# Patient Record
Sex: FEMALE | Race: WHITE | NOT HISPANIC OR LATINO | Employment: OTHER | URBAN - METROPOLITAN AREA
[De-identification: names, ages, dates, MRNs, and addresses within clinical notes are randomized per-mention and may not be internally consistent; named-entity substitution may affect disease eponyms.]

---

## 2019-04-18 ENCOUNTER — HOSPITAL ENCOUNTER (OUTPATIENT)
Dept: OBGYN | Facility: HOSPITAL | Age: 39
Discharge: HOME | End: 2019-04-18
Attending: OBSTETRICS & GYNECOLOGY | Admitting: OBSTETRICS & GYNECOLOGY

## 2019-04-18 ASSESSMENT — MIFFLIN-ST. JEOR: SCORE: 1172.57

## 2019-05-27 ENCOUNTER — HOSPITAL ENCOUNTER (OUTPATIENT)
Dept: OBGYN | Facility: HOSPITAL | Age: 39
Discharge: HOME | End: 2019-05-27
Attending: OBSTETRICS & GYNECOLOGY | Admitting: OBSTETRICS & GYNECOLOGY

## 2019-05-27 ENCOUNTER — HOSPITAL ENCOUNTER (OUTPATIENT)
Dept: OBGYN | Facility: HOSPITAL | Age: 39
Discharge: HOME | End: 2019-05-27

## 2019-06-02 ENCOUNTER — HOSPITAL ENCOUNTER (OUTPATIENT)
Dept: OBGYN | Facility: HOSPITAL | Age: 39
Discharge: HOME | End: 2019-06-02
Attending: OBSTETRICS & GYNECOLOGY | Admitting: OBSTETRICS & GYNECOLOGY

## 2019-06-02 LAB
ALBUMIN UR-MCNC: NEGATIVE MG/DL
APPEARANCE UR: ABNORMAL
BACTERIA #/AREA URNS HPF: ABNORMAL HPF
BILIRUB UR QL STRIP: NEGATIVE
COLOR UR AUTO: YELLOW
GLUCOSE UR STRIP-MCNC: NEGATIVE MG/DL
HGB UR QL STRIP: NEGATIVE
KETONES UR STRIP-MCNC: NEGATIVE MG/DL
LEUKOCYTE ESTERASE UR QL STRIP: ABNORMAL
MUCOUS THREADS #/AREA URNS LPF: ABNORMAL LPF
NITRATE UR QL: NEGATIVE
PH UR STRIP: 6 [PH] (ref 4.5–8)
RBC #/AREA URNS AUTO: ABNORMAL HPF
SP GR UR STRIP: 1.01 (ref 1–1.03)
SQUAMOUS #/AREA URNS AUTO: >100 LPF
UROBILINOGEN UR STRIP-ACNC: ABNORMAL
WBC #/AREA URNS AUTO: ABNORMAL HPF

## 2019-06-02 ASSESSMENT — MIFFLIN-ST. JEOR: SCORE: 1186.17

## 2019-06-03 LAB — BACTERIA SPEC CULT: NO GROWTH

## 2019-06-09 ENCOUNTER — HOSPITAL ENCOUNTER (OUTPATIENT)
Dept: OBGYN | Facility: HOSPITAL | Age: 39
Discharge: HOME | End: 2019-06-09
Attending: OBSTETRICS & GYNECOLOGY | Admitting: OBSTETRICS & GYNECOLOGY

## 2019-06-09 LAB
ALBUMIN UR-MCNC: ABNORMAL MG/DL
APPEARANCE UR: ABNORMAL
BACTERIA #/AREA URNS HPF: ABNORMAL HPF
BILIRUB UR QL STRIP: NEGATIVE
COLOR UR AUTO: YELLOW
GLUCOSE UR STRIP-MCNC: NEGATIVE MG/DL
HGB UR QL STRIP: ABNORMAL
KETONES UR STRIP-MCNC: ABNORMAL MG/DL
LEUKOCYTE ESTERASE UR QL STRIP: ABNORMAL
NITRATE UR QL: NEGATIVE
PH UR STRIP: 5.5 [PH] (ref 4.5–8)
RBC #/AREA URNS AUTO: ABNORMAL HPF
SP GR UR STRIP: 1.03 (ref 1–1.03)
SQUAMOUS #/AREA URNS AUTO: >100 LPF
UROBILINOGEN UR STRIP-ACNC: ABNORMAL
WBC #/AREA URNS AUTO: ABNORMAL HPF

## 2019-06-09 ASSESSMENT — MIFFLIN-ST. JEOR: SCORE: 1168.03

## 2019-06-10 LAB — BACTERIA SPEC CULT: NO GROWTH

## 2019-06-26 ENCOUNTER — HOSPITAL ENCOUNTER (OUTPATIENT)
Dept: OBGYN | Facility: HOSPITAL | Age: 39
Discharge: ANOTHER HOSPITAL | End: 2019-06-26
Attending: OBSTETRICS & GYNECOLOGY | Admitting: OBSTETRICS & GYNECOLOGY

## 2019-06-26 ASSESSMENT — MIFFLIN-ST. JEOR: SCORE: 1190.71

## 2019-07-05 ENCOUNTER — HOSPITAL ENCOUNTER (OUTPATIENT)
Dept: OBGYN | Facility: HOSPITAL | Age: 39
Discharge: HOME | End: 2019-07-06
Attending: OBSTETRICS & GYNECOLOGY | Admitting: OBSTETRICS & GYNECOLOGY

## 2019-07-05 LAB — RUPTURE OF FETAL MEMBRANES BY ROM PLUS: NEGATIVE

## 2019-07-05 ASSESSMENT — MIFFLIN-ST. JEOR: SCORE: 1227

## 2019-07-07 ENCOUNTER — ANESTHESIA - HEALTHEAST (OUTPATIENT)
Dept: OBGYN | Facility: HOSPITAL | Age: 39
End: 2019-07-07

## 2019-10-23 ENCOUNTER — RECORDS - HEALTHEAST (OUTPATIENT)
Dept: ADMINISTRATIVE | Facility: OTHER | Age: 39
End: 2019-10-23

## 2019-11-06 ENCOUNTER — HOSPITAL ENCOUNTER (OUTPATIENT)
Dept: MRI IMAGING | Facility: CLINIC | Age: 39
Discharge: HOME | End: 2019-11-06
Attending: INTERNAL MEDICINE

## 2019-11-06 DIAGNOSIS — R79.89 ABNORMAL LFTS: ICD-10-CM

## 2020-09-15 ENCOUNTER — TRANSFERRED RECORDS (OUTPATIENT)
Dept: HEALTH INFORMATION MANAGEMENT | Facility: CLINIC | Age: 40
End: 2020-09-15

## 2020-11-09 ENCOUNTER — HOSPITAL ENCOUNTER (OUTPATIENT)
Facility: CLINIC | Age: 40
End: 2020-11-09
Attending: INTERNAL MEDICINE | Admitting: INTERNAL MEDICINE
Payer: OTHER GOVERNMENT

## 2020-11-09 DIAGNOSIS — Z11.59 ENCOUNTER FOR SCREENING FOR OTHER VIRAL DISEASES: Primary | ICD-10-CM

## 2020-11-10 ENCOUNTER — AMBULATORY - HEALTHEAST (OUTPATIENT)
Dept: LAB | Facility: CLINIC | Age: 40
End: 2020-11-10

## 2020-11-10 DIAGNOSIS — Z11.59 ENCOUNTER FOR SCREENING FOR OTHER VIRAL DISEASES: ICD-10-CM

## 2020-11-12 ENCOUNTER — COMMUNICATION - HEALTHEAST (OUTPATIENT)
Dept: SCHEDULING | Facility: CLINIC | Age: 40
End: 2020-11-12

## 2020-11-12 RX ORDER — METRONIDAZOLE 500 MG/1
500 TABLET ORAL 2 TIMES DAILY
Status: ON HOLD | COMMUNITY
End: 2021-11-12

## 2020-11-12 RX ORDER — SULFAMETHOXAZOLE/TRIMETHOPRIM 800-160 MG
1 TABLET ORAL 2 TIMES DAILY
Status: ON HOLD | COMMUNITY
End: 2021-11-12

## 2020-11-12 RX ORDER — VANCOMYCIN HYDROCHLORIDE 125 MG/1
125 CAPSULE ORAL 2 TIMES DAILY
Status: ON HOLD | COMMUNITY
End: 2021-11-12

## 2020-11-12 RX ORDER — COLESEVELAM 180 1/1
1875 TABLET ORAL 2 TIMES DAILY WITH MEALS
Status: ON HOLD | COMMUNITY
End: 2021-11-12

## 2020-11-13 ENCOUNTER — HOSPITAL ENCOUNTER (OUTPATIENT)
Facility: CLINIC | Age: 40
Discharge: HOME | End: 2020-11-13
Attending: INTERNAL MEDICINE | Admitting: INTERNAL MEDICINE
Payer: OTHER GOVERNMENT

## 2020-11-13 ENCOUNTER — ANESTHESIA (OUTPATIENT)
Dept: SURGERY | Facility: CLINIC | Age: 40
End: 2020-11-13
Payer: OTHER GOVERNMENT

## 2020-11-13 ENCOUNTER — ANESTHESIA EVENT (OUTPATIENT)
Dept: SURGERY | Facility: CLINIC | Age: 40
End: 2020-11-13
Payer: OTHER GOVERNMENT

## 2020-11-13 VITALS
SYSTOLIC BLOOD PRESSURE: 99 MMHG | RESPIRATION RATE: 16 BRPM | BODY MASS INDEX: 20.44 KG/M2 | WEIGHT: 111.1 LBS | HEIGHT: 62 IN | OXYGEN SATURATION: 100 % | HEART RATE: 72 BPM | DIASTOLIC BLOOD PRESSURE: 56 MMHG | TEMPERATURE: 98 F

## 2020-11-13 LAB
ALBUMIN SERPL-MCNC: 3.5 G/DL (ref 3.4–5)
ALP SERPL-CCNC: 98 U/L (ref 40–150)
ALT SERPL W P-5'-P-CCNC: 59 U/L (ref 0–50)
AST SERPL W P-5'-P-CCNC: 23 U/L (ref 0–45)
BILIRUB DIRECT SERPL-MCNC: 0.1 MG/DL (ref 0–0.2)
BILIRUB SERPL-MCNC: 0.6 MG/DL (ref 0.2–1.3)
HCG UR QL: NEGATIVE
PROT SERPL-MCNC: 7 G/DL (ref 6.8–8.8)
UPPER EUS: NORMAL

## 2020-11-13 PROCEDURE — 88305 TISSUE EXAM BY PATHOLOGIST: CPT | Mod: 26 | Performed by: PATHOLOGY

## 2020-11-13 PROCEDURE — 250N000009 HC RX 250: Performed by: NURSE ANESTHETIST, CERTIFIED REGISTERED

## 2020-11-13 PROCEDURE — 999N000139 HC STATISTIC PRE-PROCEDURE ASSESSMENT II: Performed by: INTERNAL MEDICINE

## 2020-11-13 PROCEDURE — 36415 COLL VENOUS BLD VENIPUNCTURE: CPT | Performed by: INTERNAL MEDICINE

## 2020-11-13 PROCEDURE — 360N000015 HC SURGERY LEVEL 2 EA 15 ADDTL MIN: Performed by: INTERNAL MEDICINE

## 2020-11-13 PROCEDURE — 360N000014 HC SURGERY LEVEL 2 1ST 30 MIN: Performed by: INTERNAL MEDICINE

## 2020-11-13 PROCEDURE — 761N000001 HC RECOVERY PHASE 1 LEVEL 1 FIRST HR: Performed by: INTERNAL MEDICINE

## 2020-11-13 PROCEDURE — 370N000001 HC ANESTHESIA TECHNICAL FEE, 1ST 30 MIN: Performed by: INTERNAL MEDICINE

## 2020-11-13 PROCEDURE — 370N000002 HC ANESTHESIA TECHNICAL FEE, EACH ADDTL 15 MIN: Performed by: INTERNAL MEDICINE

## 2020-11-13 PROCEDURE — 258N000003 HC RX IP 258 OP 636: Performed by: ANESTHESIOLOGY

## 2020-11-13 PROCEDURE — 761N000007 HC RECOVERY PHASE 2 EACH 15 MINS: Performed by: INTERNAL MEDICINE

## 2020-11-13 PROCEDURE — 80076 HEPATIC FUNCTION PANEL: CPT | Performed by: INTERNAL MEDICINE

## 2020-11-13 PROCEDURE — 250N000011 HC RX IP 250 OP 636: Performed by: NURSE ANESTHETIST, CERTIFIED REGISTERED

## 2020-11-13 PROCEDURE — 81025 URINE PREGNANCY TEST: CPT | Performed by: ANESTHESIOLOGY

## 2020-11-13 PROCEDURE — 88305 TISSUE EXAM BY PATHOLOGIST: CPT | Mod: TC | Performed by: INTERNAL MEDICINE

## 2020-11-13 RX ORDER — SODIUM CHLORIDE, SODIUM LACTATE, POTASSIUM CHLORIDE, CALCIUM CHLORIDE 600; 310; 30; 20 MG/100ML; MG/100ML; MG/100ML; MG/100ML
INJECTION, SOLUTION INTRAVENOUS CONTINUOUS
Status: DISCONTINUED | OUTPATIENT
Start: 2020-11-13 | End: 2020-11-13 | Stop reason: HOSPADM

## 2020-11-13 RX ORDER — ONDANSETRON 2 MG/ML
4 INJECTION INTRAMUSCULAR; INTRAVENOUS EVERY 30 MIN PRN
Status: DISCONTINUED | OUTPATIENT
Start: 2020-11-13 | End: 2020-11-13 | Stop reason: HOSPADM

## 2020-11-13 RX ORDER — HYDRALAZINE HYDROCHLORIDE 20 MG/ML
2.5-5 INJECTION INTRAMUSCULAR; INTRAVENOUS EVERY 10 MIN PRN
Status: DISCONTINUED | OUTPATIENT
Start: 2020-11-13 | End: 2020-11-13 | Stop reason: HOSPADM

## 2020-11-13 RX ORDER — PROPOFOL 10 MG/ML
INJECTION, EMULSION INTRAVENOUS CONTINUOUS PRN
Status: DISCONTINUED | OUTPATIENT
Start: 2020-11-13 | End: 2020-11-13

## 2020-11-13 RX ORDER — INDOMETHACIN 50 MG/1
100 SUPPOSITORY RECTAL
Status: DISCONTINUED | OUTPATIENT
Start: 2020-11-13 | End: 2020-11-13 | Stop reason: HOSPADM

## 2020-11-13 RX ORDER — DEXAMETHASONE SODIUM PHOSPHATE 4 MG/ML
4 INJECTION, SOLUTION INTRA-ARTICULAR; INTRALESIONAL; INTRAMUSCULAR; INTRAVENOUS; SOFT TISSUE EVERY 10 MIN PRN
Status: DISCONTINUED | OUTPATIENT
Start: 2020-11-13 | End: 2020-11-13 | Stop reason: HOSPADM

## 2020-11-13 RX ORDER — HYDROMORPHONE HYDROCHLORIDE 1 MG/ML
.3-.5 INJECTION, SOLUTION INTRAMUSCULAR; INTRAVENOUS; SUBCUTANEOUS EVERY 10 MIN PRN
Status: DISCONTINUED | OUTPATIENT
Start: 2020-11-13 | End: 2020-11-13 | Stop reason: HOSPADM

## 2020-11-13 RX ORDER — LABETALOL HYDROCHLORIDE 5 MG/ML
10 INJECTION, SOLUTION INTRAVENOUS
Status: DISCONTINUED | OUTPATIENT
Start: 2020-11-13 | End: 2020-11-13 | Stop reason: HOSPADM

## 2020-11-13 RX ORDER — MEPERIDINE HYDROCHLORIDE 25 MG/ML
12.5 INJECTION INTRAMUSCULAR; INTRAVENOUS; SUBCUTANEOUS
Status: DISCONTINUED | OUTPATIENT
Start: 2020-11-13 | End: 2020-11-13 | Stop reason: HOSPADM

## 2020-11-13 RX ORDER — LIDOCAINE 40 MG/G
CREAM TOPICAL
Status: DISCONTINUED | OUTPATIENT
Start: 2020-11-13 | End: 2020-11-13 | Stop reason: HOSPADM

## 2020-11-13 RX ORDER — PROPOFOL 10 MG/ML
INJECTION, EMULSION INTRAVENOUS PRN
Status: DISCONTINUED | OUTPATIENT
Start: 2020-11-13 | End: 2020-11-13

## 2020-11-13 RX ORDER — ONDANSETRON 2 MG/ML
INJECTION INTRAMUSCULAR; INTRAVENOUS PRN
Status: DISCONTINUED | OUTPATIENT
Start: 2020-11-13 | End: 2020-11-13

## 2020-11-13 RX ORDER — NALOXONE HYDROCHLORIDE 0.4 MG/ML
.1-.4 INJECTION, SOLUTION INTRAMUSCULAR; INTRAVENOUS; SUBCUTANEOUS
Status: DISCONTINUED | OUTPATIENT
Start: 2020-11-13 | End: 2020-11-13 | Stop reason: HOSPADM

## 2020-11-13 RX ORDER — ONDANSETRON 4 MG/1
4 TABLET, ORALLY DISINTEGRATING ORAL EVERY 30 MIN PRN
Status: DISCONTINUED | OUTPATIENT
Start: 2020-11-13 | End: 2020-11-13 | Stop reason: HOSPADM

## 2020-11-13 RX ORDER — FENTANYL CITRATE 50 UG/ML
25-50 INJECTION, SOLUTION INTRAMUSCULAR; INTRAVENOUS
Status: DISCONTINUED | OUTPATIENT
Start: 2020-11-13 | End: 2020-11-13 | Stop reason: HOSPADM

## 2020-11-13 RX ORDER — LIDOCAINE HYDROCHLORIDE 20 MG/ML
INJECTION, SOLUTION INFILTRATION; PERINEURAL PRN
Status: DISCONTINUED | OUTPATIENT
Start: 2020-11-13 | End: 2020-11-13

## 2020-11-13 RX ORDER — FLUMAZENIL 0.1 MG/ML
0.2 INJECTION, SOLUTION INTRAVENOUS
Status: DISCONTINUED | OUTPATIENT
Start: 2020-11-13 | End: 2020-11-13 | Stop reason: HOSPADM

## 2020-11-13 RX ORDER — DEXAMETHASONE SODIUM PHOSPHATE 4 MG/ML
INJECTION, SOLUTION INTRA-ARTICULAR; INTRALESIONAL; INTRAMUSCULAR; INTRAVENOUS; SOFT TISSUE PRN
Status: DISCONTINUED | OUTPATIENT
Start: 2020-11-13 | End: 2020-11-13

## 2020-11-13 RX ADMIN — DEXAMETHASONE SODIUM PHOSPHATE 4 MG: 4 INJECTION, SOLUTION INTRA-ARTICULAR; INTRALESIONAL; INTRAMUSCULAR; INTRAVENOUS; SOFT TISSUE at 14:36

## 2020-11-13 RX ADMIN — MIDAZOLAM 2 MG: 1 INJECTION INTRAMUSCULAR; INTRAVENOUS at 14:14

## 2020-11-13 RX ADMIN — SUGAMMADEX 100 MG: 100 INJECTION, SOLUTION INTRAVENOUS at 14:50

## 2020-11-13 RX ADMIN — SODIUM CHLORIDE, POTASSIUM CHLORIDE, SODIUM LACTATE AND CALCIUM CHLORIDE: 600; 310; 30; 20 INJECTION, SOLUTION INTRAVENOUS at 14:14

## 2020-11-13 RX ADMIN — ROCURONIUM BROMIDE 30 MG: 10 INJECTION INTRAVENOUS at 14:19

## 2020-11-13 RX ADMIN — LIDOCAINE HYDROCHLORIDE 100 MG: 20 INJECTION, SOLUTION INFILTRATION; PERINEURAL at 14:19

## 2020-11-13 RX ADMIN — PROPOFOL 150 MCG/KG/MIN: 10 INJECTION, EMULSION INTRAVENOUS at 14:19

## 2020-11-13 RX ADMIN — PROPOFOL 150 MG: 10 INJECTION, EMULSION INTRAVENOUS at 14:19

## 2020-11-13 RX ADMIN — ONDANSETRON 4 MG: 2 INJECTION INTRAMUSCULAR; INTRAVENOUS at 14:36

## 2020-11-13 SDOH — HEALTH STABILITY: MENTAL HEALTH: HOW OFTEN DO YOU HAVE A DRINK CONTAINING ALCOHOL?: NEVER

## 2020-11-13 ASSESSMENT — LIFESTYLE VARIABLES: TOBACCO_USE: 0

## 2020-11-13 ASSESSMENT — MIFFLIN-ST. JEOR: SCORE: 1137.2

## 2020-11-13 NOTE — ANESTHESIA CARE TRANSFER NOTE
Patient: Alok Nino    Procedure(s):  ENDOSCOPIC ULTRASOUND, ESOPHAGOSCOPY / UPPER GASTROINTESTINAL TRACT with BIOPSY    Diagnosis: Abnormal results of liver function studies [R94.5]  Diagnosis Additional Information: No value filed.    Anesthesia Type:   General     Note:  Airway :Face Mask  Patient transferred to:PACU  Handoff Report: Identifed the Patient, Identified the Reponsible Provider, Reviewed the pertinent medical history, Discussed the surgical course, Reviewed Intra-OP anesthesia mangement and issues during anesthesia, Set expectations for post-procedure period and Allowed opportunity for questions and acknowledgement of understanding      Vitals: (Last set prior to Anesthesia Care Transfer)    CRNA VITALS  11/13/2020 1428 - 11/13/2020 1506      11/13/2020             Pulse:  78    Ht Rate:  77    SpO2:  99 %    Resp Rate (set):  10                Electronically Signed By: TACOS Montes CRNA  November 13, 2020  3:06 PM

## 2020-11-13 NOTE — DISCHARGE INSTRUCTIONS
Same Day Surgery Discharge Instructions for  Sedation and General Anesthesia       It's not unusual to feel dizzy, light-headed or faint for up to 24 hours after surgery or while taking pain medication.  If you have these symptoms: sit for a few minutes before standing and have someone assist you when you get up to walk or use the bathroom.      You should rest and relax for the next 24 hours. We recommend you make arrangements to have an adult stay with you for at least 24 hours after your discharge.  Avoid hazardous and strenuous activity.      DO NOT DRIVE any vehicle or operate mechanical equipment for 24 hours following the end of your surgery.  Even though you may feel normal, your reactions may be affected by the medication you have received.      Do not drink alcoholic beverages for 24 hours following surgery.       Slowly progress to your regular diet as you feel able. It's not unusual to feel nauseated and/or vomit after receiving anesthesia.  If you develop these symptoms, drink clear liquids (apple juice, ginger ale, broth, 7-up, etc. ) until you feel better.  If your nausea and vomiting persists for 24 hours, please notify your surgeon.        All narcotic pain medications, along with inactivity and anesthesia, can cause constipation. Drinking plenty of liquids and increasing fiber intake will help.      For any questions of a medical nature, call your surgeon.      Do not make important decisions for 24 hours.      If you had general anesthesia, you may have a sore throat for a couple of days related to the breathing tube used during surgery.  You may use Cepacol lozenges to help with this discomfort.  If it worsens or if you develop a fever, contact your surgeon.       If you feel your pain is not well managed with the pain medications prescribed by your surgeon, please contact your surgeon's office to let them know so they can address your concerns.       CoVid 19 Information    We want to give you  information regarding Covid. Please consult your primary care provider with any questions you might have.     Patient who have symptoms (cough, fever, or shortness of breath), need to isolate for 7 days from when symptoms started OR 72 hours after fever resolves (without fever reducing medications) AND improvement of respiratory symptoms (whichever is longer).      Isolate yourself at home (in own room/own bathroom if possible)    Do Not allow any visitors    Do Not go to work or school    Do Not go to [employer],  centers, shopping, or other public places.    Do Not shake hands.    Avoid close and intimate contact with others (hugging, kissing).    Follow CDC recommendations for household cleaning of frequently touched services.     After the initial 7 days, continue to isolate yourself from household members as much as possible. To continue decrease the risk of community spread and exposure, you and any members of your household should limit activities in public for 14 days after starting home isolation.     You can reference the following CDC link for helpful home isolation/care tips:  https://www.cdc.gov/coronavirus/2019-ncov/downloads/10Things.pdf    Protect Others:    Cover Your Mouth and Nose with a mask, disposable tissue or wash cloth to avoid spreading germs to others.    Wash your hands and face frequently with soap and water    Call Your Primary Doctor If: Breathing difficulty develops or you become worse.    For more information about COVID19 and options for caring for yourself at home, please visit the CDC website at https://www.cdc.gov/coronavirus/2019-ncov/about/steps-when-sick.html  For more options for care at [facility], please visit our website at [email or web address]    **If you have questions or concerns about your procedure,   call Dr. Garcia at [phone]**    Followup with primary GI Dr. Tolbert in 1-2 weeks.

## 2020-11-13 NOTE — OR NURSING
PreOp cares/orders now completed. Pt is now ready for scheduled Procedures.   No Family present w/Pt; Texting Service has been initiated.

## 2020-11-13 NOTE — ANESTHESIA PREPROCEDURE EVALUATION
Anesthesia Pre-Procedure Evaluation    Patient: Alok Nino   MRN: [id number] : 1980          Preoperative Diagnosis: Abnormal results of liver function studies [R94.5]    Procedure(s):  ENDOSCOPIC ULTRASOUND, ESOPHAGOSCOPY / UPPER GASTROINTESTINAL TRACT (GI)  ENDOSCOPIC RETROGRADE CHOLANGIOPANCREATOGRAPHY    Past Medical History:   Diagnosis Date     C. difficile enteritis      Chronic pain      Crohn disease (H)      Melanoma (H)      PONV (postoperative nausea and vomiting)      Past Surgical History:   Procedure Laterality Date     ABDOMEN SURGERY      3 for crohns dx     APPENDECTOMY       CHOLECYSTECTOMY       COLONOSCOPY       GYN SURGERY       RESECTION ILEOCECAL  2013    Procedure: RESECTION ILEOCECAL;  Laparotomy, Extensive Lysis of Adhesions, Stricture Plasty X2  Anesthesia general with block;  Surgeon: Pete Cartagena MD;  Location: [facility] OR       Anesthesia Evaluation     . Pt has had prior anesthetic.     History of anesthetic complications   - PONV        ROS/MED HX    ENT/Pulmonary:      (-) tobacco use   Neurologic:       Cardiovascular:        (-) hypertension   METS/Exercise Tolerance:     Hematologic:         Musculoskeletal:         GI/Hepatic:     (+) Inflammatory bowel disease, liver disease, Other GI/Hepatic s/p ileocecal resection     (-) GERD   Renal/Genitourinary:  - ROS Renal section negative       Endo:  - neg endo ROS       Psychiatric:         Infectious Disease:   (+) Other Infectious Disease c. difficile      Malignancy:         Other:    (+) H/O Chronic Pain,H/O chronic opiod use ,                         Physical Exam  Normal systems: cardiovascular, pulmonary and dental    Airway   Mallampati: II  TM distance: >3 FB  Neck ROM: full    Dental     Cardiovascular       Pulmonary             Lab Results   Component Value Date    WBC 8.6 2013    HGB 9.3 (L) 2013    HCT 29.4 (L) 2013     12/15/2013     2013    POTASSIUM 3.4 2013     "CHLORIDE 105 12/14/2013    CO2 27 12/14/2013    BUN 3 (L) 12/14/2013    CR 0.46 (L) 12/14/2013     (H) 12/14/2013    BLACK 8.0 (L) 12/14/2013    PHOS 3.1 12/07/2013    MAG 1.7 12/11/2013    ALBUMIN 3.6 (L) 11/19/2013    PROTTOTAL 6.9 11/19/2013    ALT 34 11/19/2013    AST 32 11/19/2013    ALKPHOS 56 11/19/2013    BILITOTAL 0.2 11/19/2013    AMYLASE 85 12/12/2013    HCG Negative 12/06/2013       Preop Vitals  BP Readings from Last 3 Encounters:   11/13/20 113/68   01/07/14 103/74   12/30/13 105/68    Pulse Readings from Last 3 Encounters:   01/07/14 90   12/30/13 95   12/23/13 85      Resp Readings from Last 3 Encounters:   11/13/20 16   12/16/13 18    SpO2 Readings from Last 3 Encounters:   11/13/20 99%   01/07/14 98%   12/30/13 100%      Temp Readings from Last 1 Encounters:   11/13/20 35.9  C (96.7  F) (Oral)    Ht Readings from Last 1 Encounters:   11/13/20 1.575 m (5' 2\")      Wt Readings from Last 1 Encounters:   11/13/20 50.4 kg (111 lb 1.6 oz)    Estimated body mass index is 20.32 kg/m  as calculated from the following:    Height as of this encounter: 1.575 m (5' 2\").    Weight as of this encounter: 50.4 kg (111 lb 1.6 oz).       Anesthesia Plan      History & Physical Review  History and physical reviewed and following examination; no interval change.    ASA Status:  2 .    NPO Status:  > 8 hours    Plan for General with Intravenous and Propofol induction. Maintenance will be TIVA.    PONV prophylaxis:  Ondansetron (or other 5HT-3) and Dexamethasone or Solumedrol         Postoperative Care  Postoperative pain management:  Multi-modal analgesia.      Consents  Anesthetic plan, risks, benefits and alternatives discussed with:  Patient..                 Sarah Hazel MD  "

## 2020-11-13 NOTE — ANESTHESIA POSTPROCEDURE EVALUATION
Patient: Alok Nino    Procedure(s):  ENDOSCOPIC ULTRASOUND, ESOPHAGOSCOPY / UPPER GASTROINTESTINAL TRACT with BIOPSY    Diagnosis:Abnormal results of liver function studies [R94.5]  Diagnosis Additional Information: No value filed.    Anesthesia Type:  General    Note:  Anesthesia Post Evaluation    Patient location during evaluation: PACU  Patient participation: Able to fully participate in evaluation  Level of consciousness: awake and alert  Pain management: adequate  Airway patency: patent  Cardiovascular status: acceptable  Respiratory status: acceptable and unassisted  Hydration status: acceptable  PONV: none             Last vitals:  Vitals:    11/13/20 1515 11/13/20 1530 11/13/20 1545   BP: 100/55 98/62 98/60   Pulse: 59 72    Resp: 16 14 20   Temp:      SpO2: 100% 100% 100%         Electronically Signed By: Sarah Hazel MD  November 13, 2020  3:47 PM

## 2020-11-13 NOTE — ANESTHESIA PROCEDURE NOTES
Airway   Date/Time: 11/13/2020 2:21 PM   Patient location during procedure: OR    Staff -   CRNA: Sb Medina APRN CRNA  Performed By: CRNA    Consent for Airway   Urgency: elective    Indications and Patient Condition  Indications for airway management: shukri-procedural  Induction type:intravenousMask difficulty assessment: 1 - vent by mask    Final Airway Details  Final airway type: endotracheal airway  Successful airway:ETT - single  Endotracheal Airway Details   ETT size (mm): 6.5  Cuffed: yes  Successful intubation technique: direct laryngoscopy  Grade View of Cords: 2  Adjucts: stylet  Secured with: pink tape    Post intubation assessment   Placement verified by: capnometry, equal breath sounds and chest rise   Number of attempts at approach: 1  Secured with:pink tape  Ease of procedure: easy  Dentition: Unchanged and Intact

## 2020-11-16 LAB — COPATH REPORT: NORMAL

## 2021-05-25 ENCOUNTER — RECORDS - HEALTHEAST (OUTPATIENT)
Dept: ADMINISTRATIVE | Facility: CLINIC | Age: 41
End: 2021-05-25

## 2021-05-26 ENCOUNTER — RECORDS - HEALTHEAST (OUTPATIENT)
Dept: ADMINISTRATIVE | Facility: CLINIC | Age: 41
End: 2021-05-26

## 2021-05-27 ENCOUNTER — RECORDS - HEALTHEAST (OUTPATIENT)
Dept: ADMINISTRATIVE | Facility: CLINIC | Age: 41
End: 2021-05-27

## 2021-05-27 NOTE — PROGRESS NOTES
Alok presents to L&D at 22w6d gestation with complaints of irregular uterine contractions the past few weeks that have worsened this past week. Denies LOF or vaginal bleeding. Douds on for evaluation and FHTs by Doppler. Vital signs obtained and Dr CHING Samuel informed of patient arrival. Here to see patient

## 2021-05-29 ENCOUNTER — RECORDS - HEALTHEAST (OUTPATIENT)
Dept: ADMINISTRATIVE | Facility: CLINIC | Age: 41
End: 2021-05-29

## 2021-05-29 NOTE — PROGRESS NOTES
Dr. Samuel here. EFM and BPP reviewed with him. He will discharge Alok home. She has a follow-up level 2 ultrasound on Thursday at [facility].

## 2021-05-29 NOTE — PROGRESS NOTES
5-27-19    S: Pt with at approx 27 weeks with U/S showing polyhydramnios. Pt with severe Crohn's, recently discharged following conservative treatment for SMO.     O: VSS   BPP 8/8. NST reactive for 27 weeks.     A: Reassuring fetal status.    P: MFM appointment Thursday. Disposition depending on results.    To Samuel MD

## 2021-05-29 NOTE — PROGRESS NOTES
Alok has returned from ultrasound and EFM applied. She has had multiple medical issues during this pregnancy including a bowel obstruction. She has had pre-term contractions and a shortened cervix. Dr. Samuel notified of US report and EFM strip. He is on his way here to see patient.

## 2021-05-29 NOTE — PROGRESS NOTES
[place] [facility] [place] told her to come in for a BPP and NST test this morning. Dr. Anderson called and confirmed this, orders placed.

## 2021-05-29 NOTE — PROGRESS NOTES
Pt presented to [facility] with c/o ctx q 5 minutes since around 0800 this morning. She denies that they are painful at this time. She also denies LOF or bleeding. Urine sample obtained for UA and pt placed on EFM. VSS, Category 1 FHR tracing with occ. Ctx, and no further concerns. Dr. Samuel at bedside to assess pt, review FHR strip, and SVE. Dr. Samuel noted pt's cervix to remain closed, and pt is ok to discharge to home at this time.

## 2021-05-29 NOTE — PROGRESS NOTES
Alok here with c/o cramping.  She denies vaginal bleeding or leaking of fluid.  EFM applied and Dr Samuel notified.  He is coming in to evaluate pt.  Urine sent for UA.

## 2021-05-30 NOTE — ANESTHESIA PROCEDURE NOTES
Epidural Block    Patient location during procedure: OB  Time Called: 7/7/2019 3:40 AM  Reason for Block:labor epidural  Staffing:  Performing  Anesthesiologist: Wally Waggoner MD  Preanesthetic Checklist  Completed: patient identified, risks, benefits, and alternatives discussed, timeout performed, consent obtained, airway assessed, oxygen available, suction available, emergency drugs available and hand hygiene performed  Procedure  Patient position: sitting  Prep: ChloraPrep and site prepped and draped  Patient monitoring: continuous pulse oximetry, heart rate and blood pressure  Approach: midline  Location: L3-L4  Injection technique: GAYATRI saline  Number of Attempts:1  Needle  Needle type: Vaibhav   Needle gauge: 18 G     Catheter in Space: 5  Assessment  Sensory level:  No complications

## 2021-05-30 NOTE — PROGRESS NOTES
100 mg vist oral given and pt discharged home with written instruction.  Pt walked out of the unit with .

## 2021-05-30 NOTE — ANESTHESIA POSTPROCEDURE EVALUATION
Patient: Alok Nino  * No procedures listed *  Anesthesia type: epidural    Patient location: Labor and Delivery  Last vitals: No vitals data found for the desired time range.    Post vital signs: stable  Level of consciousness: awake and responds to simple questions  Post-anesthesia pain: pain controlled  Post-anesthesia nausea and vomiting: no  Pulmonary: unassisted, return to baseline  Cardiovascular: stable and blood pressure at baseline  Hydration: adequate  Anesthetic events: no    QCDR Measures:  ASA# 11 - Debra-op Cardiac Arrest: ASA11B - Patient did NOT experience unanticipated cardiac arrest  ASA# 12 - Debra-op Mortality Rate: ASA12B - Patient did NOT die  ASA# 13 - PACU Re-Intubation Rate: NA - No ETT / LMA used for case  ASA# 10 - Composite Anes Safety: ASA10A - No serious adverse event    Additional Notes:

## 2021-05-30 NOTE — PROGRESS NOTES
2340, Pt negative rom plus result out and doctor Samuel updated.  2355,Dr. Samuel at bedside. Verbal order to give pt 100 mg vistril orally and discharge home obtained.

## 2021-05-30 NOTE — ANESTHESIA PREPROCEDURE EVALUATION
Anesthesia Evaluation      Patient summary reviewed   No history of anesthetic complications     Airway   Mallampati: I  Neck ROM: full   Pulmonary - negative ROS                          Cardiovascular - negative ROS   Neuro/Psych - negative ROS     Endo/Other    (+) pregnant     GI/Hepatic/Renal       Other findings: Hx of crohn's disease with bowel obstructions      Dental - normal exam                        Anesthesia Plan  Planned anesthetic: epidural    ASA 2     Anesthetic plan and risks discussed with: patient    Post-op plan: routine recovery

## 2021-05-31 ENCOUNTER — RECORDS - HEALTHEAST (OUTPATIENT)
Dept: ADMINISTRATIVE | Facility: CLINIC | Age: 41
End: 2021-05-31

## 2021-06-01 ENCOUNTER — RECORDS - HEALTHEAST (OUTPATIENT)
Dept: ADMINISTRATIVE | Facility: CLINIC | Age: 41
End: 2021-06-01

## 2021-06-02 ENCOUNTER — RECORDS - HEALTHEAST (OUTPATIENT)
Dept: ADMINISTRATIVE | Facility: CLINIC | Age: 41
End: 2021-06-02

## 2021-06-02 VITALS — HEIGHT: 62 IN | BODY MASS INDEX: 22.08 KG/M2 | WEIGHT: 120 LBS

## 2021-06-03 VITALS — HEIGHT: 62 IN | BODY MASS INDEX: 21.9 KG/M2 | WEIGHT: 119 LBS

## 2021-06-03 VITALS — BODY MASS INDEX: 24.29 KG/M2 | WEIGHT: 132 LBS | HEIGHT: 62 IN

## 2021-06-03 VITALS — HEIGHT: 62 IN | WEIGHT: 124 LBS | BODY MASS INDEX: 22.82 KG/M2

## 2021-06-03 VITALS — WEIGHT: 123 LBS | BODY MASS INDEX: 22.63 KG/M2 | HEIGHT: 62 IN

## 2021-06-05 ENCOUNTER — RECORDS - HEALTHEAST (OUTPATIENT)
Dept: SCHEDULING | Facility: CLINIC | Age: 41
End: 2021-06-05

## 2021-06-05 DIAGNOSIS — K50.00 REGIONAL ENTERITIS OF SMALL INTESTINE (H): ICD-10-CM

## 2021-07-27 ENCOUNTER — TRANSFERRED RECORDS (OUTPATIENT)
Dept: HEALTH INFORMATION MANAGEMENT | Facility: CLINIC | Age: 41
End: 2021-07-27

## 2021-09-21 ENCOUNTER — MEDICAL CORRESPONDENCE (OUTPATIENT)
Dept: HEALTH INFORMATION MANAGEMENT | Facility: CLINIC | Age: 41
End: 2021-09-21

## 2021-09-21 ENCOUNTER — TRANSFERRED RECORDS (OUTPATIENT)
Dept: HEALTH INFORMATION MANAGEMENT | Facility: CLINIC | Age: 41
End: 2021-09-21

## 2021-09-22 ENCOUNTER — TRANSCRIBE ORDERS (OUTPATIENT)
Dept: OTHER | Age: 41
End: 2021-09-22

## 2021-09-22 DIAGNOSIS — K50.819 CROHN'S DISEASE OF BOTH SMALL AND LARGE INTESTINE WITH COMPLICATION (H): Primary | ICD-10-CM

## 2021-09-27 NOTE — PROGRESS NOTES
Colon and Rectal Surgery Clinic Note      RE: Alok Nino  : 1980  AURELIO: 10/5/2021    CHIEF COMPLAINT: Stricturing Crohn's disease    HPI: Alok is a 41 year old woman with long-standing Crohn's disease dating back to age 16, at which time she underwent an appendectomy at [facility].  Dr. Whitaker was consulted intraoperatively and the diagnosis of Crohn's disease was made.  The patient has had multiple operations since that time.  In , Dr. Le Alaniz performed 10 strictureplasties.  In , Dr. Jeffers assumed the patient's care.  She performed a lysis of adhesions, ileocolic resection in 3 separate small bowel resections at that time.  The patient was brought back to the operating room in  by Dr. Jeffers.  Again, an extensive lysis of adhesions was performed.  The patient was measured to have 180 cm of small bowel remaining.  A repeat ileocolic resection was performed and, estimating from the path report, approximately 30 cm of small bowel was resected, leaving her presumably with about 150 cm of small bowel. In  she underwent another exploratory laparotomy, lysis of adhesions, and 2 more stricturoplasties by Dr. Cartagena.  She additionally underwent a laparoscopic surgery at [facility] to free fallopian tube adhesions for fertility reasons and an open cholecystectomy.     She currently follows with Dr. Bruno Tolbert at [facility] and is on Humira, Azathioprine and budesonide. She has been on Remicade in the past. She has been having some increased abdominal pain over the last month which she describes as a sharp stabbing pain in the centre of her abdomen, more on the right than the left. This is worsened with meals. She has been maintaining a low residue diet and has had resolution of her pain by instituting this change in addition to starting the budesonide. She has not had any nausea nor vomiting. Her weight has been stable. She normally has 5-6 loose stools per day and  "these have increased in frequency to 10-15x per day over the last month.    She has had a recent colonoscopy which showed an ulcerated stricture at the ileocolic anastomosis. The lumen was measured to be 5-6mm at the time. She has had an MRE which shows a 4cm inflammatory stricture at the ileocolic anastomosis. There is a plan to switch her to Stelara, and she will be receiving her first dose today.        Recent colonoscopy on 7/27/2021 with Dr. Ramey showed worsening ulcerating and stricturing anastomosis with anastomotic stenosis 5-6 mm with linear ulceration and scope could not be advanced past this.     MRE 9/30/21:  IMPRESSION:  1.  Distal 4 cm terminal ileum with circumferential mural thickening and mild generalized enhancement but no intramural edema, diffusion restriction or proximal obstruction consistent with chronic Crohn's disease with minimal or no active inflammation.   2.  Marked chronic patulous dilatation of multiple segments of small bowel without signs of active inflammation unchanged.  3.  Colon not optimally visualized but no obvious inflammatory change is present.  4.  Mild uniform bile duct dilatation to the level of the ampulla with no stone, stricture or mass unchanged.     PMH: In addition to the above, she has history of melanoma and basal cell cancer      Physical examination:  Examination was chaperoned by Cinda Poon, CRS Fellow.     Vitals: /80 (BP Location: Left arm, Patient Position: Sitting, Cuff Size: Adult Regular)   Pulse 74   Temp 98.1  F (36.7  C) (Oral)   Ht 5' 2\"   Wt 115 lb   SpO2 98%   BMI 21.03 kg/m    BMI= Body mass index is 21.03 kg/m .  General: Appears well, no acute distress  Abdomen: Soft, non-tender, not distended. Multiple surgical scars were noted. She has a RUQ subcostal incision from her previous cholecystectomy. There is a transverse incision below the umbilicus from her multiple previous exploratory laparotomies. She additionally had an umbilical " port site incision and a lower midline laparotomy incision - she reports that this from a previous gynecologic procedure.    Laboratory data:    Recent Labs   Lab Test 11/13/20  1203 08/02/19  0519 07/31/19  1925 07/31/19  1915 06/29/19  0616 06/28/19  2157 11/19/13  0841   WBC  --   --  7.1  --    < >  --   --    HGB  --   --  11.4*  --    < >  --   --    PLT  --   --  236  --    < >  --   --    CR  --  0.58*  --    < >   < >  --   --    ALBUMIN 3.5  --   --   --    < >  --    < >   BILITOTAL 0.6  --   --   --    < >  --    < >   ALKPHOS 98  --   --   --    < >  --    < >   ALT 59*  --   --   --    < >  --    < >   AST 23  --   --   --    < >  --    < >   INR  --   --   --   --   --  1.20*  --     < > = values in this interval not displayed.       Assessment/plan:  41 year old lady with a long-standing history of stricturing Crohn's disease and multiple previous laparotomies, small bowel resections and stricturoplasties. Currently has 150cm of small bowel remaining. Now presenting with partial obstructive symptoms from a tight inflammatory stricture at her ileocolic anastomosis. We discussed management options going forward, which include ongoing medical management vs likely resection of the stricture and reanastomosis.There is a plan to switch her to Stelara to see if this helps resolve the local inflammation and obstructive symptoms, although, in speaking with her gastroenterologist this has a lower likelihood of success. Additionally, she would be at risk for an acute obstructive episode requiring urgent laparotomy, resection and likely stoma if medical management were to fail. An elective OR would likely involve another exploratory laparotomy, lysis of adhesions and limited resection of the strictured small bowel and previous anastomosis with a redo ileocolic anastomosis, although stricturoplasty may be an option. Risks of surgery, including but not limited to, bleeding, infection, injury to intraabdominal  structures, anastomotic leak, VTE and cardiopulmonary complications were discussed. Resection would also be complicated by her multiple previous abdominal procedures and relatively short length of small bowel remaining. However, in light of her 5-6mm stricture on endoscopy, partial obstructive symptoms, low likelihood of success with ongoing medical management and the risk of complete obstruction if medical management were to fail, we have advised resection at this time. She would like some more time to think about her options and we'll follow up with her in a few weeks time.      Cinda Poon MD  CRS Fellow      For details of past medical history, surgical history, family history, medications, allergies, and review of systems, please see details below.    Medical history:  Past Medical History:   Diagnosis Date     C. difficile enteritis      Chronic pain      Crohn disease (H)      Melanoma (H)      PONV (postoperative nausea and vomiting)        Surgical history:  Past Surgical History:   Procedure Laterality Date     ABDOMEN SURGERY      3 for crohns dx     APPENDECTOMY       APPENDECTOMY       CHOLECYSTECTOMY       CHOLECYSTECTOMY       COLONOSCOPY       ENDOSCOPIC ULTRASOUND UPPER GASTROINTESTINAL TRACT (GI) N/A 11/13/2020    Procedure: ENDOSCOPIC ULTRASOUND, ESOPHAGOSCOPY / UPPER GASTROINTESTINAL TRACT with BIOPSY;  Surgeon: Cydney Garcia MD;  Location: [facility] OR     GYN SURGERY       OTHER SURGICAL HISTORY      strictoplasty x3     OTHER SURGICAL HISTORY      adhesion removal x1     OTHER SURGICAL HISTORY      small bowel resections     PICC  5/10/2019          RESECTION ILEOCECAL  12/6/2013    Procedure: RESECTION ILEOCECAL;  Laparotomy, Extensive Lysis of Adhesions, Stricture Plasty X2  Anesthesia general with block;  Surgeon: Pete Cartagena MD;  Location: [facility]       Family history:  Family History   Problem Relation Age of Onset     Anesthesia Reaction No family hx of      Colon Cancer No  family hx of      Crohn's Disease No family hx of      Ulcerative Colitis No family hx of      Colon Polyps No family hx of      No Known Problems Mother      No Known Problems Father      No Known Problems Brother      No Known Problems Son        Medications:  Current Outpatient Medications   Medication Sig Dispense Refill     AZATHIOPRINE PO Take 125 mg by mouth daily       calcitRIOL (ROCALTROL) 0.25 MCG capsule Take 0.25 mcg by mouth three times a week       colesevelam (WELCHOL) 625 MG tablet Take 1,875 mg by mouth 2 times daily (with meals)       ustekinumab (STELARA) 45 MG/0.5ML SOSY Inject 45 mg Subcutaneous once Patient is starting on 10/5/21 and is unsure of dose. Getting off Humira.       vitamin D (ERGOCALCIFEROL) 20238 UNIT capsule Take 50,000 Units by mouth five times a week        vitamin E (TOCOPHEROL) 400 units (180 mg) capsule Take 400 Units by mouth daily       adalimumab (HUMIRA) 40 MG/0.8ML injection Inject 40 mg Subcutaneous every 7 days (Patient not taking: Reported on 10/5/2021)       metroNIDAZOLE (FLAGYL) 500 MG tablet Take 500 mg by mouth 2 times daily (Patient not taking: Reported on 10/5/2021)       rifaximin (XIFAXAN) 550 MG TABS tablet Take 200 mg by mouth 3 times daily (Patient not taking: Reported on 10/5/2021)       sulfamethoxazole-trimethoprim (BACTRIM DS) 800-160 MG tablet Take 1 tablet by mouth 2 times daily (Patient not taking: Reported on 10/5/2021)       vancomycin (VANCOCIN) 125 MG capsule Take 125 mg by mouth 2 times daily (Patient not taking: Reported on 10/5/2021)         Allergies:  The patientis allergic to carafate, codeine, and morphine hcl.    Social history:  Social History     Tobacco Use     Smoking status: Never Smoker     Smokeless tobacco: Never Used   Substance Use Topics     Alcohol use: Yes     Comment: occasional/ 1 drink per 1-3 month     Marital status: .    Review of Systems:  Nursing Notes:   Celia Eastman, EMT  10/5/2021  9:10 AM   "Signed  Chief Complaint   Patient presents with     Consult     Discuss surgery for Crohns       Vitals:    10/05/21 0902   BP: 128/80   BP Location: Left arm   Patient Position: Sitting   Cuff Size: Adult Regular   Pulse: 74   Temp: 98.1  F (36.7  C)   TempSrc: Oral   SpO2: 98%   Weight: 115 lb   Height: 5' 2\"       Body mass index is 21.03 kg/m .          Celai Eastman, EMT                  I saw and examined the patient, led the discussion and edited the resident note.  I agree with the assessment and plan as outlined. In brief, the patient is well-known to me from 2013, when I performed a 2-1/2-hour lysis of adhesions and to stricturoplasties for her recurrent Crohn's disease. She had 150 cm of small bowel remaining at that time. She has had no procedures since and has done well until relatively recently, when she again began to experience abdominal cramping, borborygmi, and loud \"whooshing\" noises from her intestines. She has been on maximal medical therapy under the care of Dr. Bruno Tolbert which includes Humira, azathioprine, and budesonide. She had previously failed Remicade. MR enterogram shows a 4 cm inflammatory stricture at the ileocolic anastomosis and anatomic irregularity of the remaining small bowel, where she has had numerous prior operations including multiple stricturoplasties. She is to have her first Stelara infusion today. I discussed the case by telephone with Dr. Tolbert today, and he feels that there is only about a 20% chance that the Stelara will be effective. Alok has been maintaining herself on a low residue diet and avoiding vegetables of all kinds. We had a detailed discussion about the risks associated with surgery, including but not limited to bleeding, infection, DVT/PE, anastomotic failure with its potential severe consequences, and loss of additional small bowel that could lead now or later to short gut syndrome. Given that Alok is symptomatic and managing her stricture with " significant dietary restriction, I think the best course is probably to proceed with exploration and a short ileocolic resection versus repeat stricturoplasty. I am concerned about the possibility that obstruction will supervene if we continue conservative management which could lead to an urgent operation and, under the circumstances, an ileostomy. Dr. Tolbert is in agreement with this, though he and I agree that it would not be entirely unreasonable for Alok to continue on with a limited diet as she is doing to give the Stelara an opportunity to work. This trial would probably take at least 2 months to have any chance of succeeding, and as noted the risk of obstruction would be real. Alok wants to think all this over which I think is perfectly reasonable. She will touch base in the next few weeks and let me know how she wants to proceed.    I answered all of Alok's questions to her stated satisfaction. She expressed her understanding and agreement.        60 minutes spent on the date of the encounter doing chart review, history and exam, documentation, counseling, review of imaging with the radiologist, care coordination with Dr. Tolbert, and further activities as noted above.     Pete Cartagena MD   Professor and Chief  Division of Colon and Rectal Surgery  [facility]      Referring Provider:  No referring provider defined for this encounter.     Primary Care Provider:  Nakita Odonnell    This note was created using speech recognition software and may contain unintended word substitutions.

## 2021-09-30 ENCOUNTER — PRE VISIT (OUTPATIENT)
Dept: SURGERY | Facility: CLINIC | Age: 41
End: 2021-09-30

## 2021-09-30 ENCOUNTER — HOSPITAL ENCOUNTER (OUTPATIENT)
Dept: MRI IMAGING | Facility: CLINIC | Age: 41
Discharge: HOME | End: 2021-09-30
Attending: INTERNAL MEDICINE | Admitting: INTERNAL MEDICINE
Payer: OTHER GOVERNMENT

## 2021-09-30 DIAGNOSIS — K50.818 CROHN'S DISEASE OF BOTH SMALL AND LARGE INTESTINE WITH OTHER COMPLICATION (H): ICD-10-CM

## 2021-09-30 PROCEDURE — 74183 MRI ABD W/O CNTR FLWD CNTR: CPT

## 2021-09-30 PROCEDURE — 250N000011 HC RX IP 250 OP 636: Performed by: INTERNAL MEDICINE

## 2021-09-30 PROCEDURE — A9585 GADOBUTROL INJECTION: HCPCS | Performed by: INTERNAL MEDICINE

## 2021-09-30 PROCEDURE — 255N000002 HC RX 255 OP 636: Performed by: INTERNAL MEDICINE

## 2021-09-30 RX ORDER — GADOBUTROL 604.72 MG/ML
5 INJECTION INTRAVENOUS ONCE
Status: COMPLETED | OUTPATIENT
Start: 2021-09-30 | End: 2021-09-30

## 2021-09-30 RX ADMIN — GADOBUTROL 5 ML: 604.72 INJECTION INTRAVENOUS at 10:01

## 2021-09-30 RX ADMIN — GLUCAGON HYDROCHLORIDE 0.5 MG: KIT at 08:13

## 2021-09-30 RX ADMIN — GLUCAGON HYDROCHLORIDE 0.5 MG: KIT at 08:44

## 2021-10-01 NOTE — TELEPHONE ENCOUNTER
RECORDS RECEIVED FROM: External   Appt date: 10/05/2021   NOTES STATUS DETAILS   OFFICE NOTE from referring provider  Received 09/22/2021 -- Jose Tolbert MD -- [facility], P: [phone]  F: [phone]   OFFICE NOTE from other specialist   Internal 09/30/2021 -- Jose Tolbert MD -- [facility]     DISCHARGE SUMMARY from hospital  Internal 05/07/2019 -- Johnny Thurman MD -- [facility]     DISCHARGE REPORT from the ER N/A    OPERATIVE REPORT  Care Everywhere 2015   MEDICATION LIST Internal    LABS     PFC TESTING N/A    ANAL PAP N/A    BIOPSIES/PATHOLOGY RELATED TO DIAGNOSIS Internal 11/13/2020 -- [facility]    DIAGNOSTIC PROCEDURES     COLONOSCOPY Pending 07/27/2021 [facility]   UPPER ENDOSCOPY (EGD) Internal 11/13/2020     FLEX SIGMOIDOSCOPY  N/A    ERCP N/A    IMAGING (DISC & REPORT)      CT  N/A    MRI Internal 11/06/2019, 05/13/2019, 05/07/2019, more in Epic -- MR Abdomen  09/30/2021 -- MR ENtrography   XRAY N/A    ULTRASOUND (ENDOANAL/ENDORECTAL) Internal 06/26/2019 -- US Abdomen     Action 10/01/2021 JTV 5:02pm   Action Taken CSS sent an urgent request to [facility] for colonoscopy report from 07/27/2021. Update Nurse Pichardo once records have been received.     Action 10/05/2021 JTV 10:14am   Action Taken [facility] called [facility] for Colonoscopy report. [facility] spoke to Margaret. Margaret confirmed that Colonoscopy report will be faxed over. [facility] received colonoscopy report. Records was sent to Nurse Pichardo.

## 2021-10-05 ENCOUNTER — OFFICE VISIT (OUTPATIENT)
Dept: SURGERY | Facility: CLINIC | Age: 41
End: 2021-10-05
Payer: OTHER GOVERNMENT

## 2021-10-05 VITALS
DIASTOLIC BLOOD PRESSURE: 80 MMHG | BODY MASS INDEX: 21.16 KG/M2 | HEIGHT: 62 IN | WEIGHT: 115 LBS | OXYGEN SATURATION: 98 % | SYSTOLIC BLOOD PRESSURE: 128 MMHG | HEART RATE: 74 BPM | TEMPERATURE: 98.1 F

## 2021-10-05 DIAGNOSIS — K50.012 CROHN'S DISEASE OF SMALL INTESTINE WITH INTESTINAL OBSTRUCTION (H): Primary | ICD-10-CM

## 2021-10-05 PROCEDURE — 99205 OFFICE O/P NEW HI 60 MIN: CPT | Performed by: COLON & RECTAL SURGERY

## 2021-10-05 RX ORDER — CALCITRIOL 0.25 UG/1
0.25 CAPSULE, LIQUID FILLED ORAL
COMMUNITY
End: 2023-08-22

## 2021-10-05 RX ORDER — VITAMIN E 268 MG
400 CAPSULE ORAL EVERY EVENING
COMMUNITY
End: 2023-08-22

## 2021-10-05 ASSESSMENT — MIFFLIN-ST. JEOR: SCORE: 1149.89

## 2021-10-05 ASSESSMENT — PAIN SCALES - GENERAL: PAINLEVEL: NO PAIN (0)

## 2021-10-05 NOTE — LETTER
10/5/2021       RE: Alok Nino  [address]  [facility] [address]     Dear Colleague,    Thank you for referring your patient, Alok Nino, to the [facility] COLON AND RECTAL SURGERY CLINIC [place] at [facility]. Please see a copy of my visit note below.    Colon and Rectal Surgery Clinic Note      RE: Alok Nino  : 1980  AURELIO: 10/5/2021    CHIEF COMPLAINT: Stricturing Crohn's disease    HPI: Alok is a 41 year old woman with long-standing Crohn's disease dating back to age 16, at which time she underwent an appendectomy at [facility].  Dr. Whitaker was consulted intraoperatively and the diagnosis of Crohn's disease was made.  The patient has had multiple operations since that time.  In , Dr. Le Alaniz performed 10 strictureplasties.  In , Dr. Jeffers assumed the patient's care.  She performed a lysis of adhesions, ileocolic resection in 3 separate small bowel resections at that time.  The patient was brought back to the operating room in  by Dr. Jeffers.  Again, an extensive lysis of adhesions was performed.  The patient was measured to have 180 cm of small bowel remaining.  A repeat ileocolic resection was performed and, estimating from the path report, approximately 30 cm of small bowel was resected, leaving her presumably with about 150 cm of small bowel. In  she underwent another exploratory laparotomy, lysis of adhesions, and 2 more stricturoplasties by Dr. Cartagena.  She additionally underwent a laparoscopic surgery at [facility] to free fallopian tube adhesions for fertility reasons and an open cholecystectomy.     She currently follows with Dr. Bruno Tolbert at [facility] and is on Humira, Azathioprine and budesonide. She has been on Remicade in the past. She has been having some increased abdominal pain over the last month which she describes as a sharp stabbing pain in the centre of her  "abdomen, more on the right than the left. This is worsened with meals. She has been maintaining a low residue diet and has had resolution of her pain by instituting this change in addition to starting the budesonide. She has not had any nausea nor vomiting. Her weight has been stable. She normally has 5-6 loose stools per day and these have increased in frequency to 10-15x per day over the last month.    She has had a recent colonoscopy which showed an ulcerated stricture at the ileocolic anastomosis. The lumen was measured to be 5-6mm at the time. She has had an MRE which shows a 4cm inflammatory stricture at the ileocolic anastomosis. There is a plan to switch her to Stelara, and she will be receiving her first dose today.        Recent colonoscopy on 7/27/2021 with Dr. Ramey showed worsening ulcerating and stricturing anastomosis with anastomotic stenosis 5-6 mm with linear ulceration and scope could not be advanced past this.     MRE 9/30/21:  IMPRESSION:  1.  Distal 4 cm terminal ileum with circumferential mural thickening and mild generalized enhancement but no intramural edema, diffusion restriction or proximal obstruction consistent with chronic Crohn's disease with minimal or no active inflammation.   2.  Marked chronic patulous dilatation of multiple segments of small bowel without signs of active inflammation unchanged.  3.  Colon not optimally visualized but no obvious inflammatory change is present.  4.  Mild uniform bile duct dilatation to the level of the ampulla with no stone, stricture or mass unchanged.     PMH: In addition to the above, she has history of melanoma and basal cell cancer      Physical examination:  Examination was chaperoned by Cinda Poon, CRS Fellow.     Vitals: /80 (BP Location: Left arm, Patient Position: Sitting, Cuff Size: Adult Regular)   Pulse 74   Temp 98.1  F (36.7  C) (Oral)   Ht 5' 2\"   Wt 115 lb   SpO2 98%   BMI 21.03 kg/m    BMI= Body mass index is 21.03 " kg/m .  General: Appears well, no acute distress  Abdomen: Soft, non-tender, not distended. Multiple surgical scars were noted. She has a RUQ subcostal incision from her previous cholecystectomy. There is a transverse incision below the umbilicus from her multiple previous exploratory laparotomies. She additionally had an umbilical port site incision and a lower midline laparotomy incision - she reports that this from a previous gynecologic procedure.    Laboratory data:    Recent Labs   Lab Test 11/13/20  1203 08/02/19  0519 07/31/19  1925 07/31/19  1915 06/29/19  0616 06/28/19  2157 11/19/13  0841   WBC  --   --  7.1  --    < >  --   --    HGB  --   --  11.4*  --    < >  --   --    PLT  --   --  236  --    < >  --   --    CR  --  0.58*  --    < >   < >  --   --    ALBUMIN 3.5  --   --   --    < >  --    < >   BILITOTAL 0.6  --   --   --    < >  --    < >   ALKPHOS 98  --   --   --    < >  --    < >   ALT 59*  --   --   --    < >  --    < >   AST 23  --   --   --    < >  --    < >   INR  --   --   --   --   --  1.20*  --     < > = values in this interval not displayed.       Assessment/plan:  41 year old lady with a long-standing history of stricturing Crohn's disease and multiple previous laparotomies, small bowel resections and stricturoplasties. Currently has 150cm of small bowel remaining. Now presenting with partial obstructive symptoms from a tight inflammatory stricture at her ileocolic anastomosis. We discussed management options going forward, which include ongoing medical management vs likely resection of the stricture and reanastomosis.There is a plan to switch her to Stelara to see if this helps resolve the local inflammation and obstructive symptoms, although, in speaking with her gastroenterologist this has a lower likelihood of success. Additionally, she would be at risk for an acute obstructive episode requiring urgent laparotomy, resection and likely stoma if medical management were to fail. An  elective OR would likely involve another exploratory laparotomy, lysis of adhesions and limited resection of the strictured small bowel and previous anastomosis with a redo ileocolic anastomosis, although stricturoplasty may be an option. Risks of surgery, including but not limited to, bleeding, infection, injury to intraabdominal structures, anastomotic leak, VTE and cardiopulmonary complications were discussed. Resection would also be complicated by her multiple previous abdominal procedures and relatively short length of small bowel remaining. However, in light of her 5-6mm stricture on endoscopy, partial obstructive symptoms, low likelihood of success with ongoing medical management and the risk of complete obstruction if medical management were to fail, we have advised resection at this time. She would like some more time to think about her options and we'll follow up with her in a few weeks time.      Cinda Poon MD  CRS Fellow      For details of past medical history, surgical history, family history, medications, allergies, and review of systems, please see details below.    Medical history:  Past Medical History:   Diagnosis Date     C. difficile enteritis      Chronic pain      Crohn disease (H)      Melanoma (H)      PONV (postoperative nausea and vomiting)        Surgical history:  Past Surgical History:   Procedure Laterality Date     ABDOMEN SURGERY      3 for crohns dx     APPENDECTOMY       APPENDECTOMY       CHOLECYSTECTOMY       CHOLECYSTECTOMY       COLONOSCOPY       ENDOSCOPIC ULTRASOUND UPPER GASTROINTESTINAL TRACT (GI) N/A 11/13/2020    Procedure: ENDOSCOPIC ULTRASOUND, ESOPHAGOSCOPY / UPPER GASTROINTESTINAL TRACT with BIOPSY;  Surgeon: Cydney Garcia MD;  Location: [facility] OR     GYN SURGERY       OTHER SURGICAL HISTORY      strictoplasty x3     OTHER SURGICAL HISTORY      adhesion removal x1     OTHER SURGICAL HISTORY      small bowel resections     [facility]  5/10/2019          RESECTION  ILEOCECAL  12/6/2013    Procedure: RESECTION ILEOCECAL;  Laparotomy, Extensive Lysis of Adhesions, Stricture Plasty X2  Anesthesia general with block;  Surgeon: Pete Cartagena MD;  Location:  OR       Family history:  Family History   Problem Relation Age of Onset     Anesthesia Reaction No family hx of      Colon Cancer No family hx of      Crohn's Disease No family hx of      Ulcerative Colitis No family hx of      Colon Polyps No family hx of      No Known Problems Mother      No Known Problems Father      No Known Problems Brother      No Known Problems Son        Medications:  Current Outpatient Medications   Medication Sig Dispense Refill     AZATHIOPRINE PO Take 125 mg by mouth daily       calcitRIOL (ROCALTROL) 0.25 MCG capsule Take 0.25 mcg by mouth three times a week       colesevelam (WELCHOL) 625 MG tablet Take 1,875 mg by mouth 2 times daily (with meals)       ustekinumab (STELARA) 45 MG/0.5ML SOSY Inject 45 mg Subcutaneous once Patient is starting on 10/5/21 and is unsure of dose. Getting off Humira.       vitamin D (ERGOCALCIFEROL) 55764 UNIT capsule Take 50,000 Units by mouth five times a week        vitamin E (TOCOPHEROL) 400 units (180 mg) capsule Take 400 Units by mouth daily       adalimumab (HUMIRA) 40 MG/0.8ML injection Inject 40 mg Subcutaneous every 7 days (Patient not taking: Reported on 10/5/2021)       metroNIDAZOLE (FLAGYL) 500 MG tablet Take 500 mg by mouth 2 times daily (Patient not taking: Reported on 10/5/2021)       rifaximin (XIFAXAN) 550 MG TABS tablet Take 200 mg by mouth 3 times daily (Patient not taking: Reported on 10/5/2021)       sulfamethoxazole-trimethoprim (BACTRIM DS) 800-160 MG tablet Take 1 tablet by mouth 2 times daily (Patient not taking: Reported on 10/5/2021)       vancomycin (VANCOCIN) 125 MG capsule Take 125 mg by mouth 2 times daily (Patient not taking: Reported on 10/5/2021)         Allergies:  The patientis allergic to carafate, codeine, and morphine  "hcl.    Social history:  Social History     Tobacco Use     Smoking status: Never Smoker     Smokeless tobacco: Never Used   Substance Use Topics     Alcohol use: Yes     Comment: occasional/ 1 drink per 1-3 month     Marital status: .    Review of Systems:  Nursing Notes:   Celia Eastman, EMT  10/5/2021  9:10 AM  Signed  Chief Complaint   Patient presents with     Consult     Discuss surgery for Crohns       Vitals:    10/05/21 0902   BP: 128/80   BP Location: Left arm   Patient Position: Sitting   Cuff Size: Adult Regular   Pulse: 74   Temp: 98.1  F (36.7  C)   TempSrc: Oral   SpO2: 98%   Weight: 115 lb   Height: 5' 2\"       Body mass index is 21.03 kg/m .          Celia Eastman, EMT                  I saw and examined the patient, led the discussion and edited the resident note.  I agree with the assessment and plan as outlined. In brief, the patient is well-known to me from 2013, when I performed a 2-1/2-hour lysis of adhesions and to stricturoplasties for her recurrent Crohn's disease. She had 150 cm of small bowel remaining at that time. She has had no procedures since and has done well until relatively recently, when she again began to experience abdominal cramping, borborygmi, and loud \"whooshing\" noises from her intestines. She has been on maximal medical therapy under the care of Dr. Bruno Tolbert which includes Humira, azathioprine, and budesonide. She had previously failed Remicade. MR enterogram shows a 4 cm inflammatory stricture at the ileocolic anastomosis and anatomic irregularity of the remaining small bowel, where she has had numerous prior operations including multiple stricturoplasties. She is to have her first Stelara infusion today. I discussed the case by telephone with Dr. Tolbert today, and he feels that there is only about a 20% chance that the Stelara will be effective. Alok has been maintaining herself on a low residue diet and avoiding vegetables of all kinds. We had a " detailed discussion about the risks associated with surgery, including but not limited to bleeding, infection, DVT/PE, anastomotic failure with its potential severe consequences, and loss of additional small bowel that could lead now or later to short gut syndrome. Given that Alok is symptomatic and managing her stricture with significant dietary restriction, I think the best course is probably to proceed with exploration and a short ileocolic resection versus repeat stricturoplasty. I am concerned about the possibility that obstruction will supervene if we continue conservative management which could lead to an urgent operation and, under the circumstances, an ileostomy. Dr. Tolbert is in agreement with this, though he and I agree that it would not be entirely unreasonable for Alok to continue on with a limited diet as she is doing to give the Stelara an opportunity to work. This trial would probably take at least 2 months to have any chance of succeeding, and as noted the risk of obstruction would be real. Alok wants to think all this over which I think is perfectly reasonable. She will touch base in the next few weeks and let me know how she wants to proceed.    I answered all of Alok's questions to her stated satisfaction. She expressed her understanding and agreement.        60 minutes spent on the date of the encounter doing chart review, history and exam, documentation, counseling, review of imaging with the radiologist, care coordination with Dr. Tolbert, and further activities as noted above.     Pete Cartagena MD   Professor and Chief  Division of Colon and Rectal Surgery  [facility]      Referring Provider:  No referring provider defined for this encounter.     Primary Care Provider:  Nkaita Odonnell    This note was created using speech recognition software and may contain unintended word substitutions.      Again, thank you for allowing me to participate in the  care of your patient.      Sincerely,    Pete Cartagena MD

## 2021-10-05 NOTE — NURSING NOTE
"Chief Complaint   Patient presents with     Consult     Discuss surgery for Crohns       Vitals:    10/05/21 0902   BP: 128/80   BP Location: Left arm   Patient Position: Sitting   Cuff Size: Adult Regular   Pulse: 74   Temp: 98.1  F (36.7  C)   TempSrc: Oral   SpO2: 98%   Weight: 115 lb   Height: 5' 2\"       Body mass index is 21.03 kg/m .          Celia Eastman, EMT                 "

## 2021-10-12 ENCOUNTER — TELEPHONE (OUTPATIENT)
Dept: SURGERY | Facility: CLINIC | Age: 41
End: 2021-10-12

## 2021-10-12 NOTE — TELEPHONE ENCOUNTER
Spoke with patient, she has decided to move forward with the surgery discussed with Dr. Cartagena in clinic last week.    I sent a message to Dr. Cartagena and RNSina Marie asking for a Case Request so that surgery can be scheduled.    I told patient I would call her back once I have a Case Request.

## 2021-10-13 DIAGNOSIS — K50.012 CROHN'S DISEASE OF SMALL INTESTINE WITH INTESTINAL OBSTRUCTION (H): Primary | ICD-10-CM

## 2021-10-13 RX ORDER — POLYETHYLENE GLYCOL 3350 17 G/17G
238 POWDER, FOR SOLUTION ORAL SEE ADMIN INSTRUCTIONS
Qty: 14 PACKET | Refills: 0 | Status: ON HOLD | OUTPATIENT
Start: 2021-10-13 | End: 2021-11-12

## 2021-10-13 RX ORDER — NEOMYCIN SULFATE 500 MG/1
1000 TABLET ORAL EVERY 6 HOURS
Qty: 6 TABLET | Refills: 0 | Status: ON HOLD | OUTPATIENT
Start: 2021-10-13 | End: 2021-11-12

## 2021-10-13 RX ORDER — METRONIDAZOLE 500 MG/1
500 TABLET ORAL EVERY 6 HOURS
Qty: 3 TABLET | Refills: 0 | Status: ON HOLD | OUTPATIENT
Start: 2021-10-13 | End: 2021-11-12

## 2021-10-13 RX ORDER — ONDANSETRON 4 MG/1
4 TABLET, FILM COATED ORAL EVERY 6 HOURS
Qty: 3 TABLET | Refills: 0 | Status: ON HOLD | OUTPATIENT
Start: 2021-10-13 | End: 2021-11-12

## 2021-10-14 NOTE — TELEPHONE ENCOUNTER
Tier 2 Case Request received to schedule:    Patient Name: Alok Nino   MRN: [id number]   Case#: [id number]   Surgeon(s) and Role:      * Pete Cartagena MD - Primary   Date requested: * No dates entered *   Location: [facility] OR   Procedure(s):   Exploratory laparotomy, ileocolic resection versus repeat stricturoplasty (N/A)   ILEOCOLECTOMY (N/A)     Additional Instructions for the Case  Multi-surgeon case:  no  Time in minutes:  180  Anesthesia: general  Prep: full prep with antibiotics   Pre-Op: PAC  Labs: yes  WOC: no  Special equipment: no  Special Instructions: no    Schedule with Nahomy Gandhi NP 2-3 weeks after surgery.  Schedule with Surgeon 3-4 weeks after Nahomy Gandhi NP    Will offer surgery date 12/1 or after

## 2021-10-18 ENCOUNTER — TELEPHONE (OUTPATIENT)
Dept: SURGERY | Facility: CLINIC | Age: 41
End: 2021-10-18

## 2021-10-18 NOTE — TELEPHONE ENCOUNTER
Spoke with patient via phone, completed the following scheduling, then sent Surgery Packet to patient via MyChart and Mail including related scheduling information and prep instructions:     Required: Yes, you will need a  18 years or older to drive you home from your procedure as well as stay with you for 24 hours after your procedure, if you are discharged the same day as your procedure.    Surgery: Exploratory laparotomy, ileocolic resection versus repeat stricturoplasty     Date: Wednesday December 1, 2021 Surgeon: Dr. Pete Cartagena    Time: You will receive a call 1-3 days prior to your surgery with your finalized surgery and arrival time.     Location:     [address])      [place] [address]      [phone]      [email or web address]     Upcoming Related Appointments:     Pre-operative History & Physical appointment:   Clinic appointment with Pre-operative Assessment Center (PAC): 11/17/21 at 10:30 AM                                                 [facility]                                                 [address] [place] [id number]  Pre-operative Lab appointment:    Lab draw appointment:    11/17/2021 at 12:00 PM                                                 [address] Lab                                                 [address] [place] [address]  Pre-operative COVID-19 Test:   Pre-procedure asymptomatic COVID PCR   Monday 11/29/2021 at 10:00 AM                                                 [facility]        [address], [address]         [facility] [id number]  Post operative appointment:  Clinic appointment with Nahomy Gandhi NP: 12/15/2021 at 11:30 AM                                                                    "   [facility]                                                                      [address] [place] [address]    Post operative appointment:  Clinic appointment with Dr. Pete Cartagena: Tuesday 1/11/2021 at 3:00 PM                                                                      [facility]                                                                      [address] [place] [address]    Pre-surgical Preparation:      MiraLAX/Gatorade, Magnesium Citrate, Antibiotics, Zofran  - see \"Day Before Surgery\"  - obtain medications and ingredients in advance  - if you have diabetes or blood sugar concerns, please use Gatorade ZERO or Low Sugar, as per recommendation by your physician    "

## 2021-10-18 NOTE — TELEPHONE ENCOUNTER
Health Call Center    Phone Message    May a detailed message be left on voicemail: yes     Reason for Call: Other: Alok is calling in asking for a call back. She states that she would like to proceed with scheduling surgery. She states that she had called the week of 10/11 to schedule, but was told that there was no order placed for the procedure. Please call back as soon as possible to discuss.     Action Taken: Message routed to:  [facility] ([facility] Colon/Rectal    Travel Screening: Not Applicable

## 2021-10-19 NOTE — TELEPHONE ENCOUNTER
FUTURE VISIT INFORMATION      SURGERY INFORMATION:    Date: 21    Location: [facility] OR    Surgeon:  Pete Cartagena MD    Anesthesia Type:  General    Procedure: Exploratory laparotomy, ileocolic resection versus repeat stricturoplasty    Consult: OV 10/5/21    RECORDS REQUESTED FROM:       Primary Care Provider: Nakita Odonnell NP    Most recent EKG+ Tracin21

## 2021-10-20 ENCOUNTER — TELEPHONE (OUTPATIENT)
Dept: SURGERY | Facility: CLINIC | Age: 41
End: 2021-10-20

## 2021-10-20 DIAGNOSIS — K50.012 CROHN'S DISEASE OF SMALL INTESTINE WITH INTESTINAL OBSTRUCTION (H): Primary | ICD-10-CM

## 2021-10-20 NOTE — TELEPHONE ENCOUNTER
[facility] Call Center    Phone Message    May a detailed message be left on voicemail: yes     Reason for Call: Other: Per pt was told to report any symptoms or any changes before her surgery with  in December. Per pt has loss 4 pounds in the last week and is having nausea and also always feeling full even thought she has not ate anything.       Action Taken: Message routed to:  [facility] ([facility] Colon and rec    Travel Screening: Not Applicable

## 2021-10-22 NOTE — TELEPHONE ENCOUNTER
Health Call Center    Phone Message    May a detailed message be left on voicemail: yes     Reason for Call: Other: Patient is calling in asking for a call back. She states that she had called on 10/20 and left a message for Dr. Cartagena about her weight loss, and reports that she has lost a total of 5 pounds this week now and is starting to get worried. Please call back as soon as possible to discuss.     Action Taken: Message routed to:  [facility] ([facility] Colon/Rectal    Travel Screening: Not Applicable

## 2021-10-22 NOTE — TELEPHONE ENCOUNTER
I checked in on patient who states she is 108lbs. Her surgery is in December. She has nausea that is relieved with zofran. She is not vomiting. She does not have abd pain or bloating. Only pain with palpitation. She is taking Premier protein. Will check in with nutritionist to see if there are any calorie dense liquid foods she can try. Will update Dr. Cartagena.     Orders for albumin and prealbumin. Change protein drinks to Ensure Complete drinks or Ensure Enlive.   Labs look good. Continue protein supplement drinks.

## 2021-10-27 ENCOUNTER — LAB (OUTPATIENT)
Dept: LAB | Facility: CLINIC | Age: 41
End: 2021-10-27
Payer: OTHER GOVERNMENT

## 2021-10-27 DIAGNOSIS — K50.012 CROHN'S DISEASE OF SMALL INTESTINE WITH INTESTINAL OBSTRUCTION (H): ICD-10-CM

## 2021-10-27 DIAGNOSIS — Z11.59 ENCOUNTER FOR SCREENING FOR OTHER VIRAL DISEASES: ICD-10-CM

## 2021-10-27 LAB
ALBUMIN SERPL-MCNC: 3.4 G/DL (ref 3.4–5)
ALP SERPL-CCNC: 78 U/L (ref 40–150)
ALT SERPL W P-5'-P-CCNC: 27 U/L (ref 0–50)
ANION GAP SERPL CALCULATED.3IONS-SCNC: 4 MMOL/L (ref 3–14)
AST SERPL W P-5'-P-CCNC: 13 U/L (ref 0–45)
BILIRUB SERPL-MCNC: 0.3 MG/DL (ref 0.2–1.3)
BUN SERPL-MCNC: 10 MG/DL (ref 7–30)
CALCIUM SERPL-MCNC: 9 MG/DL (ref 8.5–10.1)
CHLORIDE BLD-SCNC: 113 MMOL/L (ref 94–109)
CO2 SERPL-SCNC: 27 MMOL/L (ref 20–32)
CREAT SERPL-MCNC: 0.53 MG/DL (ref 0.52–1.04)
GFR SERPL CREATININE-BSD FRML MDRD: >90 ML/MIN/1.73M2
GLUCOSE BLD-MCNC: 108 MG/DL (ref 70–99)
POTASSIUM BLD-SCNC: 3.9 MMOL/L (ref 3.4–5.3)
PREALB SERPL IA-MCNC: 20 MG/DL (ref 15–45)
PROT SERPL-MCNC: 6.8 G/DL (ref 6.8–8.8)
SODIUM SERPL-SCNC: 144 MMOL/L (ref 133–144)

## 2021-10-27 PROCEDURE — 80053 COMPREHEN METABOLIC PANEL: CPT | Performed by: PATHOLOGY

## 2021-10-27 PROCEDURE — 36415 COLL VENOUS BLD VENIPUNCTURE: CPT | Performed by: PATHOLOGY

## 2021-10-27 PROCEDURE — 84134 ASSAY OF PREALBUMIN: CPT | Performed by: PATHOLOGY

## 2021-11-08 ENCOUNTER — TELEPHONE (OUTPATIENT)
Dept: SURGERY | Facility: CLINIC | Age: 41
End: 2021-11-08
Payer: OTHER GOVERNMENT

## 2021-11-08 DIAGNOSIS — K50.012 CROHN'S DISEASE OF SMALL INTESTINE WITH INTESTINAL OBSTRUCTION (H): Primary | ICD-10-CM

## 2021-11-08 DIAGNOSIS — R11.0 NAUSEA: ICD-10-CM

## 2021-11-08 DIAGNOSIS — R10.84 ABDOMINAL PAIN, GENERALIZED: ICD-10-CM

## 2021-11-08 NOTE — TELEPHONE ENCOUNTER
Patient is scheduled on 12/1/2021 with ex lap, ileocolic resection vs repeat stricturoplasty for stricturing Crohns disease.     Per patient she recently had gained back the weight she lost but then lost it again this week. She is having worse abd pain, 7/10. She is having abd bloating. Nausea comes and goes. No vomiting. Not eating a whole lot. She is worried every time she eats that she is going to get obstructed. She is having regular diarrhea bowel movements daily, about 10x per day, which is normal for her. She is on daily budesonide. Discussed with Dr. Cartagena. Plan for outpatient CT A/P. I told pt if she starts feeling worse she should have a low threshold to go to the ER.

## 2021-11-08 NOTE — TELEPHONE ENCOUNTER
[facility] Call Center    Phone Message    May a detailed message be left on voicemail: yes     Reason for Call: Symptoms or Concerns     If patient has red-flag symptoms, warm transfer to triage line    Current symptom or concern: Increased Pain and Bloating    Symptoms have been present for:  5 day(s)    Has patient previously been seen for this? Yes    By : Dr. Cartagena     Date: 11/08    Are there any new or worsening symptoms? Yes: Increased Pain and Bloating      Action Taken: Message routed to:  [facility] ([facility] Colon and Rectal    Travel Screening: Not Applicable

## 2021-11-09 ENCOUNTER — ANCILLARY PROCEDURE (OUTPATIENT)
Dept: CT IMAGING | Facility: CLINIC | Age: 41
End: 2021-11-09
Attending: COLON & RECTAL SURGERY
Payer: OTHER GOVERNMENT

## 2021-11-09 ENCOUNTER — HOSPITAL ENCOUNTER (INPATIENT)
Facility: CLINIC | Age: 41
LOS: 5 days | Discharge: HOME HEALTH | DRG: 386 | End: 2021-11-14
Attending: COLON & RECTAL SURGERY | Admitting: COLON & RECTAL SURGERY
Payer: OTHER GOVERNMENT

## 2021-11-09 ENCOUNTER — TELEPHONE (OUTPATIENT)
Dept: SURGERY | Facility: CLINIC | Age: 41
End: 2021-11-09

## 2021-11-09 DIAGNOSIS — R10.84 ABDOMINAL PAIN, GENERALIZED: ICD-10-CM

## 2021-11-09 DIAGNOSIS — R11.0 NAUSEA: ICD-10-CM

## 2021-11-09 DIAGNOSIS — Z11.59 ENCOUNTER FOR SCREENING FOR OTHER VIRAL DISEASES: ICD-10-CM

## 2021-11-09 DIAGNOSIS — K50.912 ACUTE CROHN'S DISEASE, WITH INTESTINAL OBSTRUCTION (H): Primary | ICD-10-CM

## 2021-11-09 DIAGNOSIS — K50.012 CROHN'S DISEASE OF SMALL INTESTINE WITH INTESTINAL OBSTRUCTION (H): ICD-10-CM

## 2021-11-09 DIAGNOSIS — K56.600 PARTIAL SMALL BOWEL OBSTRUCTION (H): ICD-10-CM

## 2021-11-09 LAB
ALBUMIN SERPL-MCNC: 3.7 G/DL (ref 3.4–5)
ALP SERPL-CCNC: 84 U/L (ref 40–150)
ALT SERPL W P-5'-P-CCNC: 23 U/L (ref 0–50)
ANION GAP SERPL CALCULATED.3IONS-SCNC: 5 MMOL/L (ref 3–14)
AST SERPL W P-5'-P-CCNC: 10 U/L (ref 0–45)
BILIRUB SERPL-MCNC: 0.5 MG/DL (ref 0.2–1.3)
BUN SERPL-MCNC: 17 MG/DL (ref 7–30)
CALCIUM SERPL-MCNC: 9 MG/DL (ref 8.5–10.1)
CHLORIDE BLD-SCNC: 105 MMOL/L (ref 94–109)
CO2 SERPL-SCNC: 27 MMOL/L (ref 20–32)
CREAT SERPL-MCNC: 0.62 MG/DL (ref 0.52–1.04)
CRP SERPL HS-MCNC: <0.2 MG/L
ERYTHROCYTE [DISTWIDTH] IN BLOOD BY AUTOMATED COUNT: 12.2 % (ref 10–15)
GFR SERPL CREATININE-BSD FRML MDRD: >90 ML/MIN/1.73M2
GLUCOSE BLD-MCNC: 91 MG/DL (ref 70–99)
HCT VFR BLD AUTO: 40.3 % (ref 35–47)
HGB BLD-MCNC: 13.2 G/DL (ref 11.7–15.7)
MAGNESIUM SERPL-MCNC: 2 MG/DL (ref 1.6–2.3)
MCH RBC QN AUTO: 31.7 PG (ref 26.5–33)
MCHC RBC AUTO-ENTMCNC: 32.8 G/DL (ref 31.5–36.5)
MCV RBC AUTO: 97 FL (ref 78–100)
PHOSPHATE SERPL-MCNC: 3.6 MG/DL (ref 2.5–4.5)
PLATELET # BLD AUTO: 333 10E3/UL (ref 150–450)
POTASSIUM BLD-SCNC: 3.8 MMOL/L (ref 3.4–5.3)
PROT SERPL-MCNC: 7.4 G/DL (ref 6.8–8.8)
RBC # BLD AUTO: 4.16 10E6/UL (ref 3.8–5.2)
SODIUM SERPL-SCNC: 137 MMOL/L (ref 133–144)
WBC # BLD AUTO: 8.6 10E3/UL (ref 4–11)

## 2021-11-09 PROCEDURE — 86141 C-REACTIVE PROTEIN HS: CPT

## 2021-11-09 PROCEDURE — 74177 CT ABD & PELVIS W/CONTRAST: CPT | Performed by: RADIOLOGY

## 2021-11-09 PROCEDURE — 250N000013 HC RX MED GY IP 250 OP 250 PS 637

## 2021-11-09 PROCEDURE — 258N000003 HC RX IP 258 OP 636

## 2021-11-09 PROCEDURE — 85027 COMPLETE CBC AUTOMATED: CPT

## 2021-11-09 PROCEDURE — 3E0436Z INTRODUCTION OF NUTRITIONAL SUBSTANCE INTO CENTRAL VEIN, PERCUTANEOUS APPROACH: ICD-10-PCS | Performed by: COLON & RECTAL SURGERY

## 2021-11-09 PROCEDURE — 84100 ASSAY OF PHOSPHORUS: CPT

## 2021-11-09 PROCEDURE — 80053 COMPREHEN METABOLIC PANEL: CPT

## 2021-11-09 PROCEDURE — 36415 COLL VENOUS BLD VENIPUNCTURE: CPT

## 2021-11-09 PROCEDURE — 999N000128 HC STATISTIC PERIPHERAL IV START W/O US GUIDANCE

## 2021-11-09 PROCEDURE — 84134 ASSAY OF PREALBUMIN: CPT

## 2021-11-09 PROCEDURE — 120N000002 HC R&B MED SURG/OB UMMC

## 2021-11-09 PROCEDURE — 83735 ASSAY OF MAGNESIUM: CPT

## 2021-11-09 RX ORDER — ONDANSETRON 2 MG/ML
4 INJECTION INTRAMUSCULAR; INTRAVENOUS EVERY 6 HOURS PRN
Status: DISCONTINUED | OUTPATIENT
Start: 2021-11-09 | End: 2021-11-14 | Stop reason: HOSPADM

## 2021-11-09 RX ORDER — ACETAMINOPHEN 325 MG/1
650 TABLET ORAL EVERY 4 HOURS PRN
Status: DISCONTINUED | OUTPATIENT
Start: 2021-11-09 | End: 2021-11-14 | Stop reason: HOSPADM

## 2021-11-09 RX ORDER — NALOXONE HYDROCHLORIDE 0.4 MG/ML
0.2 INJECTION, SOLUTION INTRAMUSCULAR; INTRAVENOUS; SUBCUTANEOUS
Status: DISCONTINUED | OUTPATIENT
Start: 2021-11-09 | End: 2021-11-14 | Stop reason: HOSPADM

## 2021-11-09 RX ORDER — PROCHLORPERAZINE MALEATE 5 MG
10 TABLET ORAL EVERY 6 HOURS PRN
Status: DISCONTINUED | OUTPATIENT
Start: 2021-11-09 | End: 2021-11-14 | Stop reason: HOSPADM

## 2021-11-09 RX ORDER — IOPAMIDOL 755 MG/ML
70 INJECTION, SOLUTION INTRAVASCULAR ONCE
Status: COMPLETED | OUTPATIENT
Start: 2021-11-09 | End: 2021-11-09

## 2021-11-09 RX ORDER — LIDOCAINE 40 MG/G
CREAM TOPICAL
Status: DISCONTINUED | OUTPATIENT
Start: 2021-11-09 | End: 2021-11-11

## 2021-11-09 RX ORDER — ONDANSETRON 4 MG/1
4 TABLET, ORALLY DISINTEGRATING ORAL EVERY 6 HOURS PRN
Status: DISCONTINUED | OUTPATIENT
Start: 2021-11-09 | End: 2021-11-14 | Stop reason: HOSPADM

## 2021-11-09 RX ORDER — LIDOCAINE 40 MG/G
CREAM TOPICAL
Status: DISCONTINUED | OUTPATIENT
Start: 2021-11-09 | End: 2021-11-14 | Stop reason: HOSPADM

## 2021-11-09 RX ORDER — NALOXONE HYDROCHLORIDE 0.4 MG/ML
0.4 INJECTION, SOLUTION INTRAMUSCULAR; INTRAVENOUS; SUBCUTANEOUS
Status: DISCONTINUED | OUTPATIENT
Start: 2021-11-09 | End: 2021-11-14 | Stop reason: HOSPADM

## 2021-11-09 RX ORDER — PROCHLORPERAZINE 25 MG
25 SUPPOSITORY, RECTAL RECTAL EVERY 12 HOURS PRN
Status: DISCONTINUED | OUTPATIENT
Start: 2021-11-09 | End: 2021-11-14 | Stop reason: HOSPADM

## 2021-11-09 RX ORDER — ONDANSETRON 2 MG/ML
4 INJECTION INTRAMUSCULAR; INTRAVENOUS EVERY 6 HOURS PRN
Status: DISCONTINUED | OUTPATIENT
Start: 2021-11-09 | End: 2021-11-09

## 2021-11-09 RX ORDER — SODIUM CHLORIDE, SODIUM LACTATE, POTASSIUM CHLORIDE, CALCIUM CHLORIDE 600; 310; 30; 20 MG/100ML; MG/100ML; MG/100ML; MG/100ML
INJECTION, SOLUTION INTRAVENOUS CONTINUOUS
Status: DISCONTINUED | OUTPATIENT
Start: 2021-11-09 | End: 2021-11-09

## 2021-11-09 RX ORDER — OXYCODONE HYDROCHLORIDE 5 MG/1
5 TABLET ORAL EVERY 4 HOURS PRN
Status: DISCONTINUED | OUTPATIENT
Start: 2021-11-09 | End: 2021-11-14 | Stop reason: HOSPADM

## 2021-11-09 RX ORDER — KETOROLAC TROMETHAMINE 15 MG/ML
15 INJECTION, SOLUTION INTRAMUSCULAR; INTRAVENOUS EVERY 6 HOURS PRN
Status: DISCONTINUED | OUTPATIENT
Start: 2021-11-09 | End: 2021-11-09

## 2021-11-09 RX ORDER — DEXTROSE MONOHYDRATE, SODIUM CHLORIDE, AND POTASSIUM CHLORIDE 50; 1.49; 4.5 G/1000ML; G/1000ML; G/1000ML
INJECTION, SOLUTION INTRAVENOUS CONTINUOUS
Status: DISCONTINUED | OUTPATIENT
Start: 2021-11-09 | End: 2021-11-11

## 2021-11-09 RX ADMIN — IOPAMIDOL 70 ML: 755 INJECTION, SOLUTION INTRAVASCULAR at 08:44

## 2021-11-09 RX ADMIN — DOXYLAMINE SUCCINATE 50 MG: 25 TABLET ORAL at 22:53

## 2021-11-09 RX ADMIN — POTASSIUM CHLORIDE, DEXTROSE MONOHYDRATE AND SODIUM CHLORIDE: 150; 5; 450 INJECTION, SOLUTION INTRAVENOUS at 22:15

## 2021-11-09 RX ADMIN — OXYCODONE HYDROCHLORIDE 5 MG: 5 TABLET ORAL at 22:54

## 2021-11-09 ASSESSMENT — ACTIVITIES OF DAILY LIVING (ADL)
ADLS_ACUITY_SCORE: 3
ADLS_ACUITY_SCORE: 12
ADLS_ACUITY_SCORE: 3

## 2021-11-09 NOTE — H&P
Colorectal Surgery Admission History and Physical     Alok Nino MRN# [id number]   YOB: 1980 Age: 41 year old      Date of Admission: 11/09/2021    CC: worsening abdominal pain, bloating, nausea    Assessment: 41 year old female with stricturing Crohn's disease, history of multiple laparotomies, small bowel resections, stricturoplasties. She has recently been having partial obstructive symptoms associated with a tight inflammatory stricture at her ileocolic anastomosis and was scheduled for exploratory laparotomy and ileocolic resection vs repeat stricturoplasty on 12/1/21 with Dr. Cartagena. Over the past couple of days she has been experiencing worsening abdominal pain, bloating, and nausea. Outpatient CT A/P today with evidence of slow transit, partial obstruction.     Plan:  - Admit to colorectal surgery  - Surgery date has been rescheduled to 11/17/21  - NPO, IVF  - PICC, TPN tomorrow  - CBC, CMP, Mag, Phos  - Prealbumin, CRP  - PTA Azathioprine and Doxylamine  - Acetaminophen, Oxycodone for pain    HPI: Alok is a 41 year old woman with  Crohn's disease for which she has had a significant number of operations, including >10 stricuroplasties, multiple laparotomies, small bowel resection, ileocolic resection.  Most recently 2013 ex lap, ANTHONY, stricturoplasty x2 with Dr. Cartagena.  She has been having increased abdominal pain over the last couple of months, worsened with meals. She has had a recent colonoscopy which showed an ulcerated stricture at the ileocolic anastomosis. The lumen was measured to be 5-6mm at the time. She has had an MRE which shows a 4cm inflammatory stricture at the ileocolic anastomosis. She was recently switched to Stelara, first dose 10/5. She was scheduled for ex lap, possible ileocolic resection vs repeat stricturoplasty on 12/1/21 with Dr. Cartagena.   She has lost 5-10 pounds over the past few weeks and is concerned about this. Over the past few days she has had worsening  abdominal pain, 7/10, bloating, intermittent nausea, no vomiting. She is having regular BM movements, diarrhea about 10x/day (usual for her). She has had limited PO intake due to fear that she will be obstructed every time she eats. She called the colorectal surgery clinic today to discuss her concerns and outpatient CT A/P was ordered, which was consistent with slow transit, functional partial obstruction.     Patient evaluated at the bedside after direct admission.  She continues to have 6 out of 10 diffuse abdominal pain, although no nausea or vomiting.  Denies other symptoms at this time.      Past Medical History:  Past Medical History:   Diagnosis Date     C. difficile enteritis      Chronic pain      Crohn disease (H)      Melanoma (H)      PONV (postoperative nausea and vomiting)      Past Surgical History:  Past Surgical History:   Procedure Laterality Date     ABDOMEN SURGERY      3 for crohns dx     APPENDECTOMY       APPENDECTOMY       CHOLECYSTECTOMY       CHOLECYSTECTOMY       COLONOSCOPY       ENDOSCOPIC ULTRASOUND UPPER GASTROINTESTINAL TRACT (GI) N/A 11/13/2020    Procedure: ENDOSCOPIC ULTRASOUND, ESOPHAGOSCOPY / UPPER GASTROINTESTINAL TRACT with BIOPSY;  Surgeon: Cydney Garcia MD;  Location: [facility] OR     GYN SURGERY       OTHER SURGICAL HISTORY      strictoplasty x3     OTHER SURGICAL HISTORY      adhesion removal x1     OTHER SURGICAL HISTORY      small bowel resections     PICC  5/10/2019          RESECTION ILEOCECAL  12/6/2013    Procedure: RESECTION ILEOCECAL;  Laparotomy, Extensive Lysis of Adhesions, Stricture Plasty X2  Anesthesia general with block;  Surgeon: Pete Cartagena MD;  Location: [facility] OR     Allergies:  Allergies   Allergen Reactions     Carafate Hives     Codeine Nausea and Vomiting     Morphine Hcl Other (See Comments)     SPASMS OF SPHINCTER OF ODDI  (BILIARY TRACT)     Medications:  [COMPLETED] iopamidol (ISOVUE-370) solution 70 mL  [COMPLETED] sodium chloride 0.9 % bag  "500mL for CT scan flush use    adalimumab (HUMIRA) 40 MG/0.8ML injection, Inject 40 mg Subcutaneous every 7 days (Patient not taking: Reported on 10/5/2021)  AZATHIOPRINE PO, Take 125 mg by mouth daily  calcitRIOL (ROCALTROL) 0.25 MCG capsule, Take 0.25 mcg by mouth three times a week  colesevelam (WELCHOL) 625 MG tablet, Take 1,875 mg by mouth 2 times daily (with meals)  metroNIDAZOLE (FLAGYL) 500 MG tablet, Take 1 tablet (500 mg) by mouth every 6 hours At 8:00 am, 2:00 pm, 8:00 pm the day prior to your surgery with neomycin and zofran.  metroNIDAZOLE (FLAGYL) 500 MG tablet, Take 500 mg by mouth 2 times daily (Patient not taking: Reported on 10/5/2021)  neomycin (MYCIFRADIN) 500 MG tablet, Take 2 tablets (1,000 mg) by mouth every 6 hours At 8:00 am, 2:00 pm, 8:00 pm the day prior to your surgery with flagyl and zofran.  ondansetron (ZOFRAN) 4 MG tablet, Take 1 tablet (4 mg) by mouth every 6 hours At 8:00 am, 2:00 pm, 8:00 pm the day prior to your surgery with neomycin and flagyl.  polyethylene glycol (MIRALAX) 17 g packet, Take 238 g by mouth See Admin Instructions Starting at 4 pm night prior to surgery. Refer to \"Getting Ready for Surgery\" instructions.  rifaximin (XIFAXAN) 550 MG TABS tablet, Take 200 mg by mouth 3 times daily (Patient not taking: Reported on 10/5/2021)  sulfamethoxazole-trimethoprim (BACTRIM DS) 800-160 MG tablet, Take 1 tablet by mouth 2 times daily (Patient not taking: Reported on 10/5/2021)  ustekinumab (STELARA) 45 MG/0.5ML SOSY, Inject 45 mg Subcutaneous once Patient is starting on 10/5/21 and is unsure of dose. Getting off Humira.  vancomycin (VANCOCIN) 125 MG capsule, Take 125 mg by mouth 2 times daily (Patient not taking: Reported on 10/5/2021)  vitamin D (ERGOCALCIFEROL) 32297 UNIT capsule, Take 50,000 Units by mouth five times a week   vitamin E (TOCOPHEROL) 400 units (180 mg) capsule, Take 400 Units by mouth daily      Medications prior to Admission:  No medications prior to " admission.     Social History:  Social History     Socioeconomic History     Marital status:      Spouse name: Not on file     Number of children: Not on file     Years of education: Not on file     Highest education level: Not on file   Occupational History     Not on file   Tobacco Use     Smoking status: Never Smoker     Smokeless tobacco: Never Used   Substance and Sexual Activity     Alcohol use: Yes     Comment: occasional/ 1 drink per 1-3 month     Drug use: No     Sexual activity: Not on file   Other Topics Concern     Parent/sibling w/ CABG, MI or angioplasty before 65F 55M? Not Asked   Social History Narrative     Not on file     Social Determinants of Health     Financial Resource Strain: Not on file   Food Insecurity: Not on file   Transportation Needs: Not on file   Physical Activity: Not on file   Stress: Not on file   Social Connections: Not on file   Intimate Partner Violence: Not on file   Housing Stability: Not on file     Family History:  Family History   Problem Relation Age of Onset     Anesthesia Reaction No family hx of      Colon Cancer No family hx of      Crohn's Disease No family hx of      Ulcerative Colitis No family hx of      Colon Polyps No family hx of      No Known Problems Mother      No Known Problems Father      No Known Problems Brother      No Known Problems Son      ROS:  The remainder of the complete ROS was negative unless noted in the HPI.    Exam:  There were no vitals taken for this visit.  General: Alert, interactive, NAD  Resp: Normal rate and work of breathing  Cardiac: Regular rate and rhythm, extremities warm and well perfused, capillary refill less than 2 seconds, 2+ bilateral radial pulses.  Abdomen: Soft, minimally tender throughout, mildly distended. No peritonitis, no rebound or guarding.  Extremities: No LE edema  Skin: Warm and dry, no jaundice or rash    Labs:  All laboratory and imaging data in the past 24 hours reviewed.  Recent Results (from the  past 168 hour(s))   CBC with platelets   Result Value Ref Range Status    WBC Count 8.6 4.0 - 11.0 10e3/uL Final    RBC Count 4.16 3.80 - 5.20 10e6/uL Final    Hemoglobin 13.2 11.7 - 15.7 g/dL Final    Hematocrit 40.3 35.0 - 47.0 % Final    MCV 97 78 - 100 fL Final    MCH 31.7 26.5 - 33.0 pg Final    MCHC 32.8 31.5 - 36.5 g/dL Final    RDW 12.2 10.0 - 15.0 % Final    Platelet Count 333 150 - 450 10e3/uL Final   C-Reactive Protein, High Sensitivity   Result Value Ref Range Status    C-Reactive Protein High Sensitivity <0.2 mg/L Final   Magnesium   Result Value Ref Range Status    Magnesium 2.0 1.6 - 2.3 mg/dL Final   Phosphorus   Result Value Ref Range Status    Phosphorus 3.6 2.5 - 4.5 mg/dL Final   Comprehensive metabolic panel   Result Value Ref Range Status    Sodium 137 133 - 144 mmol/L Final    Potassium 3.8 3.4 - 5.3 mmol/L Final    Chloride 105 94 - 109 mmol/L Final    Carbon Dioxide (CO2) 27 20 - 32 mmol/L Final    Anion Gap 5 3 - 14 mmol/L Final    Urea Nitrogen 17 7 - 30 mg/dL Final    Creatinine 0.62 0.52 - 1.04 mg/dL Final    Calcium 9.0 8.5 - 10.1 mg/dL Final    Glucose 91 70 - 99 mg/dL Final    Alkaline Phosphatase 84 40 - 150 U/L Final    AST 10 0 - 45 U/L Final    ALT 23 0 - 50 U/L Final    Protein Total 7.4 6.8 - 8.8 g/dL Final    Albumin 3.7 3.4 - 5.0 g/dL Final    Bilirubin Total 0.5 0.2 - 1.3 mg/dL Final    GFR Estimate >90 >60 mL/min/1.73m2 Final     *Note: Due to a large number of results and/or encounters for the requested time period, some results have not been displayed. A complete set of results can be found in Results Review.     Imaging: None    Discussed with Colorectal Surgery Fellow, Dr. Adams, who agrees with my assessment and plan.    Jefry Lao  PGY1 N Surgery  11/9/2021

## 2021-11-09 NOTE — TELEPHONE ENCOUNTER
Spoke with Dr. Cartagena's RNCC RUTH Pichardo, she states that Dr. Cartagena would like to move patient's surgery up to 11/17/2021 since a time slot opened up from a cancellation, and it would be better to do patient's surgery sooner. RUTH Pichardo, said that patient will be admitted the hospital in the meantime, but that she's not sure whether she'll stay inpatient until surgery date.    I let RUTH Pichardo, know that I rescheduled patient's surgery to 11/17/2021, and that I rescheduled PAC H&P, Labs, and COVID test to 11/16/2021 in-case patient discharges ahead of her surgery date. Otherwise, if patient is still inpatient until her surgery date, then COVID test, Labs, and H&P will all be done while inpatient.    RUTH Pichardo, updated inpatient team - ANA Whatley - of the above.    Ashly Salazar  Debra-op Coordinator  [facility]  Direct Phone: [phone]

## 2021-11-10 ENCOUNTER — HOME INFUSION (PRE-WILLOW HOME INFUSION) (OUTPATIENT)
Dept: PHARMACY | Facility: CLINIC | Age: 41
End: 2021-11-10

## 2021-11-10 LAB
B-HCG SERPL-ACNC: <1 IU/L (ref 0–5)
PREALB SERPL IA-MCNC: 22 MG/DL (ref 15–45)
SARS-COV-2 RNA RESP QL NAA+PROBE: NEGATIVE
TRIGL SERPL-MCNC: 30 MG/DL

## 2021-11-10 PROCEDURE — 250N000011 HC RX IP 250 OP 636: Performed by: PHYSICIAN ASSISTANT

## 2021-11-10 PROCEDURE — 250N000011 HC RX IP 250 OP 636

## 2021-11-10 PROCEDURE — C1751 CATH, INF, PER/CENT/MIDLINE: HCPCS

## 2021-11-10 PROCEDURE — 258N000003 HC RX IP 258 OP 636

## 2021-11-10 PROCEDURE — 84478 ASSAY OF TRIGLYCERIDES: CPT

## 2021-11-10 PROCEDURE — 250N000013 HC RX MED GY IP 250 OP 250 PS 637: Performed by: PHYSICIAN ASSISTANT

## 2021-11-10 PROCEDURE — 120N000002 HC R&B MED SURG/OB UMMC

## 2021-11-10 PROCEDURE — 83735 ASSAY OF MAGNESIUM: CPT | Performed by: COLON & RECTAL SURGERY

## 2021-11-10 PROCEDURE — 250N000013 HC RX MED GY IP 250 OP 250 PS 637

## 2021-11-10 PROCEDURE — 272N000504 HC NEEDLE CR4

## 2021-11-10 PROCEDURE — U0003 INFECTIOUS AGENT DETECTION BY NUCLEIC ACID (DNA OR RNA); SEVERE ACUTE RESPIRATORY SYNDROME CORONAVIRUS 2 (SARS-COV-2) (CORONAVIRUS DISEASE [COVID-19]), AMPLIFIED PROBE TECHNIQUE, MAKING USE OF HIGH THROUGHPUT TECHNOLOGIES AS DESCRIBED BY CMS-2020-01-R: HCPCS | Performed by: PHYSICIAN ASSISTANT

## 2021-11-10 PROCEDURE — 250N000012 HC RX MED GY IP 250 OP 636 PS 637

## 2021-11-10 PROCEDURE — 84702 CHORIONIC GONADOTROPIN TEST: CPT | Performed by: NURSE PRACTITIONER

## 2021-11-10 PROCEDURE — 36415 COLL VENOUS BLD VENIPUNCTURE: CPT | Performed by: NURSE PRACTITIONER

## 2021-11-10 RX ORDER — BUDESONIDE 3 MG/1
9 CAPSULE, COATED PELLETS ORAL EVERY MORNING
Status: DISCONTINUED | OUTPATIENT
Start: 2021-11-10 | End: 2021-11-11

## 2021-11-10 RX ORDER — HYDROMORPHONE HCL IN WATER/PF 6 MG/30 ML
0.2 PATIENT CONTROLLED ANALGESIA SYRINGE INTRAVENOUS
Status: DISCONTINUED | OUTPATIENT
Start: 2021-11-10 | End: 2021-11-14 | Stop reason: HOSPADM

## 2021-11-10 RX ORDER — BUDESONIDE 3 MG/1
9 CAPSULE, COATED PELLETS ORAL EVERY MORNING
Status: ON HOLD | COMMUNITY
End: 2021-11-12

## 2021-11-10 RX ADMIN — DOXYLAMINE SUCCINATE 50 MG: 25 TABLET ORAL at 22:00

## 2021-11-10 RX ADMIN — Medication 125 MG: at 08:33

## 2021-11-10 RX ADMIN — POTASSIUM CHLORIDE, DEXTROSE MONOHYDRATE AND SODIUM CHLORIDE: 150; 5; 450 INJECTION, SOLUTION INTRAVENOUS at 08:32

## 2021-11-10 RX ADMIN — POTASSIUM CHLORIDE, DEXTROSE MONOHYDRATE AND SODIUM CHLORIDE: 150; 5; 450 INJECTION, SOLUTION INTRAVENOUS at 18:37

## 2021-11-10 RX ADMIN — BUDESONIDE 9 MG: 3 CAPSULE ORAL at 13:57

## 2021-11-10 RX ADMIN — HYDROMORPHONE HYDROCHLORIDE 0.2 MG: 0.2 INJECTION, SOLUTION INTRAMUSCULAR; INTRAVENOUS; SUBCUTANEOUS at 11:02

## 2021-11-10 RX ADMIN — ENOXAPARIN SODIUM 40 MG: 40 INJECTION SUBCUTANEOUS at 20:02

## 2021-11-10 RX ADMIN — OXYCODONE HYDROCHLORIDE 5 MG: 5 TABLET ORAL at 22:05

## 2021-11-10 ASSESSMENT — ACTIVITIES OF DAILY LIVING (ADL)
ADLS_ACUITY_SCORE: 3
DEPENDENT_IADLS:: INDEPENDENT
ADLS_ACUITY_SCORE: 3

## 2021-11-10 ASSESSMENT — MIFFLIN-ST. JEOR: SCORE: 1137.64

## 2021-11-10 NOTE — PROGRESS NOTES
Admitted/transferred from: home, direct admit  2 RN full   skin assessment completed by Jesus Evans RN and Dora Vargas RN.  Skin assessment finding: skin intact, no problems   Interventions/actions: skin interventions continue to monitor for changes in integrity     Will continue to monitor.

## 2021-11-10 NOTE — PROGRESS NOTES
Unsuccessful RUE PICC attempt.  Was unable to thread catheter past subclavian vein.  Did not attempt LUE PICC, per patient she had a history of unsuccessful attempt in the left arm from another hospital.

## 2021-11-10 NOTE — PLAN OF CARE
AVSS. NPO except ice chips, denied nausea. Pain managed with IV Dilaudid, Oxycodone available. Up ad svetlana. Voiding spontaneously. Bowel sounds hypoactive. Passing gas, LBM 11/9 prior to admission. PICC placement attempted with [facility], however was unsuccessful. New IR order for PICC placement. PICC placement was delayed until COVID and HCG results - patient on the schedule for tomorrow. Up ad svetlana. PIV infusing.  at the bedside for a few hours. Continue with plan of care.

## 2021-11-10 NOTE — PROGRESS NOTES
CLINICAL NUTRITION SERVICES - ASSESSMENT NOTE     Nutrition Prescription    RECOMMENDATIONS FOR MDs/PROVIDERS TO ORDER:  Recommend discontinuing D5 upon initiation of TPN.    Malnutrition Status:    Patient does not meet two of the established criteria necessary for diagnosing malnutrition but is at risk for malnutrition    Recommendations already ordered by Registered Dietitian (RD):  Continue NPO  Ordered weight check  Ordered Triglycerides check    --Use dosing weight 51 kg  --Begin TPN, goal volume 1320 ml/day with initial 110g Dex daily (374 kcal, GIR1.5), 65g AA daily (260 kcal), and 250 ml 20% IV lipids 5x/week.  Micro/Rx: Infuvite + MTE5  --ONLY once pt receives ~100% of initial continuous PN volume with K+/Mg++/Phos WNL, advance PN dex by 55 g every 1-2 days (pending lytes/Glu and Pharm D/RD discretion) to initial goal of 220g Dex (748 kcal) to increase provisions to 1365 kcals (27 kcal/kg/day), 1.3 g PRO/kg/day, GIR 3.0 with 26% kcals from Fat.    Future/Additional Recommendations:   - Monitor bili, LFTs, TG weekly while on TPN. If total bili >2, recommend transition to SMOF lipids rather than intralipids and adjust MTE5 to only 2x/wk to prevent accumulation of copper and managanese.      REASON FOR ASSESSMENT  Alok Nino is a/an 41 year old female assessed by the dietitian for Admission Nutrition Risk Screen for positive and Pharmacy/Nutrition to Start and Manage PN (Central Line NOT in place (Line has been ordered, but not yet inserted).    MEDICAL HISTORY  Per H&P, pt has stricturing Crohn's disease, history of multiple laparotomies, small bowel resections, stricturoplasties. She has recently been having partial obstructive symptoms associated with a tight inflammatory stricture at her ileocolic anastomosis and was scheduled for exploratory laparotomy and ileocolic resection vs repeat stricturoplasty on 12/1/21 with Dr. Cartagena. Over the past couple of days she has been experiencing worsening  "abdominal pain, bloating, and nausea.     NUTRITION HISTORY  - Per H&P, Pt has lost 5-10 pounds over the past few weeks and is concerned about this. Over the past few days she has had worsening abdominal pain, 7/10, bloating, intermittent nausea, no vomiting. She is having regular BM movements, diarrhea about 10x/day (usual for her).  - Information obtained from pt:  - Prior to a couple of weeks ago, pt followed a regular diet of 3 meals per day + snacks.  - Pt experienced a week of nausea and abdominal pain, reduced intake, and a 5 pound wt loss. During this week, pt intake was 1-2 meals per day + 2 protein shakes per day.   - Pt felt better following week, consumed usual intake, and gained back 5 pounds.  - The following week, pt experienced more pain, nausea, reduced intake and 5# wt loss, so called MD. Pt intake again included 1-2 meals per day + 2 protein shakes per day.   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  - No BM today, but one yesterday.     CURRENT NUTRITION ORDERS  Diet: NPO  Intake/Tolerance: No intake since admit    LABS  K, Mg, Phos - WNL    MEDICATIONS  D5 + NaCL @ 100 mL/hr    ANTHROPOMETRICS  Height: 157.5 cm (5' 2\")    Most Recent Weight:    52.2 kg (115 lb) - 10/5/21  IBW: 50 kg  BMI: Normal BMI, BMI = 21 kg/m2  Weight History: 3# (3%) wt loss in past few weeks.     11/10/21 50.8 kg (112 lb) - per RD, taken with standing scale  Wt Readings from Last 10 Encounters:   10/05/21 52.2 kg (115 lb)   11/13/20 50.4 kg (111 lb 1.6 oz)   01/07/14 48.5 kg (107 lb)   12/30/13 47.6 kg (105 lb)   12/23/13 47.7 kg (105 lb 3.2 oz)   12/12/13 55.3 kg (122 lb)   11/19/13 52.6 kg (116 lb)   09/10/13 53.1 kg (117 lb)     Dosing Weight: 51 kg    ASSESSED NUTRITION NEEDS  Estimated Energy Needs: 6167-8964 kcals/day (25 - 30 kcals/kg)  Justification: Maintenance  Estimated Protein Needs: 60-75 grams protein/day (1.2 - 1.5 grams of pro/kg)  Justification: " Maintenance  Estimated Fluid Needs: (1 mL/kcal)   Justification: Maintenance    PHYSICAL FINDINGS  See malnutrition section below.    MALNUTRITION  % Intake: </= 50% for >/= 5 days (severe)  % Weight Loss: Weight loss does not meet criteria  Subcutaneous Fat Loss: None observed  Muscle Loss: None observed  Fluid Accumulation/Edema: None noted  Malnutrition Diagnosis: Patient does not meet two of the established criteria necessary for diagnosing malnutrition but is at risk for malnutrition    NUTRITION DIAGNOSIS  Inadequate oral intake related to GI distress as evidenced by pt report of intake </= 50% for >/= 5 days, 3# (3%) wt loss in past few weeks, and need for TPN to meet minimum nutritional needs.     INTERVENTIONS  Implementation  Nutrition Education: Provided education of role of RD in nutrition POC and discussed TPN.   Collaboration with other providers during 7C rounds  Parenteral Nutrition/IV Fluids - Initiate     Goals  Total avg nutritional intake to meet a minimum of 25 kcal/kg and 1.2 g PRO/kg daily (per dosing wt 51 kg).     Monitoring/Evaluation  Progress toward goals will be monitored and evaluated per protocol.    Ema Farley RD, LDN  Pg [phone]  Units covered: 7C (all beds) and 5A (beds 2831 through 5211-2)

## 2021-11-10 NOTE — PLAN OF CARE
Assumed cares from [id number], admitted from home. VSS on RA, A&Ox4. UAL, ambulating independently in halls. Reporting abdominal pain, PRN oxycodone administered x1. Reporting mild intermittent nausea, denied medication at this time. Currently NPO with ice chips and meds for bowel rest. Abdomen soft, tender, rounded. Bowel sounds hypoactive. Denied passing flatus or stool at this time. Voiding spontaneously without difficulty. PIV CDI and running MIVF per orders, plan to place PICC tomorrow. Still needing two nurse admission skin check. Continue with POC.

## 2021-11-10 NOTE — PLAN OF CARE
A&O, VSS, afebrile. Pain managed without intervention overnight. Aqua K pad ordered for pt use as needed. Independent in room. IVF per MAR. Voiding spontaneously. No BM overnight. Pt resting between cares. Remains NPO w/o c/o nausea.     Plan for PICC placement for TPN 11/10 and ileocolic resection potentially on 11/17

## 2021-11-10 NOTE — CONSULTS
Care Management Initial Consult    General Information  Assessment completed with: Patient,    Type of CM/SW Visit: Initial Assessment    Primary Care Provider verified and updated as needed: Yes   Readmission within the last 30 days: previous discharge plan unsuccessful   Return Category: Exacerbation of disease  Reason for Consult: discharge planning  Advance Care Planning: Advance Care Planning Reviewed: no concerns identified          Communication Assessment  Patient's communication style: spoken language (English or Bilingual)    Hearing Difficulty or Deaf: no   Wear Glasses or Blind: no    Cognitive  Cognitive/Neuro/Behavioral: WDL                      Living Environment:   People in home: spouse,child(isabelle), dependent     Current living Arrangements: house      Able to return to prior arrangements: yes       Family/Social Support:  Care provided by: self  Provides care for: no one  Marital Status:     Jose       Description of Support System: Supportive,Involved    Support Assessment: Adequate family and caregiver support,Adequate social supports    Current Resources:   Patient receiving home care services: No     Community Resources: None  Equipment currently used at home: none  Supplies currently used at home: None    Employment/Financial:  Employment Status:          Financial Concerns: insurance, none,No concerns identified           Lifestyle & Psychosocial Needs:  Social Determinants of Health     Tobacco Use: Low Risk      Smoking Tobacco Use: Never Smoker     Smokeless Tobacco Use: Never Used   Alcohol Use: Not At Risk     Frequency of Alcohol Consumption: Never     Average Number of Drinks: Not on file     Frequency of Binge Drinking: Not on file   Financial Resource Strain: Not on file   Food Insecurity: Not on file   Transportation Needs: Not on file   Physical Activity: Not on file   Stress: Not on file   Social Connections: Not on file   Intimate Partner Violence: Not on file    Depression: Not at risk     PHQ-2 Score: 0   Housing Stability: Not on file       Functional Status:  Prior to admission patient needed assistance:   Dependent ADLs:: Independent  Dependent IADLs:: Independent       Mental Health Status:  Mental Health Status: No Current Concerns       Chemical Dependency Status:                Values/Beliefs:  Spiritual, Cultural Beliefs, Adventism Practices, Values that affect care: no               Additional Information:  This writer called and spoke with Alok to discuss discharge planning. Alok is aware that she may need some Total Parenteral Nutrition when she is discharged. Alok states she has not had experience with this before and this writer expressed that an order was placed for patient learning center. Alok stated that someone had called her  and scheduled for Friday at 10AM already. She is needs a PICC like placed by IR. This writer discussed with her the process for benefit check for home infusion. Alko was agreeable to sending for benefit check to [facility].     RNCC will continue to follow for final recommendations and discharge planning.     Lynda Yost RN  Float RN Care Coordinator  Pager [phone] (unit RNCC pager)     For Weekend & Holiday on call RN Care Coordinator:  (Home discharge with needs including home care, Assisted living facility returns, Durable medical equip, IV antibiotics)   [place]    Pager [phone]  [facility] (Sunday Only) Pager [phone]    For weekend social work needs, contact information below:  (Transitional care unit, Long-term care unit, Hospice, Counseling, Domestic violence,Vulnerable adult, Health Care Directive)  4A, 4C, 4E, 5A, 5B  Pager [phone]  6A, 6B, 6C, 6D   Pager [phone]  7A, 7B, 7C, 7D, 5C  Pager [phone]  [facility] (Saturday Only) Pager [phone]    After hours for all units- (only  is available -[phone]/everyday)  Pager [phone]

## 2021-11-10 NOTE — PROGRESS NOTES
Therapy: TPN  Insurance: [employer]     Ded: $300  Met: $204  Co-Insurance: 80/20  Max Out of Pocket: $ 1058  Met: $1058    Patient has coverage for TPN therapy. Deductible no longer applies since out of pocket has been met in full. Patient should have coverage at 100%.    [facility] in reference to admission date 11/09/2021 to check TPN therapy coverage.    Please contact Intake with any questions, [phone] or In Basket pool, [facility] ([phone]).

## 2021-11-10 NOTE — CONSULTS
Interventional Radiology Consult Service Note    Patient is on IR schedule 11/11 for a DL PICC placement.   Labs WNL for procedure. COVID PENDING.  No NPO required.  Consent will be done prior to procedure.     Please [place] the IR [place] RN [place] [phone] for estimated time of procedure.     Case discussed with Dr. Borden from IR and Armida Person PA-C. This is a 41 year old female with a history of stricturing Crohn's disease with multiple surgical interventions including small bowel resections, laparotomies, and stricturoplasties. She was admitted with progressive nausea, abdominal pain and bloating consistent with partial SBO. She had scheduled surgery with Dr. Cartagena to address area of stricture in December which has now been moved up to 11/17. Team is planning for TPN in the interim and requested vascular access to place a PICC line. VAS attempted RUE PICC placement and were unable to thread past the subclavian. Pt refused LUE PICC attempted and reported historical difficulty on the left at an OSH.    Pt with pending COVID test. Procedure timing TBD once that has resulted.    Expected date of discharge: TBD    Vitals:   /55 (BP Location: Left arm)   Pulse 68   Temp 96.8  F (36  C) (Oral)   Resp 18   Wt 50.8 kg (112 lb)   SpO2 98%   BMI 20.49 kg/m      Pertinent Labs:     Lab Results   Component Value Date    WBC 8.6 11/09/2021    WBC 7.1 07/31/2019    WBC 12.6 (H) 07/08/2019    WBC 8.5 06/30/2019    WBC 8.6 12/14/2013    WBC 10.0 12/13/2013    WBC 7.9 12/12/2013       Lab Results   Component Value Date    HGB 13.2 11/09/2021    HGB 11.4 07/31/2019    HGB 8.8 07/08/2019    HGB 9.3 12/14/2013    HGB 10.1 12/13/2013    HGB 9.9 12/12/2013       Lab Results   Component Value Date     11/09/2021     07/31/2019     07/08/2019     12/15/2013     12/14/2013     12/13/2013       Lab Results   Component Value Date    INR 1.20 (H) 06/28/2019       Lab Results    Component Value Date    POTASSIUM 3.8 11/09/2021    POTASSIUM 3.4 12/14/2013        TACOS Alcala CNP  Interventional Radiology  Pager: [phone]

## 2021-11-10 NOTE — PROGRESS NOTES
Colorectal Surgery Progress Note  [facility]    Subjective: Continues to have poor appetite, fear that eating will lead to complete obstruction. No vomiting overnight, no nausea currently. Denies abdominal pain this morning but does feel slightly bloated.    Vitals:  Vitals:    11/09/21 1951 11/09/21 2238 11/10/21 0648   BP: 102/66 106/56 101/55   BP Location: Left arm Left arm Left arm   Pulse: 71 76 68   Resp: 16 16 18   Temp: 97.4  F (36.3  C) 97  F (36.1  C) 96.8  F (36  C)   TempSrc: Temporal Oral Oral   SpO2: 100% 98% 98%     I/O:  I/O last 3 completed shifts:  In: 851.66 [I.V.:851.66]  Out: 400 [Urine:400]     Physical Exam:  Gen: AAOx3, NAD, sleeping in bed this morning  Pulm: Non-labored breathing on room air  Abd: Soft, non-distended, mildly tender, well healed transverse abdominal surgical scar  Ext:  Warm and well-perfused     BMP  Recent Labs   Lab 11/09/21 2121      POTASSIUM 3.8   CHLORIDE 105   CO2 27   BUN 17   CR 0.62   GLC 91   MAG 2.0   PHOS 3.6     CBC  Recent Labs   Lab 11/09/21 2121   WBC 8.6   HGB 13.2   HCT 40.3        ASSESSMENT: 41 year old female with a history of stricturing Crohn's disease with multiple surgical interventions including small bowel resections, laparotomies, and stricturoplasties. She was admitted with progressive nausea, abdominal pain and bloating consistent with partial SBO. She had scheduled surgery with Dr. Cartagena to address area of stricture in December which has now been moved up to 11/17.    -PICC line placement today for TPN    Mervin Mares, MS4  8:15 AM November 10, 2021    Addendum:  Pt was seen and examined independent of the medical student.   Agree with note as written above.   -  Unsuccessful PICC placement attempt this morning with vascular access team, will reach out to IR to see if they will be able to place PICC for TPN  - NPO, okay for sips for comfort  - Continue MIVF      Leslee Patrick MD PGY-1  Colon  and Rectal Surgery       Seen and discussed with CRS fellow Dr. Adams, discussed with staff Dr. Cartagena.

## 2021-11-11 ENCOUNTER — APPOINTMENT (OUTPATIENT)
Dept: INTERVENTIONAL RADIOLOGY/VASCULAR | Facility: CLINIC | Age: 41
DRG: 386 | End: 2021-11-11
Attending: NURSE PRACTITIONER
Payer: OTHER GOVERNMENT

## 2021-11-11 LAB
ALBUMIN SERPL-MCNC: 2.5 G/DL (ref 3.4–5)
ALP SERPL-CCNC: 67 U/L (ref 40–150)
ALT SERPL W P-5'-P-CCNC: 18 U/L (ref 0–50)
ANION GAP SERPL CALCULATED.3IONS-SCNC: 2 MMOL/L (ref 3–14)
AST SERPL W P-5'-P-CCNC: 10 U/L (ref 0–45)
BILIRUB DIRECT SERPL-MCNC: 0.1 MG/DL (ref 0–0.2)
BILIRUB SERPL-MCNC: 0.4 MG/DL (ref 0.2–1.3)
BUN SERPL-MCNC: 8 MG/DL (ref 7–30)
CALCIUM SERPL-MCNC: 8.2 MG/DL (ref 8.5–10.1)
CHLORIDE BLD-SCNC: 114 MMOL/L (ref 94–109)
CO2 SERPL-SCNC: 24 MMOL/L (ref 20–32)
CREAT SERPL-MCNC: 0.59 MG/DL (ref 0.52–1.04)
GFR SERPL CREATININE-BSD FRML MDRD: >90 ML/MIN/1.73M2
GLUCOSE BLD-MCNC: 98 MG/DL (ref 70–99)
GLUCOSE BLDC GLUCOMTR-MCNC: 88 MG/DL (ref 70–99)
INR PPP: 1.19 (ref 0.85–1.15)
MAGNESIUM SERPL-MCNC: 1.8 MG/DL (ref 1.6–2.3)
PHOSPHATE SERPL-MCNC: 3.2 MG/DL (ref 2.5–4.5)
POTASSIUM BLD-SCNC: 4 MMOL/L (ref 3.4–5.3)
PREALB SERPL IA-MCNC: 14 MG/DL (ref 15–45)
PROT SERPL-MCNC: 5.8 G/DL (ref 6.8–8.8)
SODIUM SERPL-SCNC: 140 MMOL/L (ref 133–144)

## 2021-11-11 PROCEDURE — 02H633Z INSERTION OF INFUSION DEVICE INTO RIGHT ATRIUM, PERCUTANEOUS APPROACH: ICD-10-PCS | Performed by: PHYSICIAN ASSISTANT

## 2021-11-11 PROCEDURE — 36573 INSJ PICC RS&I 5 YR+: CPT | Performed by: PHYSICIAN ASSISTANT

## 2021-11-11 PROCEDURE — 120N000002 HC R&B MED SURG/OB UMMC

## 2021-11-11 PROCEDURE — 250N000013 HC RX MED GY IP 250 OP 250 PS 637

## 2021-11-11 PROCEDURE — 36573 INSJ PICC RS&I 5 YR+: CPT

## 2021-11-11 PROCEDURE — 272N000504 HC NEEDLE CR4

## 2021-11-11 PROCEDURE — 258N000003 HC RX IP 258 OP 636: Performed by: PHYSICIAN ASSISTANT

## 2021-11-11 PROCEDURE — 82040 ASSAY OF SERUM ALBUMIN: CPT

## 2021-11-11 PROCEDURE — 36415 COLL VENOUS BLD VENIPUNCTURE: CPT

## 2021-11-11 PROCEDURE — C1769 GUIDE WIRE: HCPCS

## 2021-11-11 PROCEDURE — 250N000011 HC RX IP 250 OP 636: Performed by: PHYSICIAN ASSISTANT

## 2021-11-11 PROCEDURE — 83735 ASSAY OF MAGNESIUM: CPT

## 2021-11-11 PROCEDURE — 255N000002 HC RX 255 OP 636: Performed by: PHYSICIAN ASSISTANT

## 2021-11-11 PROCEDURE — C1887 CATHETER, GUIDING: HCPCS

## 2021-11-11 PROCEDURE — 85610 PROTHROMBIN TIME: CPT

## 2021-11-11 PROCEDURE — 250N000011 HC RX IP 250 OP 636

## 2021-11-11 PROCEDURE — 82248 BILIRUBIN DIRECT: CPT

## 2021-11-11 PROCEDURE — C1751 CATH, INF, PER/CENT/MIDLINE: HCPCS

## 2021-11-11 PROCEDURE — 258N000003 HC RX IP 258 OP 636

## 2021-11-11 PROCEDURE — 84134 ASSAY OF PREALBUMIN: CPT

## 2021-11-11 PROCEDURE — 250N000009 HC RX 250: Performed by: PHYSICIAN ASSISTANT

## 2021-11-11 PROCEDURE — 84100 ASSAY OF PHOSPHORUS: CPT

## 2021-11-11 PROCEDURE — 250N000009 HC RX 250: Performed by: COLON & RECTAL SURGERY

## 2021-11-11 RX ORDER — DEXTROSE MONOHYDRATE 100 MG/ML
INJECTION, SOLUTION INTRAVENOUS CONTINUOUS PRN
Status: DISCONTINUED | OUTPATIENT
Start: 2021-11-11 | End: 2021-11-14 | Stop reason: HOSPADM

## 2021-11-11 RX ORDER — SODIUM CHLORIDE 9 MG/ML
INJECTION, SOLUTION INTRAVENOUS CONTINUOUS
Status: DISCONTINUED | OUTPATIENT
Start: 2021-11-11 | End: 2021-11-14 | Stop reason: HOSPADM

## 2021-11-11 RX ORDER — HEPARIN SODIUM,PORCINE 10 UNIT/ML
5 VIAL (ML) INTRAVENOUS
Status: COMPLETED | OUTPATIENT
Start: 2021-11-11 | End: 2021-11-11

## 2021-11-11 RX ORDER — IODIXANOL 320 MG/ML
50 INJECTION, SOLUTION INTRAVASCULAR ONCE
Status: COMPLETED | OUTPATIENT
Start: 2021-11-11 | End: 2021-11-11

## 2021-11-11 RX ORDER — HYDROMORPHONE HCL IN WATER/PF 6 MG/30 ML
0.2 PATIENT CONTROLLED ANALGESIA SYRINGE INTRAVENOUS ONCE
Status: COMPLETED | OUTPATIENT
Start: 2021-11-11 | End: 2021-11-11

## 2021-11-11 RX ORDER — LIDOCAINE HYDROCHLORIDE 10 MG/ML
1-30 INJECTION, SOLUTION EPIDURAL; INFILTRATION; INTRACAUDAL; PERINEURAL
Status: COMPLETED | OUTPATIENT
Start: 2021-11-11 | End: 2021-11-11

## 2021-11-11 RX ORDER — HEPARIN SODIUM (PORCINE) LOCK FLUSH IV SOLN 100 UNIT/ML 100 UNIT/ML
2 SOLUTION INTRAVENOUS ONCE
Status: DISCONTINUED | OUTPATIENT
Start: 2021-11-11 | End: 2021-11-11 | Stop reason: CLARIF

## 2021-11-11 RX ORDER — HEPARIN SODIUM,PORCINE 10 UNIT/ML
5-20 VIAL (ML) INTRAVENOUS
Status: DISCONTINUED | OUTPATIENT
Start: 2021-11-11 | End: 2021-11-14 | Stop reason: HOSPADM

## 2021-11-11 RX ORDER — HEPARIN SODIUM,PORCINE 10 UNIT/ML
5-20 VIAL (ML) INTRAVENOUS EVERY 24 HOURS
Status: DISCONTINUED | OUTPATIENT
Start: 2021-11-11 | End: 2021-11-14 | Stop reason: HOSPADM

## 2021-11-11 RX ADMIN — OXYCODONE HYDROCHLORIDE 5 MG: 5 TABLET ORAL at 21:43

## 2021-11-11 RX ADMIN — DOXYLAMINE SUCCINATE 50 MG: 25 TABLET ORAL at 21:44

## 2021-11-11 RX ADMIN — HYDROMORPHONE HYDROCHLORIDE 0.2 MG: 0.2 INJECTION, SOLUTION INTRAMUSCULAR; INTRAVENOUS; SUBCUTANEOUS at 20:46

## 2021-11-11 RX ADMIN — ENOXAPARIN SODIUM 40 MG: 40 INJECTION SUBCUTANEOUS at 20:46

## 2021-11-11 RX ADMIN — SODIUM CHLORIDE 1000 ML: 9 INJECTION, SOLUTION INTRAVENOUS at 17:30

## 2021-11-11 RX ADMIN — I.V. FAT EMULSION 250 ML: 20 EMULSION INTRAVENOUS at 20:59

## 2021-11-11 RX ADMIN — HYDROMORPHONE HYDROCHLORIDE 0.2 MG: 0.2 INJECTION, SOLUTION INTRAMUSCULAR; INTRAVENOUS; SUBCUTANEOUS at 11:52

## 2021-11-11 RX ADMIN — LIDOCAINE HYDROCHLORIDE 4 ML: 10 INJECTION, SOLUTION EPIDURAL; INFILTRATION; INTRACAUDAL; PERINEURAL at 14:36

## 2021-11-11 RX ADMIN — CALCIUM GLUCONATE: 98 INJECTION, SOLUTION INTRAVENOUS at 20:59

## 2021-11-11 RX ADMIN — OXYCODONE HYDROCHLORIDE 5 MG: 5 TABLET ORAL at 15:56

## 2021-11-11 RX ADMIN — HYDROMORPHONE HYDROCHLORIDE 0.2 MG: 0.2 INJECTION, SOLUTION INTRAMUSCULAR; INTRAVENOUS; SUBCUTANEOUS at 15:15

## 2021-11-11 RX ADMIN — Medication 2 ML: at 14:35

## 2021-11-11 RX ADMIN — Medication 2 ML: at 14:40

## 2021-11-11 RX ADMIN — POTASSIUM CHLORIDE, DEXTROSE MONOHYDRATE AND SODIUM CHLORIDE: 150; 5; 450 INJECTION, SOLUTION INTRAVENOUS at 04:53

## 2021-11-11 RX ADMIN — IODIXANOL 20 ML: 320 INJECTION, SOLUTION INTRAVASCULAR at 14:34

## 2021-11-11 RX ADMIN — HYDROMORPHONE HYDROCHLORIDE 0.2 MG: 0.2 INJECTION, SOLUTION INTRAMUSCULAR; INTRAVENOUS; SUBCUTANEOUS at 18:20

## 2021-11-11 ASSESSMENT — ACTIVITIES OF DAILY LIVING (ADL)
ADLS_ACUITY_SCORE: 3
ADLS_ACUITY_SCORE: 3
ADLS_ACUITY_SCORE: 5
ADLS_ACUITY_SCORE: 5
ADLS_ACUITY_SCORE: 3
ADLS_ACUITY_SCORE: 5
ADLS_ACUITY_SCORE: 3
ADLS_ACUITY_SCORE: 5
ADLS_ACUITY_SCORE: 3
ADLS_ACUITY_SCORE: 3
ADLS_ACUITY_SCORE: 5
ADLS_ACUITY_SCORE: 3
ADLS_ACUITY_SCORE: 3
ADLS_ACUITY_SCORE: 5
ADLS_ACUITY_SCORE: 3
ADLS_ACUITY_SCORE: 5
ADLS_ACUITY_SCORE: 3
ADLS_ACUITY_SCORE: 5

## 2021-11-11 ASSESSMENT — MIFFLIN-ST. JEOR: SCORE: 1133.56

## 2021-11-11 NOTE — PLAN OF CARE
A&O, VSS, afebrile. Pain managed without intervention overnight. Independent in room. IVF per MAR. Voiding spontaneously. No BM overnight, pt reporting decrease in flatus since previous day, bowel sounds faint. Pt resting between cares. Remains NPO w/o c/o nausea.      Plan for PICC placement for TPN 11/11 and ileocolic resection potentially on 11/17

## 2021-11-11 NOTE — PROGRESS NOTES
Care Management Follow Up    Length of Stay (days): 2    Expected Discharge Date: 11/12/2021     Concerns to be Addressed:       Patient plan of care discussed at interdisciplinary rounds: Yes    Anticipated Discharge Disposition:  Home with home care for TPN administration.     Anticipated Discharge Services:    Anticipated Discharge DME:      Patient/family educated on Medicare website which has current facility and service quality ratings:  yes  Education Provided on the Discharge Plan:  Yes and [facility] Appointment scheduled for tomorrow with spouse to attend as caregiver.    Patient/Family in Agreement with the Plan:  Yes    Private pay costs discussed: Not applicable    Additional Information: The following tentative home care plans of care have been arranged and patient has 100% coverage for home TPN after update for [facility] Intake:    Please fax discharge orders to [facility]     Ph:  [phone]     Fax: [phone]     Skilled home care RN for initial home safety evaluation and 1-3 times a week to evaluate medication management, nutrition and hydration evaluation, endurance evaluation, and general status evaluation after discharge from the acute care hospital setting.     Skilled home care RN to assist with management and education reinforcement with TPN via picc line.  TPN labs and picc line care per home care agency routine.     Inpatient care management team will continue to follow for updated needs prn.      AL Tejeda.S.N., R.N., P.H.N..  Care Coordinator     Pager   [facility]

## 2021-11-11 NOTE — PLAN OF CARE
"/72 (BP Location: Left arm)   Pulse 66   Temp 97.4  F (36.3  C) (Temporal)   Resp 18   Ht 1.575 m (5' 2\")   Wt 50.9 kg (112 lb 4.8 oz)   SpO2 98%   BMI 20.54 kg/m    Assumed care from 2999-3976. VSS on RA. A&Ox4. Pain managed with PRN oxycodone before bed. Denies nausea, NPO. PIV SL, plan for PICC placement tomorrow. Skin otherwise pale & intact. Denies gas this evening, states she had a small BM this AM and no bowel function since. Voiding spont, AUO. Up ad svetlana. Continue POC.   "

## 2021-11-11 NOTE — IR NOTE
Patient Name: Alok Nino  Medical Record Number: [id number]  Today's Date: 11/11/2021    Procedure: Peripheral inserted central venous catheter placement  Proceduralist: Zuleika Person  Pathology present: NA    Procedure Start: 1310  Procedure end: 1440   Sedation medications administered: None     Report given to: INGRID Akins RN  : FROY    Other Notes: Pt arrived to IR room 2 from [facility]. Consent reviewed. Pt denies any questions or concerns regarding procedure. Pt positioned supine and monitored per protocol. Pt tolerated procedure without any noted complications. Pt transferred back to [facility].    Handoff report received from Zara Gallo RN. Assumed care of Patient at 1345. Erica Boston RN.

## 2021-11-11 NOTE — PROGRESS NOTES
"Colorectal Surgery Progress Note  [facility]    Subjective:  Passed 2 small formed stools.  Somewhat decreased bloating.  Denies nausea/vomiting.  Awaiting PICC    Vitals:  Vitals:    11/10/21 1452 11/10/21 1526 11/10/21 2158 11/11/21 0614   BP: 105/57  108/72 100/55   BP Location: Left arm  Left arm Left arm   Pulse: 57  66 54   Resp: 18  18 18   Temp: 97.1  F (36.2  C)  97.4  F (36.3  C) 97.5  F (36.4  C)   TempSrc: Temporal  Temporal Temporal   SpO2: 99%  98% 98%   Weight:  50.9 kg (112 lb 4.8 oz)     Height:  1.575 m (5' 2\")       I/O:  I/O last 3 completed shifts:  In: 2356.67 [I.V.:2356.67]  Out: 1775 [Urine:1775]     Physical Exam:  Gen: AAOx3, NAD, sleeping in bed this morning  Pulm: Non-labored breathing on room air  Abd: Soft, mildly distended, mildly tender, well healed transverse abdominal surgical scar  Ext:  Warm and well-perfused     BMP  Recent Labs   Lab 11/11/21  0637 11/09/21  2121    137   POTASSIUM 4.0 3.8   CHLORIDE 114* 105   CO2 24 27   BUN 8 17   CR 0.59 0.62   GLC 98 91   MAG 1.8 2.0   PHOS 3.2 3.6     CBC  Recent Labs   Lab 11/09/21 2121   WBC 8.6   HGB 13.2   HCT 40.3        ASSESSMENT: 41 year old female with a history of stricturing Crohn's disease with multiple surgical interventions including small bowel resections, laparotomies, and stricturoplasties. She was admitted with progressive nausea, abdominal pain and bloating consistent with partial SBO. She had scheduled surgery with Dr. Cartagena to address area of stricture in December which has now been moved up to 11/17.    -PICC line placement today for TPN    Mervin Lopezer, MS4  8:15 AM November 10, 2021    Addendum:  Pt was seen and examined independent of the medical student.   Agree with note as written above.   - IR PICC line placement today and initiate TPN.   - D5-0.45%-Kcl @ 100  - ambulate  - hold home budesonide and azathioprine  - dvt ppx:  lovenox    Jesenia Person PA-C  Colon and " Rectal Surgery    Discussed with house staff but patient not seen-- off in IR when I came by. I agree with the assessment and plan as detailed in the PA note.    Pete Cartagena MD  Professor of Surgery

## 2021-11-11 NOTE — PROCEDURES
[facility]    Procedure: IR Procedure Note    Date/Time: 11/11/2021 2:44 PM  Performed by: Zuleika Person PA-C  Authorized by: Zuleika Person PA-C   IR Fellow Physician:  Other(s) attending procedure: Vincent Gold PA-C    UNIVERSAL PROTOCOL   Site Marked: NA  Prior Images Obtained and Reviewed:  Yes  Required items: Required blood products, implants, devices and special equipment available    Patient identity confirmed:  Verbally with patient, arm band, provided demographic data and hospital-assigned identification number  Patient was reevaluated immediately before administering moderate or deep sedation or anesthesia  Confirmation Checklist:  Patient's identity using two indicators, relevant allergies, procedure was appropriate and matched the consent or emergent situation and correct equipment/implants were available  Time out: Immediately prior to the procedure a time out was called    Universal Protocol: the Joint Commission Universal Protocol was followed    Preparation: Patient was prepped and draped in usual sterile fashion           ANESTHESIA    Anesthesia: Local infiltration  Local Anesthetic:  Lidocaine 1% without epinephrine      SEDATION    Patient Sedated: No    See dictated procedure note for full details.  Findings: Local only    Specimens: none    Complications: None    Condition: Stable    Plan: Transport back to inpatient bed for recovery    PROCEDURE   Patient Tolerance:  Patient tolerated the procedure well with no immediate complications  Describe Procedure: Completed placement of 5FR double lumen 40 cm PICC via right lateral brachial vein with tip in the RA. External length 2 cm from hub. Sutured in place.  Aspirates and flushes well.  Heplocked.  Ready for immediate use.            Length of time physician/provider present for 1:1 monitoring during sedation: 0

## 2021-11-12 ENCOUNTER — TEAM CONFERENCE (OUTPATIENT)
Dept: SURGERY | Facility: CLINIC | Age: 41
End: 2021-11-12
Payer: OTHER GOVERNMENT

## 2021-11-12 ENCOUNTER — APPOINTMENT (OUTPATIENT)
Dept: EDUCATION SERVICES | Facility: CLINIC | Age: 41
DRG: 386 | End: 2021-11-12
Attending: PHYSICIAN ASSISTANT
Payer: OTHER GOVERNMENT

## 2021-11-12 ENCOUNTER — HOME INFUSION (PRE-WILLOW HOME INFUSION) (OUTPATIENT)
Dept: PHARMACY | Facility: CLINIC | Age: 41
End: 2021-11-12

## 2021-11-12 LAB
ALBUMIN SERPL-MCNC: 2.5 G/DL (ref 3.4–5)
ALP SERPL-CCNC: 74 U/L (ref 40–150)
ALT SERPL W P-5'-P-CCNC: 17 U/L (ref 0–50)
ANION GAP SERPL CALCULATED.3IONS-SCNC: 2 MMOL/L (ref 3–14)
AST SERPL W P-5'-P-CCNC: 8 U/L (ref 0–45)
BILIRUB DIRECT SERPL-MCNC: 0.1 MG/DL (ref 0–0.2)
BILIRUB SERPL-MCNC: 0.4 MG/DL (ref 0.2–1.3)
BUN SERPL-MCNC: 9 MG/DL (ref 7–30)
CALCIUM SERPL-MCNC: 8.4 MG/DL (ref 8.5–10.1)
CHLORIDE BLD-SCNC: 108 MMOL/L (ref 94–109)
CO2 SERPL-SCNC: 29 MMOL/L (ref 20–32)
CREAT SERPL-MCNC: 0.53 MG/DL (ref 0.52–1.04)
GFR SERPL CREATININE-BSD FRML MDRD: >90 ML/MIN/1.73M2
GLUCOSE BLD-MCNC: 95 MG/DL (ref 70–99)
GLUCOSE BLDC GLUCOMTR-MCNC: 101 MG/DL (ref 70–99)
GLUCOSE BLDC GLUCOMTR-MCNC: 109 MG/DL (ref 70–99)
GLUCOSE BLDC GLUCOMTR-MCNC: 89 MG/DL (ref 70–99)
GLUCOSE BLDC GLUCOMTR-MCNC: 90 MG/DL (ref 70–99)
INR PPP: 1.19 (ref 0.85–1.15)
MAGNESIUM SERPL-MCNC: 1.9 MG/DL (ref 1.6–2.3)
PHOSPHATE SERPL-MCNC: 3.9 MG/DL (ref 2.5–4.5)
POTASSIUM BLD-SCNC: 3.6 MMOL/L (ref 3.4–5.3)
PROT SERPL-MCNC: 5.6 G/DL (ref 6.8–8.8)
SODIUM SERPL-SCNC: 139 MMOL/L (ref 133–144)

## 2021-11-12 PROCEDURE — 250N000013 HC RX MED GY IP 250 OP 250 PS 637

## 2021-11-12 PROCEDURE — 84100 ASSAY OF PHOSPHORUS: CPT

## 2021-11-12 PROCEDURE — 36592 COLLECT BLOOD FROM PICC: CPT | Performed by: COLON & RECTAL SURGERY

## 2021-11-12 PROCEDURE — 250N000011 HC RX IP 250 OP 636: Performed by: PHYSICIAN ASSISTANT

## 2021-11-12 PROCEDURE — 82248 BILIRUBIN DIRECT: CPT | Performed by: COLON & RECTAL SURGERY

## 2021-11-12 PROCEDURE — 999N000007 HC SITE CHECK

## 2021-11-12 PROCEDURE — 80053 COMPREHEN METABOLIC PANEL: CPT | Performed by: COLON & RECTAL SURGERY

## 2021-11-12 PROCEDURE — 120N000002 HC R&B MED SURG/OB UMMC

## 2021-11-12 PROCEDURE — 85610 PROTHROMBIN TIME: CPT | Performed by: COLON & RECTAL SURGERY

## 2021-11-12 PROCEDURE — 250N000009 HC RX 250: Performed by: COLON & RECTAL SURGERY

## 2021-11-12 PROCEDURE — 83735 ASSAY OF MAGNESIUM: CPT

## 2021-11-12 PROCEDURE — 250N000011 HC RX IP 250 OP 636

## 2021-11-12 RX ORDER — OXYCODONE HYDROCHLORIDE 5 MG/1
5 TABLET ORAL EVERY 8 HOURS PRN
Qty: 15 TABLET | Refills: 0 | Status: ON HOLD | OUTPATIENT
Start: 2021-11-12 | End: 2021-11-19

## 2021-11-12 RX ADMIN — DOXYLAMINE SUCCINATE 50 MG: 25 TABLET ORAL at 22:09

## 2021-11-12 RX ADMIN — Medication 5 ML: at 22:09

## 2021-11-12 RX ADMIN — OXYCODONE HYDROCHLORIDE 5 MG: 5 TABLET ORAL at 07:40

## 2021-11-12 RX ADMIN — HYDROMORPHONE HYDROCHLORIDE 0.2 MG: 0.2 INJECTION, SOLUTION INTRAMUSCULAR; INTRAVENOUS; SUBCUTANEOUS at 12:59

## 2021-11-12 RX ADMIN — OXYCODONE HYDROCHLORIDE 5 MG: 5 TABLET ORAL at 16:41

## 2021-11-12 RX ADMIN — ENOXAPARIN SODIUM 40 MG: 40 INJECTION SUBCUTANEOUS at 20:29

## 2021-11-12 RX ADMIN — CALCIUM GLUCONATE: 98 INJECTION, SOLUTION INTRAVENOUS at 20:28

## 2021-11-12 RX ADMIN — I.V. FAT EMULSION 250 ML: 20 EMULSION INTRAVENOUS at 20:29

## 2021-11-12 RX ADMIN — HYDROMORPHONE HYDROCHLORIDE 0.2 MG: 0.2 INJECTION, SOLUTION INTRAMUSCULAR; INTRAVENOUS; SUBCUTANEOUS at 00:14

## 2021-11-12 RX ADMIN — HYDROMORPHONE HYDROCHLORIDE 0.2 MG: 0.2 INJECTION, SOLUTION INTRAMUSCULAR; INTRAVENOUS; SUBCUTANEOUS at 05:44

## 2021-11-12 RX ADMIN — HYDROMORPHONE HYDROCHLORIDE 0.2 MG: 0.2 INJECTION, SOLUTION INTRAMUSCULAR; INTRAVENOUS; SUBCUTANEOUS at 20:40

## 2021-11-12 ASSESSMENT — ACTIVITIES OF DAILY LIVING (ADL)
ADLS_ACUITY_SCORE: 5
ADLS_ACUITY_SCORE: 3
ADLS_ACUITY_SCORE: 5
ADLS_ACUITY_SCORE: 3
ADLS_ACUITY_SCORE: 5
ADLS_ACUITY_SCORE: 3
ADLS_ACUITY_SCORE: 5
ADLS_ACUITY_SCORE: 3
ADLS_ACUITY_SCORE: 5
ADLS_ACUITY_SCORE: 3
ADLS_ACUITY_SCORE: 5
ADLS_ACUITY_SCORE: 3
ADLS_ACUITY_SCORE: 5
ADLS_ACUITY_SCORE: 3
ADLS_ACUITY_SCORE: 5
ADLS_ACUITY_SCORE: 5

## 2021-11-12 ASSESSMENT — MIFFLIN-ST. JEOR: SCORE: 1130.38

## 2021-11-12 NOTE — PLAN OF CARE
Assumed care from [id number]. VSS on room air. Up ad svetlana. Alert and oriented x4. Pain mainly in upper arm where new PICC was placed today. Ice packs used, taking IV dilaudid and PO oxycodone. PICC infusing TPN/lipids and other lumen infusing MIVF. PIV saline locked. Voids spontaneously, reports gas and stool. NPO, denies nausea. Continue with POC.

## 2021-11-12 NOTE — CONSULTS
Met with patient and  Vincent in [facility] for PICC and TPN teaching. Patient is an infusion nurse and Vincent seemed to have a good big picture view on how all of this worked before we started.    Vincent did a great job doing an IV flush, preparing the TPN bag including drawing vitamins out from vials, spiking the tubing, using the CADD pump to demonstrate his understanding.    Both asked a few clarifying questions to further verbalize understanding and feel comfortable going home with these cares.    Literature given: Handwashing and Skin Care, Getting Ready for Your PICC, Caring for Your PICC at Home, Changing the End Cap, Flushing the Line with Heparin, Saline or Citrate,     Literature given: Handwashing and Skin Care, How to Set up and Infuse Your TPN.

## 2021-11-12 NOTE — CONSULTS
[facility] PATIENT CONSULTATION NOTE    HPI:  Alok Nino is a 41 year old year old female who has a PMH significant for stricturing Crohn's w/ hx of multiple bowel resections presented w/ nausea, vomiting and abdominal pain, found to have pSBO. PICC line placed by IR for TPN yesterday w/ findings of right subclavian vein stenosis. She states she has had 2 other PICC lines in the past on the right side. States they have attempted left-sided PICC line in the past that has been unsuccessful. Denies any history of DVTs. Dad has clotting disorder for which he takes Xarelto. Reports numbness and tingling of the bilateral upper extremities that she attributes to hypokalemia. No episodes of swelling or cyanosis. No reports of cool extremities.    Review Of Systems:   General: Denies F/C  Respiratory: Denies SOB  Cardio: Denies CP  Gastrointestinal: Denies N/V  Genitourinary: Denies recent change in urination  Musculoskeletal: See HPI  Neurologic: Denies HA  Psychiatric: Denies confusion  Hematology/immunology: no unexpected bruising    PAST MEDICAL HISTORY:  Past Medical History:   Diagnosis Date     C. difficile enteritis      Chronic pain      Crohn disease (H)      Melanoma (H)      PONV (postoperative nausea and vomiting)        PAST SURGICAL HISTORY:  Past Surgical History:   Procedure Laterality Date     ABDOMEN SURGERY      3 for crohns dx     APPENDECTOMY       APPENDECTOMY       CHOLECYSTECTOMY       CHOLECYSTECTOMY       COLONOSCOPY       ENDOSCOPIC ULTRASOUND UPPER GASTROINTESTINAL TRACT (GI) N/A 11/13/2020    Procedure: ENDOSCOPIC ULTRASOUND, ESOPHAGOSCOPY / UPPER GASTROINTESTINAL TRACT with BIOPSY;  Surgeon: Cydney Garcia MD;  Location: [facility] OR     GYN SURGERY       H STATISTIC PICC LINE INSERTION >5YR, FAILED Right 11/10/2021    DL unsuccessful attempt from another hospital, IR deferral     IR PICC PLACEMENT > 5 YRS OF AGE  11/11/2021     OTHER SURGICAL HISTORY      strictoplasty  x3     OTHER SURGICAL HISTORY      adhesion removal x1     OTHER SURGICAL HISTORY      small bowel resections     PICC  05/10/2019          RESECTION ILEOCECAL  12/06/2013    Procedure: RESECTION ILEOCECAL;  Laparotomy, Extensive Lysis of Adhesions, Stricture Plasty X2  Anesthesia general with block;  Surgeon: Pete Cartagena MD;  Location:  OR       FAMILY HISTORY:  Family History   Problem Relation Age of Onset     Anesthesia Reaction No family hx of      Colon Cancer No family hx of      Crohn's Disease No family hx of      Ulcerative Colitis No family hx of      Colon Polyps No family hx of      No Known Problems Mother      No Known Problems Father      No Known Problems Brother      No Known Problems Son        SOCIAL HISTORY:   Social History     Tobacco Use     Smoking status: Never Smoker     Smokeless tobacco: Never Used   Substance Use Topics     Alcohol use: Yes     Comment: occasional/ 1 drink per 1-3 month       HOME MEDICATIONS:  Prior to Admission medications    Medication Sig Start Date End Date Taking? Authorizing Provider   calcitRIOL (ROCALTROL) 0.25 MCG capsule Take 0.25 mcg by mouth three times a week   Yes Reported, Patient   doxylamine (UNISOM) 25 MG TABS tablet Take 50 mg by mouth At Bedtime Been taking nightly for two years   Yes Reported, Patient   metroNIDAZOLE (FLAGYL) 500 MG tablet Take 1 tablet (500 mg) by mouth every 6 hours At 8:00 am, 2:00 pm, 8:00 pm the day prior to your surgery with neomycin and zofran. 10/13/21  Yes Pete Cartagena MD   neomycin (MYCIFRADIN) 500 MG tablet Take 2 tablets (1,000 mg) by mouth every 6 hours At 8:00 am, 2:00 pm, 8:00 pm the day prior to your surgery with flagyl and zofran. 10/13/21  Yes Pete Cartagena MD   oxyCODONE (ROXICODONE) 5 MG tablet Take 1 tablet (5 mg) by mouth every 8 hours as needed for moderate to severe pain 11/12/21  Yes Jesenia Person PA-C   vitamin D (ERGOCALCIFEROL) 01982 UNIT capsule Take 50,000 Units by mouth five  "times a week    Yes Reported, Patient   vitamin E (TOCOPHEROL) 400 units (180 mg) capsule Take 400 Units by mouth daily   Yes Reported, Patient   ondansetron (ZOFRAN) 4 MG tablet Take 1 tablet (4 mg) by mouth every 6 hours At 8:00 am, 2:00 pm, 8:00 pm the day prior to your surgery with neomycin and flagyl. 10/13/21   Pete Cartagena MD   polyethylene glycol (MIRALAX) 17 g packet Take 238 g by mouth See Admin Instructions Starting at 4 pm night prior to surgery. Refer to \"Getting Ready for Surgery\" instructions. 10/13/21   Pete Cartagena MD       VITAL SIGNS:  /57 (BP Location: Left arm)   Pulse 59   Temp 97.1  F (36.2  C) (Temporal)   Resp 16   Ht 1.575 m (5' 2\")   Wt 50.5 kg (111 lb 6.4 oz)   SpO2 99%   BMI 20.38 kg/m      Intake/Output Summary (Last 24 hours) at 11/12/2021 1147  Last data filed at 11/12/2021 0958  Gross per 24 hour   Intake 2249.42 ml   Output 1650 ml   Net 599.42 ml       Labs:  ROUTINE IP LABS (Last four results)  BMP  Recent Labs   Lab 11/12/21  0748 11/12/21  0731 11/12/21 0046 11/11/21 2058 11/11/21 0637 11/09/21 2121     --   --   --  140 137   POTASSIUM 3.6  --   --   --  4.0 3.8   CHLORIDE 108  --   --   --  114* 105   BLACK 8.4*  --   --   --  8.2* 9.0   CO2 29  --   --   --  24 27   BUN 9  --   --   --  8 17   CR 0.53  --   --   --  0.59 0.62   GLC 95 90 89 88 98 91     CBC  Recent Labs   Lab 11/09/21 2121   WBC 8.6   RBC 4.16   HGB 13.2   HCT 40.3   MCV 97   MCH 31.7   MCHC 32.8   RDW 12.2        INR  Recent Labs   Lab 11/12/21  0748 11/11/21 0637   INR 1.19* 1.19*       PHYSICAL EXAM:  Constitutional: healthy, alert, no acute distress and cooperative   Cardiovascular: RRR  Respiratory: CTAB anteriorly, breathing unlabored without secondary muscle use  Psychiatric: mentation appears normal and affect normal/bright  Neck: no asymmetry  GI/Abdomen: +BS, abdomen soft, non-tender. No masses, no CVAT  MSK: able to move all extremities without weakness or " ataxia  Extremities: no open lesions, extremities warm and well perfused, palpable radial pulses, no swelling or cyanosis.  Hematology: no bruising on visible skin      Patient Active Problem List   Diagnosis     Inflammatory bowel disease (Crohn's disease) (H)     Crohn disease (H)     Acute Crohn's disease, with intestinal obstruction (H)       ASSESSMENT:  41 year old year old female who has a PMH significant for stricturing Crohn's w/ hx of multiple bowel resections presented w/ nausea, vomiting and abdominal pain, found to have pSBO. PICC line placed by IR for TPN yesterday w/ findings of right subclavian vein stenosis, likely scarring secondary to multiple PICC lines.    PLAN:  Defer to IR for management of R subclavian vein stenosis; do not recc intervention for asymptomatic lesion.    Seen and examined with Dr. Moran.     Marilu Morgan MD  Vascular Surgery Fellow  Pager: [phone]

## 2021-11-12 NOTE — PLAN OF CARE
AVSS. NPO except ice. Denied nausea. Patient had a double lumen PICC placed in the right arm with a difficult placement. Pain to her right arm increased after PICC placement - Dilaudid and Oxycodone given with minimal improvement - cross-cover notified and ordered a one-time IV Dilaudid. The verbal report that writer received was that the PICC placement was difficult because they found a subclavian thrombus, however the IR note does not reflect that. Writer reached out to primary team and cross-cover regarding the potential findings. PIV saline locked. Plan to start TPN/lipids tonight. Up ad svetlana. Voiding spontaneously. Passing gas. Had a BM this morning. Continue with plan of care

## 2021-11-12 NOTE — PROGRESS NOTES
CRS Update    Surgery Packet printed and given to patient.     Pt has been hiccuping on and off for the last 2 hours.   Denies abdominal pain, denies abd bloating.  No nausea.  Has not passed any stool/gas today.  Last BM was yesterday morning.      In relatively good spirits.  Non-toxic appearing.    Abd soft, non-distended, non-tender.    Again only pain she is having is the right arm pain from the PICC placement which is why she is taking oxycodone.      - continue to monitor    Jesenia Pesron PA-C  Colon and Rectal Surgery

## 2021-11-12 NOTE — DISCHARGE SUMMARY
[facility]  Discharge Summary  Colon and Rectal Surgery     Alok Nino MRN# [id number]   YOB: 1980 Age: 41 year old     Date of Admission:  11/9/2021  Date of Discharge::  11/14/2021  Admitting Physician:  Pete Cartagena MD  Discharge Physician:  Pete Cartagena MD  Primary Care Physician:        Nakita Odonnell          Admission Diagnoses:   Acute Crohn's disease, with intestinal obstruction (H) [K50.912]  Partial small bowel obstruction  History of multiple abdominal surgeries - laparotomies, small bowel resection, stricturoplasties  Decreased oral intake           Discharge Diagnosis:   Acute Crohn's disease, with intestinal obstruction (H) [K50.912]  Partial small bowel obstruction  History of multiple abdominal surgeries - laparotomies, small bowel resection, stricturoplasties  Decreased oral intake     Right subclavian vein stenosis likely 2/2 scarring from history of multiple PICC lines         Procedures:   11/11/2021:  Interventional Radiology placement of right PICC line            Consultations:   VASCULAR ACCESS ADULT IP CONSULT  PHARMACY/NUTRITION TO START AND MANAGE TPN  VASCULAR ACCESS FOR PICC PLACEMENT ADULT IP CONSULT  VASCULAR ACCESS CARE ADULT IP CONSULT  PATIENT LEARNING CENTER IP CONSULT  INTERVENTIONAL RADIOLOGY ADULT/PEDS IP CONSULT  PHARMACY IP CONSULT  CARE MANAGEMENT / SOCIAL WORK IP CONSULT  VASCULAR SURGERY ADULT IP CONSULT         Imaging Studies:     Results for orders placed or performed during the hospital encounter of 11/09/21   IR PICC Placement > 5 Yrs of Age    Narrative    DIAGNOSIS: Crohn's disease    PROCEDURE: IR PICC PLACEMENT > 5 YRS OF AGE    Impression    IMPRESSION:   1. Completed image-guided placement of 5 Swiss, 40 cm double lumen  PICC via right lateral brachial vein with tip in right atrium. Sutured  in place with biopatch and statlock.  External length 2.0 cm.   Aspirates and flushes freely,  heparin locked and ready for immediate  use. No immediate complication.  2. Venotomy x3  3.  Venogram with subclavian vein re-cannalization.     ----------    CLINICAL HISTORY: This is a?41 year old female?with a history of  stricturing Crohn's disease with multiple surgical interventions  including small bowel resections, laparotomies, and stricturoplasties  now presents for double lumen PICC for vascular access and TPN.   Vascular Access team tried and failed RUE PICC due to inability to  advance through the subclavian vein.  Patient reports she's had failed  PICC on the left.   She understands that we may need to attempt PICC  placement on the left if unable to place on the right.     PERFORMED BY: Zuleika Person PA-C and Vincent Gold PA-C    CONSENT: Written informed consent was obtained and is documented in  the patient record.    MEDICATIONS: 1. 5 mL 1% lidocaine subcutaneous  2. 20 units heparin per lumen     NURSING: The patient was placed on continuous vital signs monitoring.  Vital signs were monitored by nursing staff under my supervision.     FLUOROSCOPY TIME: 12 minutes    DESCRIPTION: The right upper extremity was prepped and draped in the  usual sterile fashion.      Ultrasound revealed a patent basilic vein, and the overlying tissue  was anesthetized.  Under ultrasound guidance, venipuncture was made  with blood return however the wire was unable to be advanced and  repeat US demonstrates venospasm.    A right lateral superior brachial vein was chosen and overlying tissue  was anesthetized and new 0.018 guidewire was again unable to advanced.       A third more superior location was chosen and again the right lateral  superior brachial vein was anesthetized and guidewire was advanced  until wire was unable to be advanced through the subclavian vein.     The internal peel away sheath was advanced over the 0.018 guidewire  and venogram was performed that demonstrates tortuosity of the  subclavian vein with  collateralization to the innominate vein.  The  0.018 wire was exchanged for glidewire and JB1 catheter was advanced  into the innominate vein with contrast confirming location.  An 0.018  glidewire was then advanced to right atrium and a long peel away  sheath was advanced over the wire.  Measurements were taken.  PICC was  cut to length.  Inner dilator was removed and the catheter was  advanced through the peel away sheath under fluoroscopic guidance with  the tip placed in the right atrium.  The catheter was aspirated and  flushed freely and locked with heparin. Catheter was secured to the  skin with nylon suture and stat lock.  External length is 2.0cm.      Multiple Ultrasound and flouro images were saved.      COMPLICATIONS: No immediate concerns; the patient remained stable  throughout the procedure and tolerated it well.    ESTIMATED BLOOD LOSS: 8 mL    SPECIMENS: None    MAGDA BERMUDEZ PA-C         SYSTEM ID:  [id number]          Medications Prior to Admission:     Medications Prior to Admission   Medication Sig Dispense Refill Last Dose     calcitRIOL (ROCALTROL) 0.25 MCG capsule Take 0.25 mcg by mouth three times a week   Past Week at night     doxylamine (UNISOM) 25 MG TABS tablet Take 50 mg by mouth At Bedtime Been taking nightly for two years   11/8/2021 at Unknown time     vitamin D (ERGOCALCIFEROL) 01397 UNIT capsule Take 50,000 Units by mouth five times a week    11/8/2021 at Unknown time     vitamin E (TOCOPHEROL) 400 units (180 mg) capsule Take 400 Units by mouth daily   11/8/2021 at Unknown time     [DISCONTINUED] adalimumab (HUMIRA) 40 MG/0.8ML injection Inject 40 mg Subcutaneous every 7 days (Patient not taking: Reported on 10/5/2021)        [DISCONTINUED] AZATHIOPRINE PO Take 125 mg by mouth daily   11/10/2021 at nighttime     [DISCONTINUED] budesonide (ENTOCORT EC) 3 MG EC capsule Take 9 mg by mouth every morning   11/9/2021 at Unknown time     [DISCONTINUED] colesevelam (WELCHOL) 625 MG tablet  "Take 1,875 mg by mouth 2 times daily (with meals)   11/8/2021 at Unknown time     [DISCONTINUED] metroNIDAZOLE (FLAGYL) 500 MG tablet Take 1 tablet (500 mg) by mouth every 6 hours At 8:00 am, 2:00 pm, 8:00 pm the day prior to your surgery with neomycin and zofran. 3 tablet 0      [DISCONTINUED] metroNIDAZOLE (FLAGYL) 500 MG tablet Take 500 mg by mouth 2 times daily (Patient not taking: Reported on 10/5/2021)        [DISCONTINUED] neomycin (MYCIFRADIN) 500 MG tablet Take 2 tablets (1,000 mg) by mouth every 6 hours At 8:00 am, 2:00 pm, 8:00 pm the day prior to your surgery with flagyl and zofran. 6 tablet 0      [DISCONTINUED] ondansetron (ZOFRAN) 4 MG tablet Take 1 tablet (4 mg) by mouth every 6 hours At 8:00 am, 2:00 pm, 8:00 pm the day prior to your surgery with neomycin and flagyl. 3 tablet 0      [DISCONTINUED] polyethylene glycol (MIRALAX) 17 g packet Take 238 g by mouth See Admin Instructions Starting at 4 pm night prior to surgery. Refer to \"Getting Ready for Surgery\" instructions. 14 packet 0      [DISCONTINUED] rifaximin (XIFAXAN) 550 MG TABS tablet Take 200 mg by mouth 3 times daily (Patient not taking: Reported on 10/5/2021)        [DISCONTINUED] sulfamethoxazole-trimethoprim (BACTRIM DS) 800-160 MG tablet Take 1 tablet by mouth 2 times daily (Patient not taking: Reported on 10/5/2021)        [DISCONTINUED] ustekinumab (STELARA) 45 MG/0.5ML SOSY Inject 45 mg Subcutaneous once Patient is starting on 10/5/21 and is unsure of dose. Getting off Humira.   Past Month at Unknown time     [DISCONTINUED] vancomycin (VANCOCIN) 125 MG capsule Take 125 mg by mouth 2 times daily (Patient not taking: Reported on 10/5/2021)               Discharge Medications:     Current Discharge Medication List      START taking these medications    Details   oxyCODONE (ROXICODONE) 5 MG tablet Take 1 tablet (5 mg) by mouth every 8 hours as needed for moderate to severe pain  Qty: 15 tablet, Refills: 0    Associated Diagnoses: Acute " Crohn's disease, with intestinal obstruction (H); Partial small bowel obstruction (H)      parenteral nutrition - PTA/DISCHARGE ORDER The TPN formula will print on the After Visit Summary Report.  Qty: 1 each, Refills: 0    Associated Diagnoses: Acute Crohn's disease, with intestinal obstruction (H); Partial small bowel obstruction (H)         CONTINUE these medications which have NOT CHANGED    Details   calcitRIOL (ROCALTROL) 0.25 MCG capsule Take 0.25 mcg by mouth three times a week      doxylamine (UNISOM) 25 MG TABS tablet Take 50 mg by mouth At Bedtime Been taking nightly for two years      vitamin D (ERGOCALCIFEROL) 75429 UNIT capsule Take 50,000 Units by mouth five times a week       vitamin E (TOCOPHEROL) 400 units (180 mg) capsule Take 400 Units by mouth daily         STOP taking these medications       adalimumab (HUMIRA) 40 MG/0.8ML injection Comments:   Reason for Stopping:         AZATHIOPRINE PO Comments:   Reason for Stopping:         budesonide (ENTOCORT EC) 3 MG EC capsule Comments:   Reason for Stopping:         colesevelam (WELCHOL) 625 MG tablet Comments:   Reason for Stopping:         metroNIDAZOLE (FLAGYL) 500 MG tablet Comments:   Reason for Stopping:         metroNIDAZOLE (FLAGYL) 500 MG tablet Comments:   Reason for Stopping:         neomycin (MYCIFRADIN) 500 MG tablet Comments:   Reason for Stopping:         ondansetron (ZOFRAN) 4 MG tablet Comments:   Reason for Stopping:         polyethylene glycol (MIRALAX) 17 g packet Comments:   Reason for Stopping:         rifaximin (XIFAXAN) 550 MG TABS tablet Comments:   Reason for Stopping:         sulfamethoxazole-trimethoprim (BACTRIM DS) 800-160 MG tablet Comments:   Reason for Stopping:         ustekinumab (STELARA) 45 MG/0.5ML SOSY Comments:   Reason for Stopping:         vancomycin (VANCOCIN) 125 MG capsule Comments:   Reason for Stopping:                     Brief History of Illness:   42 y/o F with long standing crohns disease diagnosed at  "age 16, has had multiple abdominal surgeries since including multiple stricturoplasties, appendectomy, ileocolic resection, multiple small bowel resections, cholecystectomy with approximately 150cm of small bowel remaining.  Follows with Dr. Tolbert at [facility] and is on Humira, Azathioprine and Budesonide and recently started stelara.  Has baseline 5-6 loose stools per day. More recently has developed obstructive symptoms.  Endoscopic evaluation shows ulcerated stricture at the ileocolic anastomosis. Pt was scheduled to have surgery in December but presented with worsening obstructive symptoms.             Hospital Course:   Pt was admitted and given IVFs.  Bedside PICC line was attempted by the Vascular Access Team but there was difficulty with placement.  Therefore patient was brought to Interventional  Radiology for PICC placement.  Patient was found to have right subclavian vein stenosis, increasing the difficulty of PICC placement.  PICC was successfully placed by IR.  TPN was initiated and slowly reached TPN goal.     Pt continued to pass stools during this hospitalization.  Pt can take occasional sips of clear liquids.  Pt was discharged with Home health care and infusion for home TPN.  Pt surgery has been rescheduled to Nov 17th instead of December 1.  Pt will need Pre-operative Assessment Clinic evaluation, pre-op Labs and Pre-op COVID testing on 11/16 prior to surgery.           Day of Discharge Physical Exam:   Blood pressure 99/63, pulse 57, temperature 98  F (36.7  C), temperature source Temporal, resp. rate 16, height 1.575 m (5' 2\"), weight 49.2 kg (108 lb 6.4 oz), SpO2 97 %.    Gen: AAOx3, NAD, resting comfortably  Pulm: Non-labored breathing on room air  Abd: Soft, non-tender, non-distended, no guarding/rebound  Ext:  Warm and well-perfused           Discharge Instructions and Follow-Up:     Discharge Procedure Orders   Home infusion referral   Referral Priority: Routine Referral Type: Consultation "   Number of Visits Requested: 1     MD face to face encounter   Order Comments: Documentation of Face to Face and Certification for Home Health Services    I certify that patient: Alok Nino is under my care and that I, or a nurse practitioner or physician's assistant working with me, had a face-to-face encounter that meets the physician face-to-face encounter requirements with this patient on: November 11, 2021.    This encounter with the patient was in whole, or in part, for the following medical condition, which is the primary reason for home health care: Acute Crohn's with intestinal obstruction..    I certify that, based on my findings, the following services are medically necessary home health services: Skilled home care RN.    My clinical findings support the need for the above services because: MD orders and recommendations by the inpatient interdisiciplinary team.    Further, I certify that my clinical findings support that this patient is homebound secondary to illness.    Based on the above findings. I certify that this patient is confined to the home and needs intermittent skilled nursing care.  The patient is under my care, and I have initiated the establishment of the plan of care.  This patient will be followed by a physician who will periodically review the plan of care.  Physician/Provider to provide follow up care: Nakita Odonnell    Attending hospital physician (the Medicare certified [place] provider): Dr. Pete Rushing M.D.  Physician Signature: See electronic signature associated with these discharge orders.    Date: 11/11/2021     Reason for your hospital stay   Order Comments: You were admitted for a partial small bowel obstruction.  You had a PICC line placed and TPN initiated.  You were found to have right subclavian stenosis.     Activity   Order Comments: Your activity upon discharge:   ACTIVITY  -Activity as tolerated  -No driving while on narcotic analgesics (i.e. Percocet,  oxycodone, Vicodin)  -No driving until you are able to fully twist to both sides or slam on brakes quickly and without any pain  -We encourage walking at least 4-5 times per day     Order Specific Question Answer Comments   Is discharge order? Yes      Adult [facility] Follow-up and recommended labs and tests   Order Comments: NOTIFY  Please contact Kylee Welsh RN, Arely Mackenzie RN or Ivanna FREEDMAN at [phone] for problems after discharge such as:  -Temperature > 101F, chills, rigors, dizziness  -Redness around or purulent drainage from wound  -Inability to tolerate diet, nausea or vomiting  -You stop passing gas, develop significant bloating, abdominal pain  -Have blood in stools/vomit  -Have severe diarrhea/constipation  -Any other questions or concerns.  - At nights (after 4:30pm), on weekends, or if urgent, call [phone] and ask the  to speak with the on-call Colorectal Surgery resident or fellow      Medication Instructions  Some of your medications may have changed. Please take only prescribed and resumed medications     FOLLOW-UP  1.  You will need to be seen in the Pre-operative Assessment Clinic, obtain pre-operative labs and pre-operative COVID testing on 11/16.  Please contact our clinic scheduler (phone # [phone]) if you have questions regarding these appointments.     2.  You are scheduled for surgery on 11/17/2021.    3.  You are NOT to take any pre-operative bowel prep.  So No Miralax/GoLytely, Flagyl, Neomycin.         Appointments on [place] and/or [facility] (with [facility] or [facility] provider or service). Call [phone] if you haven't heard regarding these appointments within 7 days of discharge.     Diet   Order Comments: Follow this diet upon discharge:   DIET  -Nutrition is from TPN via your PICC line  -OK for sips of clear liquids     Order Specific Question Answer Comments   Is discharge order? Yes             Home Health Care:     Arranged           Discharge Disposition:      Discharged to home      Condition at discharge: Stable      Pt was seen and discussed with Dr. Centeno on 11/14/2021.     Leslee Patrick MD PGY-1  Colon and Rectal Surgery

## 2021-11-12 NOTE — PLAN OF CARE
AVSS. Reported minimal pain but still having right arm pain from the PICC placement. Oxycodone and IV Dilaudid given. NPO. On TPN/lipids, BG q6h. Denied nausea. Bowel sounds hypoactive. Denied passing gas or BM this shift. Voiding spontaneously. Up ad svetlana. Ambulating in hallway.  visited. [facility] for PICC/TPN teaching. Vascular Surgery consulted for subclavian vein stenosis - no intervention indicated. Continue with plan of care.

## 2021-11-12 NOTE — PROGRESS NOTES
Colorectal Surgery Progress Note  [facility]    Subjective:  Passed small stool yesterday.  Right arm is tender from PICC placement.  Using some oxycodone but really for her arm, not abdomen. Pt would like to try to discharge home Sunday if possible.     Vitals:  Vitals:    11/11/21 1430 11/11/21 1605 11/11/21 2200 11/12/21 0705   BP: 108/66 107/67 111/63 100/57   BP Location:  Left arm Left arm Left arm   Patient Position:       Pulse: 58 59 62 59   Resp: 14 16 16 16   Temp:  98  F (36.7  C) 98.1  F (36.7  C) 97.1  F (36.2  C)   TempSrc:  Temporal Temporal Temporal   SpO2: 99% 98% 98% 99%   Weight:       Height:         I/O:  I/O last 3 completed shifts:  In: 2249.42 [I.V.:1491.42]  Out: 1100 [Urine:1100]     Physical Exam:  Gen: AAOx3, NAD, sleeping in bed this morning  Pulm: Non-labored breathing on room air  Abd: Soft, mildly distended, mildly tender, well healed transverse abdominal surgical scar  Ext:  Warm and well-perfused     BMP  Recent Labs   Lab 11/12/21  0748 11/12/21  0731 11/12/21  0046 11/11/21 2058 11/11/21  0637 11/09/21 2121     --   --   --  140 137   POTASSIUM 3.6  --   --   --  4.0 3.8   CHLORIDE 108  --   --   --  114* 105   CO2 29  --   --   --  24 27   BUN 9  --   --   --  8 17   CR 0.53  --   --   --  0.59 0.62   GLC 95 90 89 88 98 91   MAG 1.9  --   --   --  1.8 2.0   PHOS 3.9  --   --   --  3.2 3.6     CBC  Recent Labs   Lab 11/09/21 2121   WBC 8.6   HGB 13.2   HCT 40.3        ASSESSMENT: 41 year old female with a history of stricturing Crohn's disease with multiple surgical interventions including small bowel resections, laparotomies, and stricturoplasties. She was admitted with progressive nausea, abdominal pain and bloating consistent with partial SBO. She had scheduled surgery with Dr. Cartagena to address area of stricture in December which has now been moved up to 11/17.    - PICC placed. TPN initiated.  Anticipate possibly reaching TPN  goal on Sunday assuming labs remain stable.   - apprec Vasc Surgery consult for Right Subclav stenosis seen during diff PICC line placement  - ambulate  - hold home budesonide and azathioprine  dvt ppx:  lovenox  Dispo:  Tentatively plan for discharge home on Sunday with PICC and TPN.  Pt will return on 11/17 for surgery with us.     Jesenia Person PA-C  Colon and Rectal Surgery    Pt seen and discussed with Dr. Cartagena

## 2021-11-12 NOTE — PROGRESS NOTES
COLON AND RECTAL SURGERY HUDDLE:    Patient was reviewed in preporation for their surgery the following was reviewed and has been completed:    Surgeon: Dr. Pete Cartagena    Surgery & Date: 11/17/2021 Exploratory laparotomy, ileocolic resection versus repeat stricturoplasty    Last MD Note: reviewed    Anesthesia Type: General    Other Providers: No    PAC: Yes    WOC: N/A    Labs: Yes    Bowel Prep: Yes MiraLAX / Gatorade , Antibiotic and Magnesium Citrate    Packet: Yes--- will need to be updated if she discharges before surgery     Imaging: N/A    Post-Op Appointments: Yes    COVID: Yes

## 2021-11-12 NOTE — PROGRESS NOTES
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   She and her  Vincent have had some initial teaching by a [facility] nurse.   Benefits have been checked and pt will have 100% coverage for the TPN therapy and nursing through her   policy since the deductible has been met through the rest of this year.  Met with Alok and Vincent at bedside to relay benefit information.  Alok confirmed she would like to use [employer] [facility] [employer] for her home TPN.   Informed them about supplies and delivery of supplies, storage of TPN and plan for SNV.    [facility] [facility] can provide either a hook up of TPN at the hospital or have pt disconnect for transport (if ok with medical team pending medical stability) and have nurse visit at home to restart TPN on day of discharge if needed.  Alok and Vincent verbalized understanding of all information given.  They are willing and able to learn and manage home TPN.  Questions answered.  Will have [employer] weekend nurse f/u for possible discharge and contact Alok with details for delivery and/or hook up.  Lisa DAVIS  Nurse Liaison  [email or web address]  [address] [place] [address]  [email or web address]  [phone] M-F I [facility] [facility] main: [phone]

## 2021-11-13 LAB
ANION GAP SERPL CALCULATED.3IONS-SCNC: 6 MMOL/L (ref 3–14)
BUN SERPL-MCNC: 12 MG/DL (ref 7–30)
CALCIUM SERPL-MCNC: 8.6 MG/DL (ref 8.5–10.1)
CHLORIDE BLD-SCNC: 103 MMOL/L (ref 94–109)
CO2 SERPL-SCNC: 29 MMOL/L (ref 20–32)
CREAT SERPL-MCNC: 0.57 MG/DL (ref 0.52–1.04)
GFR SERPL CREATININE-BSD FRML MDRD: >90 ML/MIN/1.73M2
GLUCOSE BLD-MCNC: 104 MG/DL (ref 70–99)
GLUCOSE BLDC GLUCOMTR-MCNC: 109 MG/DL (ref 70–99)
GLUCOSE BLDC GLUCOMTR-MCNC: 113 MG/DL (ref 70–99)
GLUCOSE BLDC GLUCOMTR-MCNC: 88 MG/DL (ref 70–99)
GLUCOSE BLDC GLUCOMTR-MCNC: 99 MG/DL (ref 70–99)
MAGNESIUM SERPL-MCNC: 1.9 MG/DL (ref 1.6–2.3)
PHOSPHATE SERPL-MCNC: 4.4 MG/DL (ref 2.5–4.5)
POTASSIUM BLD-SCNC: 3.7 MMOL/L (ref 3.4–5.3)
SODIUM SERPL-SCNC: 138 MMOL/L (ref 133–144)

## 2021-11-13 PROCEDURE — 250N000011 HC RX IP 250 OP 636: Performed by: PHYSICIAN ASSISTANT

## 2021-11-13 PROCEDURE — 84100 ASSAY OF PHOSPHORUS: CPT

## 2021-11-13 PROCEDURE — 250N000009 HC RX 250: Performed by: COLON & RECTAL SURGERY

## 2021-11-13 PROCEDURE — 250N000013 HC RX MED GY IP 250 OP 250 PS 637

## 2021-11-13 PROCEDURE — 258N000003 HC RX IP 258 OP 636: Performed by: PHYSICIAN ASSISTANT

## 2021-11-13 PROCEDURE — 80048 BASIC METABOLIC PNL TOTAL CA: CPT

## 2021-11-13 PROCEDURE — 36592 COLLECT BLOOD FROM PICC: CPT

## 2021-11-13 PROCEDURE — 120N000002 HC R&B MED SURG/OB UMMC

## 2021-11-13 PROCEDURE — 83735 ASSAY OF MAGNESIUM: CPT

## 2021-11-13 RX ADMIN — ENOXAPARIN SODIUM 40 MG: 40 INJECTION SUBCUTANEOUS at 20:07

## 2021-11-13 RX ADMIN — DOXYLAMINE SUCCINATE 50 MG: 25 TABLET ORAL at 21:03

## 2021-11-13 RX ADMIN — SODIUM CHLORIDE 1000 ML: 9 INJECTION, SOLUTION INTRAVENOUS at 21:02

## 2021-11-13 RX ADMIN — I.V. FAT EMULSION 250 ML: 20 EMULSION INTRAVENOUS at 20:07

## 2021-11-13 RX ADMIN — OXYCODONE HYDROCHLORIDE 5 MG: 5 TABLET ORAL at 00:40

## 2021-11-13 RX ADMIN — CALCIUM GLUCONATE: 98 INJECTION, SOLUTION INTRAVENOUS at 20:07

## 2021-11-13 ASSESSMENT — ACTIVITIES OF DAILY LIVING (ADL)
ADLS_ACUITY_SCORE: 3
ADLS_ACUITY_SCORE: 5
ADLS_ACUITY_SCORE: 5
ADLS_ACUITY_SCORE: 3
ADLS_ACUITY_SCORE: 3
ADLS_ACUITY_SCORE: 5
ADLS_ACUITY_SCORE: 5
ADLS_ACUITY_SCORE: 3
ADLS_ACUITY_SCORE: 5
ADLS_ACUITY_SCORE: 3
ADLS_ACUITY_SCORE: 5
ADLS_ACUITY_SCORE: 5
ADLS_ACUITY_SCORE: 3
ADLS_ACUITY_SCORE: 5
ADLS_ACUITY_SCORE: 3
ADLS_ACUITY_SCORE: 5

## 2021-11-13 ASSESSMENT — MIFFLIN-ST. JEOR: SCORE: 1122.22

## 2021-11-13 NOTE — PLAN OF CARE
Assumed care from [id number]. Pt alert and oriented x4. On RA. Minimal pain, tolerated with one dose of oxycodone. States pain is more related to her upper arm from PICC placement rather than abdomen. Abdomen soft but tender. Denies passing gas, no BM. Voiding spontaneously. Denies nausea. NPO, on TPN. TPN and lipids running continuously through PICC. TPN and IVF are to equal 75 ml/hr. Up ad svetlana. Continue plan of care.

## 2021-11-13 NOTE — PROGRESS NOTES
Colorectal Surgery Progress Note  [facility]    Subjective: No bowel movement yesterday. Is not passing gas. No nausea or vomiting. Arm pain has improved around the PICC site. Hiccupping this morning and yesterday.    Vitals:  Vitals:    11/12/21 1417 11/12/21 1745 11/12/21 2348 11/13/21 0800   BP: 115/57  97/54 114/63   BP Location: Left arm  Left arm Left arm   Pulse: 57  60 71   Resp: 16  16 16   Temp: 98  F (36.7  C)  98  F (36.7  C) 97.4  F (36.3  C)   TempSrc: Oral  Temporal Temporal   SpO2: 99%  97% 97%   Weight:  50.2 kg (110 lb 11.2 oz)     Height:         I/O:  I/O last 3 completed shifts:  In: 1954.02 [P.O.:100; I.V.:273.67]  Out: 850 [Urine:850]   UOP 1450  Stool not charted    Physical Exam:  Gen: AAOx3, NAD, sleeping in bed this morning  Pulm: Non-labored breathing on room air  Abd: Soft, non distended, nontender, well healed transverse abdominal surgical scar  Ext:  Warm and well-perfused    BMP  Recent Labs   Lab 11/13/21  0724 11/13/21  0658 11/13/21  0039 11/12/21  1923 11/12/21  1339 11/12/21  0748 11/11/21  2058 11/11/21  0637 11/09/21 2121     --   --   --   --  139  --  140 137   POTASSIUM 3.7  --   --   --   --  3.6  --  4.0 3.8   CHLORIDE 103  --   --   --   --  108  --  114* 105   CO2 29  --   --   --   --  29  --  24 27   BUN 12  --   --   --   --  9  --  8 17   CR 0.57  --   --   --   --  0.53  --  0.59 0.62   * 88 99 101*   < > 95   < > 98 91   MAG 1.9  --   --   --   --  1.9  --  1.8 2.0   PHOS 4.4  --   --   --   --  3.9  --  3.2 3.6    < > = values in this interval not displayed.     CBC  Recent Labs   Lab 11/09/21  2121   WBC 8.6   HGB 13.2   HCT 40.3        ASSESSMENT: 41 year old female with a history of stricturing Crohn's disease with multiple surgical interventions including small bowel resections, laparotomies, and stricturoplasties. She was admitted with progressive nausea, abdominal pain and bloating consistent with partial  SBO. She had scheduled surgery with Dr. Cartagena to address area of stricture in December which has now been moved up to 11/17. PICC placed and TPN started, following electrolytes for refeeding syndrome.     - Continue TPN  - Ongoing discussions on discharge home tomorrow vs remain in-house until surgery    Seen and discussed with Dr. Adams.     Mervin Mares, MS4  10:05 AM November 13, 2021     Resident/Fellow Attestation   I, Pete Adams, was present with the medical/TOY student who participated in the service and in the documentation of the note.  I have verified the history and personally performed the physical exam and medical decision making.  I agree with the assessment and plan of care as documented in the note.      Awaiting surgery on 11/17.  Was initially passing gas and stool, but has stopped in last 24 hours.  TPN running.  Abdomen benign.  Not a candidate for discharge if still clinically obstructed.  Will reasses tomorrow. Patent hoping to see her children before surgery.  I encouraged her to arrange a visit outside or in lobby in case discharge isn't safe for her.      Pete Adams MD, MPH  Fellow in Colon and Rectal Surgery  [facility]       Date of Service (when I saw the patient): 11/13/21      Attestation:  Discussed with the house staff team or resident(s) and agree with the findings and plan in this note. Continuing supportive treatment. Planned surgery now for 11/17, Wed this week.   Frieda Centeno MD  Colon and Rectal Surgery Attending  [phone]

## 2021-11-13 NOTE — PLAN OF CARE
Problem: Adult Inpatient Plan of Care  Goal: Plan of Care Review  Outcome: No Change  Goal: Patient-Specific Goal (Individualized)  Outcome: No Change  Goal: Absence of Hospital-Acquired Illness or Injury  Outcome: No Change  Goal: Optimal Comfort and Wellbeing  Outcome: No Change  Soft blood pressure 98/65. Right picc site is tender and using cold packs. Blood sugar 109. NPO with meds and ice chips. Sips of clear liquid is ok too. On continuous TPN infusing at 55 ml/hr. Walked in the halls. Independent. Small formed stool and passing flatus. Refusing a shower but did chg wipes.

## 2021-11-13 NOTE — PLAN OF CARE
Patient admitted for acute Crohn's and intestinal obstruction. Surgery scheduled for 11/17. Denies abdominal pan, reports slight pain from PICC placement. NPO, sips of clear liquid OK. Continuous TPN running at 55 mL/hr. MIVF running continuous at 20 mL/hr when lipids are not running. Pt. reports passing gas and a small, formed BM at 1100. Independent with walking and personal cares. Weight loss of 4 lbs in 3 days.

## 2021-11-13 NOTE — PLAN OF CARE
Assumed cares from [id number]. VSS on RA, A&Ox4. UAL, ambulating independently in halls. Reporting pain at PICC site managed with PRN Oxycodone x1 and Dilaudid x1. PICC site tender, small amounts of blood noted under dressing with no change throughout shift, bicep firm on palpation. Provider updated and came to assess, no new orders received at this time. Continuous TPN and lipids running per orders with MIVF when lipids are not running to total 75ml/hr, currently NPO. Abdomen soft, tender, bowel sounds hypoactive. Denied passing flatus or stool this shift. Voiding spontaneously without difficulty. Continue with POC.

## 2021-11-14 ENCOUNTER — HOME INFUSION (PRE-WILLOW HOME INFUSION) (OUTPATIENT)
Dept: PHARMACY | Facility: CLINIC | Age: 41
End: 2021-11-14
Payer: OTHER GOVERNMENT

## 2021-11-14 VITALS
HEART RATE: 78 BPM | BODY MASS INDEX: 19.95 KG/M2 | RESPIRATION RATE: 16 BRPM | TEMPERATURE: 97.8 F | SYSTOLIC BLOOD PRESSURE: 107 MMHG | OXYGEN SATURATION: 100 % | HEIGHT: 62 IN | WEIGHT: 108.4 LBS | DIASTOLIC BLOOD PRESSURE: 75 MMHG

## 2021-11-14 LAB
GLUCOSE BLDC GLUCOMTR-MCNC: 108 MG/DL (ref 70–99)
GLUCOSE BLDC GLUCOMTR-MCNC: 118 MG/DL (ref 70–99)
MAGNESIUM SERPL-MCNC: 1.9 MG/DL (ref 1.6–2.3)
MAGNESIUM SERPL-MCNC: 1.9 MG/DL (ref 1.6–2.3)
PHOSPHATE SERPL-MCNC: 3.6 MG/DL (ref 2.5–4.5)
POTASSIUM BLD-SCNC: 3.8 MMOL/L (ref 3.4–5.3)

## 2021-11-14 PROCEDURE — 999N000044 HC STATISTIC CVC DRESSING CHANGE

## 2021-11-14 PROCEDURE — 36592 COLLECT BLOOD FROM PICC: CPT | Performed by: COLON & RECTAL SURGERY

## 2021-11-14 PROCEDURE — 84132 ASSAY OF SERUM POTASSIUM: CPT | Performed by: COLON & RECTAL SURGERY

## 2021-11-14 PROCEDURE — 84100 ASSAY OF PHOSPHORUS: CPT | Performed by: COLON & RECTAL SURGERY

## 2021-11-14 PROCEDURE — 83735 ASSAY OF MAGNESIUM: CPT | Performed by: COLON & RECTAL SURGERY

## 2021-11-14 ASSESSMENT — ACTIVITIES OF DAILY LIVING (ADL)
ADLS_ACUITY_SCORE: 5
ADLS_ACUITY_SCORE: 3
ADLS_ACUITY_SCORE: 5
ADLS_ACUITY_SCORE: 3
ADLS_ACUITY_SCORE: 3
ADLS_ACUITY_SCORE: 5
ADLS_ACUITY_SCORE: 5
ADLS_ACUITY_SCORE: 3
ADLS_ACUITY_SCORE: 5
ADLS_ACUITY_SCORE: 5
ADLS_ACUITY_SCORE: 3
ADLS_ACUITY_SCORE: 5

## 2021-11-14 ASSESSMENT — MIFFLIN-ST. JEOR: SCORE: 1119.95

## 2021-11-14 NOTE — PLAN OF CARE
VSS, A&O x4, UAL, set to discharge today, scheduled for surgery on 11/17. She has verbally acknowledged understanding of how to manage TPN at home. Reported diarrhea overnight, which is baseline. Bowel sounds returned to normoactive and is passing gas. Denies abdominal discomfort and arm pain. PICC dressing changed by vascular team. Reviewed after visit summary with patient. Home care to come and set up TPN pump for home use.

## 2021-11-14 NOTE — PROGRESS NOTES
Care Management Discharge Note    Discharge Date: 11/14/2021       Discharge Disposition:  Home with family    Discharge Services:  New TPN with [facility]    Discharge DME:  TPN supplies from [facility]    Discharge Transportation:  Spouse/family or friend    Private pay costs discussed: Not applicable    PAS Confirmation Code:    Patient/family educated on Medicare website which has current facility and service quality ratings: n/a     Education Provided on the Discharge Plan:    Persons Notified of Discharge Plans: Bedside RUTH Johnson([id number]), Dr Leslee Patrick([id number]), pharmacist: millicent([id number]) and Yamileth [facility] .[phone]  Patient/Family in Agreement with the Plan: Yes     Handoff Referral Completed: Yes--discharge orders signed at 0830 this morning, however wrong TPN formula was entered--pharmacist updated and I fax'd script to [facility] RN Yamileth.  I called Yamileth and informed her that pt needs to stay on continuous TPN and will need a hospital hook up this afternoon.  Per Millicent: pt will begin cycled schedule at 8pm this evening.    Additional Information:  Yamileth has called the pt.  12:54pm--Yamileth at [facility] needed a verbal order from MD to use new TPN formula recipe to deliver to hospital and hook her up on the new(cycled)recipe and I paged Dr Max Sapp([id number]) and he will call.  I verified with Yamileth that she got the verbal order and she has--delivery of supplies and RN for hospital TPN hook up by 5pm. Yamileth will call pt and bedside RUTH Johnson.  I have called bedside RUTH Johnson and informed her.        Amanda Diaz RN

## 2021-11-14 NOTE — PLAN OF CARE
VSS. Up ad svetlana. Discharge orders place this AM. Plan for home infusion RN to come to hospital at 1630 to hook pt up to home TPN. TPN supplies delivered to room around 1430. Reviewed discharge instructions and follow-up with patient. New medications picked up from discharge pharmacy. All questions answered. Pt ready to discharge after meeting with home infusion RN this afernoon.

## 2021-11-14 NOTE — PLAN OF CARE
A&Ox4. Soft BPs with AOVSS on RA. Provider, Leslee, paged about BPs. Minimal pain, sore near PICC site. No nausea reported. NPO with ice chips and occasional sips per orders. Passing gas, BMx1 this shift. Medium soft, formed stool per pt. Voiding spontaneously. Up ad svetlana in room and halls. Double lumen R PICC. One lumen infusing IVMF. The other infusing Lipids and continuous TPN. Blood sugars Q6H, 113 at 1930. Possible discharge early this week prior to her scheduled surgery on Wednesday 11/17. Continue to monitor and follow POC.

## 2021-11-14 NOTE — PLAN OF CARE
Pt hooked up to continuous TPN by Homecare RN. Pt sent home with discharge medications and home TPN. Pt walked off unit with supplies.

## 2021-11-14 NOTE — PLAN OF CARE
Soft Bps, team aware. OVSS. Glucose checks q6. Alert and oriented x4. Denies pain, just tender at PICC site. No nausea. RA. NPO. Voiding spontaneously. Passing gas, no Bms this shift but did have on evening. PICC running TPN at 55ml/hr and lipids. TPN+IVF should equal 75ml/hour total. Continue plan of care.

## 2021-11-15 ENCOUNTER — PATIENT OUTREACH (OUTPATIENT)
Dept: SURGERY | Facility: CLINIC | Age: 41
End: 2021-11-15
Payer: OTHER GOVERNMENT

## 2021-11-15 ENCOUNTER — LAB REQUISITION (OUTPATIENT)
Dept: LAB | Facility: CLINIC | Age: 41
End: 2021-11-15
Payer: OTHER GOVERNMENT

## 2021-11-15 ENCOUNTER — HOME INFUSION (PRE-WILLOW HOME INFUSION) (OUTPATIENT)
Dept: PHARMACY | Facility: CLINIC | Age: 41
End: 2021-11-15

## 2021-11-15 DIAGNOSIS — E44.0 MODERATE PROTEIN-CALORIE MALNUTRITION (H): ICD-10-CM

## 2021-11-15 LAB
ALBUMIN SERPL-MCNC: 3.3 G/DL (ref 3.4–5)
ALP SERPL-CCNC: 91 U/L (ref 40–150)
ALT SERPL W P-5'-P-CCNC: 32 U/L (ref 0–50)
ANION GAP SERPL CALCULATED.3IONS-SCNC: 8 MMOL/L (ref 3–14)
AST SERPL W P-5'-P-CCNC: 23 U/L (ref 0–45)
BASOPHILS # BLD AUTO: 0 10E3/UL (ref 0–0.2)
BASOPHILS NFR BLD AUTO: 0 %
BILIRUB DIRECT SERPL-MCNC: 0.2 MG/DL (ref 0–0.2)
BILIRUB SERPL-MCNC: 0.7 MG/DL (ref 0.2–1.3)
BILIRUB SERPL-MCNC: 0.7 MG/DL (ref 0.2–1.3)
BUN SERPL-MCNC: 17 MG/DL (ref 7–30)
CALCIUM SERPL-MCNC: 9.1 MG/DL (ref 8.5–10.1)
CHLORIDE BLD-SCNC: 100 MMOL/L (ref 94–109)
CO2 SERPL-SCNC: 27 MMOL/L (ref 20–32)
CREAT SERPL-MCNC: 0.58 MG/DL (ref 0.52–1.04)
EOSINOPHIL # BLD AUTO: 0.1 10E3/UL (ref 0–0.7)
EOSINOPHIL NFR BLD AUTO: 1 %
ERYTHROCYTE [DISTWIDTH] IN BLOOD BY AUTOMATED COUNT: 12.1 % (ref 10–15)
FASTING STATUS PATIENT QL REPORTED: NORMAL
GFR SERPL CREATININE-BSD FRML MDRD: >90 ML/MIN/1.73M2
GLUCOSE BLD-MCNC: 96 MG/DL (ref 70–99)
HCT VFR BLD AUTO: 39.6 % (ref 35–47)
HGB BLD-MCNC: 13.4 G/DL (ref 11.7–15.7)
IMM GRANULOCYTES # BLD: 0 10E3/UL
IMM GRANULOCYTES NFR BLD: 0 %
LYMPHOCYTES # BLD AUTO: 1.4 10E3/UL (ref 0.8–5.3)
LYMPHOCYTES NFR BLD AUTO: 16 %
MAGNESIUM SERPL-MCNC: 2 MG/DL (ref 1.6–2.3)
MCH RBC QN AUTO: 31.5 PG (ref 26.5–33)
MCHC RBC AUTO-ENTMCNC: 33.8 G/DL (ref 31.5–36.5)
MCV RBC AUTO: 93 FL (ref 78–100)
MONOCYTES # BLD AUTO: 0.6 10E3/UL (ref 0–1.3)
MONOCYTES NFR BLD AUTO: 7 %
NEUTROPHILS # BLD AUTO: 6.6 10E3/UL (ref 1.6–8.3)
NEUTROPHILS NFR BLD AUTO: 76 %
NRBC # BLD AUTO: 0 10E3/UL
NRBC BLD AUTO-RTO: 0 /100
PHOSPHATE SERPL-MCNC: 3.1 MG/DL (ref 2.5–4.5)
PLATELET # BLD AUTO: 305 10E3/UL (ref 150–450)
POTASSIUM BLD-SCNC: 4 MMOL/L (ref 3.4–5.3)
PROT SERPL-MCNC: 7.3 G/DL (ref 6.8–8.8)
RBC # BLD AUTO: 4.26 10E6/UL (ref 3.8–5.2)
SODIUM SERPL-SCNC: 135 MMOL/L (ref 133–144)
TRIGL SERPL-MCNC: 52 MG/DL
WBC # BLD AUTO: 8.8 10E3/UL (ref 4–11)

## 2021-11-15 PROCEDURE — 83735 ASSAY OF MAGNESIUM: CPT | Performed by: COLON & RECTAL SURGERY

## 2021-11-15 PROCEDURE — 85025 COMPLETE CBC W/AUTO DIFF WBC: CPT | Performed by: COLON & RECTAL SURGERY

## 2021-11-15 PROCEDURE — 80053 COMPREHEN METABOLIC PANEL: CPT | Performed by: COLON & RECTAL SURGERY

## 2021-11-15 PROCEDURE — 82248 BILIRUBIN DIRECT: CPT | Performed by: COLON & RECTAL SURGERY

## 2021-11-15 PROCEDURE — 36592 COLLECT BLOOD FROM PICC: CPT | Performed by: COLON & RECTAL SURGERY

## 2021-11-15 PROCEDURE — 84100 ASSAY OF PHOSPHORUS: CPT | Performed by: COLON & RECTAL SURGERY

## 2021-11-15 PROCEDURE — 84478 ASSAY OF TRIGLYCERIDES: CPT | Performed by: COLON & RECTAL SURGERY

## 2021-11-15 NOTE — PROGRESS NOTES
Followed up with Alok post discharge. Confirmed her appointments for tomorrow and reiterated that she does not need to do a bowel prep for surgery.     I answered all her questions to stated satisfaction.

## 2021-11-15 NOTE — PHARMACY - PREOPERATIVE ASSESSMENT CENTER
Preoperative Assessment Center Medication History Note    Medication history completed on November 15, 2021 by this writer. See Epic admission navigator for prior to admission medications. Operating room staff will still need to confirm medications and last dose information on day of surgery.     Medication history interview sources  Patient interview: Yes  Care Everywhere records: No  Surescripts pharmacy refill records: Yes  Other (if applicable): [facility] pharmacist, recent hospital discharge summary (see note by Leslee Patrick MD from 11/12/21)    Changes made to PTA medication list  Added: none  Deleted: none  Changed: indicated oxycodone as Not Taking (per pt hasn't used it since leaving the hospital)    Additional medication history information (including reliability of information, actions taken by pharmacist):  -pt manages her own meds and is a reliable historian.  -several medications were discontinued following recent hospitalization, including:   -adalimumab (Humira)   -azathioprine 125 mg daily   -budesonide EC 6 mg daily   -colesevelam   -rifaximin   -Bactrim   -ustekinumab (Stelara)  -per Surescripts, pt has recent fills of potasium chorlide (11/9 x90 days) and progesterone (9/1 x90 days), but pt confirmed she is not currently taking either of these medications.  -pt has planned procedure for 11/17 with Dr Cartagena and reported she was instructed not to complete usual bowel prep with Miralax and antibiotics.  -TPN formula per I:   Wt 51 kg; Formula: aa (Travasol 10%) 65 gm/d, dex 220 gm/d, Volume 1320 ml, Lipid  20 % 180 ml; Lytes per DAY: na 50 meq\d, k 60 meq\d, ca 5 meq\d, magnesium 4  meq\d, po4 10 mm\d, Cl to ac = 15% Cl, Infuvite 10 ml/day, Tralement 1 ml/day RD  11/14/21( 18 hours; 100mL OF). Infusion volume 1500. infusion time 18 hours. taper  up 1 hour. taper down 1 hour. max rate 88.3 mL/hr. TKO = 5 mL/hour.    -- No recent (within 30 days) course of antibiotics  -- No recent  (within 30 days) course of systemic steroids  -- Patient declines being on any other prescription or over-the-counter medications    Prior to Admission medications    Medication Sig Last Dose Taking? Auth Provider   calcitRIOL (ROCALTROL) 0.25 MCG capsule Take 0.25 mcg by mouth three times a week Taking Yes Reported, Patient   doxylamine (UNISOM) 25 MG TABS tablet Take 50 mg by mouth At Bedtime Been taking nightly for two years Taking Yes Reported, Patient   vitamin D (ERGOCALCIFEROL) 62510 UNIT capsule Take 50,000 Units by mouth five times a week  Taking Yes Reported, Patient   vitamin E (TOCOPHEROL) 400 units (180 mg) capsule Take 400 Units by mouth daily Taking Yes Reported, Patient   oxyCODONE (ROXICODONE) 5 MG tablet Take 1 tablet (5 mg) by mouth every 8 hours as needed for moderate to severe pain  Patient not taking: Reported on 11/15/2021 Not Taking  Jesenia Person PA-C   parenteral nutrition - PTA/DISCHARGE ORDER The TPN formula will print on the After Visit Summary Report.   Leslee Patrick MD       Medication history completed by:   Roger Charles, PharmD  [facility] Pharmacist  [phone]

## 2021-11-16 ENCOUNTER — HOME INFUSION (PRE-WILLOW HOME INFUSION) (OUTPATIENT)
Dept: PHARMACY | Facility: CLINIC | Age: 41
End: 2021-11-16

## 2021-11-16 ENCOUNTER — PRE VISIT (OUTPATIENT)
Dept: SURGERY | Facility: CLINIC | Age: 41
End: 2021-11-16

## 2021-11-16 ENCOUNTER — LAB (OUTPATIENT)
Dept: LAB | Facility: CLINIC | Age: 41
End: 2021-11-16
Attending: PHYSICIAN ASSISTANT
Payer: OTHER GOVERNMENT

## 2021-11-16 ENCOUNTER — LAB (OUTPATIENT)
Dept: LAB | Facility: CLINIC | Age: 41
End: 2021-11-16
Attending: COLON & RECTAL SURGERY
Payer: OTHER GOVERNMENT

## 2021-11-16 ENCOUNTER — OFFICE VISIT (OUTPATIENT)
Dept: SURGERY | Facility: CLINIC | Age: 41
End: 2021-11-16
Payer: OTHER GOVERNMENT

## 2021-11-16 ENCOUNTER — ANESTHESIA EVENT (OUTPATIENT)
Dept: SURGERY | Facility: CLINIC | Age: 41
DRG: 331 | End: 2021-11-16
Payer: OTHER GOVERNMENT

## 2021-11-16 VITALS
RESPIRATION RATE: 20 BRPM | OXYGEN SATURATION: 99 % | BODY MASS INDEX: 20.15 KG/M2 | DIASTOLIC BLOOD PRESSURE: 73 MMHG | SYSTOLIC BLOOD PRESSURE: 108 MMHG | HEART RATE: 91 BPM | HEIGHT: 62 IN | WEIGHT: 109.5 LBS | TEMPERATURE: 97.7 F

## 2021-11-16 DIAGNOSIS — Z01.818 PRE-OP EVALUATION: Primary | ICD-10-CM

## 2021-11-16 DIAGNOSIS — Z01.818 PRE-OP EVALUATION: ICD-10-CM

## 2021-11-16 DIAGNOSIS — Z11.59 ENCOUNTER FOR SCREENING FOR OTHER VIRAL DISEASES: ICD-10-CM

## 2021-11-16 LAB
ABO/RH(D): NORMAL
ANTIBODY SCREEN: NEGATIVE
SARS-COV-2 RNA RESP QL NAA+PROBE: NEGATIVE
SPECIMEN EXPIRATION DATE: NORMAL

## 2021-11-16 PROCEDURE — 86900 BLOOD TYPING SEROLOGIC ABO: CPT

## 2021-11-16 PROCEDURE — U0005 INFEC AGEN DETEC AMPLI PROBE: HCPCS | Mod: 90 | Performed by: PATHOLOGY

## 2021-11-16 PROCEDURE — 99000 SPECIMEN HANDLING OFFICE-LAB: CPT | Performed by: PATHOLOGY

## 2021-11-16 PROCEDURE — 250N000011 HC RX IP 250 OP 636: Performed by: PHYSICIAN ASSISTANT

## 2021-11-16 PROCEDURE — U0003 INFECTIOUS AGENT DETECTION BY NUCLEIC ACID (DNA OR RNA); SEVERE ACUTE RESPIRATORY SYNDROME CORONAVIRUS 2 (SARS-COV-2) (CORONAVIRUS DISEASE [COVID-19]), AMPLIFIED PROBE TECHNIQUE, MAKING USE OF HIGH THROUGHPUT TECHNOLOGIES AS DESCRIBED BY CMS-2020-01-R: HCPCS | Mod: 90 | Performed by: PATHOLOGY

## 2021-11-16 PROCEDURE — 36592 COLLECT BLOOD FROM PICC: CPT

## 2021-11-16 PROCEDURE — 99203 OFFICE O/P NEW LOW 30 MIN: CPT | Performed by: PHYSICIAN ASSISTANT

## 2021-11-16 RX ORDER — HEPARIN SODIUM,PORCINE 10 UNIT/ML
5 VIAL (ML) INTRAVENOUS ONCE
Status: DISCONTINUED | OUTPATIENT
Start: 2021-11-16 | End: 2021-11-21 | Stop reason: HOSPADM

## 2021-11-16 RX ORDER — HEPARIN SODIUM,PORCINE 10 UNIT/ML
5 VIAL (ML) INTRAVENOUS ONCE
Status: COMPLETED | OUTPATIENT
Start: 2021-11-16 | End: 2021-11-16

## 2021-11-16 RX ADMIN — Medication 5 ML: at 14:45

## 2021-11-16 ASSESSMENT — LIFESTYLE VARIABLES: TOBACCO_USE: 0

## 2021-11-16 ASSESSMENT — MIFFLIN-ST. JEOR: SCORE: 1124.94

## 2021-11-16 ASSESSMENT — PAIN SCALES - GENERAL: PAINLEVEL: NO PAIN (0)

## 2021-11-16 NOTE — NURSING NOTE
Chief Complaint   Patient presents with     Blood Draw     Labs drawn via picc by RN in lab.       Labs collected from CVC by RN, line flushed with saline and heparin.    Samantha Rodriguez RN

## 2021-11-16 NOTE — H&P
Pre-Operative H & P     CC:  Preoperative exam to assess for increased cardiopulmonary risk while undergoing surgery and anesthesia.    Date of Encounter: 11/16/2021  Primary Care Physician:  Nakita Odonnell     Reason for visit:   Encounter Diagnosis   Name Primary?     Pre-op evaluation Yes       HPI  Alok Nino is a 41 year old female who presents for pre-operative H & P in preparation for Exploratory laparotomy, ileocolic resection versus repeat stricturoplasty with Dr. Cartagena on 11/17/21 at [facility]. Patient is being evaluated for comorbid conditions of anemia, c. diff      Ms. Nino has a longstanding history of chron s disease initially diagnosed at age 16. She has had multiple previous bowel surgeries. She follows with [facility]. She was recently hospitalized for obstructive symptoms. PICC line was placed and she was initiated on TPN. She is now scheduled for the above procedure.       History is obtained from the patient and chart review      Hx of abnormal bleeding or anti-platelet use: denies    Menstrual history: Patient's last menstrual period was 11/02/2021 (approximate).    Prior to Admission Medications  Current Outpatient Medications   Medication Sig Dispense Refill     calcitRIOL (ROCALTROL) 0.25 MCG capsule Take 0.25 mcg by mouth three times a week       doxylamine (UNISOM) 25 MG TABS tablet Take 50 mg by mouth At Bedtime Been taking nightly for two years       vitamin D (ERGOCALCIFEROL) 24133 UNIT capsule Take 50,000 Units by mouth five times a week        vitamin E (TOCOPHEROL) 400 units (180 mg) capsule Take 400 Units by mouth daily       oxyCODONE (ROXICODONE) 5 MG tablet Take 1 tablet (5 mg) by mouth every 8 hours as needed for moderate to severe pain (Patient not taking: Reported on 11/15/2021) 15 tablet 0     parenteral nutrition - PTA/DISCHARGE ORDER The TPN formula will print on the After Visit Summary Report. 1 each 0       Family  History  Family History   Problem Relation Age of Onset     Anesthesia Reaction No family hx of      Colon Cancer No family hx of      Crohn's Disease No family hx of      Ulcerative Colitis No family hx of      Colon Polyps No family hx of      No Known Problems Mother      No Known Problems Father      No Known Problems Brother      No Known Problems Son        The complete review of systems is negative other than noted in the HPI or here.   Anesthesia Pre-Procedure Evaluation    Patient: Alok Nino   MRN: [id number] : 1980        Preoperative Diagnosis: Crohn's disease of small intestine with intestinal obstruction (H) [K50.012]    Procedure : Procedure(s):  Exploratory laparotomy, ileocolic resection versus repeat stricturoplasty  PAC EVALUATION       Past Medical History:   Diagnosis Date     C. difficile enteritis      Chronic pain      Crohn disease (H)      Melanoma (H)      PONV (postoperative nausea and vomiting)       Past Surgical History:   Procedure Laterality Date     ABDOMEN SURGERY      3 for crohns dx     APPENDECTOMY       APPENDECTOMY       CHOLECYSTECTOMY       CHOLECYSTECTOMY       COLONOSCOPY       ENDOSCOPIC ULTRASOUND UPPER GASTROINTESTINAL TRACT (GI) N/A 2020    Procedure: ENDOSCOPIC ULTRASOUND, ESOPHAGOSCOPY / UPPER GASTROINTESTINAL TRACT with BIOPSY;  Surgeon: Cydney Garcia MD;  Location:  OR     GYN SURGERY       H STATISTIC PICC LINE INSERTION >5YR, FAILED Right 11/10/2021    LUE unsuccessful attempt from another hospital, IR deferral     IR PICC PLACEMENT > 5 YRS OF AGE  2021     OTHER SURGICAL HISTORY      strictoplasty x3     OTHER SURGICAL HISTORY      adhesion removal x1     OTHER SURGICAL HISTORY      small bowel resections     PICC  05/10/2019          RESECTION ILEOCECAL  2013    Procedure: RESECTION ILEOCECAL;  Laparotomy, Extensive Lysis of Adhesions, Stricture Plasty X2  Anesthesia general with block;  Surgeon: Pete Cartagena MD;   Location: [facility] OR      Allergies   Allergen Reactions     Carafate Hives     Codeine Nausea and Vomiting     Morphine Hcl Other (See Comments)     SPASMS OF SPHINCTER OF ODDI  (BILIARY TRACT)      Social History     Tobacco Use     Smoking status: Never Smoker     Smokeless tobacco: Never Used   Substance Use Topics     Alcohol use: Yes     Comment: occasional/ 1 drink per 1-3 month      Wt Readings from Last 1 Encounters:   11/14/21 49.2 kg (108 lb 6.4 oz)        Anesthesia Evaluation            ROS/MED HX  ENT/Pulmonary:  - neg pulmonary ROS  (-) tobacco use   Neurologic:  - neg neurologic ROS     Cardiovascular:     (+) -----Previous cardiac testing   Echo: Date: Results:    Stress Test: Date: Results:    ECG Reviewed: Date: 7/2019 Results:  SR with SA  Cath: Date: Results:   (-) taking anticoagulants/antiplatelets   METS/Exercise Tolerance:     Hematologic:  - neg hematologic  ROS  (-) history of blood clots and history of blood transfusion   Musculoskeletal:       GI/Hepatic: Comment: Crohn's disease  Currently on TPA  Bowel obstruction symptoms    (-) GERD   Renal/Genitourinary:  - neg Renal ROS     Endo:  - neg endo ROS     Psychiatric/Substance Use:  - neg psychiatric ROS     Infectious Disease: Comment: H/o C. Diff      Malignancy:       Other:      (+) , H/O Chronic Pain,           OUTSIDE LABS:  CBC:   Lab Results   Component Value Date    WBC 8.8 11/15/2021    WBC 8.6 11/09/2021    HGB 13.4 11/15/2021    HGB 13.2 11/09/2021    HCT 39.6 11/15/2021    HCT 40.3 11/09/2021     11/15/2021     11/09/2021     BMP:   Lab Results   Component Value Date     11/15/2021     11/13/2021    POTASSIUM 4.0 11/15/2021    POTASSIUM 3.8 11/14/2021    CHLORIDE 100 11/15/2021    CHLORIDE 103 11/13/2021    CO2 27 11/15/2021    CO2 29 11/13/2021    BUN 17 11/15/2021    BUN 12 11/13/2021    CR 0.58 11/15/2021    CR 0.57 11/13/2021    GLC 96 11/15/2021     (H) 11/14/2021     COAGS:   Lab Results  "  Component Value Date    INR 1.19 (H) 11/12/2021     POC:   Lab Results   Component Value Date     (H) 12/15/2013    HCG Negative 11/13/2020     HEPATIC:   Lab Results   Component Value Date    ALBUMIN 3.3 (L) 11/15/2021    PROTTOTAL 7.3 11/15/2021    ALT 32 11/15/2021    AST 23 11/15/2021    ALKPHOS 91 11/15/2021    BILITOTAL 0.7 11/15/2021    BILITOTAL 0.7 11/15/2021     OTHER:   Lab Results   Component Value Date    PH 7.29 (L) 08/02/2019    LACT 1.3 05/07/2019    A1C 5.1 12/07/2013    BLACK 9.1 11/15/2021    PHOS 3.1 11/15/2021    MAG 2.0 11/15/2021    LIPASE 21 06/26/2019    AMYLASE 85 12/12/2013    TSH 0.06 (L) 07/31/2019    CRP <0.2 11/09/2021    SED 31 (H) 06/29/2019           /73 (BP Location: Left arm, Patient Position: Sitting, Cuff Size: Adult Regular)   Pulse 91   Temp 97.7  F (36.5  C) (Oral)   Resp 20   Ht 1.575 m (5' 2\")   Wt 49.7 kg (109 lb 8 oz)   LMP 11/02/2021 (Approximate)   SpO2 99%   Breastfeeding No   BMI 20.03 kg/m           Physical Exam  Constitutional: Awake, alert, cooperative, no apparent distress, and appears stated age.  Eyes: Pupils equal, round and reactive to light, extra ocular muscles intact, sclera clear, conjunctiva normal.  HENT: Normocephalic, oral pharynx with moist mucus membranes, good dentition.  Respiratory: Clear to auscultation bilaterally, no crackles or wheezing.  Cardiovascular: Regular rate and rhythm, normal S1 and S2, and no murmur noted.  Carotids  no bruits. No edema. Palpable pulses to radial arteries.   GI: Normal bowel sounds, soft, non-distended, non-tender. Multiple surgical scars  Genitourinary:  deferred  Skin: Warm and dry.  No rashes at anticipated surgical site.   Musculoskeletal: Full ROM of neck. There is no redness, warmth, or swelling of the exposed joints. Gross motor strength is normal.    Neurologic: Awake, alert, oriented to name, place and time. Cranial nerves II-XII are grossly intact. Gait is normal.   Neuropsychiatric: " Calm, cooperative. Normal affect.     PRIOR LABS/DIAGNOSTIC STUDIES:   All labs and imaging personally reviewed     EKG/ stress test - if available please see in ROS above   No results found.  No flowsheet data found.    The patient's records and results personally reviewed by this provider.     Outside records reviewed from: care everywhere    LAB/DIAGNOSTIC STUDIES TODAY:  T&S    ASSESSMENT and PLAN    Alok Nino is a 41 year old female scheduled for Exploratory laparotomy, ileocolic resection versus repeat stricturoplasty on 11/17/21 by Dr. Cartagena in treatment of Crohn's disease of small intestine with intestinal obstruction.  PAC referral for risk assessment and optimization for anesthesia with comorbid conditions of anemia, c. diff:        Pre-operative considerations:    1.  Cardiac:  Functional status- METS 4. Denies cardiac symptoms.  intermediate risk surgery with 0.9% (RCRI #) risk of major adverse cardiac event.        2.  Pulm:  airway feasible.  KELIN risk: low   ~non smoker        3.  GI:  Risk of PONV score = 3.  If > 2, anti-emetic intervention recommended.    ~Crohn's disease s/p multiple previous surgeries. Now with SBO with the above procedure planned    ~PICC in place currently on TPN       4. ID: h/o C. diff     5. Access: PICC in place. She reports she has had left shoulder pain since that time- Message sent to IR team and they will reach out to discuss with patient       VTE risk: 0.26%      Patient is optimized and is acceptable candidate for the proposed procedure.  No further diagnostic evaluation is needed.        Patient discussed with Dr. Sullivan      Final plan per anesthesiologist on day of surgery.      Arrival time, NPO, shower and medication instructions provided by nursing staff today.      On the day of service:     Prep time: 5 minutes  Visit time: 19 minutes  Documentation time: 9 minutes  ------------------------------------------  Total time: 33 minutes      Hannah Schmizt  DERECK  Preoperative Assessment Center  [facility]  Clinic and Surgery Center  Phone: [phone]  Fax: [phone]

## 2021-11-16 NOTE — PATIENT INSTRUCTIONS
Preparing for Your Surgery      Name:  Alok Nino   MRN:  [id number]   :  1980   Today's Date:  2021       Arriving for surgery:  Surgery date:  21  Arrival time:  08:30 am    Restrictions due to COVID 19:       One visitor is allowed in the Pre Op area.       When you go into surgery, one visitor is allowed to wait in the Surgery Waiting Room       (provided there is enough space to social distance).         In hospital patients are allowed 1 visitor per day       The visitor may change daily     Visiting Hours: 8 am - 8:30 pm   No ill visitors.   All visitors must wear face mask.    [facility] parking is available for anyone with mobility limitations or disabilities.  ([place]  24 hours/ 7 days a week; [facility]  7 am- 3:30 pm, Mon- Fri)    Please come to:   [facility] [address]  [address] [place] [address]  - ? parking is available in front of the hospital    -    Please proceed to [address] on the 3rd floor. [phone]?     - ?If you are in need of directions, wheelchair or escort please stop at the Information Desk in the lobby.  Inform the information person that you are here for surgery; a wheelchair and escort to [address] will be provided.?     What can I eat or drink?  -  You may eat and drink normally for up to 8 hours before your surgery.   -  You may have clear liquids until 2 hours before surgery.     Examples of clear liquids:  Water  Clear broth  Juices (apple, white grape, white cranberry  and cider) without pulp  Noncarbonated, powder based beverages  (lemonade and Deep-Aid)  Sodas (Sprite, 7-Up, ginger ale and seltzer)  Coffee or tea (without milk or cream)  Gatorade    -  No Alcohol for at least 24 hours before surgery     Which medicines can I take?  Hold Aspirin for 7 days before surgery.   Hold Multivitamins for 7 days before surgery.  Hold Supplements (vitamin E) for 7 days before surgery.  Hold  Ibuprofen (Advil, Motrin) for 1 day before surgery--unless otherwise directed by surgeon.  Hold Naproxen (Aleve) for 4 days before surgery.  -  PLEASE TAKE these medications the day of surgery:  Tylenol if needed.    How do I prepare myself?  - Please take 2 showers before surgery using Scrubcare or Hibiclens soap.    Use this soap only from the neck to your toes.     Leave the soap on your skin for one minute--then rinse thoroughly.      You may use your own shampoo and conditioner; no other hair products.   - Please remove all jewelry and body piercings.  - No lotions, deodorants or fragrance.  - No makeup or fingernail polish.   - Bring your ID and insurance card.    -If you have a Deep Brain Stimulator, Spinal Cord Stimulator or any neuro stimulator device---you must bring the remote control to the hospital     - All patients are required to have a Covid-19 test within 4 days of surgery/procedure.      -Patients will be contacted by the [facility] scheduling team within 1 week of surgery to make an appointment.      - Patients may call the Scheduling team at [phone] if they have not been scheduled within 4 days of  surgery.      Questions or Concerns:    - For any questions regarding the day of surgery or your hospital stay, please contact the Pre Admission Nursing Office at [phone].       - If you have health changes between today and your surgery please call your surgeon.       For questions after surgery please call your surgeons office.

## 2021-11-16 NOTE — ANESTHESIA PREPROCEDURE EVALUATION
Anesthesia Pre-Procedure Evaluation    Patient: Alok Nino   MRN: [id number] : 1980        Preoperative Diagnosis: Crohn's disease of small intestine with intestinal obstruction (H) [K50.012]    Procedure : Procedure(s):  Exploratory laparotomy, ileocolic resection versus repeat stricturoplasty  PAC EVALUATION       Past Medical History:   Diagnosis Date     C. difficile enteritis      Chronic pain      Crohn disease (H)      Melanoma (H)      PONV (postoperative nausea and vomiting)       Past Surgical History:   Procedure Laterality Date     ABDOMEN SURGERY      3 for crohns dx     APPENDECTOMY       APPENDECTOMY       CHOLECYSTECTOMY       CHOLECYSTECTOMY       COLONOSCOPY       ENDOSCOPIC ULTRASOUND UPPER GASTROINTESTINAL TRACT (GI) N/A 2020    Procedure: ENDOSCOPIC ULTRASOUND, ESOPHAGOSCOPY / UPPER GASTROINTESTINAL TRACT with BIOPSY;  Surgeon: Cydney Garcia MD;  Location: [facility] OR     GYN SURGERY       H STATISTIC PICC LINE INSERTION >5YR, FAILED Right 11/10/2021    LUE unsuccessful attempt from another hospital, IR deferral     IR PICC PLACEMENT > 5 YRS OF AGE  2021     OTHER SURGICAL HISTORY      strictoplasty x3     OTHER SURGICAL HISTORY      adhesion removal x1     OTHER SURGICAL HISTORY      small bowel resections     PICC  05/10/2019          RESECTION ILEOCECAL  2013    Procedure: RESECTION ILEOCECAL;  Laparotomy, Extensive Lysis of Adhesions, Stricture Plasty X2  Anesthesia general with block;  Surgeon: Pete Cartagena MD;  Location: [facility] OR      Allergies   Allergen Reactions     Carafate Hives     Codeine Nausea and Vomiting     Morphine Hcl Other (See Comments)     SPASMS OF SPHINCTER OF ODDI  (BILIARY TRACT)      Social History     Tobacco Use     Smoking status: Never Smoker     Smokeless tobacco: Never Used   Substance Use Topics     Alcohol use: Yes     Comment: occasional/ 1 drink per 1-3 month      Wt Readings from Last 1 Encounters:   21 49.2 kg (108  lb 6.4 oz)        Anesthesia Evaluation   Pt has had prior anesthetic. Type: General and MAC.    History of anesthetic complications  - PONV.      ROS/MED HX  ENT/Pulmonary:  - neg pulmonary ROS  (-) tobacco use, sleep apnea and allergic rhinitis   Neurologic:  - neg neurologic ROS  (-) no CVA, no TIA, Dementia and migraines   Cardiovascular:     (+) -----Previous cardiac testing   Echo: Date: Results:    Stress Test: Date: Results:    ECG Reviewed: Date: 7/2019 Results:  SR with SA  Cath: Date: Results:   (-) hypertension, CAD, taking anticoagulants/antiplatelets, irregular heartbeat/palpitations, stent and murmur   METS/Exercise Tolerance: 4 - Raking leaves, gardening Comment: No intentional exercise, but can walk multiple blocks and ascend stairs without exertional symptoms    Hematologic:  - neg hematologic  ROS  (-) history of blood clots and history of blood transfusion   Musculoskeletal:  - neg musculoskeletal ROS     GI/Hepatic: Comment: Crohn's disease  Currently on TPN  Bowel obstruction symptoms     Intermittently elevated LFTs, most recently WNL   (-) GERD   Renal/Genitourinary:  - neg Renal ROS  (-) renal disease and History of transplant   Endo:     (+) Chronic steroid usage (recent 2 month, stopped last week due to upcoming surgery) for  (-) Type I DM and Type II DM   Psychiatric/Substance Use:  - neg psychiatric ROS  (-) psychiatric history   Infectious Disease: Comment: H/o C. Diff - neg infectious disease ROS     Malignancy:  - neg malignancy ROS (+) Malignancy, History of Skin.Skin CA status post Surgery.        Other:            Physical Exam    Airway        Mallampati: I   TM distance: > 3 FB   Neck ROM: full   Mouth opening: > 3 cm    Respiratory Devices and Support         Dental  no notable dental history         Cardiovascular          Rhythm and rate: regular and normal (-) no peripheral edema and no murmur    Pulmonary   pulmonary exam normal        breath sounds clear to auscultation            OUTSIDE LABS:  CBC:   Lab Results   Component Value Date    WBC 8.8 11/15/2021    WBC 8.6 11/09/2021    HGB 13.4 11/15/2021    HGB 13.2 11/09/2021    HCT 39.6 11/15/2021    HCT 40.3 11/09/2021     11/15/2021     11/09/2021     BMP:   Lab Results   Component Value Date     11/15/2021     11/13/2021    POTASSIUM 4.0 11/15/2021    POTASSIUM 3.8 11/14/2021    CHLORIDE 100 11/15/2021    CHLORIDE 103 11/13/2021    CO2 27 11/15/2021    CO2 29 11/13/2021    BUN 17 11/15/2021    BUN 12 11/13/2021    CR 0.58 11/15/2021    CR 0.57 11/13/2021    GLC 96 11/15/2021     (H) 11/14/2021     COAGS:   Lab Results   Component Value Date    INR 1.19 (H) 11/12/2021     POC:   Lab Results   Component Value Date     (H) 12/15/2013    HCG Negative 11/13/2020     HEPATIC:   Lab Results   Component Value Date    ALBUMIN 3.3 (L) 11/15/2021    PROTTOTAL 7.3 11/15/2021    ALT 32 11/15/2021    AST 23 11/15/2021    ALKPHOS 91 11/15/2021    BILITOTAL 0.7 11/15/2021    BILITOTAL 0.7 11/15/2021     OTHER:   Lab Results   Component Value Date    PH 7.29 (L) 08/02/2019    LACT 1.3 05/07/2019    A1C 5.1 12/07/2013    BLACK 9.1 11/15/2021    PHOS 3.1 11/15/2021    MAG 2.0 11/15/2021    LIPASE 21 06/26/2019    AMYLASE 85 12/12/2013    TSH 0.06 (L) 07/31/2019    CRP <0.2 11/09/2021    SED 31 (H) 06/29/2019       Anesthesia Plan    ASA Status:  2      Anesthesia Type: General.     - Airway: ETT   Induction: Intravenous.   Maintenance: Balanced.   Techniques and Equipment:     - Lines/Monitors: 2nd IV     Consents    Anesthesia Plan(s) and associated risks, benefits, and realistic alternatives discussed. Questions answered and patient/representative(s) expressed understanding.    - Discussed:     - Discussed with:  Patient      - Extended Intubation/Ventilatory Support Discussed: No.      - Patient is DNR/DNI Status: No    Use of blood products discussed: No .     Postoperative Care    Pain management: IV  analgesics, Oral pain medications, Multi-modal analgesia.   PONV prophylaxis: Ondansetron (or other 5HT-3), Background Propofol Infusion, Dexamethasone or Solumedrol     Comments:              PAC Discussion and Assessment    ASA Classification: 3  Case is suitable for: [facility]  Anesthetic techniques and relevant risks discussed: GA                  PAC Resident/NP Anesthesia Assessment: Alok Nino is a 41 year old female scheduled for Exploratory laparotomy, ileocolic resection versus repeat stricturoplasty on 11/17/21 by Dr. Cartagena in treatment of Crohn's disease of small intestine with intestinal obstruction.  PAC referral for risk assessment and optimization for anesthesia with comorbid conditions of anemia, c. diff:        Pre-operative considerations:    1.  Cardiac:  Functional status- METS 4. Denies cardiac symptoms.  intermediate risk surgery with 0.9% (RCRI #) risk of major adverse cardiac event.        2.  Pulm:  airway feasible.  KELIN risk: low   ~non smoker        3.  GI:  Risk of PONV score = 3.  If > 2, anti-emetic intervention recommended.    ~Crohn's disease s/p multiple previous surgeries. Now with SBO with the above procedure planned    ~PICC in place currently on TPN       4. ID: h/o C. diff     5. Access: PICC in place. She reports she has had left shoulder pain since that time- Message sent to IR team and they will reach out to discuss with patient       VTE risk: 0.26%      Patient is optimized and is acceptable candidate for the proposed procedure.  No further diagnostic evaluation is needed.        Patient discussed with Dr. Sullivan      Final plan per anesthesiologist on day of surgery.             **For further details of assessment, testing, and physical exam please see H and P completed on same date.            DERECK Hanna PA-C

## 2021-11-16 NOTE — PROGRESS NOTES
This is a recent snapshot of the patient's [facility] medical record.  For current drug dose and complete information and questions, call [phone] or In Basket pool, [facility]v home infusion ([id number])  CSN Number:  [id number]

## 2021-11-17 ENCOUNTER — DOCUMENTATION ONLY (OUTPATIENT)
Dept: INTERVENTIONAL RADIOLOGY/VASCULAR | Facility: CLINIC | Age: 41
End: 2021-11-17

## 2021-11-17 ENCOUNTER — HOSPITAL ENCOUNTER (INPATIENT)
Facility: CLINIC | Age: 41
LOS: 9 days | Discharge: HOME | DRG: 331 | End: 2021-11-26
Attending: COLON & RECTAL SURGERY | Admitting: COLON & RECTAL SURGERY
Payer: OTHER GOVERNMENT

## 2021-11-17 ENCOUNTER — ANESTHESIA (OUTPATIENT)
Dept: SURGERY | Facility: CLINIC | Age: 41
DRG: 331 | End: 2021-11-17
Payer: OTHER GOVERNMENT

## 2021-11-17 ENCOUNTER — PRE VISIT (OUTPATIENT)
Dept: SURGERY | Facility: CLINIC | Age: 41
End: 2021-11-17

## 2021-11-17 DIAGNOSIS — K50.012 CROHN'S DISEASE OF SMALL INTESTINE WITH INTESTINAL OBSTRUCTION (H): ICD-10-CM

## 2021-11-17 DIAGNOSIS — K50.90 CROHN DISEASE (H): ICD-10-CM

## 2021-11-17 DIAGNOSIS — G89.18 ACUTE POST-OPERATIVE PAIN: ICD-10-CM

## 2021-11-17 DIAGNOSIS — K50.912 ACUTE CROHN'S DISEASE, WITH INTESTINAL OBSTRUCTION (H): Primary | ICD-10-CM

## 2021-11-17 LAB
CREAT SERPL-MCNC: 0.6 MG/DL (ref 0.52–1.04)
GFR SERPL CREATININE-BSD FRML MDRD: >90 ML/MIN/1.73M2
GLUCOSE BLDC GLUCOMTR-MCNC: 104 MG/DL (ref 70–99)
GLUCOSE BLDC GLUCOMTR-MCNC: 152 MG/DL (ref 70–99)

## 2021-11-17 PROCEDURE — C9290 INJ, BUPIVACAINE LIPOSOME: HCPCS | Performed by: ANESTHESIOLOGY

## 2021-11-17 PROCEDURE — 250N000011 HC RX IP 250 OP 636: Performed by: COLON & RECTAL SURGERY

## 2021-11-17 PROCEDURE — 250N000009 HC RX 250: Performed by: NURSE ANESTHETIST, CERTIFIED REGISTERED

## 2021-11-17 PROCEDURE — 0DBB0ZZ EXCISION OF ILEUM, OPEN APPROACH: ICD-10-PCS | Performed by: COLON & RECTAL SURGERY

## 2021-11-17 PROCEDURE — 258N000003 HC RX IP 258 OP 636: Performed by: STUDENT IN AN ORGANIZED HEALTH CARE EDUCATION/TRAINING PROGRAM

## 2021-11-17 PROCEDURE — 250N000011 HC RX IP 250 OP 636: Performed by: NURSE ANESTHETIST, CERTIFIED REGISTERED

## 2021-11-17 PROCEDURE — 250N000013 HC RX MED GY IP 250 OP 250 PS 637

## 2021-11-17 PROCEDURE — 999N000141 HC STATISTIC PRE-PROCEDURE NURSING ASSESSMENT: Performed by: COLON & RECTAL SURGERY

## 2021-11-17 PROCEDURE — 250N000009 HC RX 250: Performed by: ANESTHESIOLOGY

## 2021-11-17 PROCEDURE — 250N000011 HC RX IP 250 OP 636: Performed by: ANESTHESIOLOGY

## 2021-11-17 PROCEDURE — 88307 TISSUE EXAM BY PATHOLOGIST: CPT | Mod: 26 | Performed by: PATHOLOGY

## 2021-11-17 PROCEDURE — 250N000011 HC RX IP 250 OP 636: Performed by: STUDENT IN AN ORGANIZED HEALTH CARE EDUCATION/TRAINING PROGRAM

## 2021-11-17 PROCEDURE — 120N000002 HC R&B MED SURG/OB UMMC

## 2021-11-17 PROCEDURE — 272N000002 HC OR SUPPLY OTHER OPNP: Performed by: COLON & RECTAL SURGERY

## 2021-11-17 PROCEDURE — 44120 REMOVAL OF SMALL INTESTINE: CPT | Mod: 22 | Performed by: COLON & RECTAL SURGERY

## 2021-11-17 PROCEDURE — 258N000003 HC RX IP 258 OP 636: Performed by: ANESTHESIOLOGY

## 2021-11-17 PROCEDURE — 360N000076 HC SURGERY LEVEL 3, PER MIN: Performed by: COLON & RECTAL SURGERY

## 2021-11-17 PROCEDURE — 710N000010 HC RECOVERY PHASE 1, LEVEL 2, PER MIN: Performed by: COLON & RECTAL SURGERY

## 2021-11-17 PROCEDURE — 250N000013 HC RX MED GY IP 250 OP 250 PS 637: Performed by: STUDENT IN AN ORGANIZED HEALTH CARE EDUCATION/TRAINING PROGRAM

## 2021-11-17 PROCEDURE — 370N000017 HC ANESTHESIA TECHNICAL FEE, PER MIN: Performed by: COLON & RECTAL SURGERY

## 2021-11-17 PROCEDURE — 250N000011 HC RX IP 250 OP 636

## 2021-11-17 PROCEDURE — 258N000003 HC RX IP 258 OP 636

## 2021-11-17 PROCEDURE — 0DNW0ZZ RELEASE PERITONEUM, OPEN APPROACH: ICD-10-PCS | Performed by: COLON & RECTAL SURGERY

## 2021-11-17 PROCEDURE — 82565 ASSAY OF CREATININE: CPT | Performed by: STUDENT IN AN ORGANIZED HEALTH CARE EDUCATION/TRAINING PROGRAM

## 2021-11-17 PROCEDURE — 272N000001 HC OR GENERAL SUPPLY STERILE: Performed by: COLON & RECTAL SURGERY

## 2021-11-17 PROCEDURE — 88307 TISSUE EXAM BY PATHOLOGIST: CPT | Mod: TC | Performed by: COLON & RECTAL SURGERY

## 2021-11-17 RX ORDER — HYDROMORPHONE HCL IN WATER/PF 6 MG/30 ML
0.2 PATIENT CONTROLLED ANALGESIA SYRINGE INTRAVENOUS EVERY 5 MIN PRN
Status: DISCONTINUED | OUTPATIENT
Start: 2021-11-17 | End: 2021-11-17 | Stop reason: HOSPADM

## 2021-11-17 RX ORDER — ACETAMINOPHEN 325 MG/1
975 TABLET ORAL ONCE
Status: COMPLETED | OUTPATIENT
Start: 2021-11-17 | End: 2021-11-17

## 2021-11-17 RX ORDER — AZATHIOPRINE 100 MG/1
100 TABLET ORAL
Status: ON HOLD | COMMUNITY
End: 2021-11-26

## 2021-11-17 RX ORDER — CEFAZOLIN SODIUM 2 G/100ML
2 INJECTION, SOLUTION INTRAVENOUS SEE ADMIN INSTRUCTIONS
Status: DISCONTINUED | OUTPATIENT
Start: 2021-11-17 | End: 2021-11-17 | Stop reason: HOSPADM

## 2021-11-17 RX ORDER — ONDANSETRON 4 MG/1
4 TABLET, ORALLY DISINTEGRATING ORAL EVERY 30 MIN PRN
Status: DISCONTINUED | OUTPATIENT
Start: 2021-11-17 | End: 2021-11-17 | Stop reason: HOSPADM

## 2021-11-17 RX ORDER — NALOXONE HYDROCHLORIDE 0.4 MG/ML
0.2 INJECTION, SOLUTION INTRAMUSCULAR; INTRAVENOUS; SUBCUTANEOUS
Status: DISCONTINUED | OUTPATIENT
Start: 2021-11-17 | End: 2021-11-26 | Stop reason: HOSPADM

## 2021-11-17 RX ORDER — SODIUM CHLORIDE, SODIUM LACTATE, POTASSIUM CHLORIDE, CALCIUM CHLORIDE 600; 310; 30; 20 MG/100ML; MG/100ML; MG/100ML; MG/100ML
INJECTION, SOLUTION INTRAVENOUS CONTINUOUS
Status: DISCONTINUED | OUTPATIENT
Start: 2021-11-17 | End: 2021-11-17 | Stop reason: HOSPADM

## 2021-11-17 RX ORDER — FENTANYL CITRATE 50 UG/ML
25-50 INJECTION, SOLUTION INTRAMUSCULAR; INTRAVENOUS
Status: DISCONTINUED | OUTPATIENT
Start: 2021-11-17 | End: 2021-11-17 | Stop reason: HOSPADM

## 2021-11-17 RX ORDER — SODIUM CHLORIDE, SODIUM LACTATE, POTASSIUM CHLORIDE, CALCIUM CHLORIDE 600; 310; 30; 20 MG/100ML; MG/100ML; MG/100ML; MG/100ML
INJECTION, SOLUTION INTRAVENOUS CONTINUOUS PRN
Status: DISCONTINUED | OUTPATIENT
Start: 2021-11-17 | End: 2021-11-17

## 2021-11-17 RX ORDER — ONDANSETRON 4 MG/1
4 TABLET, ORALLY DISINTEGRATING ORAL EVERY 6 HOURS PRN
Status: DISCONTINUED | OUTPATIENT
Start: 2021-11-17 | End: 2021-11-26 | Stop reason: HOSPADM

## 2021-11-17 RX ORDER — HYDROMORPHONE HYDROCHLORIDE 4 MG/1
TABLET ORAL
Status: ON HOLD | COMMUNITY
Start: 2020-11-06 | End: 2021-11-26

## 2021-11-17 RX ORDER — DOXYCYCLINE 100 MG/1
CAPSULE ORAL
Status: ON HOLD | COMMUNITY
Start: 2021-04-01 | End: 2021-11-17

## 2021-11-17 RX ORDER — ACETAMINOPHEN 325 MG/1
975 TABLET ORAL ONCE
Status: DISCONTINUED | OUTPATIENT
Start: 2021-11-17 | End: 2021-11-17 | Stop reason: HOSPADM

## 2021-11-17 RX ORDER — HYDROMORPHONE HCL IN WATER/PF 6 MG/30 ML
0.4 PATIENT CONTROLLED ANALGESIA SYRINGE INTRAVENOUS
Status: DISCONTINUED | OUTPATIENT
Start: 2021-11-17 | End: 2021-11-26

## 2021-11-17 RX ORDER — MAGNESIUM CARB/ALUMINUM HYDROX 105-160MG
TABLET,CHEWABLE ORAL
Status: ON HOLD | COMMUNITY
Start: 2021-10-21 | End: 2021-11-19

## 2021-11-17 RX ORDER — FENTANYL CITRATE 50 UG/ML
INJECTION, SOLUTION INTRAMUSCULAR; INTRAVENOUS PRN
Status: DISCONTINUED | OUTPATIENT
Start: 2021-11-17 | End: 2021-11-17

## 2021-11-17 RX ORDER — NALOXONE HYDROCHLORIDE 0.4 MG/ML
0.4 INJECTION, SOLUTION INTRAMUSCULAR; INTRAVENOUS; SUBCUTANEOUS
Status: DISCONTINUED | OUTPATIENT
Start: 2021-11-17 | End: 2021-11-26 | Stop reason: HOSPADM

## 2021-11-17 RX ORDER — LIDOCAINE 40 MG/G
CREAM TOPICAL
Status: DISCONTINUED | OUTPATIENT
Start: 2021-11-17 | End: 2021-11-17 | Stop reason: HOSPADM

## 2021-11-17 RX ORDER — HEPARIN SODIUM (PORCINE) LOCK FLUSH IV SOLN 100 UNIT/ML 100 UNIT/ML
SOLUTION INTRAVENOUS PRN
Status: DISCONTINUED | OUTPATIENT
Start: 2021-11-17 | End: 2021-11-17

## 2021-11-17 RX ORDER — ONDANSETRON 2 MG/ML
4 INJECTION INTRAMUSCULAR; INTRAVENOUS EVERY 30 MIN PRN
Status: DISCONTINUED | OUTPATIENT
Start: 2021-11-17 | End: 2021-11-17 | Stop reason: HOSPADM

## 2021-11-17 RX ORDER — METFORMIN HYDROCHLORIDE EXTENDED-RELEASE TABLETS 1000 MG/1
1000 TABLET, FILM COATED, EXTENDED RELEASE ORAL
COMMUNITY
End: 2022-03-09

## 2021-11-17 RX ORDER — DOXYCYCLINE HYCLATE 100 MG
TABLET ORAL
Status: ON HOLD | COMMUNITY
Start: 2021-06-24 | End: 2021-11-17

## 2021-11-17 RX ORDER — PROPOFOL 10 MG/ML
INJECTION, EMULSION INTRAVENOUS PRN
Status: DISCONTINUED | OUTPATIENT
Start: 2021-11-17 | End: 2021-11-17

## 2021-11-17 RX ORDER — PROGESTERONE 100 MG/1
CAPSULE ORAL
Status: ON HOLD | COMMUNITY
Start: 2021-08-27 | End: 2021-11-17

## 2021-11-17 RX ORDER — LIDOCAINE HYDROCHLORIDE 20 MG/ML
INJECTION, SOLUTION INFILTRATION; PERINEURAL PRN
Status: DISCONTINUED | OUTPATIENT
Start: 2021-11-17 | End: 2021-11-17

## 2021-11-17 RX ORDER — BUPIVACAINE HYDROCHLORIDE 2.5 MG/ML
INJECTION, SOLUTION EPIDURAL; INFILTRATION; INTRACAUDAL
Status: COMPLETED | OUTPATIENT
Start: 2021-11-17 | End: 2021-11-17

## 2021-11-17 RX ORDER — POTASSIUM CHLORIDE 1500 MG/1
TABLET, EXTENDED RELEASE ORAL
COMMUNITY
Start: 2021-11-09 | End: 2022-03-09

## 2021-11-17 RX ORDER — EPHEDRINE SULFATE 50 MG/ML
INJECTION, SOLUTION INTRAMUSCULAR; INTRAVENOUS; SUBCUTANEOUS PRN
Status: DISCONTINUED | OUTPATIENT
Start: 2021-11-17 | End: 2021-11-17

## 2021-11-17 RX ORDER — OXYCODONE HYDROCHLORIDE 10 MG/1
10 TABLET ORAL EVERY 4 HOURS PRN
Status: DISCONTINUED | OUTPATIENT
Start: 2021-11-17 | End: 2021-11-26

## 2021-11-17 RX ORDER — OXYCODONE HYDROCHLORIDE 5 MG/1
5 TABLET ORAL EVERY 4 HOURS PRN
Status: DISCONTINUED | OUTPATIENT
Start: 2021-11-17 | End: 2021-11-17 | Stop reason: HOSPADM

## 2021-11-17 RX ORDER — ACETAMINOPHEN 325 MG/1
650 TABLET ORAL EVERY 4 HOURS PRN
Status: DISCONTINUED | OUTPATIENT
Start: 2021-11-20 | End: 2021-11-18

## 2021-11-17 RX ORDER — ONDANSETRON 2 MG/ML
4 INJECTION INTRAMUSCULAR; INTRAVENOUS EVERY 6 HOURS PRN
Status: DISCONTINUED | OUTPATIENT
Start: 2021-11-17 | End: 2021-11-26 | Stop reason: HOSPADM

## 2021-11-17 RX ORDER — HYDRALAZINE HYDROCHLORIDE 20 MG/ML
10 INJECTION INTRAMUSCULAR; INTRAVENOUS EVERY 4 HOURS PRN
Status: DISCONTINUED | OUTPATIENT
Start: 2021-11-17 | End: 2021-11-26 | Stop reason: HOSPADM

## 2021-11-17 RX ORDER — ACETAMINOPHEN 325 MG/1
975 TABLET ORAL EVERY 8 HOURS
Status: DISCONTINUED | OUTPATIENT
Start: 2021-11-17 | End: 2021-11-18

## 2021-11-17 RX ORDER — AMOXICILLIN 875 MG
TABLET ORAL
Status: ON HOLD | COMMUNITY
Start: 2021-04-01 | End: 2021-11-17

## 2021-11-17 RX ORDER — POLYETHYLENE GLYCOL 3350 17 G/17G
POWDER, FOR SOLUTION ORAL
Status: ON HOLD | COMMUNITY
Start: 2021-10-21 | End: 2021-11-19

## 2021-11-17 RX ORDER — DEXAMETHASONE SODIUM PHOSPHATE 4 MG/ML
INJECTION, SOLUTION INTRA-ARTICULAR; INTRALESIONAL; INTRAMUSCULAR; INTRAVENOUS; SOFT TISSUE PRN
Status: DISCONTINUED | OUTPATIENT
Start: 2021-11-17 | End: 2021-11-17

## 2021-11-17 RX ORDER — SODIUM CHLORIDE, SODIUM LACTATE, POTASSIUM CHLORIDE, CALCIUM CHLORIDE 600; 310; 30; 20 MG/100ML; MG/100ML; MG/100ML; MG/100ML
INJECTION, SOLUTION INTRAVENOUS CONTINUOUS
Status: DISCONTINUED | OUTPATIENT
Start: 2021-11-17 | End: 2021-11-18

## 2021-11-17 RX ORDER — ONDANSETRON 4 MG/1
TABLET, ORALLY DISINTEGRATING ORAL
COMMUNITY
Start: 2020-11-06 | End: 2022-03-09

## 2021-11-17 RX ORDER — AZITHROMYCIN 250 MG/1
TABLET, FILM COATED ORAL
Status: ON HOLD | COMMUNITY
Start: 2021-04-05 | End: 2021-11-26

## 2021-11-17 RX ORDER — HYDROMORPHONE HCL IN WATER/PF 6 MG/30 ML
0.2 PATIENT CONTROLLED ANALGESIA SYRINGE INTRAVENOUS
Status: DISCONTINUED | OUTPATIENT
Start: 2021-11-17 | End: 2021-11-26

## 2021-11-17 RX ORDER — ONDANSETRON 2 MG/ML
4 INJECTION INTRAMUSCULAR; INTRAVENOUS ONCE
Status: COMPLETED | OUTPATIENT
Start: 2021-11-17 | End: 2021-11-17

## 2021-11-17 RX ORDER — LIDOCAINE 40 MG/G
CREAM TOPICAL
Status: DISCONTINUED | OUTPATIENT
Start: 2021-11-17 | End: 2021-11-26 | Stop reason: HOSPADM

## 2021-11-17 RX ORDER — USTEKINUMAB 90 MG/ML
INJECTION, SOLUTION SUBCUTANEOUS
Status: ON HOLD | COMMUNITY
Start: 2021-10-18 | End: 2021-11-26

## 2021-11-17 RX ORDER — COLESEVELAM 180 1/1
1875 TABLET ORAL
COMMUNITY
End: 2022-03-09

## 2021-11-17 RX ORDER — BUDESONIDE 3 MG/1
CAPSULE, COATED PELLETS ORAL
Status: ON HOLD | COMMUNITY
Start: 2021-08-27 | End: 2021-11-26

## 2021-11-17 RX ORDER — FENTANYL CITRATE 50 UG/ML
25 INJECTION, SOLUTION INTRAMUSCULAR; INTRAVENOUS EVERY 5 MIN PRN
Status: DISCONTINUED | OUTPATIENT
Start: 2021-11-17 | End: 2021-11-17 | Stop reason: HOSPADM

## 2021-11-17 RX ORDER — FLUMAZENIL 0.1 MG/ML
0.2 INJECTION, SOLUTION INTRAVENOUS
Status: DISCONTINUED | OUTPATIENT
Start: 2021-11-17 | End: 2021-11-17 | Stop reason: HOSPADM

## 2021-11-17 RX ORDER — CEFAZOLIN SODIUM 2 G/100ML
2 INJECTION, SOLUTION INTRAVENOUS
Status: COMPLETED | OUTPATIENT
Start: 2021-11-17 | End: 2021-11-17

## 2021-11-17 RX ADMIN — BUPIVACAINE 15 ML: 13.3 INJECTION, SUSPENSION, LIPOSOMAL INFILTRATION at 11:13

## 2021-11-17 RX ADMIN — PHENYLEPHRINE HYDROCHLORIDE 100 MCG: 10 INJECTION INTRAVENOUS at 14:10

## 2021-11-17 RX ADMIN — PHENYLEPHRINE HYDROCHLORIDE 100 MCG: 10 INJECTION INTRAVENOUS at 14:24

## 2021-11-17 RX ADMIN — PHENYLEPHRINE HYDROCHLORIDE 100 MCG: 10 INJECTION INTRAVENOUS at 14:21

## 2021-11-17 RX ADMIN — ONDANSETRON 4 MG: 2 INJECTION INTRAMUSCULAR; INTRAVENOUS at 17:15

## 2021-11-17 RX ADMIN — SODIUM CHLORIDE, POTASSIUM CHLORIDE, SODIUM LACTATE AND CALCIUM CHLORIDE: 600; 310; 30; 20 INJECTION, SOLUTION INTRAVENOUS at 19:20

## 2021-11-17 RX ADMIN — SODIUM CHLORIDE, POTASSIUM CHLORIDE, SODIUM LACTATE AND CALCIUM CHLORIDE: 600; 310; 30; 20 INJECTION, SOLUTION INTRAVENOUS at 13:00

## 2021-11-17 RX ADMIN — Medication 5 MG: at 14:21

## 2021-11-17 RX ADMIN — SUGAMMADEX 200 MG: 100 INJECTION, SOLUTION INTRAVENOUS at 17:28

## 2021-11-17 RX ADMIN — Medication 5 MG: at 13:57

## 2021-11-17 RX ADMIN — FENTANYL CITRATE 25 MCG: 50 INJECTION, SOLUTION INTRAMUSCULAR; INTRAVENOUS at 18:11

## 2021-11-17 RX ADMIN — ROCURONIUM BROMIDE 50 MG: 50 INJECTION, SOLUTION INTRAVENOUS at 13:45

## 2021-11-17 RX ADMIN — Medication 2 G: at 13:30

## 2021-11-17 RX ADMIN — LIDOCAINE HYDROCHLORIDE 100 MG: 20 INJECTION, SOLUTION INFILTRATION; PERINEURAL at 13:45

## 2021-11-17 RX ADMIN — ROCURONIUM BROMIDE 20 MG: 50 INJECTION, SOLUTION INTRAVENOUS at 15:29

## 2021-11-17 RX ADMIN — ACETAMINOPHEN 975 MG: 325 TABLET, FILM COATED ORAL at 18:59

## 2021-11-17 RX ADMIN — DOXYLAMINE SUCCINATE 50 MG: 25 TABLET ORAL at 22:37

## 2021-11-17 RX ADMIN — METRONIDAZOLE 500 MG: 500 INJECTION, SOLUTION INTRAVENOUS at 13:00

## 2021-11-17 RX ADMIN — PHENYLEPHRINE HYDROCHLORIDE 100 MCG: 10 INJECTION INTRAVENOUS at 13:57

## 2021-11-17 RX ADMIN — FENTANYL CITRATE 100 MCG: 50 INJECTION, SOLUTION INTRAMUSCULAR; INTRAVENOUS at 13:45

## 2021-11-17 RX ADMIN — ACETAMINOPHEN 975 MG: 325 TABLET, FILM COATED ORAL at 10:31

## 2021-11-17 RX ADMIN — MIDAZOLAM 1 MG: 1 INJECTION INTRAMUSCULAR; INTRAVENOUS at 11:07

## 2021-11-17 RX ADMIN — ENOXAPARIN SODIUM 40 MG: 40 INJECTION SUBCUTANEOUS at 11:15

## 2021-11-17 RX ADMIN — HYDROMORPHONE HYDROCHLORIDE 0.2 MG: 0.2 INJECTION, SOLUTION INTRAMUSCULAR; INTRAVENOUS; SUBCUTANEOUS at 19:40

## 2021-11-17 RX ADMIN — BUPIVACAINE HYDROCHLORIDE 20 ML: 2.5 INJECTION, SOLUTION EPIDURAL; INFILTRATION; INTRACAUDAL; PERINEURAL at 11:13

## 2021-11-17 RX ADMIN — HYDROMORPHONE HYDROCHLORIDE 0.2 MG: 0.2 INJECTION, SOLUTION INTRAMUSCULAR; INTRAVENOUS; SUBCUTANEOUS at 18:22

## 2021-11-17 RX ADMIN — FENTANYL CITRATE 25 MCG: 50 INJECTION, SOLUTION INTRAMUSCULAR; INTRAVENOUS at 19:21

## 2021-11-17 RX ADMIN — METRONIDAZOLE 500 MG: 500 INJECTION, SOLUTION INTRAVENOUS at 12:33

## 2021-11-17 RX ADMIN — HYDROMORPHONE HYDROCHLORIDE 0.4 MG: 0.2 INJECTION, SOLUTION INTRAMUSCULAR; INTRAVENOUS; SUBCUTANEOUS at 21:08

## 2021-11-17 RX ADMIN — HYDROMORPHONE HYDROCHLORIDE 0.4 MG: 0.2 INJECTION, SOLUTION INTRAMUSCULAR; INTRAVENOUS; SUBCUTANEOUS at 23:03

## 2021-11-17 RX ADMIN — FENTANYL CITRATE 50 MCG: 50 INJECTION, SOLUTION INTRAMUSCULAR; INTRAVENOUS at 11:07

## 2021-11-17 RX ADMIN — SODIUM CHLORIDE, PRESERVATIVE FREE 300 UNITS: 5 INJECTION INTRAVENOUS at 17:08

## 2021-11-17 RX ADMIN — ROCURONIUM BROMIDE 20 MG: 50 INJECTION, SOLUTION INTRAVENOUS at 15:53

## 2021-11-17 RX ADMIN — SODIUM CHLORIDE, POTASSIUM CHLORIDE, SODIUM LACTATE AND CALCIUM CHLORIDE: 600; 310; 30; 20 INJECTION, SOLUTION INTRAVENOUS at 10:30

## 2021-11-17 RX ADMIN — DEXAMETHASONE SODIUM PHOSPHATE 4 MG: 4 INJECTION, SOLUTION INTRA-ARTICULAR; INTRALESIONAL; INTRAMUSCULAR; INTRAVENOUS; SOFT TISSUE at 13:45

## 2021-11-17 RX ADMIN — FENTANYL CITRATE 50 MCG: 50 INJECTION, SOLUTION INTRAMUSCULAR; INTRAVENOUS at 14:35

## 2021-11-17 RX ADMIN — PROPOFOL 20 MG: 10 INJECTION, EMULSION INTRAVENOUS at 14:35

## 2021-11-17 RX ADMIN — OXYCODONE HYDROCHLORIDE 15 MG: 5 TABLET ORAL at 23:52

## 2021-11-17 RX ADMIN — FENTANYL CITRATE 25 MCG: 50 INJECTION, SOLUTION INTRAMUSCULAR; INTRAVENOUS at 19:05

## 2021-11-17 RX ADMIN — ONDANSETRON 4 MG: 2 INJECTION INTRAMUSCULAR; INTRAVENOUS at 17:12

## 2021-11-17 RX ADMIN — HYDROMORPHONE HYDROCHLORIDE 0.2 MG: 0.2 INJECTION, SOLUTION INTRAMUSCULAR; INTRAVENOUS; SUBCUTANEOUS at 18:09

## 2021-11-17 RX ADMIN — FENTANYL CITRATE 50 MCG: 50 INJECTION, SOLUTION INTRAMUSCULAR; INTRAVENOUS at 15:02

## 2021-11-17 RX ADMIN — FENTANYL CITRATE 25 MCG: 50 INJECTION, SOLUTION INTRAMUSCULAR; INTRAVENOUS at 18:53

## 2021-11-17 RX ADMIN — ROCURONIUM BROMIDE 20 MG: 50 INJECTION, SOLUTION INTRAVENOUS at 16:29

## 2021-11-17 RX ADMIN — OXYCODONE HYDROCHLORIDE 10 MG: 5 TABLET ORAL at 18:59

## 2021-11-17 RX ADMIN — PROPOFOL 20 MG: 10 INJECTION, EMULSION INTRAVENOUS at 14:47

## 2021-11-17 RX ADMIN — ROCURONIUM BROMIDE 30 MG: 50 INJECTION, SOLUTION INTRAVENOUS at 14:48

## 2021-11-17 RX ADMIN — PROPOFOL 100 MG: 10 INJECTION, EMULSION INTRAVENOUS at 13:45

## 2021-11-17 RX ADMIN — HYDROMORPHONE HYDROCHLORIDE 0.2 MG: 0.2 INJECTION, SOLUTION INTRAMUSCULAR; INTRAVENOUS; SUBCUTANEOUS at 19:29

## 2021-11-17 RX ADMIN — HYDROMORPHONE HYDROCHLORIDE 0.2 MG: 0.2 INJECTION, SOLUTION INTRAMUSCULAR; INTRAVENOUS; SUBCUTANEOUS at 19:13

## 2021-11-17 RX ADMIN — Medication 2 G: at 17:27

## 2021-11-17 RX ADMIN — Medication 5 MG: at 14:24

## 2021-11-17 RX ADMIN — PROPOFOL 100 MCG/KG/MIN: 10 INJECTION, EMULSION INTRAVENOUS at 13:46

## 2021-11-17 ASSESSMENT — ACTIVITIES OF DAILY LIVING (ADL)
ADLS_ACUITY_SCORE: 3
ADLS_ACUITY_SCORE: 1
ADLS_ACUITY_SCORE: 3

## 2021-11-17 ASSESSMENT — MIFFLIN-ST. JEOR: SCORE: 1112.25

## 2021-11-17 NOTE — OR NURSING
TAP block performed without complications.  Given 1 mg versed and 50 mcg fentanyl. VSS.  Pt tolerated well.  Will continue to monitor.

## 2021-11-17 NOTE — PROGRESS NOTES
"Interventional Radiology  11/17/21  8:29 AM    Spoke with the patient over the phone just now after receiving a message from pre op provider regarding right shoulder pain since PICC placement.    Aolk Nino is a 41 year old female s/p Double Lumen PICC placement via Right brachial vein on 11/11/2021.      Patient reports right shoulder pain since placement.  Pain is 2/10 in severity, a constant dull ache and worse with raising arms above the shoulder.  She has not required pain medication for it.  She was concerned about the positioning of her arms during surgery.  Denies any swelling, numbness or tingling, weakness, fevers or chills or any PICC dysfunction.      She is having exploratory laparotomy, ileocolic resection versus repeat stricturoplasty with Dr. Cartagena today.    Discussed the procedure and the pain is at the site where the tortuosity of the  subclavian vein required extensive work to obtain central access to place PICC.  Please see IR dictation under \"imaging\" for more detailed information.     PICC is fully functional without any difficulties aspirating and flushing tube per patient.  Reassured patient and pain will likely go away once the PICC is no longer needed.      Patient encouraged to call with any questions or concerns.     Zuleika Person PA-C  Physician Assistant  Interventional Radiology   [phone]        "

## 2021-11-17 NOTE — BRIEF OP NOTE
[facility]    Brief Operative Note    Pre-operative diagnosis: Crohn's disease of small intestine with intestinal obstruction (H) [K50.012]  Post-operative diagnosis Same as pre-operative diagnosis    Procedure: Procedure(s):  Exploratory laparotomy, illeal resection, extensive lysis of adhesions (2 hr)  Surgeon: Surgeon(s) and Role:     * Pete Cartagena MD - Primary  Anesthesia: Combined General with Block   Estimated Blood Loss: Less than 10 ml    Drains: None  Specimens:   ID Type Source Tests Collected by Time Destination   1 : Ileum Open Call Back Tissue Small Intestine, Ileum SURGICAL PATHOLOGY EXAM Pete Cartagena MD 11/17/2021  4:32 PM      Findings:   2 strictures proximal to prior ileocolic anastomosis.  Complications: None.  Implants: * No implants in log *    Catherine Jimenez MD  Colorectal Surgery Fellow

## 2021-11-17 NOTE — ANESTHESIA PROCEDURE NOTES
Airway       Patient location during procedure: OR       Procedure Start/Stop Times: 11/17/2021 1:48 PM  Staff -        Anesthesiologist:  Briseida Cleary MD       Resident/Fellow: Dany Denson       Performed By: anesthesiologist and with residents       Procedure performed by resident/fellow/CRNA in presence of a teaching physician.    Consent for Airway        Urgency: elective  Indications and Patient Condition       Indications for airway management: shukri-procedural         Mask difficulty assessment: 1 - vent by mask    Final Airway Details       Final airway type: endotracheal airway       Successful airway: ETT - single  Endotracheal Airway Details        ETT size (mm): 6.5       Cuffed: yes       Successful intubation technique: video laryngoscopy       VL Blade Size: MAC 3       Grade View of Cords: 2       Adjucts: stylet       Position: Right       Measured from: gums/teeth       Secured at (cm): 20       Bite block used: None    Post intubation assessment        Placement verified by: capnometry, equal breath sounds and chest rise        Number of attempts at approach: 2       Number of other approaches attempted: 0       Secured with: pink tape       Ease of procedure: easy       Dentition: Intact and Lips/oral mucosa injury    Additional Comments       Initial attempt by Lita ALMANZA, with grade 3 view on VL. Resident took the blade and optimized to a grade 2b view but had difficulty passing tube due to how anterior the airway was. Staff took the blade and was able to pass the ETT but patient sustained a small lip laceration at this point.

## 2021-11-17 NOTE — PROGRESS NOTES
This is a recent snapshot of the patient's [facility] medical record.  For current drug dose and complete information and questions, call [phone] or In Basket pool, fv home infusion ([id number])  CSN Number:  [id number]

## 2021-11-17 NOTE — ANESTHESIA PROCEDURE NOTES
TAP Procedure Note    Pre-Procedure   Staff -        Anesthesiologist:  Keven Madden MD       Resident/Fellow: Jose Suh DO       Performed By: resident       Location: pre-op       Procedure Start/Stop Times: 11/17/2021 11:08 AM and 11/17/2021 11:14 AM       Pre-Anesthestic Checklist: patient identified, IV checked, site marked, risks and benefits discussed, informed consent, monitors and equipment checked, pre-op evaluation, at physician/surgeon's request and post-op pain management  Timeout:       Correct Patient: Yes        Correct Procedure: Yes        Correct Site: Yes        Correct Position: Yes        Correct Laterality: Yes        Site Marked: Yes  Procedure Documentation  Procedure: TAP       Diagnosis: POST OPERATIVE PAIN       Laterality: bilateral       Patient Position: supine       Patient Prep/Sterile Barriers: sterile gloves, mask       Skin prep: Chloraprep       Needle Type: short bevel       Needle Gauge: 21.        Needle Length (millimeters): 110        Ultrasound guided       1. Ultrasound was used to identify targeted nerve, plexus, vascular marker, or fascial plane and place a needle adjacent to it in real-time.       2. Ultrasound was used to visualize the spread of anesthetic in close proximity to the above referenced structure.       3. A permanent image is entered into the patient's record.    Assessment/Narrative         The placement was negative for: blood aspirated, painful injection and site bleeding       Paresthesias: No.     Bolus given via needle..        Secured via.        Insertion/Infusion Method: Single Shot       Complications: none       Injection made incrementally with aspirations every 5 mL.    Medication(s) Administered   Bupivacaine 0.25% PF (Infiltration), 20 mL  Bupivacaine liposome (Exparel) 1.3% LA inj susp (Infiltration), 15 mL  Medication Administration Time: 11/17/2021 11:13 AM

## 2021-11-17 NOTE — ANESTHESIA CARE TRANSFER NOTE
Patient: Alok Nino    Procedure: Procedure(s):  Exploratory laparotomy, illeal resection, extensive lysis of adhesions (2 hr)       Diagnosis: Crohn's disease of small intestine with intestinal obstruction (H) [K50.012]  Diagnosis Additional Information: No value filed.    Anesthesia Type:   General     Note:    Oropharynx: oropharynx clear of all foreign objects and spontaneously breathing  Level of Consciousness: drowsy  Oxygen Supplementation: nasal cannula  Level of Supplemental Oxygen (L/min / FiO2): 3  Independent Airway: airway patency satisfactory and stable  Dentition: dentition unchanged  Vital Signs Stable: post-procedure vital signs reviewed and stable  Report to RN Given: handoff report given  Patient transferred to: PACU  Comments: Responds to voice   Handoff Report: Identifed the Patient, Identified the Reponsible Provider, Reviewed the pertinent medical history, Discussed the surgical course, Reviewed Intra-OP anesthesia mangement and issues during anesthesia, Set expectations for post-procedure period and Allowed opportunity for questions and acknowledgement of understanding      Vitals:  Vitals Value Taken Time   BP     Temp     Pulse     Resp     SpO2         Electronically Signed By: TACOS Rosenberg CRNA  November 17, 2021  5:52 PM

## 2021-11-18 LAB
ANION GAP SERPL CALCULATED.3IONS-SCNC: 5 MMOL/L (ref 3–14)
BUN SERPL-MCNC: 12 MG/DL (ref 7–30)
CALCIUM SERPL-MCNC: 8.2 MG/DL (ref 8.5–10.1)
CHLORIDE BLD-SCNC: 108 MMOL/L (ref 94–109)
CO2 SERPL-SCNC: 27 MMOL/L (ref 20–32)
CREAT SERPL-MCNC: 0.68 MG/DL (ref 0.52–1.04)
ERYTHROCYTE [DISTWIDTH] IN BLOOD BY AUTOMATED COUNT: 12.1 % (ref 10–15)
GFR SERPL CREATININE-BSD FRML MDRD: >90 ML/MIN/1.73M2
GLUCOSE BLD-MCNC: 109 MG/DL (ref 70–99)
GLUCOSE BLDC GLUCOMTR-MCNC: 111 MG/DL (ref 70–99)
HCT VFR BLD AUTO: 31.4 % (ref 35–47)
HGB BLD-MCNC: 10.2 G/DL (ref 11.7–15.7)
MAGNESIUM SERPL-MCNC: 1.8 MG/DL (ref 1.6–2.3)
MCH RBC QN AUTO: 31.6 PG (ref 26.5–33)
MCHC RBC AUTO-ENTMCNC: 32.5 G/DL (ref 31.5–36.5)
MCV RBC AUTO: 97 FL (ref 78–100)
PHOSPHATE SERPL-MCNC: 4.4 MG/DL (ref 2.5–4.5)
PLAT MORPH BLD: NORMAL
PLATELET # BLD AUTO: 182 10E3/UL (ref 150–450)
POTASSIUM BLD-SCNC: 4.1 MMOL/L (ref 3.4–5.3)
RBC # BLD AUTO: 3.23 10E6/UL (ref 3.8–5.2)
RBC MORPH BLD: NORMAL
SODIUM SERPL-SCNC: 140 MMOL/L (ref 133–144)
WBC # BLD AUTO: 11.7 10E3/UL (ref 4–11)

## 2021-11-18 PROCEDURE — 258N000003 HC RX IP 258 OP 636: Performed by: STUDENT IN AN ORGANIZED HEALTH CARE EDUCATION/TRAINING PROGRAM

## 2021-11-18 PROCEDURE — 250N000013 HC RX MED GY IP 250 OP 250 PS 637: Performed by: STUDENT IN AN ORGANIZED HEALTH CARE EDUCATION/TRAINING PROGRAM

## 2021-11-18 PROCEDURE — 250N000011 HC RX IP 250 OP 636: Performed by: STUDENT IN AN ORGANIZED HEALTH CARE EDUCATION/TRAINING PROGRAM

## 2021-11-18 PROCEDURE — 83735 ASSAY OF MAGNESIUM: CPT | Performed by: STUDENT IN AN ORGANIZED HEALTH CARE EDUCATION/TRAINING PROGRAM

## 2021-11-18 PROCEDURE — 84100 ASSAY OF PHOSPHORUS: CPT | Performed by: STUDENT IN AN ORGANIZED HEALTH CARE EDUCATION/TRAINING PROGRAM

## 2021-11-18 PROCEDURE — 250N000013 HC RX MED GY IP 250 OP 250 PS 637: Performed by: PHYSICIAN ASSISTANT

## 2021-11-18 PROCEDURE — 250N000011 HC RX IP 250 OP 636: Performed by: COLON & RECTAL SURGERY

## 2021-11-18 PROCEDURE — 999N000007 HC SITE CHECK

## 2021-11-18 PROCEDURE — 250N000009 HC RX 250: Performed by: COLON & RECTAL SURGERY

## 2021-11-18 PROCEDURE — 258N000003 HC RX IP 258 OP 636: Performed by: PHYSICIAN ASSISTANT

## 2021-11-18 PROCEDURE — 85027 COMPLETE CBC AUTOMATED: CPT | Performed by: STUDENT IN AN ORGANIZED HEALTH CARE EDUCATION/TRAINING PROGRAM

## 2021-11-18 PROCEDURE — 80048 BASIC METABOLIC PNL TOTAL CA: CPT | Performed by: STUDENT IN AN ORGANIZED HEALTH CARE EDUCATION/TRAINING PROGRAM

## 2021-11-18 PROCEDURE — 120N000002 HC R&B MED SURG/OB UMMC

## 2021-11-18 PROCEDURE — 250N000013 HC RX MED GY IP 250 OP 250 PS 637

## 2021-11-18 PROCEDURE — 3E0436Z INTRODUCTION OF NUTRITIONAL SUBSTANCE INTO CENTRAL VEIN, PERCUTANEOUS APPROACH: ICD-10-PCS | Performed by: COLON & RECTAL SURGERY

## 2021-11-18 PROCEDURE — 36592 COLLECT BLOOD FROM PICC: CPT | Performed by: STUDENT IN AN ORGANIZED HEALTH CARE EDUCATION/TRAINING PROGRAM

## 2021-11-18 RX ORDER — METHOCARBAMOL 500 MG/1
500 TABLET, FILM COATED ORAL 4 TIMES DAILY PRN
Status: DISCONTINUED | OUTPATIENT
Start: 2021-11-18 | End: 2021-11-26 | Stop reason: HOSPADM

## 2021-11-18 RX ORDER — DEXTROSE MONOHYDRATE 100 MG/ML
INJECTION, SOLUTION INTRAVENOUS CONTINUOUS PRN
Status: DISCONTINUED | OUTPATIENT
Start: 2021-11-18 | End: 2021-11-26 | Stop reason: HOSPADM

## 2021-11-18 RX ORDER — HEPARIN SODIUM,PORCINE 10 UNIT/ML
5-20 VIAL (ML) INTRAVENOUS EVERY 24 HOURS
Status: DISCONTINUED | OUTPATIENT
Start: 2021-11-18 | End: 2021-11-26 | Stop reason: HOSPADM

## 2021-11-18 RX ORDER — SODIUM CHLORIDE 9 MG/ML
INJECTION, SOLUTION INTRAVENOUS CONTINUOUS
Status: DISCONTINUED | OUTPATIENT
Start: 2021-11-18 | End: 2021-11-26 | Stop reason: HOSPADM

## 2021-11-18 RX ORDER — HEPARIN SODIUM,PORCINE 10 UNIT/ML
5-20 VIAL (ML) INTRAVENOUS
Status: DISCONTINUED | OUTPATIENT
Start: 2021-11-18 | End: 2021-11-26 | Stop reason: HOSPADM

## 2021-11-18 RX ORDER — ACETAMINOPHEN 325 MG/1
975 TABLET ORAL EVERY 8 HOURS
Status: DISCONTINUED | OUTPATIENT
Start: 2021-11-18 | End: 2021-11-26 | Stop reason: HOSPADM

## 2021-11-18 RX ADMIN — ONDANSETRON 4 MG: 2 INJECTION INTRAMUSCULAR; INTRAVENOUS at 22:35

## 2021-11-18 RX ADMIN — METHOCARBAMOL 500 MG: 500 TABLET ORAL at 14:18

## 2021-11-18 RX ADMIN — ACETAMINOPHEN 975 MG: 325 TABLET, FILM COATED ORAL at 11:59

## 2021-11-18 RX ADMIN — HYDROMORPHONE HYDROCHLORIDE 0.4 MG: 0.2 INJECTION, SOLUTION INTRAMUSCULAR; INTRAVENOUS; SUBCUTANEOUS at 03:05

## 2021-11-18 RX ADMIN — I.V. FAT EMULSION 250 ML: 20 EMULSION INTRAVENOUS at 19:56

## 2021-11-18 RX ADMIN — METHOCARBAMOL 500 MG: 500 TABLET ORAL at 06:26

## 2021-11-18 RX ADMIN — SODIUM CHLORIDE 1000 ML: 9 INJECTION, SOLUTION INTRAVENOUS at 19:55

## 2021-11-18 RX ADMIN — HYDROMORPHONE HYDROCHLORIDE 0.4 MG: 0.2 INJECTION, SOLUTION INTRAMUSCULAR; INTRAVENOUS; SUBCUTANEOUS at 20:17

## 2021-11-18 RX ADMIN — HYDROMORPHONE HYDROCHLORIDE 0.4 MG: 0.2 INJECTION, SOLUTION INTRAMUSCULAR; INTRAVENOUS; SUBCUTANEOUS at 22:22

## 2021-11-18 RX ADMIN — OXYCODONE HYDROCHLORIDE 15 MG: 5 TABLET ORAL at 17:43

## 2021-11-18 RX ADMIN — CALCIUM GLUCONATE: 98 INJECTION, SOLUTION INTRAVENOUS at 19:57

## 2021-11-18 RX ADMIN — ACETAMINOPHEN 975 MG: 325 TABLET, FILM COATED ORAL at 19:53

## 2021-11-18 RX ADMIN — HYDROMORPHONE HYDROCHLORIDE 0.4 MG: 0.2 INJECTION, SOLUTION INTRAMUSCULAR; INTRAVENOUS; SUBCUTANEOUS at 01:18

## 2021-11-18 RX ADMIN — HYDROMORPHONE HYDROCHLORIDE 0.4 MG: 0.2 INJECTION, SOLUTION INTRAMUSCULAR; INTRAVENOUS; SUBCUTANEOUS at 07:59

## 2021-11-18 RX ADMIN — OXYCODONE HYDROCHLORIDE 15 MG: 5 TABLET ORAL at 13:05

## 2021-11-18 RX ADMIN — HYDROMORPHONE HYDROCHLORIDE 0.4 MG: 0.2 INJECTION, SOLUTION INTRAMUSCULAR; INTRAVENOUS; SUBCUTANEOUS at 18:22

## 2021-11-18 RX ADMIN — HYDROMORPHONE HYDROCHLORIDE 0.4 MG: 0.2 INJECTION, SOLUTION INTRAMUSCULAR; INTRAVENOUS; SUBCUTANEOUS at 10:01

## 2021-11-18 RX ADMIN — HYDROMORPHONE HYDROCHLORIDE 0.4 MG: 0.2 INJECTION, SOLUTION INTRAMUSCULAR; INTRAVENOUS; SUBCUTANEOUS at 16:20

## 2021-11-18 RX ADMIN — ENOXAPARIN SODIUM 40 MG: 40 INJECTION SUBCUTANEOUS at 11:59

## 2021-11-18 RX ADMIN — OXYCODONE HYDROCHLORIDE 15 MG: 5 TABLET ORAL at 21:39

## 2021-11-18 RX ADMIN — SODIUM CHLORIDE, POTASSIUM CHLORIDE, SODIUM LACTATE AND CALCIUM CHLORIDE: 600; 310; 30; 20 INJECTION, SOLUTION INTRAVENOUS at 06:26

## 2021-11-18 RX ADMIN — METHOCARBAMOL 500 MG: 500 TABLET ORAL at 18:21

## 2021-11-18 RX ADMIN — DOXYLAMINE SUCCINATE 50 MG: 25 TABLET ORAL at 21:40

## 2021-11-18 RX ADMIN — ACETAMINOPHEN 975 MG: 325 TABLET, FILM COATED ORAL at 03:09

## 2021-11-18 RX ADMIN — ONDANSETRON 4 MG: 4 TABLET, ORALLY DISINTEGRATING ORAL at 14:12

## 2021-11-18 RX ADMIN — HYDROMORPHONE HYDROCHLORIDE 0.4 MG: 0.2 INJECTION, SOLUTION INTRAMUSCULAR; INTRAVENOUS; SUBCUTANEOUS at 05:17

## 2021-11-18 RX ADMIN — HYDROMORPHONE HYDROCHLORIDE 0.4 MG: 0.2 INJECTION, SOLUTION INTRAMUSCULAR; INTRAVENOUS; SUBCUTANEOUS at 11:59

## 2021-11-18 RX ADMIN — OXYCODONE HYDROCHLORIDE 15 MG: 5 TABLET ORAL at 04:03

## 2021-11-18 RX ADMIN — HYDROMORPHONE HYDROCHLORIDE 0.4 MG: 0.2 INJECTION, SOLUTION INTRAMUSCULAR; INTRAVENOUS; SUBCUTANEOUS at 14:12

## 2021-11-18 RX ADMIN — OXYCODONE HYDROCHLORIDE 15 MG: 5 TABLET ORAL at 08:41

## 2021-11-18 RX ADMIN — Medication 5 ML: at 09:41

## 2021-11-18 ASSESSMENT — MIFFLIN-ST. JEOR: SCORE: 1129.25

## 2021-11-18 ASSESSMENT — ACTIVITIES OF DAILY LIVING (ADL)
ADLS_ACUITY_SCORE: 3
ADLS_ACUITY_SCORE: 5
ADLS_ACUITY_SCORE: 3
ADLS_ACUITY_SCORE: 5
ADLS_ACUITY_SCORE: 3
ADLS_ACUITY_SCORE: 3
ADLS_ACUITY_SCORE: 5
ADLS_ACUITY_SCORE: 3
ADLS_ACUITY_SCORE: 5
ADLS_ACUITY_SCORE: 3
ADLS_ACUITY_SCORE: 5
ADLS_ACUITY_SCORE: 3
ADLS_ACUITY_SCORE: 3
ADLS_ACUITY_SCORE: 5
ADLS_ACUITY_SCORE: 5

## 2021-11-18 NOTE — PROGRESS NOTES
CLINICAL NUTRITION SERVICES - ASSESSMENT NOTE     Nutrition Prescription    RECOMMENDATIONS FOR MDs/PROVIDERS TO ORDER:  1. Adjust/titrate IV fluids as appropriate when TPN resumes tonight  2. Please alert RD or pharmacist if pt requires adjustments to PN fluid volume (TPN to run at 55 mL/hr continuously)    Malnutrition Status:    Patient does not meet two of the established criteria necessary for diagnosing malnutrition but is at risk for malnutrition    Recommendations already ordered by Registered Dietitian (RD):  TPN via central line:   -- Using dosing weight 48 kg  --1320 mL/day x 24 hours with goal 220 g dextrose, 65 g AA, and 250 mL 20% IV lipids 5 days/week to provide 1365 kcal (28 kcal/kg), 1.4 g/kg PRO, GIR 3.2, and 26% kcal from fat    Future/Additional Recommendations:  Monitor ability to advance diet, wt trends, labs vs need to adjust TPN provisions/overall nutrition POC     REASON FOR ASSESSMENT  Alok Nino is a/an 41 year old female assessed by the dietitian for Pharmacy/Nutrition to Start and Manage PN (Indication: Continuation of home TPN) and Malnutrition Screening Tool score >/=2     NUTRITION HISTORY  Pt recently discharged 11/14; started TPN during that admission on 11/10, reached goal on 11/13.  Admit again yesterday for planned surgery, plan to resume home TPN today.    Per last RD assessment note on 11/10:  - Prior to a couple of weeks ago, pt followed a regular diet of 3 meals per day + snacks.  - Pt experienced a week of nausea and abdominal pain, reduced intake, and a 5 pound wt loss. During this week, pt intake was 1-2 meals per day + 2 protein shakes per day.   - Pt felt better following week, consumed usual intake, and gained back 5 pounds.  - The following week, pt experienced more pain, nausea, reduced intake and 5# wt loss, so called MD. Pt intake again included 1-2 meals per day + 2 protein shakes per day.     Unit pharmacist obtained [facility] home TPN recipe: 1320 mL/day with 220  "g dextrose, 65 g AA, and 180 mL 20% lipids daily to provide 1368 kcal (28 kcal/kg),  1.3 g/kg PRO, GIR 3.1, and 26% kcal from fat per dosing weight 49.2 kg    Per chart, pt is POD#1 exploratory laparotomy, illeal resection, and extensive lysis of adhesions with no complications and progressing well. PMH includes Crohn's disease for 25 years requiring multiple bowel surgeries.     CURRENT NUTRITION ORDERS  Diet: Clear Liquid  Intake/Tolerance: advanced to clear liquids this morning, was NPO yesterday for surgery    LABS  Labs reviewed  - K+, Mg++, Phos WNL  - (11/15): Alk Phos, ALT, AST, Tbili, TG WNL  - BGs stable since admit yesterday    MEDICATIONS  Medications reviewed  - LR @ 75 mL/hr    ANTHROPOMETRICS  Height: 157.5 cm (5' 2\")  Most Recent Weight: 48.4 kg (106 lb 11.2 oz)    IBW: 50 kg   BMI: Normal BMI  Weight History: 9 lb wt loss overall the past month (7.8% wt loss)  Wt Readings from Last 10 Encounters:   11/17/21 48.4 kg (106 lb 11.2 oz)   11/16/21 49.7 kg (109 lb 8 oz)   11/14/21 49.2 kg (108 lb 6.4 oz)   10/05/21 52.2 kg (115 lb)   11/13/20 50.4 kg (111 lb 1.6 oz)   01/07/14 48.5 kg (107 lb)   12/30/13 47.6 kg (105 lb)   12/23/13 47.7 kg (105 lb 3.2 oz)   12/12/13 55.3 kg (122 lb)   11/19/13 52.6 kg (116 lb)      Dosing Weight: 48 kg (actual)    ASSESSED NUTRITION NEEDS  Estimated Energy Needs: 9683-6778 kcals/day (25 - 30 kcals/kg)  Justification: Maintenance  Estimated Protein Needs: 58-72 grams protein/day (1.2 - 1.5 grams of pro/kg)  Justification: Post-op  Estimated Fluid Needs: (1 mL/kcal)   Justification: Maintenance, or other per provider pending fluid status    PHYSICAL FINDINGS  See malnutrition section below.    MALNUTRITION  % Intake: Decreased intake does not meet criteria  % Weight Loss: > 5% in 1 month (severe)  Subcutaneous Fat Loss: None observed per last RD note on 11/10  Muscle Loss: None observed per last RD note on 11/10  Fluid Accumulation/Edema: None noted per chart review " today  Malnutrition Diagnosis: Patient does not meet two of the established criteria necessary for diagnosing malnutrition but is at risk for malnutrition    NUTRITION DIAGNOSIS  Inadequate oral intake related to slow diet advancement post-op and recent decreased intake 2/2 nausea an abdominal pain as evidenced by NPO/clear liquids since surgery yesterday and overall decreased intake over the past ~2 weeks per review of recent RD note (11/10)    INTERVENTIONS  Implementation  Nutrition Education: Unable to complete due to pt with other cares during attempts to visit   Collaboration with other providers and Parenteral Nutrition/IV Fluids - unit pharmacist obtained pt's home TPN recipe, sent change order request with modified version to start inpatient (adjusting lipid volume to avoid wasting bags)    Goals  Total avg nutritional intake to meet a minimum of 25 kcal/kg and 1.2 g PRO/kg daily (per dosing wt 48 kg).     Monitoring/Evaluation  Progress toward goals will be monitored and evaluated per protocol.     Diandra Pal, RD, LD  Weekday Pager: [phone]  Weekday Units covered: 7C (all beds) and 5A (beds 5201 through 5211-2)  Weekend/Holiday RD Pager: [phone]

## 2021-11-18 NOTE — PROGRESS NOTES
"Colorectal Surgery Progress Note  POD#1      Subjective:  States that her pain was \"high\" yesterday, but is now down to a 7/10. She has not eaten as of yet, and TPN was unable to be started overnight due to some issues with pharmacy. She states that she tried to stand yesterday, but unable to ambulate due to the pain. She denies flatus, BM, hiccups or beltching.     Vitals:  Vitals:    11/17/21 2115 11/17/21 2222 11/18/21 0305 11/18/21 0729   BP: 98/54 99/56  (!) 87/47   BP Location:  Left arm  Right arm   Cuff Size:       Pulse: 81 83  73   Resp: 15 14  17   Temp:  97.9  F (36.6  C)  98.6  F (37  C)   TempSrc:  Temporal  Temporal   SpO2: 99% 98% 97% 93%   Weight:       Height:         I/O:  I/O last 3 completed shifts:  In: 1700 [I.V.:1700]  Out: 1045 [Urine:1025; Blood:20]    Physical Exam:  Gen: AAOx3, NAD  Pulm: Non-labored breathing  Abd: Soft, non-distended, appropriately tender, no guarding   Incision C/D/I   Ext:  Warm and well-perfused    BMP  Recent Labs   Lab 11/18/21  0720 11/17/21  1851 11/17/21  0922 11/15/21  1535 11/14/21  0823 11/14/21  0758 11/13/21  1311 11/13/21  0724 11/12/21  1339 11/12/21  0748     --   --  135  --   --   --  138  --  139   POTASSIUM 4.1  --   --  4.0  --  3.8  --  3.7  --  3.6   CHLORIDE 108  --   --  100  --   --   --  103  --  108   CO2 27  --   --  27  --   --   --  29  --  29   BUN 12  --   --  17  --   --   --  12  --  9   CR 0.68 0.60  --  0.58  --   --   --  0.57  --  0.53   * 152* 104* 96   < >  --    < > 104*   < > 95   MAG 1.8  --   --  2.0  --  1.9  --  1.9  --  1.9   PHOS 4.4  --   --  3.1  --  3.6  --  4.4  --  3.9    < > = values in this interval not displayed.     CBC  Recent Labs   Lab 11/18/21  0720 11/15/21  1535   WBC 11.7* 8.8   HGB 10.2* 13.4   HCT 31.4* 39.6    305         ASSESSMENT: This is a 41 year old female with h/o  Chron's disease for 25 years requiring multiple bowel surgeries now POD#1 s/p exploratory laparotomy, illeal " resection, and extensive lysis of adhesions with no complications and progressing well.     Plan:  - start TPN this AM   - discontinue martinez catheter (adequate output and Cr 0.68)  - start enoxaparin 40mg PO Qday (H/H WNL, Cr WNL)  - advance to CLD   - may need to start patient on a PCA later today if her pain prevents ambulation       Aki Ellis MD  Surgery PGY1    Patient was seen and discussed with Dr. Jimenez

## 2021-11-18 NOTE — ANESTHESIA POSTPROCEDURE EVALUATION
Patient: Alok Nino    Procedure: Procedure(s):  Exploratory laparotomy, illeal resection, extensive lysis of adhesions (2 hr)       Diagnosis:Crohn's disease of small intestine with intestinal obstruction (H) [K50.012]  Diagnosis Additional Information: No value filed.    Anesthesia Type:  General    Note:  Disposition: Admission   Postop Pain Control: Uneventful            Sign Out: Well controlled pain   PONV: No   Neuro/Psych: Uneventful            Sign Out: Acceptable/Baseline neuro status   Airway/Respiratory: Uneventful            Sign Out: Acceptable/Baseline resp. status   CV/Hemodynamics: Uneventful            Sign Out: Acceptable CV status; No obvious hypovolemia; No obvious fluid overload   Other NRE: NONE   DID A NON-ROUTINE EVENT OCCUR? No           Last vitals:  Vitals Value Taken Time   BP 96/64 11/17/21 1845   Temp 37.1  C (98.8  F) 11/17/21 1838   Pulse 89 11/17/21 1857   Resp 12 11/17/21 1857   SpO2 100 % 11/17/21 1857   Vitals shown include unvalidated device data.    Electronically Signed By: Ramon Kim MD  November 17, 2021  6:59 PM

## 2021-11-18 NOTE — PROGRESS NOTES
Admitted/transferred from: PACU  2 RN full   skin assessment completed by Floyd Lee, RN and Keven RN.  Skin assessment finding: issues found Transverse midline incision   Interventions/actions: other NA     Will continue to monitor.

## 2021-11-18 NOTE — PLAN OF CARE
Vital signs stable on room air. Pain controlled with oxycodone, Robaxin, and IV dilaudid. Tolerating clear liquid diet. TPN to restart tonight. Had one short episode of nausea that resolved on it's own. Zofran given once. Voiding with adequate urine output after martinez removal. Not passing gas or stool. Transverse abdominal incision clean/dry/intact and open to air. Abdominal binder on. Up independently.

## 2021-11-18 NOTE — OP NOTE
Procedure Date: 11/17/2021    PREOPERATIVE DIAGNOSIS:  Stricturing Crohn's disease with distal ileal obstruction.    POSTOPERATIVE DIAGNOSIS:  Stricturing Crohn's disease with distal ileal obstruction.    PROCEDURE:  Laparotomy with extensive lysis of adhesions (120 minutes), distal ileal resection with ileoileal anastomosis.    INDICATIONS FOR PROCEDURE:  Alok Nino is a 41-year-old woman with longstanding Crohn's disease dating back 25 years.  She has had multiple operations and extensive bowel surgeries, including numerous bowel resections and stricturoplasties.  Her most recent stricturoplasty was performed by me in 2014.  She has been under the medical care of Dr. Jose Tolbert.  She is felt to have approximately 150 cm of small bowel.  She has been having cramping abdominal pain and was referred for consideration of surgery.  Her nutrition parameters had begun to wane, and she was started on total parenteral nutrition.  She felt very poorly last week, constantly being on the verge of vomiting, and she was admitted briefly and made n.p.o. while on TPN.  Her symptoms very substantially resolved, and she was able to be discharged prior to today's scheduled surgery.  Colonoscopy demonstrated an ulcerated area in the neoterminal ileum with an estimated lumen size of about 5-6 mm.  The patient underwent MR enterogram, and this demonstrated a short segment of thickened neoterminal ileum with a narrowed lumen.  There was also marked small bowel deformity related to the patients numerous previous bowel resections and strictureplasties, but no convincing sign of focal obstruction or other active inflammation.  Given her ongoing symptoms, I advised exploration with possible stricturoplasty and possible resection.  I discussed the risks and alternatives to surgery in detail with the patient.  I answered all of her questions to her stated satisfaction.  She expressed understanding and provided written informed  consent.    SURGEON:  Pete Cartagena MD    ASSISTANTS:  Catherine Jimenez MD and Pete Adasm MD    DESCRIPTION OF PROCEDURE:  After induction of adequate endotracheal anesthesia, the patient was placed in the supine position on the operating table.  The abdomen was prepped and draped in sterile fashion.  A timeout was performed.  We chose to reenter abdomen via her previous transverse incision that dated back to childhood.  The abdomen was entered carefully and without incident.  We immediately encountered extensive adhesions.  These were carefully taken down, predominantly with sharp dissection, over the course of 2 hours until we had adequate mobilization.  We were able to identify a thickened area of bowel immediately proximal to the ileocolic anastomosis.  We carefully took down additional adhesions so that we had a clear view of the anatomy.  This was presumably at the site of my previous stricturoplasty site, almost immediately proximal to a large and patulous ileocolic anastomosis.  I chose not to take down all of the abdominal adhesions, as this appeared, by MRI, to be the sole source of obstruction.  However, her MRI also indicated chronic distortion of her small bowel with multiple dilated anastomotic areas consistent, I believe, just with anatomic postoperative deformity and not necessarily obstruction.  There was a second very dilated segment that lay on the left side of the abdomen, again seen on MRI, that was soft and had no adjacent area of Crohn's disease.  The bowel was so extremely thickened, I did not feel it was a suitable candidate for stricturoplasty, especially as one had already been performed in this area.  I decided that the patient was best served by resection of this short segment.  On careful inspection, immediately distal to this, there was a second area that seemed narrow.  We calibrated this with a Hamilton catheter, and there was substantial hang-up of a balloon dilated to 1.5 cm.   As this was very close to our proposed anastomotic line, I felt it was preferable to resect this along with the more distal area of Crohn's stricture.  I selected a point for transection immediately upstream from the ileocolic anastomosis.  The resection of the 2 strictured areas comprised a segment of bowel that measured about 7-8 cm.  Both resection margins were clamped.  The mesentery was taken down primarily with the LigaSure.  Underneath the strictured area, the mesentery was quite thickened, and this was taken down between clamps, a 0 Vicryl stick tie, and this pedicle was doubly ligated with 0 silk.  We next performed a hand-sutured end-to-end anastomosis with interrupted 4-0 silk Lembert sutures, followed by running 4-0 Maxon seromuscular layer.  This resulted in a well-vascularized and quite patulous anastomosis.  There was a small amount of fecal spillage when we inspected the open bowel ends that was promptly irrigated away.  The abdomen was copiously irrigated with water.  Hemostasis was intact.  The fascia was closed with running 0 PDS suture.  The skin was irrigated and closed with running 4-0 monocryl and skin adhesive.  Gloves were changed prior to the abdominal closure.  Sterile dressing was applied and procedure terminated.    ESTIMATED BLOOD LOSS:  10 mL.    COMPLICATIONS:  The patient tolerated the procedure well without evident complications.     COUNTS:  Sponge, needle and instrument counts were reported as correct at the conclusion of the case x 2.    Pete Cartagena MD        D: 2021   T: 2021   MT: [facility]    Name:     BASSEM RAMOS  MRN:      [id number]        Account:        [id number]   :      1980           Procedure Date: 2021     Document: [id number]    cc:  MD Pete Jim MD Hassinger Taryn, MD

## 2021-11-18 NOTE — PLAN OF CARE
Assumed care of pt post-op at 2000, cared for until 2300.  Pt has not dangled, passed gas or had a bowel movent.  Hamilton in place.  On 2L O2 via nasal cannula.  Per pharmacy, unable to prep TPN tonight, will start tomorrow.  Pt NPO ex. Ice chips and sips w/ meds.  Transverse abdominal incision CDI and VIJAYA, periwound has no discoloration.  Pain managed w/ IV dilaudid x2.

## 2021-11-18 NOTE — PROGRESS NOTES
"  Colorectal Surgery Progress Note  Surgery Cross-Cover  Post Op Check    11/18/2021    Alok Nino is a 41 year old female with h/o  Chron's disease for 25 years requiring multiple bowel surgeries now POD#0 s/p exploratory laparotomy, illeal resection, and extensive lysis of adhesions with no complications.    Pt reports some abdominal soreness. Denies SOB, chest pain, or dizziness. No flatus or BM. Voiding adequately with martinez in place.    BP 99/56 (BP Location: Left arm)   Pulse 83   Temp 97.9  F (36.6  C) (Temporal)   Resp 14   Ht 1.575 m (5' 2\")   Wt 48.4 kg (106 lb 11.2 oz)   LMP 11/02/2021 (Approximate)   SpO2 97%   BMI 19.52 kg/m      Gen: A&O x3, NAD  Chest: breathing non-labored on RA  Abdomen: soft, non-distended, diffusely tender  Incision: clean, dry, intact exofin  Extremities: warm and well perfused    A/P: No acute post-op issues. Continue plan of care per primary team. Please call with any questions.    Karlo Lee MD  General Surgery PGY 1 Resident  Pager: [phone]          "

## 2021-11-18 NOTE — PHARMACY-ADMISSION MEDICATION HISTORY
"  Admission Medication History Completed by Pharmacy    See [facility] Admission Navigator for allergy information, preferred outpatient pharmacy, prior to admission medications and immunization status.     Medication History Sources:     Pre-op pharmacist med hx, pre-op RN assessment.      Prior to Admission medications    Medication Sig Last Dose Taking? Auth Provider   adalimumab (HUMIRA) 40 MG/0.8ML pen kit Inject 40 mg Subcutaneous More than a month at Unknown time Yes Reported, Patient   azaTHIOprine 100 MG TABS Take 100 mg by mouth Past Week at Unknown time Yes Reported, Patient   budesonide (ENTOCORT EC) 3 MG EC capsule  Past Week at Unknown time Yes Reported, Patient   calcitRIOL (ROCALTROL) 0.25 MCG capsule Take 0.25 mcg by mouth three times a week Past Week at Unknown time Yes Reported, Patient   colesevelam (WELCHOL) 625 MG tablet Take 1,875 mg by mouth Past Week at Unknown time Yes Reported, Patient   doxylamine (UNISOM) 25 MG TABS tablet Take 50 mg by mouth At Bedtime Been taking nightly for two years 11/16/2021 at 2200 Yes Reported, Patient   HYDROmorphone (DILAUDID) 4 MG tablet  Past Week at Unknown time Yes Reported, Patient   magnesium citrate 1.745 GM/30ML solution Take 296 mLs by mouth once for 1 dose Start at 8 pm night before surgery, unless otherwise stated in \"Getting Ready for Surgery\" instruction Past Week at Unknown time Yes Reported, Patient   metFORMIN osmotic (FORTAMET) 1000 MG 24 hr tablet Take 1,000 mg by mouth More than a month at Unknown time Yes Reported, Patient   ondansetron (ZOFRAN-ODT) 4 MG ODT tab  More than a month at Unknown time Yes Reported, Patient   oxyCODONE (ROXICODONE) 5 MG tablet Take 1 tablet (5 mg) by mouth every 8 hours as needed for moderate to severe pain Past Week at Unknown time Yes Jesenia Person PA-C   parenteral nutrition - PTA/DISCHARGE ORDER The TPN formula will print on the After Visit Summary Report. 11/17/2021 at Unknown time Yes Darnell, " "MD Leslee   potassium chloride ER (K-TAB) 20 MEQ CR tablet  Past Week at Unknown time Yes Reported, Patient   ustekinumab (STELARA) 90 MG/ML  Past Month at Unknown time Yes Reported, Patient   vitamin D (ERGOCALCIFEROL) 44637 UNIT capsule Take 50,000 Units by mouth five times a week  Past Week at Unknown time Yes Reported, Patient   vitamin E (TOCOPHEROL) 400 units (180 mg) capsule Take 400 Units by mouth daily Past Week at Unknown time Yes Reported, Patient   azithromycin (ZITHROMAX) 250 MG tablet    Reported, Patient   polyethylene glycol (MIRALAX) 17 GM/Dose powder TAKE 238 G BY MOUTH SEE ADMIN INSTRUCTIONS STARTING AT 4 PM NIGHT PRIOR TO SURGERY. REFER TO \"GETTING READY FOR SURGERY\" INSTRUCTIONS.   Reported, Patient       Date completed: 11/17/21    Medication history completed by: Melania Gong NARCISA    "

## 2021-11-19 LAB
ALBUMIN SERPL-MCNC: 2.3 G/DL (ref 3.4–5)
ALP SERPL-CCNC: 60 U/L (ref 40–150)
ALT SERPL W P-5'-P-CCNC: 22 U/L (ref 0–50)
ANION GAP SERPL CALCULATED.3IONS-SCNC: 3 MMOL/L (ref 3–14)
AST SERPL W P-5'-P-CCNC: 11 U/L (ref 0–45)
BILIRUB DIRECT SERPL-MCNC: 0.2 MG/DL (ref 0–0.2)
BILIRUB SERPL-MCNC: 0.6 MG/DL (ref 0.2–1.3)
BUN SERPL-MCNC: 10 MG/DL (ref 7–30)
CALCIUM SERPL-MCNC: 8.3 MG/DL (ref 8.5–10.1)
CHLORIDE BLD-SCNC: 107 MMOL/L (ref 94–109)
CO2 SERPL-SCNC: 28 MMOL/L (ref 20–32)
CREAT SERPL-MCNC: 0.66 MG/DL (ref 0.52–1.04)
GFR SERPL CREATININE-BSD FRML MDRD: >90 ML/MIN/1.73M2
GLUCOSE BLD-MCNC: 119 MG/DL (ref 70–99)
GLUCOSE BLDC GLUCOMTR-MCNC: 107 MG/DL (ref 70–99)
GLUCOSE BLDC GLUCOMTR-MCNC: 122 MG/DL (ref 70–99)
GLUCOSE BLDC GLUCOMTR-MCNC: 144 MG/DL (ref 70–99)
HOLD SPECIMEN: NORMAL
INR PPP: 1.28 (ref 0.85–1.15)
MAGNESIUM SERPL-MCNC: 1.8 MG/DL (ref 1.6–2.3)
PHOSPHATE SERPL-MCNC: 1.6 MG/DL (ref 2.5–4.5)
PHOSPHATE SERPL-MCNC: 2 MG/DL (ref 2.5–4.5)
POTASSIUM BLD-SCNC: 3.6 MMOL/L (ref 3.4–5.3)
PREALB SERPL IA-MCNC: 12 MG/DL (ref 15–45)
PROT SERPL-MCNC: 5.8 G/DL (ref 6.8–8.8)
SODIUM SERPL-SCNC: 138 MMOL/L (ref 133–144)

## 2021-11-19 PROCEDURE — 82248 BILIRUBIN DIRECT: CPT | Performed by: COLON & RECTAL SURGERY

## 2021-11-19 PROCEDURE — 250N000011 HC RX IP 250 OP 636: Performed by: STUDENT IN AN ORGANIZED HEALTH CARE EDUCATION/TRAINING PROGRAM

## 2021-11-19 PROCEDURE — 84134 ASSAY OF PREALBUMIN: CPT | Performed by: COLON & RECTAL SURGERY

## 2021-11-19 PROCEDURE — 85610 PROTHROMBIN TIME: CPT | Performed by: COLON & RECTAL SURGERY

## 2021-11-19 PROCEDURE — 250N000013 HC RX MED GY IP 250 OP 250 PS 637: Performed by: PHYSICIAN ASSISTANT

## 2021-11-19 PROCEDURE — 120N000002 HC R&B MED SURG/OB UMMC

## 2021-11-19 PROCEDURE — 250N000009 HC RX 250: Performed by: COLON & RECTAL SURGERY

## 2021-11-19 PROCEDURE — 258N000003 HC RX IP 258 OP 636: Performed by: COLON & RECTAL SURGERY

## 2021-11-19 PROCEDURE — 83735 ASSAY OF MAGNESIUM: CPT | Performed by: COLON & RECTAL SURGERY

## 2021-11-19 PROCEDURE — 84100 ASSAY OF PHOSPHORUS: CPT | Performed by: COLON & RECTAL SURGERY

## 2021-11-19 PROCEDURE — 36592 COLLECT BLOOD FROM PICC: CPT | Performed by: COLON & RECTAL SURGERY

## 2021-11-19 PROCEDURE — 84075 ASSAY ALKALINE PHOSPHATASE: CPT | Performed by: COLON & RECTAL SURGERY

## 2021-11-19 PROCEDURE — 250N000013 HC RX MED GY IP 250 OP 250 PS 637: Performed by: COLON & RECTAL SURGERY

## 2021-11-19 PROCEDURE — 250N000013 HC RX MED GY IP 250 OP 250 PS 637: Performed by: STUDENT IN AN ORGANIZED HEALTH CARE EDUCATION/TRAINING PROGRAM

## 2021-11-19 PROCEDURE — 250N000013 HC RX MED GY IP 250 OP 250 PS 637

## 2021-11-19 PROCEDURE — 36415 COLL VENOUS BLD VENIPUNCTURE: CPT | Performed by: COLON & RECTAL SURGERY

## 2021-11-19 RX ADMIN — OXYCODONE HYDROCHLORIDE 15 MG: 5 TABLET ORAL at 11:16

## 2021-11-19 RX ADMIN — OXYCODONE HYDROCHLORIDE 15 MG: 5 TABLET ORAL at 16:35

## 2021-11-19 RX ADMIN — HYDROMORPHONE HYDROCHLORIDE 0.4 MG: 0.2 INJECTION, SOLUTION INTRAMUSCULAR; INTRAVENOUS; SUBCUTANEOUS at 02:54

## 2021-11-19 RX ADMIN — METHOCARBAMOL 500 MG: 500 TABLET ORAL at 01:43

## 2021-11-19 RX ADMIN — HYDROMORPHONE HYDROCHLORIDE 0.4 MG: 0.2 INJECTION, SOLUTION INTRAMUSCULAR; INTRAVENOUS; SUBCUTANEOUS at 08:17

## 2021-11-19 RX ADMIN — HYDROMORPHONE HYDROCHLORIDE 0.4 MG: 0.2 INJECTION, SOLUTION INTRAMUSCULAR; INTRAVENOUS; SUBCUTANEOUS at 10:25

## 2021-11-19 RX ADMIN — DOXYLAMINE SUCCINATE 50 MG: 25 TABLET ORAL at 22:19

## 2021-11-19 RX ADMIN — CALCIUM GLUCONATE: 98 INJECTION, SOLUTION INTRAVENOUS at 19:56

## 2021-11-19 RX ADMIN — OXYCODONE HYDROCHLORIDE 15 MG: 5 TABLET ORAL at 06:30

## 2021-11-19 RX ADMIN — ACETAMINOPHEN 975 MG: 325 TABLET, FILM COATED ORAL at 12:33

## 2021-11-19 RX ADMIN — HYDROMORPHONE HYDROCHLORIDE 0.4 MG: 0.2 INJECTION, SOLUTION INTRAMUSCULAR; INTRAVENOUS; SUBCUTANEOUS at 00:26

## 2021-11-19 RX ADMIN — OXYCODONE HYDROCHLORIDE 15 MG: 5 TABLET ORAL at 20:30

## 2021-11-19 RX ADMIN — HYDROMORPHONE HYDROCHLORIDE 0.4 MG: 0.2 INJECTION, SOLUTION INTRAMUSCULAR; INTRAVENOUS; SUBCUTANEOUS at 12:33

## 2021-11-19 RX ADMIN — ENOXAPARIN SODIUM 40 MG: 40 INJECTION SUBCUTANEOUS at 12:33

## 2021-11-19 RX ADMIN — HYDROMORPHONE HYDROCHLORIDE 0.4 MG: 0.2 INJECTION, SOLUTION INTRAMUSCULAR; INTRAVENOUS; SUBCUTANEOUS at 14:55

## 2021-11-19 RX ADMIN — I.V. FAT EMULSION 250 ML: 20 EMULSION INTRAVENOUS at 20:02

## 2021-11-19 RX ADMIN — OXYCODONE HYDROCHLORIDE 15 MG: 5 TABLET ORAL at 01:43

## 2021-11-19 RX ADMIN — METHOCARBAMOL 500 MG: 500 TABLET ORAL at 11:16

## 2021-11-19 RX ADMIN — HYDROMORPHONE HYDROCHLORIDE 0.4 MG: 0.2 INJECTION, SOLUTION INTRAMUSCULAR; INTRAVENOUS; SUBCUTANEOUS at 21:30

## 2021-11-19 RX ADMIN — HYDROMORPHONE HYDROCHLORIDE 0.4 MG: 0.2 INJECTION, SOLUTION INTRAMUSCULAR; INTRAVENOUS; SUBCUTANEOUS at 05:11

## 2021-11-19 RX ADMIN — SODIUM PHOSPHATE, MONOBASIC, MONOHYDRATE AND SODIUM PHOSPHATE, DIBASIC, ANHYDROUS 9 MMOL: 276; 142 INJECTION, SOLUTION INTRAVENOUS at 21:27

## 2021-11-19 RX ADMIN — HYDROMORPHONE HYDROCHLORIDE 0.4 MG: 0.2 INJECTION, SOLUTION INTRAMUSCULAR; INTRAVENOUS; SUBCUTANEOUS at 23:58

## 2021-11-19 RX ADMIN — ACETAMINOPHEN 975 MG: 325 TABLET, FILM COATED ORAL at 04:11

## 2021-11-19 RX ADMIN — ACETAMINOPHEN 975 MG: 325 TABLET, FILM COATED ORAL at 20:08

## 2021-11-19 RX ADMIN — HYDROMORPHONE HYDROCHLORIDE 0.4 MG: 0.2 INJECTION, SOLUTION INTRAMUSCULAR; INTRAVENOUS; SUBCUTANEOUS at 19:21

## 2021-11-19 RX ADMIN — HYDROMORPHONE HYDROCHLORIDE 0.4 MG: 0.2 INJECTION, SOLUTION INTRAMUSCULAR; INTRAVENOUS; SUBCUTANEOUS at 17:11

## 2021-11-19 ASSESSMENT — ACTIVITIES OF DAILY LIVING (ADL)
ADLS_ACUITY_SCORE: 3

## 2021-11-19 ASSESSMENT — MIFFLIN-ST. JEOR: SCORE: 1127.21

## 2021-11-19 NOTE — PLAN OF CARE
Cared for pt from [id number].  Pt moved to room 405.  Transverse abdominal incision is CDI, VIJAYA.  Pain is controlled with PRN Oxy, Tyl, Methocarbomal and Dilaudid.  Pain is pretty consistently 7/10 and pt requests them whenever available.  Pt is up independently.  Continuous TPN w/ lipids was started this evening. Voiding spontaneously.  Pt c/o nausea at 2230, after being moved on her bed, she had also received IV dilaudid about 20 min prior.  Pt did not PG and no BM.

## 2021-11-19 NOTE — PLAN OF CARE
Assumed care of Patient from 1649-5370. A&Ox4. Soft BPs-OVSS on RA. Patient reports intermittent pain control with Tylenol, Oxycodone, Robaxin, and Dilaudid. Tolerating clear liquid diet. Nausea denied throughout shift. Patient denies passing flatus and having last BM prior to admission.transverse abdominal incision VIJAYA and CDI. Abdominal binder remains in place. Pt voiding spontaneously with adequate output. PICC- double lumen infusing TPN/lipids and TKO in other lumen. Up and svetlana. Using IS with encourgement. SCDs on in bed. Awaiting return of bowel function. Continue plan of care.

## 2021-11-19 NOTE — PROGRESS NOTES
Colon and Rectal Surgery Progress Note    S:  Episodic nausea without vomiting.  Overall feels reasonably well.    Vitals:  Vitals:    11/18/21 1507 11/18/21 2213 11/19/21 0700 11/19/21 1039   BP: 100/51 97/60 (!) 88/47    BP Location: Left arm Left arm Left arm    Pulse: 76 86 90    Resp: 18 18 14    Temp: 98.3  F (36.8  C) 99  F (37.2  C) 99  F (37.2  C)    TempSrc: Temporal Temporal Oral    SpO2: 95% 96% 95%    Weight:    110 lb   Height:         I/O:  I/O last 3 completed shifts:  In: 1928 [P.O.:250; I.V.:841.33]  Out: 2250 [Urine:2250]    PE:  General Appearance: NAD   Abdomen: soft, NT, distended but not tensely so      Labs:  Recent Labs   Lab 11/19/21  0722 11/19/21  0147 11/18/21 2003 11/18/21  0720 11/17/21  1851 11/17/21  0922 11/15/21  1535 11/14/21  0823 11/14/21  0758 11/13/21  1311 11/13/21  0724   WBC  --   --   --  11.7*  --   --  8.8  --   --   --   --    HGB  --   --   --  10.2*  --   --  13.4  --   --   --   --    PLT  --   --   --  182  --   --  305  --   --   --   --      --   --  140  --   --  135  --   --   --  138   POTASSIUM 3.6  --   --  4.1  --   --  4.0  --  3.8  --  3.7   CHLORIDE 107  --   --  108  --   --  100  --   --   --  103   CO2 28  --   --  27  --   --  27  --   --   --  29   BUN 10  --   --  12  --   --  17  --   --   --  12   CR 0.66  --   --  0.68 0.60  --  0.58  --   --   --  0.57   * 122* 111* 109* 152*   < > 96   < >  --    < > 104*    < > = values in this interval not displayed.       A/P: satisfactory progress.  Reports she typically has slow ROBF.  Surgery discussed.    Ptee Cartagena MD  Chief, Division of Colon and Rectal Surgery

## 2021-11-19 NOTE — CONSULTS
Care Management Initial Consult    General Information  Assessment completed with: Patient,Care Team Member,-chart review,    Type of CM/SW Visit: Initial Assessment    Primary Care Provider verified and updated as needed: Yes   Readmission within the last 30 days: other (see comments) (Yes for planned surgery.)      Reason for Consult: discharge planning  Advance Care Planning:       General Information Comments:  ([facility] was following at home for home TPN pre op. )    Communication Assessment  Patient's communication style: spoken language (English or Bilingual)    Hearing Difficulty or Deaf: no   Wear Glasses or Blind: no    Cognitive  Cognitive/Neuro/Behavioral: WDL  Level of Consciousness: alert  Arousal Level: opens eyes spontaneously  Orientation: oriented x 4  Mood/Behavior: calm,cooperative  Best Language: 0 - No aphasia  Speech: clear,spontaneous    Living Environment:   People in home: spouse (Jose)     Current living Arrangements: house      Able to return to prior arrangements: yes     Family/Social Support:  Care provided by:    Provides care for:    Marital Status:              Description of Support System: Supportive       Current Resources:   Patient receiving home care services: Yes     Community Resources:    Equipment currently used at home: none  Supplies currently used at home:      Employment/Financial:  Employment Status:  (Employed)        Financial Concerns: No concerns identified      Lifestyle & Psychosocial Needs:(From Chart Flow Sheet)  Social Determinants of Health     Tobacco Use: Low Risk      Smoking Tobacco Use: Never Smoker     Smokeless Tobacco Use: Never Used   Alcohol Use: Not At Risk     Frequency of Alcohol Consumption: Never     Average Number of Drinks: Not on file     Frequency of Binge Drinking: Not on file   Financial Resource Strain: Not on file   Food Insecurity: Not on file   Transportation Needs: Not on file   Physical Activity: Not on file   Stress: Not on  file   Social Connections: Not on file   Intimate Partner Violence: Not on file   Depression: Not at risk     PHQ-2 Score: 0   Housing Stability: Not on file     Functional Status:  Prior to admission patient needed assistance: independent    Mental Health Status:        Chemical Dependency Status: No concerns        Values/Beliefs:  Spiritual, Cultural Beliefs, Religion Practices, Values that affect care: no             Additional Information:  Patient was admitted with home TPN already in place and if needed at discharge, the following plan is in place and can be discontinued or enhanced as needed:    Please fax discharge orders to [facility]     Ph: [phone]     Fax: [phone]     Skilled home care RN for resumption of home care services and to assist with the MD team updated plans of care as designated in discharge orders.     Skilled home care RN to evaluate medication management, nutrition and hydration evaluation, endurance evaluation, and general status evaluation after discharge from the acute care hospital setting.     Skilled home care RN to assist with management and education reinforcement with home TPN via picc line.  TPN labs and picc line cares per home care agency routine.   __________________________________    Inpatient cares continue per MD team plans of care.  The inpatient care management team will continue to follow prn.    Krissy Lisa,  B.S.N., R.N., P.H.N..  Care Coordinator     Pager   [facility] [place]

## 2021-11-19 NOTE — PROGRESS NOTES
Patient is alert and oriented x 4. Soft BP's, all other VSS on room air. Pain managed with scheduled Tylenol, prn Oxycodone, IV Dilaudid, and Robaxin. Clear liquid diet, tolerating. Denies any nausea. R PICC double lumen with cont. TPN, Lipids off period, and TKO in other lumen. Transverse abdominal incision VIJAYA and CDI, abdominal binder in place. Up Ind, walked around unit x 3. Voiding spontaneously, adequate output. No BM, denies passing gas, +BS. Daily weights. Tried oral powder phos replacement but patient reported nausea, requesting switch to IV replacement. K+ and Mg within range, recheck ordered for the am. Patient administered Lovenox injection, tolerated.

## 2021-11-20 LAB
ANION GAP SERPL CALCULATED.3IONS-SCNC: 4 MMOL/L (ref 3–14)
BUN SERPL-MCNC: 8 MG/DL (ref 7–30)
CALCIUM SERPL-MCNC: 8.1 MG/DL (ref 8.5–10.1)
CHLORIDE BLD-SCNC: 103 MMOL/L (ref 94–109)
CO2 SERPL-SCNC: 31 MMOL/L (ref 20–32)
CREAT SERPL-MCNC: 0.6 MG/DL (ref 0.52–1.04)
GFR SERPL CREATININE-BSD FRML MDRD: >90 ML/MIN/1.73M2
GLUCOSE BLD-MCNC: 141 MG/DL (ref 70–99)
GLUCOSE BLDC GLUCOMTR-MCNC: 116 MG/DL (ref 70–99)
GLUCOSE BLDC GLUCOMTR-MCNC: 123 MG/DL (ref 70–99)
GLUCOSE BLDC GLUCOMTR-MCNC: 140 MG/DL (ref 70–99)
MAGNESIUM SERPL-MCNC: 1.8 MG/DL (ref 1.6–2.3)
PHOSPHATE SERPL-MCNC: 2.7 MG/DL (ref 2.5–4.5)
PLATELET # BLD AUTO: 181 10E3/UL (ref 150–450)
POTASSIUM BLD-SCNC: 3.2 MMOL/L (ref 3.4–5.3)
SODIUM SERPL-SCNC: 138 MMOL/L (ref 133–144)

## 2021-11-20 PROCEDURE — 120N000002 HC R&B MED SURG/OB UMMC

## 2021-11-20 PROCEDURE — 250N000013 HC RX MED GY IP 250 OP 250 PS 637

## 2021-11-20 PROCEDURE — 250N000013 HC RX MED GY IP 250 OP 250 PS 637: Performed by: STUDENT IN AN ORGANIZED HEALTH CARE EDUCATION/TRAINING PROGRAM

## 2021-11-20 PROCEDURE — 250N000013 HC RX MED GY IP 250 OP 250 PS 637: Performed by: PHYSICIAN ASSISTANT

## 2021-11-20 PROCEDURE — 80048 BASIC METABOLIC PNL TOTAL CA: CPT | Performed by: COLON & RECTAL SURGERY

## 2021-11-20 PROCEDURE — 250N000013 HC RX MED GY IP 250 OP 250 PS 637: Performed by: COLON & RECTAL SURGERY

## 2021-11-20 PROCEDURE — 258N000003 HC RX IP 258 OP 636: Performed by: PHYSICIAN ASSISTANT

## 2021-11-20 PROCEDURE — 250N000009 HC RX 250: Performed by: COLON & RECTAL SURGERY

## 2021-11-20 PROCEDURE — 36592 COLLECT BLOOD FROM PICC: CPT | Performed by: COLON & RECTAL SURGERY

## 2021-11-20 PROCEDURE — 84100 ASSAY OF PHOSPHORUS: CPT | Performed by: COLON & RECTAL SURGERY

## 2021-11-20 PROCEDURE — 83735 ASSAY OF MAGNESIUM: CPT | Performed by: COLON & RECTAL SURGERY

## 2021-11-20 PROCEDURE — 85049 AUTOMATED PLATELET COUNT: CPT | Performed by: STUDENT IN AN ORGANIZED HEALTH CARE EDUCATION/TRAINING PROGRAM

## 2021-11-20 PROCEDURE — 999N000007 HC SITE CHECK

## 2021-11-20 PROCEDURE — 250N000011 HC RX IP 250 OP 636: Performed by: STUDENT IN AN ORGANIZED HEALTH CARE EDUCATION/TRAINING PROGRAM

## 2021-11-20 RX ORDER — POTASSIUM CHLORIDE 750 MG/1
20 TABLET, EXTENDED RELEASE ORAL ONCE
Status: COMPLETED | OUTPATIENT
Start: 2021-11-20 | End: 2021-11-20

## 2021-11-20 RX ORDER — POTASSIUM CHLORIDE 750 MG/1
20 TABLET, EXTENDED RELEASE ORAL ONCE
Status: DISCONTINUED | OUTPATIENT
Start: 2021-11-20 | End: 2021-11-22

## 2021-11-20 RX ADMIN — ONDANSETRON 4 MG: 4 TABLET, ORALLY DISINTEGRATING ORAL at 12:22

## 2021-11-20 RX ADMIN — HYDROMORPHONE HYDROCHLORIDE 0.4 MG: 0.2 INJECTION, SOLUTION INTRAMUSCULAR; INTRAVENOUS; SUBCUTANEOUS at 18:13

## 2021-11-20 RX ADMIN — ACETAMINOPHEN 975 MG: 325 TABLET, FILM COATED ORAL at 12:16

## 2021-11-20 RX ADMIN — HYDROMORPHONE HYDROCHLORIDE 0.4 MG: 0.2 INJECTION, SOLUTION INTRAMUSCULAR; INTRAVENOUS; SUBCUTANEOUS at 09:49

## 2021-11-20 RX ADMIN — I.V. FAT EMULSION 250 ML: 20 EMULSION INTRAVENOUS at 20:22

## 2021-11-20 RX ADMIN — OXYCODONE HYDROCHLORIDE 15 MG: 5 TABLET ORAL at 15:58

## 2021-11-20 RX ADMIN — ENOXAPARIN SODIUM 40 MG: 40 INJECTION SUBCUTANEOUS at 12:16

## 2021-11-20 RX ADMIN — HYDROMORPHONE HYDROCHLORIDE 0.4 MG: 0.2 INJECTION, SOLUTION INTRAMUSCULAR; INTRAVENOUS; SUBCUTANEOUS at 07:42

## 2021-11-20 RX ADMIN — POTASSIUM CHLORIDE 20 MEQ: 750 TABLET, EXTENDED RELEASE ORAL at 15:58

## 2021-11-20 RX ADMIN — ACETAMINOPHEN 975 MG: 325 TABLET, FILM COATED ORAL at 03:37

## 2021-11-20 RX ADMIN — METHOCARBAMOL 500 MG: 500 TABLET ORAL at 15:58

## 2021-11-20 RX ADMIN — SODIUM CHLORIDE 1000 ML: 9 INJECTION, SOLUTION INTRAVENOUS at 20:47

## 2021-11-20 RX ADMIN — METHOCARBAMOL 500 MG: 500 TABLET ORAL at 20:40

## 2021-11-20 RX ADMIN — HYDROMORPHONE HYDROCHLORIDE 0.4 MG: 0.2 INJECTION, SOLUTION INTRAMUSCULAR; INTRAVENOUS; SUBCUTANEOUS at 22:50

## 2021-11-20 RX ADMIN — OXYCODONE HYDROCHLORIDE 15 MG: 5 TABLET ORAL at 21:38

## 2021-11-20 RX ADMIN — HYDROMORPHONE HYDROCHLORIDE 0.4 MG: 0.2 INJECTION, SOLUTION INTRAMUSCULAR; INTRAVENOUS; SUBCUTANEOUS at 14:25

## 2021-11-20 RX ADMIN — HYDROMORPHONE HYDROCHLORIDE 0.4 MG: 0.2 INJECTION, SOLUTION INTRAMUSCULAR; INTRAVENOUS; SUBCUTANEOUS at 12:15

## 2021-11-20 RX ADMIN — DOXYLAMINE SUCCINATE 50 MG: 25 TABLET ORAL at 21:38

## 2021-11-20 RX ADMIN — Medication: at 20:22

## 2021-11-20 RX ADMIN — OXYCODONE HYDROCHLORIDE 15 MG: 5 TABLET ORAL at 05:59

## 2021-11-20 RX ADMIN — HYDROMORPHONE HYDROCHLORIDE 0.4 MG: 0.2 INJECTION, SOLUTION INTRAMUSCULAR; INTRAVENOUS; SUBCUTANEOUS at 03:30

## 2021-11-20 RX ADMIN — OXYCODONE HYDROCHLORIDE 15 MG: 5 TABLET ORAL at 01:54

## 2021-11-20 RX ADMIN — ACETAMINOPHEN 975 MG: 325 TABLET, FILM COATED ORAL at 20:29

## 2021-11-20 RX ADMIN — HYDROMORPHONE HYDROCHLORIDE 0.4 MG: 0.2 INJECTION, SOLUTION INTRAMUSCULAR; INTRAVENOUS; SUBCUTANEOUS at 20:40

## 2021-11-20 RX ADMIN — METHOCARBAMOL 500 MG: 500 TABLET ORAL at 01:54

## 2021-11-20 RX ADMIN — OXYCODONE HYDROCHLORIDE 15 MG: 5 TABLET ORAL at 10:48

## 2021-11-20 ASSESSMENT — ACTIVITIES OF DAILY LIVING (ADL)
ADLS_ACUITY_SCORE: 3

## 2021-11-20 ASSESSMENT — MIFFLIN-ST. JEOR: SCORE: 1139.91

## 2021-11-20 NOTE — PLAN OF CARE
"/60 (BP Location: Left arm, Cuff Size: Adult Regular)   Pulse 95   Temp 98.4  F (36.9  C) (Oral)   Resp 15   Ht 1.575 m (5' 2\")   Wt 49.9 kg (110 lb)   LMP 11/02/2021 (Approximate)   SpO2 96%   BMI 20.12 kg/m      VSS, A&O x 4, patient is calm, cooperative.  Incision is CDI.  Continuous TPN, NS. Able to ambulate independently: walks to bathroom & halls.  Nausea after walk, given zofran ODT.Showered independently. Abdominal pain is constant, aching.  Giving dilaudid, tylenolol and oxycodone as soon as able to prevent breakthrough pain.      Denise Parker  Student Nurse      "

## 2021-11-20 NOTE — PLAN OF CARE
POD # 2 s/p Exploratory laparotomy, illeal resection, extensive lysis of adhesions. Patient reports good pain control with Oxycodone and IV Dilaudid. Denies nausea, tolerating sips of clears. Reports negative flatus, no BM. Voiding spontaneously in BR. PICC line patent, TPN and Lipids infusing. Phos level 2.0, replacement is infusing. Pt took 4 walks today, encouraged to use the IS, PCDs in place, call light within reach.     Update: Pt would like pain meds every 2 hours when available.

## 2021-11-20 NOTE — PROGRESS NOTES
Colorectal Surgery Progress Note  [facility]  POD#3    Subjective:  Feels bloated this morning, states that she can hear gurgling from her stomach. No nausea or vomiting. Tolerating clears. No flatus or stool    Vitals:  Vitals:    11/19/21 0700 11/19/21 1039 11/19/21 1350 11/19/21 2248   BP: (!) 88/47  103/64 101/56   BP Location: Left arm  Left arm Left arm   Pulse: 90  110 97   Resp: 14  16 16   Temp: 99  F (37.2  C)  100.1  F (37.8  C) 99.2  F (37.3  C)   TempSrc: Oral  Oral Oral   SpO2: 95%  96% 94%   Weight:  49.9 kg (110 lb)     Height:         I/O:  I/O last 3 completed shifts:  In: 2447.91 [P.O.:200; I.V.:679]  Out: 1750 [Urine:1750]    Physical Exam:  Gen: AAOx3, NAD, sitting up in bed  Pulm: Non-labored breathing  Abd: Soft abdomen, distended, hypoactive bowel sounds. Appropriately tender over transverse abdominal incision which is clean dry and intact.   Ext:  Warm and well-perfused    BMP  Recent Labs   Lab 11/20/21  0532 11/19/21  2305 11/19/21  2122 11/19/21  1633 11/19/21  0722 11/18/21 2003 11/18/21  0720 11/17/21  1851 11/17/21  0922 11/15/21  1535 11/14/21  0823 11/14/21  0758   NA  --   --   --   --  138  --  140  --   --  135  --   --    POTASSIUM  --   --   --   --  3.6  --  4.1  --   --  4.0  --  3.8   CHLORIDE  --   --   --   --  107  --  108  --   --  100  --   --    CO2  --   --   --   --  28  --  27  --   --  27  --   --    BUN  --   --   --   --  10  --  12  --   --  17  --   --    CR  --   --   --   --  0.66  --  0.68 0.60  --  0.58  --   --    * 144*  --  107* 119*   < > 109* 152*   < > 96   < >  --    MAG  --   --   --   --  1.8  --  1.8  --   --  2.0  --  1.9   PHOS  --   --  1.6*  --  2.0*  --  4.4  --   --  3.1  --  3.6    < > = values in this interval not displayed.     CBC  Recent Labs   Lab 11/18/21  0720 11/15/21  1535   WBC 11.7* 8.8   HGB 10.2* 13.4   HCT 31.4* 39.6    305     ASSESSMENT: 41 year old female with history of  stricturing Crohn's disease with multiple bowel surgeries who underwent exploratory laparotomy with ileal resection and extensive ANTHONY on 11/18/21. Postoperatively awaiting ROBF.    -Multimodal pain control:   -Scheduled tylenol. PRN dilaudid/oxy  -Clear liquid diet until ROBF  -IVF: TPN+NS to equal 75mL/hr  -Encourage ambulation    Patient was seen and discussed with Dr. Banerjee.    Mervin Lopezer, MS4  7:42 AM November 20, 2021    Agree w/ above and discussed w/ Dr. Taryn Banerjee MD  Fellow in Colon and Rectal Surgery  [facility]    Attestation:  This patient has been seen and evaluated by me, Frieda Centeno.  Discussed with the house staff team or resident(s) and agree with the findings and plan in this note. Await return of bowel function. Otherwise doing well and incisions benign. Moderately distended.  Frieda Centeno MD  Colon and Rectal Surgery Attending  [phone]

## 2021-11-20 NOTE — PLAN OF CARE
PMHx Crohn's disease for 25 years requiring multiple bowel surgeries now s/p exploratory laparotomy, illeal resection, and extensive lysis of adhesions     AVSS.  94% RA.  Pain well managed with PRN oxycodone and Dilaudid IV.  L PICC infusing, TPN/Lipids and TKO.  Up independently to BR, voiding spont.  Still no flatus, pt feels like it is coming soon.  +BS.  Hot packs applied as well.  Abd transverse incision with dermabond, c/d/intact.  Binder ON.  Denies nausea, tolerating clears.  Cont with POC.

## 2021-11-20 NOTE — PROGRESS NOTES
Patient is alert and oriented x 4. Soft BP's, all other VSS on room air. Pain managed with scheduled Tylenol, prn Oxycodone, IV Dilaudid, and Robaxin. Clear liquid, had some intake with breakfast but did not order lunch and states she would prefer to just drink water due to not being a fan of the clear liquid options. One episode of nausea noted, zofran given x 1. R PICC double lumen with cont. TPN, Lipids off period, and TKO in other lumen. Transverse abdominal incision VIJAYA and CDI, abdominal binder in place. Up Ind, walked around unit x 1. Showered today. Voiding spontaneously, adequate output. No BM, denies passing gas, +BS. Daily weights. Patient administered Lovenox injection, tolerated.

## 2021-11-21 LAB
ANION GAP SERPL CALCULATED.3IONS-SCNC: 1 MMOL/L (ref 3–14)
BUN SERPL-MCNC: 11 MG/DL (ref 7–30)
CALCIUM SERPL-MCNC: 7.9 MG/DL (ref 8.5–10.1)
CHLORIDE BLD-SCNC: 107 MMOL/L (ref 94–109)
CO2 SERPL-SCNC: 32 MMOL/L (ref 20–32)
CREAT SERPL-MCNC: 0.54 MG/DL (ref 0.52–1.04)
GFR SERPL CREATININE-BSD FRML MDRD: >90 ML/MIN/1.73M2
GLUCOSE BLD-MCNC: 119 MG/DL (ref 70–99)
GLUCOSE BLDC GLUCOMTR-MCNC: 108 MG/DL (ref 70–99)
GLUCOSE BLDC GLUCOMTR-MCNC: 108 MG/DL (ref 70–99)
GLUCOSE BLDC GLUCOMTR-MCNC: 116 MG/DL (ref 70–99)
HOLD SPECIMEN: NORMAL
MAGNESIUM SERPL-MCNC: 1.9 MG/DL (ref 1.6–2.3)
PHOSPHATE SERPL-MCNC: 3 MG/DL (ref 2.5–4.5)
POTASSIUM BLD-SCNC: 3.6 MMOL/L (ref 3.4–5.3)
POTASSIUM BLD-SCNC: 3.6 MMOL/L (ref 3.4–5.3)
SODIUM SERPL-SCNC: 140 MMOL/L (ref 133–144)

## 2021-11-21 PROCEDURE — 120N000002 HC R&B MED SURG/OB UMMC

## 2021-11-21 PROCEDURE — 999N000044 HC STATISTIC CVC DRESSING CHANGE

## 2021-11-21 PROCEDURE — 250N000013 HC RX MED GY IP 250 OP 250 PS 637

## 2021-11-21 PROCEDURE — 250N000011 HC RX IP 250 OP 636: Performed by: STUDENT IN AN ORGANIZED HEALTH CARE EDUCATION/TRAINING PROGRAM

## 2021-11-21 PROCEDURE — 83735 ASSAY OF MAGNESIUM: CPT | Performed by: COLON & RECTAL SURGERY

## 2021-11-21 PROCEDURE — 84132 ASSAY OF SERUM POTASSIUM: CPT | Performed by: COLON & RECTAL SURGERY

## 2021-11-21 PROCEDURE — 84100 ASSAY OF PHOSPHORUS: CPT | Performed by: COLON & RECTAL SURGERY

## 2021-11-21 PROCEDURE — 80048 BASIC METABOLIC PNL TOTAL CA: CPT | Performed by: COLON & RECTAL SURGERY

## 2021-11-21 PROCEDURE — 36592 COLLECT BLOOD FROM PICC: CPT | Performed by: COLON & RECTAL SURGERY

## 2021-11-21 PROCEDURE — 250N000013 HC RX MED GY IP 250 OP 250 PS 637: Performed by: PHYSICIAN ASSISTANT

## 2021-11-21 PROCEDURE — 250N000009 HC RX 250: Performed by: COLON & RECTAL SURGERY

## 2021-11-21 PROCEDURE — 250N000013 HC RX MED GY IP 250 OP 250 PS 637: Performed by: STUDENT IN AN ORGANIZED HEALTH CARE EDUCATION/TRAINING PROGRAM

## 2021-11-21 RX ADMIN — ACETAMINOPHEN 975 MG: 325 TABLET, FILM COATED ORAL at 03:09

## 2021-11-21 RX ADMIN — HYDROMORPHONE HYDROCHLORIDE 0.4 MG: 0.2 INJECTION, SOLUTION INTRAMUSCULAR; INTRAVENOUS; SUBCUTANEOUS at 16:13

## 2021-11-21 RX ADMIN — DOXYLAMINE SUCCINATE 50 MG: 25 TABLET ORAL at 21:58

## 2021-11-21 RX ADMIN — METHOCARBAMOL 500 MG: 500 TABLET ORAL at 20:46

## 2021-11-21 RX ADMIN — HYDROMORPHONE HYDROCHLORIDE 0.4 MG: 0.2 INJECTION, SOLUTION INTRAMUSCULAR; INTRAVENOUS; SUBCUTANEOUS at 09:15

## 2021-11-21 RX ADMIN — OXYCODONE HYDROCHLORIDE 15 MG: 5 TABLET ORAL at 23:34

## 2021-11-21 RX ADMIN — HYDROMORPHONE HYDROCHLORIDE 0.4 MG: 0.2 INJECTION, SOLUTION INTRAMUSCULAR; INTRAVENOUS; SUBCUTANEOUS at 22:58

## 2021-11-21 RX ADMIN — METHOCARBAMOL 500 MG: 500 TABLET ORAL at 09:58

## 2021-11-21 RX ADMIN — OXYCODONE HYDROCHLORIDE 15 MG: 5 TABLET ORAL at 14:43

## 2021-11-21 RX ADMIN — OXYCODONE HYDROCHLORIDE 15 MG: 5 TABLET ORAL at 09:58

## 2021-11-21 RX ADMIN — ENOXAPARIN SODIUM 40 MG: 40 INJECTION SUBCUTANEOUS at 12:39

## 2021-11-21 RX ADMIN — HYDROMORPHONE HYDROCHLORIDE 0.4 MG: 0.2 INJECTION, SOLUTION INTRAMUSCULAR; INTRAVENOUS; SUBCUTANEOUS at 20:46

## 2021-11-21 RX ADMIN — METHOCARBAMOL 500 MG: 500 TABLET ORAL at 23:34

## 2021-11-21 RX ADMIN — ACETAMINOPHEN 975 MG: 325 TABLET, FILM COATED ORAL at 12:39

## 2021-11-21 RX ADMIN — Medication: at 20:11

## 2021-11-21 RX ADMIN — HYDROMORPHONE HYDROCHLORIDE 0.4 MG: 0.2 INJECTION, SOLUTION INTRAMUSCULAR; INTRAVENOUS; SUBCUTANEOUS at 13:22

## 2021-11-21 RX ADMIN — HYDROMORPHONE HYDROCHLORIDE 0.4 MG: 0.2 INJECTION, SOLUTION INTRAMUSCULAR; INTRAVENOUS; SUBCUTANEOUS at 18:27

## 2021-11-21 RX ADMIN — OXYCODONE HYDROCHLORIDE 15 MG: 5 TABLET ORAL at 19:20

## 2021-11-21 RX ADMIN — HYDROMORPHONE HYDROCHLORIDE 0.4 MG: 0.2 INJECTION, SOLUTION INTRAMUSCULAR; INTRAVENOUS; SUBCUTANEOUS at 04:24

## 2021-11-21 RX ADMIN — METHOCARBAMOL 500 MG: 500 TABLET ORAL at 14:51

## 2021-11-21 RX ADMIN — HYDROMORPHONE HYDROCHLORIDE 0.4 MG: 0.2 INJECTION, SOLUTION INTRAMUSCULAR; INTRAVENOUS; SUBCUTANEOUS at 01:11

## 2021-11-21 RX ADMIN — I.V. FAT EMULSION 250 ML: 20 EMULSION INTRAVENOUS at 20:12

## 2021-11-21 RX ADMIN — HYDROMORPHONE HYDROCHLORIDE 0.4 MG: 0.2 INJECTION, SOLUTION INTRAMUSCULAR; INTRAVENOUS; SUBCUTANEOUS at 11:21

## 2021-11-21 RX ADMIN — ACETAMINOPHEN 975 MG: 325 TABLET, FILM COATED ORAL at 20:12

## 2021-11-21 RX ADMIN — OXYCODONE HYDROCHLORIDE 15 MG: 5 TABLET ORAL at 03:09

## 2021-11-21 ASSESSMENT — ACTIVITIES OF DAILY LIVING (ADL)
ADLS_ACUITY_SCORE: 3

## 2021-11-21 NOTE — PROGRESS NOTES
Patient is alert and oriented x 4. Soft BP's, all other VSS on room air. Pain managed with scheduled Tylenol, prn Oxycodone, IV Dilaudid, and Robaxin. Clear liquid, had some intake and has predominantly been drinking tea. Denies any nausea. R PICC double lumen with cont. TPN, Lipids off period, and TKO in other lumen. Transverse abdominal incision VIJAYA and CDI, abdominal binder in place. Up Ind, walked around unit x 2.  Voiding spontaneously, adequate output. No BM, denies passing gas, +BS. Daily weights.

## 2021-11-21 NOTE — PLAN OF CARE
Reason for Admission: ex lap with ileal resection and lysis of adhesions  History: Crohn's   Procedures: POD #3  IV Access: PICC with TPN and lipids  Incisions/Drains: transverse abdomen incision  Temp: 98.2  F (36.8  C) Temp src: Oral BP: 95/49 Pulse: 78   Resp: 16 SpO2: 94 % O2 Device: None (Room air)    Labs: K+/Mg/Phos replacement, BG Q 6hrs  Diet: TPN, clears  Activity: independent  Pain Management: PRN dilaudid, oxycodone. Scheduled Tylenol   GI/: voiding spontaneously, -gas, -BM on shift  Og: A&O x4  Team: colorectal    Shift Summary:  Assumed cares [id number]. Pt had incisional pain throughout shift. PRN medications, abdomen binder used with some relief. TPN, lipids, and NA running per orders.  Dacia Abbott RN on 11/21/2021 at 5:54 AM

## 2021-11-21 NOTE — PLAN OF CARE
- VSS soft BP in room air  - alert and oriented x 4  - pain managed by PRN IV dilaudid, PO Oxycodone, Robaxin and scheduled tylenol  - stated didn't feel hungry and drank tea that her friend brought and was good drinking water  - R PICC, continuous TPN and lipids infusing 1 lumen and TKO on the other lumen  - denies nausea, no vomiting  - ambulated in the mccarthy with SBA  - voiding with adequate output, no BM and denied flatus  - Continue POC

## 2021-11-21 NOTE — PROGRESS NOTES
Colorectal Surgery Progress Note  [facility]  POD#4    Subjective:  BRYCE, States that she is feeling hungry. Ambulating without issues. Continues to have 7/10 pain.    Vitals:  Vitals:    11/20/21 1500 11/20/21 2315 11/21/21 0029 11/21/21 1001   BP: 104/65 90/46 95/49 99/64   BP Location: Left arm Left arm Left arm Left arm   Cuff Size:    Adult Regular   Pulse: 95 78  79   Resp: 14 16  15   Temp: 99  F (37.2  C) 98.2  F (36.8  C)  99.2  F (37.3  C)   TempSrc: Oral Oral  Oral   SpO2: 97% 94%  100%   Weight:       Height:         I/O:  I/O last 3 completed shifts:  In: 2981.65 [P.O.:920; I.V.:499.67]  Out: 1050 [Urine:1050]    Physical Exam:  Gen: AAOx3, NAD, sitting up in bed  Pulm: Non-labored breathing  Abd: Soft abdomen, distended, improved bowel sounds. Appropriately tender over transverse abdominal incision which is clean dry and intact.   Ext:  Warm and well-perfused    BMP  Recent Labs   Lab 11/21/21  1004 11/21/21  0936 11/21/21  0323 11/21/21  0001 11/20/21  1923 11/20/21  1224 11/20/21  0720 11/19/21  2305 11/19/21  2122 11/19/21  1633 11/19/21  0722 11/18/21 2003 11/18/21  0720   NA  --  140  --   --   --   --  138  --   --   --  138  --  140   POTASSIUM  --  3.6  --  3.6  --   --  3.2*  --   --   --  3.6  --  4.1   CHLORIDE  --  107  --   --   --   --  103  --   --   --  107  --  108   CO2  --  32  --   --   --   --  31  --   --   --  28  --  27   BUN  --  11  --   --   --   --  8  --   --   --  10  --  12   CR  --  0.54  --   --   --   --  0.60  --   --   --  0.66  --  0.68   * 119* 116*  --  116*   < > 141*   < >  --    < > 119*   < > 109*   MAG  --  1.9  --   --   --   --  1.8  --   --   --  1.8  --  1.8   PHOS  --  3.0  --   --   --   --  2.7  --  1.6*  --  2.0*  --  4.4    < > = values in this interval not displayed.     CBC  Recent Labs   Lab 11/20/21  0720 11/18/21  0720 11/15/21  1535   WBC  --  11.7* 8.8   HGB  --  10.2* 13.4   HCT  --  31.4* 39.6     182 305     ASSESSMENT: 41 year old female with history of stricturing Crohn's disease with multiple bowel surgeries who underwent exploratory laparotomy with ileal resection and extensive ANTHONY on 11/18/21. Postoperatively awaiting ROBF.    -Multimodal pain control:   -Scheduled tylenol. PRN dilaudid/oxy  -Clear liquid diet until ROBF  -IVF: TPN+NS to equal 75mL/hr  -Encourage ambulation    Patient was seen and discussed with Dr. Banerjee and discussed with Dr. Taryn Ellis MD  Surgery PGY1

## 2021-11-21 NOTE — PLAN OF CARE
"BP 99/64 (BP Location: Left arm, Cuff Size: Adult Regular)   Pulse 79   Temp 99.2  F (37.3  C) (Oral)   Resp 15   Ht 1.575 m (5' 2\")   Wt 51.2 kg (112 lb 12.8 oz)   LMP 11/02/2021 (Approximate)   SpO2 100%   BMI 20.63 kg/m      VSS, A&O x 4, patient is calm, cooperative.  Incision is CDI. Continuous TPN, NS. TPN/Lipids leaked over night, new tubing, bedding provided. Able to ambulate independently: walks to bathroom & halls.  Abdominal pain is constant, aching. Giving Dilaudid, tylenol, oxycodone and robaxin for pain.     Denise Parker  Student Nurse  "

## 2021-11-22 LAB
ALBUMIN SERPL-MCNC: 1.8 G/DL (ref 3.4–5)
ALP SERPL-CCNC: 56 U/L (ref 40–150)
ALT SERPL W P-5'-P-CCNC: 13 U/L (ref 0–50)
ANION GAP SERPL CALCULATED.3IONS-SCNC: 3 MMOL/L (ref 3–14)
AST SERPL W P-5'-P-CCNC: 7 U/L (ref 0–45)
BILIRUB SERPL-MCNC: 0.4 MG/DL (ref 0.2–1.3)
BUN SERPL-MCNC: 9 MG/DL (ref 7–30)
CALCIUM SERPL-MCNC: 8.2 MG/DL (ref 8.5–10.1)
CHLORIDE BLD-SCNC: 107 MMOL/L (ref 94–109)
CO2 SERPL-SCNC: 30 MMOL/L (ref 20–32)
CREAT SERPL-MCNC: 0.5 MG/DL (ref 0.52–1.04)
GFR SERPL CREATININE-BSD FRML MDRD: >90 ML/MIN/1.73M2
GLUCOSE BLD-MCNC: 116 MG/DL (ref 70–99)
GLUCOSE BLDC GLUCOMTR-MCNC: 85 MG/DL (ref 70–99)
HOLD SPECIMEN: NORMAL
INR PPP: 1.02 (ref 0.85–1.15)
MAGNESIUM SERPL-MCNC: 1.9 MG/DL (ref 1.6–2.3)
PHOSPHATE SERPL-MCNC: 4.1 MG/DL (ref 2.5–4.5)
POTASSIUM BLD-SCNC: 3.6 MMOL/L (ref 3.4–5.3)
PROT SERPL-MCNC: 5.3 G/DL (ref 6.8–8.8)
SODIUM SERPL-SCNC: 140 MMOL/L (ref 133–144)

## 2021-11-22 PROCEDURE — 84100 ASSAY OF PHOSPHORUS: CPT | Performed by: COLON & RECTAL SURGERY

## 2021-11-22 PROCEDURE — 36592 COLLECT BLOOD FROM PICC: CPT | Performed by: COLON & RECTAL SURGERY

## 2021-11-22 PROCEDURE — 250N000013 HC RX MED GY IP 250 OP 250 PS 637

## 2021-11-22 PROCEDURE — 250N000009 HC RX 250: Performed by: COLON & RECTAL SURGERY

## 2021-11-22 PROCEDURE — 84134 ASSAY OF PREALBUMIN: CPT | Performed by: COLON & RECTAL SURGERY

## 2021-11-22 PROCEDURE — 250N000013 HC RX MED GY IP 250 OP 250 PS 637: Performed by: STUDENT IN AN ORGANIZED HEALTH CARE EDUCATION/TRAINING PROGRAM

## 2021-11-22 PROCEDURE — 83735 ASSAY OF MAGNESIUM: CPT | Performed by: COLON & RECTAL SURGERY

## 2021-11-22 PROCEDURE — 82040 ASSAY OF SERUM ALBUMIN: CPT | Performed by: COLON & RECTAL SURGERY

## 2021-11-22 PROCEDURE — 258N000003 HC RX IP 258 OP 636: Performed by: PHYSICIAN ASSISTANT

## 2021-11-22 PROCEDURE — 250N000011 HC RX IP 250 OP 636: Performed by: STUDENT IN AN ORGANIZED HEALTH CARE EDUCATION/TRAINING PROGRAM

## 2021-11-22 PROCEDURE — 250N000013 HC RX MED GY IP 250 OP 250 PS 637: Performed by: PHYSICIAN ASSISTANT

## 2021-11-22 PROCEDURE — 85610 PROTHROMBIN TIME: CPT | Performed by: COLON & RECTAL SURGERY

## 2021-11-22 PROCEDURE — 120N000002 HC R&B MED SURG/OB UMMC

## 2021-11-22 RX ADMIN — HYDROMORPHONE HYDROCHLORIDE 0.4 MG: 0.2 INJECTION, SOLUTION INTRAMUSCULAR; INTRAVENOUS; SUBCUTANEOUS at 10:27

## 2021-11-22 RX ADMIN — HYDROMORPHONE HYDROCHLORIDE 0.4 MG: 0.2 INJECTION, SOLUTION INTRAMUSCULAR; INTRAVENOUS; SUBCUTANEOUS at 21:57

## 2021-11-22 RX ADMIN — SODIUM CHLORIDE 1000 ML: 9 INJECTION, SOLUTION INTRAVENOUS at 22:46

## 2021-11-22 RX ADMIN — I.V. FAT EMULSION 250 ML: 20 EMULSION INTRAVENOUS at 20:38

## 2021-11-22 RX ADMIN — Medication: at 20:37

## 2021-11-22 RX ADMIN — ENOXAPARIN SODIUM 40 MG: 40 INJECTION SUBCUTANEOUS at 12:35

## 2021-11-22 RX ADMIN — ACETAMINOPHEN 975 MG: 325 TABLET, FILM COATED ORAL at 03:15

## 2021-11-22 RX ADMIN — OXYCODONE HYDROCHLORIDE 15 MG: 5 TABLET ORAL at 08:49

## 2021-11-22 RX ADMIN — HYDROMORPHONE HYDROCHLORIDE 0.4 MG: 0.2 INJECTION, SOLUTION INTRAMUSCULAR; INTRAVENOUS; SUBCUTANEOUS at 03:15

## 2021-11-22 RX ADMIN — HYDROMORPHONE HYDROCHLORIDE 0.4 MG: 0.2 INJECTION, SOLUTION INTRAMUSCULAR; INTRAVENOUS; SUBCUTANEOUS at 14:55

## 2021-11-22 RX ADMIN — HYDROMORPHONE HYDROCHLORIDE 0.4 MG: 0.2 INJECTION, SOLUTION INTRAMUSCULAR; INTRAVENOUS; SUBCUTANEOUS at 07:58

## 2021-11-22 RX ADMIN — ACETAMINOPHEN 975 MG: 325 TABLET, FILM COATED ORAL at 20:49

## 2021-11-22 RX ADMIN — HYDROMORPHONE HYDROCHLORIDE 0.4 MG: 0.2 INJECTION, SOLUTION INTRAMUSCULAR; INTRAVENOUS; SUBCUTANEOUS at 19:44

## 2021-11-22 RX ADMIN — METHOCARBAMOL 500 MG: 500 TABLET ORAL at 13:21

## 2021-11-22 RX ADMIN — OXYCODONE HYDROCHLORIDE 15 MG: 5 TABLET ORAL at 17:48

## 2021-11-22 RX ADMIN — HYDROMORPHONE HYDROCHLORIDE 0.4 MG: 0.2 INJECTION, SOLUTION INTRAMUSCULAR; INTRAVENOUS; SUBCUTANEOUS at 01:01

## 2021-11-22 RX ADMIN — HYDROMORPHONE HYDROCHLORIDE 0.4 MG: 0.2 INJECTION, SOLUTION INTRAMUSCULAR; INTRAVENOUS; SUBCUTANEOUS at 05:31

## 2021-11-22 RX ADMIN — OXYCODONE HYDROCHLORIDE 15 MG: 5 TABLET ORAL at 03:43

## 2021-11-22 RX ADMIN — ACETAMINOPHEN 975 MG: 325 TABLET, FILM COATED ORAL at 12:35

## 2021-11-22 RX ADMIN — DOXYLAMINE SUCCINATE 50 MG: 25 TABLET ORAL at 21:57

## 2021-11-22 RX ADMIN — METHOCARBAMOL 500 MG: 500 TABLET ORAL at 17:48

## 2021-11-22 RX ADMIN — OXYCODONE HYDROCHLORIDE 15 MG: 5 TABLET ORAL at 13:20

## 2021-11-22 RX ADMIN — HYDROMORPHONE HYDROCHLORIDE 0.4 MG: 0.2 INJECTION, SOLUTION INTRAMUSCULAR; INTRAVENOUS; SUBCUTANEOUS at 17:12

## 2021-11-22 RX ADMIN — HYDROMORPHONE HYDROCHLORIDE 0.4 MG: 0.2 INJECTION, SOLUTION INTRAMUSCULAR; INTRAVENOUS; SUBCUTANEOUS at 12:36

## 2021-11-22 RX ADMIN — OXYCODONE HYDROCHLORIDE 15 MG: 5 TABLET ORAL at 22:49

## 2021-11-22 ASSESSMENT — ACTIVITIES OF DAILY LIVING (ADL)
ADLS_ACUITY_SCORE: 3
ADLS_ACUITY_SCORE: 5
ADLS_ACUITY_SCORE: 3
ADLS_ACUITY_SCORE: 5
ADLS_ACUITY_SCORE: 3
ADLS_ACUITY_SCORE: 5
ADLS_ACUITY_SCORE: 3
ADLS_ACUITY_SCORE: 3
ADLS_ACUITY_SCORE: 5
ADLS_ACUITY_SCORE: 3

## 2021-11-22 ASSESSMENT — MIFFLIN-ST. JEOR: SCORE: 1153.25

## 2021-11-22 NOTE — PLAN OF CARE
POD 5. AVSS. Pain managed with IV Dilaudid, Oxycodone and Tylenol. Denies nausea, on a clear liquid diet, awaiting ROBF. No gas/BM this shift. Adequate UOP. UAL. PICC with continuous TPN and TKO. Abdominal incision VIJAYA, binder in place.   Continue POC

## 2021-11-22 NOTE — PLAN OF CARE
- VSS on room air  - alert and oriented x4   - pain rates from 6-7s   - pain managed by PRN IV dilaudid and PO oxycodone, scheduled tylenol and robaxin  - R PICC double lumen with new bag of continuous TPN and lipid, TKO on the other lumen  - denies nausea and no vomiting  - tolerating clear liquid diet well,drinking tea  - ambulated the hallway 2x up and independent   - Voiding spontaneously, with adequate output but still no BM and not passing any gas but audible bowel sounds  - Continue POC

## 2021-11-22 NOTE — PROGRESS NOTES
"SPIRITUAL HEALTH SERVICES  [facility] ([place]) 7C  REFERRAL SOURCE: Length of Stay     Pt was sitting in bed with her phone. Introduced spiritual health services. Pt stated she was fine. She states that she is coping with the length of stay because \"I've done it so many times, I know what to expect.\" Pt is Anglican and stated \"I might like communion when I'm able to eat and drink again. Instructed pt how to request spiritual health visit for  visit.     PLAN:  remains available per pt/family/staff request.     Rev. Haydee Jay MDiv, [facility]  Staff    Pager [phone]  * [facility] remains available 24/7 for emergent requests/referrals, either by having the switchboard page the on-call  or by entering an ASAP/STAT consult in Epic (this will also page the on-call ).*   "

## 2021-11-22 NOTE — PROGRESS NOTES
Colorectal Surgery Progress Note  [facility]  POD#5    Subjective:  Doing ok.  No gas/stool from below.  Feels a little bloated.  Feels like things are starting to move as thought she was going to pass something.  Starting to feel hungry.  No nausea, burping/hiccuping.  Tolerating clears and feels that urine is lightening up in color.  Walking a lot.     Vitals:  Vitals:    11/21/21 1001 11/21/21 1600 11/21/21 2338 11/22/21 0734   BP: 99/64 111/72  101/59   BP Location: Left arm Left arm     Cuff Size: Adult Regular      Pulse: 79 89 78 82   Resp: 15 16 16 16   Temp: 99.2  F (37.3  C) 98.9  F (37.2  C) 97.9  F (36.6  C) 98.5  F (36.9  C)   TempSrc: Oral Oral Temporal Oral   SpO2: 100% 97% 96% 97%   Weight:       Height:         I/O:  I/O last 3 completed shifts:  In: 3405.44 [P.O.:1300; I.V.:540]  Out: 2000 [Urine:2000]    Physical Exam:  Gen: AAOx3, NAD, sitting up in bed  Pulm: Non-labored breathing  Abd: Soft abdomen, distended.  Appropriately tender over transverse abdominal incision which is clean dry and intact.   Ext:  Warm and well-perfused    BMP  Recent Labs   Lab 11/22/21  0709 11/21/21  1905 11/21/21  1004 11/21/21  0936 11/21/21  0323 11/21/21  0001 11/20/21  1224 11/20/21  0720 11/19/21  2305 11/19/21  2122 11/19/21  1633 11/19/21  0722     --   --  140  --   --   --  138  --   --   --  138   POTASSIUM 3.6  --   --  3.6  --  3.6  --  3.2*  --   --   --  3.6   CHLORIDE 107  --   --  107  --   --   --  103  --   --   --  107   CO2 30  --   --  32  --   --   --  31  --   --   --  28   BUN 9  --   --  11  --   --   --  8  --   --   --  10   CR 0.50*  --   --  0.54  --   --   --  0.60  --   --   --  0.66   * 108* 108* 119*   < >  --    < > 141*   < >  --    < > 119*   MAG 1.9  --   --  1.9  --   --   --  1.8  --   --   --  1.8   PHOS 4.1  --   --  3.0  --   --   --  2.7  --  1.6*  --  2.0*    < > = values in this interval not displayed.     CBC  Recent Labs   Lab  11/20/21  0720 11/18/21  0720 11/15/21  1535   WBC  --  11.7* 8.8   HGB  --  10.2* 13.4   HCT  --  31.4* 39.6    182 305     ASSESSMENT: 41 year old female with history of stricturing Crohn's disease with multiple bowel surgeries who underwent exploratory laparotomy with ileal resection and extensive ANTHONY on 11/18/21. Postoperatively awaiting ROBF.    -Multimodal pain control: Scheduled tylenol. PRN dilaudid/oxy  -Clear liquid diet until ROBF  -IVF: TPN+NS to equal 75mL/hr  -Encourage ambulation    Patient was seen and discussed with Dr. Barbara Person, PA-C  Colon and Rectal Surgery     Attestation:  This patient has been seen and evaluated by me.  Discussed with the house staff team or resident(s) and agree with the findings and plan in this note.  She is comfortable with relatively minor incisional pain.  Taking clears without a problem but nothing pr yet.  Abd is distended but not tense, soft, NT.  Wound is clear.  Awaiting ROBF but otherwise stable.  Pete Cartagena MD  Professor of Surgery  Chief, Division of Colon and Rectal Surgery  [phone]

## 2021-11-22 NOTE — PLAN OF CARE
"Reason for Admission: ex lap with ileal resection and lysis of adhesions  History: Crohn's   Procedures: POD #4  IV Access: PICC with TPN and lipids  Incisions/Drains: transverse abdomen incision  /72 (BP Location: Left arm)   Pulse 78   Temp 97.9  F (36.6  C) (Temporal)   Resp 16   Ht 1.575 m (5' 2\")   Wt 51.2 kg (112 lb 12.8 oz)   LMP 11/02/2021 (Approximate)   SpO2 96%   BMI 20.63 kg/m    Labs: K+/Mg/Phos replacement  Diet: TPN, clears  Activity: independent  Pain Management: PRN dilaudid, oxycodone. Scheduled Tylenol   GI/: voiding spontaneously, -gas, -BM, faint bowel sounds   Og: A&O x4  Team: colorectal     Shift Summary:  Assumed cares [id number].  "

## 2021-11-23 LAB
GLUCOSE BLDC GLUCOMTR-MCNC: 107 MG/DL (ref 70–99)
GLUCOSE BLDC GLUCOMTR-MCNC: 110 MG/DL (ref 70–99)
GLUCOSE BLDC GLUCOMTR-MCNC: 127 MG/DL (ref 70–99)
MAGNESIUM SERPL-MCNC: 2.1 MG/DL (ref 1.6–2.3)
PHOSPHATE SERPL-MCNC: 4.2 MG/DL (ref 2.5–4.5)
PLATELET # BLD AUTO: 256 10E3/UL (ref 150–450)
POTASSIUM BLD-SCNC: 4.1 MMOL/L (ref 3.4–5.3)

## 2021-11-23 PROCEDURE — 250N000013 HC RX MED GY IP 250 OP 250 PS 637: Performed by: STUDENT IN AN ORGANIZED HEALTH CARE EDUCATION/TRAINING PROGRAM

## 2021-11-23 PROCEDURE — 250N000011 HC RX IP 250 OP 636: Performed by: STUDENT IN AN ORGANIZED HEALTH CARE EDUCATION/TRAINING PROGRAM

## 2021-11-23 PROCEDURE — 84100 ASSAY OF PHOSPHORUS: CPT | Performed by: COLON & RECTAL SURGERY

## 2021-11-23 PROCEDURE — 36592 COLLECT BLOOD FROM PICC: CPT | Performed by: COLON & RECTAL SURGERY

## 2021-11-23 PROCEDURE — 250N000013 HC RX MED GY IP 250 OP 250 PS 637: Performed by: PHYSICIAN ASSISTANT

## 2021-11-23 PROCEDURE — 85049 AUTOMATED PLATELET COUNT: CPT | Performed by: STUDENT IN AN ORGANIZED HEALTH CARE EDUCATION/TRAINING PROGRAM

## 2021-11-23 PROCEDURE — 83735 ASSAY OF MAGNESIUM: CPT | Performed by: COLON & RECTAL SURGERY

## 2021-11-23 PROCEDURE — 120N000002 HC R&B MED SURG/OB UMMC

## 2021-11-23 PROCEDURE — 84132 ASSAY OF SERUM POTASSIUM: CPT | Performed by: COLON & RECTAL SURGERY

## 2021-11-23 PROCEDURE — 250N000013 HC RX MED GY IP 250 OP 250 PS 637

## 2021-11-23 PROCEDURE — 250N000009 HC RX 250: Performed by: COLON & RECTAL SURGERY

## 2021-11-23 RX ADMIN — ENOXAPARIN SODIUM 40 MG: 40 INJECTION SUBCUTANEOUS at 13:04

## 2021-11-23 RX ADMIN — DOXYLAMINE SUCCINATE 50 MG: 25 TABLET ORAL at 22:15

## 2021-11-23 RX ADMIN — HYDROMORPHONE HYDROCHLORIDE 0.4 MG: 0.2 INJECTION, SOLUTION INTRAMUSCULAR; INTRAVENOUS; SUBCUTANEOUS at 08:19

## 2021-11-23 RX ADMIN — HYDROMORPHONE HYDROCHLORIDE 0.4 MG: 0.2 INJECTION, SOLUTION INTRAMUSCULAR; INTRAVENOUS; SUBCUTANEOUS at 05:13

## 2021-11-23 RX ADMIN — OXYCODONE HYDROCHLORIDE 15 MG: 5 TABLET ORAL at 20:59

## 2021-11-23 RX ADMIN — METHOCARBAMOL 500 MG: 500 TABLET ORAL at 15:41

## 2021-11-23 RX ADMIN — HYDROMORPHONE HYDROCHLORIDE 0.4 MG: 0.2 INJECTION, SOLUTION INTRAMUSCULAR; INTRAVENOUS; SUBCUTANEOUS at 11:53

## 2021-11-23 RX ADMIN — METHOCARBAMOL 500 MG: 500 TABLET ORAL at 02:58

## 2021-11-23 RX ADMIN — HYDROMORPHONE HYDROCHLORIDE 0.4 MG: 0.2 INJECTION, SOLUTION INTRAMUSCULAR; INTRAVENOUS; SUBCUTANEOUS at 19:08

## 2021-11-23 RX ADMIN — OXYCODONE HYDROCHLORIDE 15 MG: 5 TABLET ORAL at 16:56

## 2021-11-23 RX ADMIN — METHOCARBAMOL 500 MG: 500 TABLET ORAL at 22:22

## 2021-11-23 RX ADMIN — OXYCODONE HYDROCHLORIDE 15 MG: 5 TABLET ORAL at 03:59

## 2021-11-23 RX ADMIN — ACETAMINOPHEN 975 MG: 325 TABLET, FILM COATED ORAL at 20:33

## 2021-11-23 RX ADMIN — HYDROMORPHONE HYDROCHLORIDE 0.4 MG: 0.2 INJECTION, SOLUTION INTRAMUSCULAR; INTRAVENOUS; SUBCUTANEOUS at 00:34

## 2021-11-23 RX ADMIN — OXYCODONE HYDROCHLORIDE 15 MG: 5 TABLET ORAL at 12:59

## 2021-11-23 RX ADMIN — I.V. FAT EMULSION 250 ML: 20 EMULSION INTRAVENOUS at 20:34

## 2021-11-23 RX ADMIN — HYDROMORPHONE HYDROCHLORIDE 0.4 MG: 0.2 INJECTION, SOLUTION INTRAMUSCULAR; INTRAVENOUS; SUBCUTANEOUS at 02:51

## 2021-11-23 RX ADMIN — ACETAMINOPHEN 975 MG: 325 TABLET, FILM COATED ORAL at 03:58

## 2021-11-23 RX ADMIN — HYDROMORPHONE HYDROCHLORIDE 0.4 MG: 0.2 INJECTION, SOLUTION INTRAMUSCULAR; INTRAVENOUS; SUBCUTANEOUS at 22:14

## 2021-11-23 RX ADMIN — Medication: at 20:34

## 2021-11-23 RX ADMIN — OXYCODONE HYDROCHLORIDE 15 MG: 5 TABLET ORAL at 09:01

## 2021-11-23 RX ADMIN — ACETAMINOPHEN 975 MG: 325 TABLET, FILM COATED ORAL at 12:59

## 2021-11-23 RX ADMIN — HYDROMORPHONE HYDROCHLORIDE 0.4 MG: 0.2 INJECTION, SOLUTION INTRAMUSCULAR; INTRAVENOUS; SUBCUTANEOUS at 15:40

## 2021-11-23 ASSESSMENT — ACTIVITIES OF DAILY LIVING (ADL)
ADLS_ACUITY_SCORE: 3

## 2021-11-23 NOTE — PROGRESS NOTES
This is a recent snapshot of the patient's [facility] medical record.  For current drug dose and complete information and questions, call [phone] or In Basket pool, [facility] ([id number])  CSN Number:  [id number]

## 2021-11-23 NOTE — ACP (ADVANCE CARE PLANNING)
As per MD team rounds, patient not ready for discharge until Thursday. Clear liquid diet and IV dilaudid for pain management. Likely will continue TPN at home. Waiting on ROBF.   Please fax discharge orders to [facility] on day of discharge.    Ph: [phone]     Fax: [phone]    Inpatient cares continue per MD team plans of care.  The inpatient care management team will continue to follow prn.    Noble Garcia RN Care Coordinator, MSN  Phone: [phone]

## 2021-11-23 NOTE — PLAN OF CARE
- VSS on RA  - alert and oriented x 4  - pain managed by PRN IV dilaudid, PO oxycodone, Robaxin and scheduled tylenol  - PICC with TPN and lipids infusing and TKO  - BG was 85 felt a bit dizzy had some apple juice  - tolerating clear liquid diet   - ambulated 2x this shift  - adequate urine output  - no BM/ gas this shift  - Abdominal incision open to air, binder in place   - Continue POC

## 2021-11-23 NOTE — PROGRESS NOTES
Patient is alert and oriented x 4. VSS on room air. Pain managed with scheduled Tylenol, prn Oxycodone, IV Dilaudid, and Robaxin. Clear liquid diet, tolerating. Denies any nausea. R PICC double lumen with TPN/Lipids, and TKO in other lumen. Transverse abdominal incision VIJAYA and CDI, abdominal binder in place. Up Ind.  Voiding spontaneously, adequate output. No BM, denies passing gas, +BS.

## 2021-11-23 NOTE — PROGRESS NOTES
Colorectal Surgery Progress Note  [facility]  POD#6    Subjective:  No acute changes.  Continues to be bloated.  But minimal pain.  No gas/stool.  Continue to tolerate CLD.     Vitals:  Vitals:    11/22/21 0954 11/22/21 1514 11/22/21 2100 11/23/21 0711   BP:  115/73 115/64 104/55   BP Location:  Left arm Left arm Left arm   Pulse:  98 97 77   Resp:  17 16 16   Temp:  98  F (36.7  C) 99  F (37.2  C) 98  F (36.7  C)   TempSrc:  Temporal Oral Temporal   SpO2:  95% 96% 94%   Weight: 52.5 kg (115 lb 11.9 oz)      Height:         I/O:  I/O last 3 completed shifts:  In: 2462.11 [P.O.:450; I.V.:500]  Out: 2900 [Urine:2900]    Physical Exam:  Gen: AAOx3, NAD, sitting up in bed  Pulm: Non-labored breathing  Abd: Soft abdomen, distended.  Appropriately tender over transverse abdominal incision which is clean dry and intact.   Ext:  Warm and well-perfused    BMP  Recent Labs   Lab 11/23/21  0723 11/23/21  0045 11/22/21  2052 11/22/21  0709 11/21/21  1905 11/21/21  1004 11/21/21  0936 11/21/21  0323 11/21/21  0001 11/20/21  1224 11/20/21  0720 11/19/21  1633 11/19/21  0722   NA  --   --   --  140  --   --  140  --   --   --  138  --  138   POTASSIUM 4.1  --   --  3.6  --   --  3.6  --  3.6  --  3.2*  --  3.6   CHLORIDE  --   --   --  107  --   --  107  --   --   --  103  --  107   CO2  --   --   --  30  --   --  32  --   --   --  31  --  28   BUN  --   --   --  9  --   --  11  --   --   --  8  --  10   CR  --   --   --  0.50*  --   --  0.54  --   --   --  0.60  --  0.66   GLC  --  110* 85 116* 108*   < > 119*   < >  --    < > 141*   < > 119*   MAG 2.1  --   --  1.9  --   --  1.9  --   --   --  1.8  --  1.8   PHOS 4.2  --   --  4.1  --   --  3.0  --   --   --  2.7   < > 2.0*    < > = values in this interval not displayed.     CBC  Recent Labs   Lab 11/23/21  0723 11/20/21  0720 11/18/21 0720   WBC  --   --  11.7*   HGB  --   --  10.2*   HCT  --   --  31.4*    181 182     ASSESSMENT: 41 year  old female with history of stricturing Crohn's disease with multiple bowel surgeries who underwent exploratory laparotomy with ileal resection and extensive ANTHONY on 11/18/21. Postoperatively awaiting ROBF.    -Multimodal pain control: Scheduled tylenol. PRN dilaudid/oxy  -Clear liquid diet until ROBF  -IVF: TPN+NS to equal 75mL/hr  -Encourage ambulation  Ppx: lovenox  Dispo: pending return of bowel function.  Will need 30 days lovenox on discharge.  High chance of requiring TPN on discharge also.     Patient was seen and discussed with Dr. Barbara Person PA-C  Colon and Rectal Surgery

## 2021-11-23 NOTE — PLAN OF CARE
A&Ox4. VSS on RA, pain managed with scheduled tylenol, PRN PO oxycodone and PRN IV dilaudid. Tolerating clear liquid diet, denies nausea. Q6 hr BG checks. R PICC double lumen caps changed, infusing TPN (lipids ended), and TKO in other lumen. Transverse abd incision VIJAYA, abd binder in place. Voiding spontaneously, adequate UOP, Small smear of BM, passed gas. Abd still somewhat distended. Up in hallway independently. Continue to monitor.

## 2021-11-24 LAB
ANION GAP SERPL CALCULATED.3IONS-SCNC: 5 MMOL/L (ref 3–14)
BUN SERPL-MCNC: 11 MG/DL (ref 7–30)
CALCIUM SERPL-MCNC: 8.2 MG/DL (ref 8.5–10.1)
CHLORIDE BLD-SCNC: 109 MMOL/L (ref 94–109)
CO2 SERPL-SCNC: 27 MMOL/L (ref 20–32)
CREAT SERPL-MCNC: 0.52 MG/DL (ref 0.52–1.04)
GFR SERPL CREATININE-BSD FRML MDRD: >90 ML/MIN/1.73M2
GLUCOSE BLD-MCNC: 120 MG/DL (ref 70–99)
GLUCOSE BLDC GLUCOMTR-MCNC: 103 MG/DL (ref 70–99)
GLUCOSE BLDC GLUCOMTR-MCNC: 111 MG/DL (ref 70–99)
GLUCOSE BLDC GLUCOMTR-MCNC: 116 MG/DL (ref 70–99)
HOLD SPECIMEN: NORMAL
MAGNESIUM SERPL-MCNC: 2.2 MG/DL (ref 1.6–2.3)
PATH REPORT.COMMENTS IMP SPEC: NORMAL
PATH REPORT.FINAL DX SPEC: NORMAL
PATH REPORT.GROSS SPEC: NORMAL
PATH REPORT.MICROSCOPIC SPEC OTHER STN: NORMAL
PATH REPORT.RELEVANT HX SPEC: NORMAL
PHOSPHATE SERPL-MCNC: 4.1 MG/DL (ref 2.5–4.5)
PHOTO IMAGE: NORMAL
POTASSIUM BLD-SCNC: 4.1 MMOL/L (ref 3.4–5.3)
SODIUM SERPL-SCNC: 141 MMOL/L (ref 133–144)
TRIGL SERPL-MCNC: 64 MG/DL

## 2021-11-24 PROCEDURE — 84100 ASSAY OF PHOSPHORUS: CPT | Performed by: COLON & RECTAL SURGERY

## 2021-11-24 PROCEDURE — 120N000002 HC R&B MED SURG/OB UMMC

## 2021-11-24 PROCEDURE — 999N000007 HC SITE CHECK

## 2021-11-24 PROCEDURE — 250N000013 HC RX MED GY IP 250 OP 250 PS 637: Performed by: PHYSICIAN ASSISTANT

## 2021-11-24 PROCEDURE — 36592 COLLECT BLOOD FROM PICC: CPT | Performed by: COLON & RECTAL SURGERY

## 2021-11-24 PROCEDURE — 250N000013 HC RX MED GY IP 250 OP 250 PS 637: Performed by: STUDENT IN AN ORGANIZED HEALTH CARE EDUCATION/TRAINING PROGRAM

## 2021-11-24 PROCEDURE — 83735 ASSAY OF MAGNESIUM: CPT | Performed by: COLON & RECTAL SURGERY

## 2021-11-24 PROCEDURE — 80048 BASIC METABOLIC PNL TOTAL CA: CPT | Performed by: COLON & RECTAL SURGERY

## 2021-11-24 PROCEDURE — 84478 ASSAY OF TRIGLYCERIDES: CPT | Performed by: STUDENT IN AN ORGANIZED HEALTH CARE EDUCATION/TRAINING PROGRAM

## 2021-11-24 PROCEDURE — 250N000013 HC RX MED GY IP 250 OP 250 PS 637

## 2021-11-24 PROCEDURE — 250N000011 HC RX IP 250 OP 636: Performed by: STUDENT IN AN ORGANIZED HEALTH CARE EDUCATION/TRAINING PROGRAM

## 2021-11-24 PROCEDURE — 250N000009 HC RX 250: Performed by: COLON & RECTAL SURGERY

## 2021-11-24 PROCEDURE — 36592 COLLECT BLOOD FROM PICC: CPT | Performed by: STUDENT IN AN ORGANIZED HEALTH CARE EDUCATION/TRAINING PROGRAM

## 2021-11-24 RX ORDER — DEXTROSE MONOHYDRATE 100 MG/ML
INJECTION, SOLUTION INTRAVENOUS CONTINUOUS PRN
Status: DISCONTINUED | OUTPATIENT
Start: 2021-11-24 | End: 2021-11-26 | Stop reason: HOSPADM

## 2021-11-24 RX ADMIN — DOXYLAMINE SUCCINATE 50 MG: 25 TABLET ORAL at 22:44

## 2021-11-24 RX ADMIN — OXYCODONE HYDROCHLORIDE 15 MG: 5 TABLET ORAL at 14:01

## 2021-11-24 RX ADMIN — I.V. FAT EMULSION 250 ML: 20 EMULSION INTRAVENOUS at 20:38

## 2021-11-24 RX ADMIN — ACETAMINOPHEN 975 MG: 325 TABLET, FILM COATED ORAL at 13:25

## 2021-11-24 RX ADMIN — ENOXAPARIN SODIUM 40 MG: 40 INJECTION SUBCUTANEOUS at 13:25

## 2021-11-24 RX ADMIN — HYDROMORPHONE HYDROCHLORIDE 0.4 MG: 0.2 INJECTION, SOLUTION INTRAMUSCULAR; INTRAVENOUS; SUBCUTANEOUS at 22:53

## 2021-11-24 RX ADMIN — HYDROMORPHONE HYDROCHLORIDE 0.2 MG: 0.2 INJECTION, SOLUTION INTRAMUSCULAR; INTRAVENOUS; SUBCUTANEOUS at 03:43

## 2021-11-24 RX ADMIN — Medication: at 20:28

## 2021-11-24 RX ADMIN — HYDROMORPHONE HYDROCHLORIDE 0.4 MG: 0.2 INJECTION, SOLUTION INTRAMUSCULAR; INTRAVENOUS; SUBCUTANEOUS at 11:32

## 2021-11-24 RX ADMIN — METHOCARBAMOL 500 MG: 500 TABLET ORAL at 08:17

## 2021-11-24 RX ADMIN — OXYCODONE HYDROCHLORIDE 15 MG: 5 TABLET ORAL at 18:55

## 2021-11-24 RX ADMIN — HYDROMORPHONE HYDROCHLORIDE 0.4 MG: 0.2 INJECTION, SOLUTION INTRAMUSCULAR; INTRAVENOUS; SUBCUTANEOUS at 16:10

## 2021-11-24 RX ADMIN — OXYCODONE HYDROCHLORIDE 15 MG: 5 TABLET ORAL at 10:09

## 2021-11-24 RX ADMIN — ACETAMINOPHEN 975 MG: 325 TABLET, FILM COATED ORAL at 20:23

## 2021-11-24 RX ADMIN — METHOCARBAMOL 500 MG: 500 TABLET ORAL at 16:10

## 2021-11-24 RX ADMIN — HYDROMORPHONE HYDROCHLORIDE 0.4 MG: 0.2 INJECTION, SOLUTION INTRAMUSCULAR; INTRAVENOUS; SUBCUTANEOUS at 20:25

## 2021-11-24 RX ADMIN — HYDROMORPHONE HYDROCHLORIDE 0.4 MG: 0.2 INJECTION, SOLUTION INTRAMUSCULAR; INTRAVENOUS; SUBCUTANEOUS at 08:18

## 2021-11-24 RX ADMIN — ACETAMINOPHEN 975 MG: 325 TABLET, FILM COATED ORAL at 03:43

## 2021-11-24 RX ADMIN — HYDROMORPHONE HYDROCHLORIDE 0.4 MG: 0.2 INJECTION, SOLUTION INTRAMUSCULAR; INTRAVENOUS; SUBCUTANEOUS at 18:04

## 2021-11-24 RX ADMIN — OXYCODONE HYDROCHLORIDE 15 MG: 5 TABLET ORAL at 06:03

## 2021-11-24 RX ADMIN — HYDROMORPHONE HYDROCHLORIDE 0.4 MG: 0.2 INJECTION, SOLUTION INTRAMUSCULAR; INTRAVENOUS; SUBCUTANEOUS at 13:26

## 2021-11-24 RX ADMIN — HYDROMORPHONE HYDROCHLORIDE 0.4 MG: 0.2 INJECTION, SOLUTION INTRAMUSCULAR; INTRAVENOUS; SUBCUTANEOUS at 00:30

## 2021-11-24 ASSESSMENT — ACTIVITIES OF DAILY LIVING (ADL)
ADLS_ACUITY_SCORE: 3

## 2021-11-24 NOTE — DISCHARGE SUMMARY
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         Consultations:   PHARMACY/NUTRITION TO START AND MANAGE TPN  PHARMACY IP CONSULT  CARE MANAGEMENT / SOCIAL WORK IP CONSULT  PHARMACY IP CONSULT         Imaging Studies:     Results for orders placed or performed during the hospital encounter of 11/17/21   POC US Guidance Needle Placement    Impression    TAP            Medications Prior to Admission:     Medications Prior to Admission   Medication Sig Dispense Refill Last Dose     adalimumab (HUMIRA) 40 MG/0.8ML pen kit Inject 40 mg Subcutaneous   More than a month at Unknown time     azaTHIOprine 100 MG TABS Take 100 mg by mouth   Past Week at Unknown time     budesonide (ENTOCORT EC) 3 MG EC capsule    Past Week at Unknown time     calcitRIOL (ROCALTROL) 0.25 MCG capsule Take 0.25 mcg by mouth three times a week   Past Week at Unknown time     colesevelam (WELCHOL) 625 MG tablet Take 1,875 mg by mouth   Past Week at Unknown time     doxylamine (UNISOM) 25 MG TABS tablet Take 50 mg by mouth At Bedtime Been taking nightly  "for two years   11/16/2021 at 2200     HYDROmorphone (DILAUDID) 4 MG tablet    Past Week at Unknown time     metFORMIN osmotic (FORTAMET) 1000 MG 24 hr tablet Take 1,000 mg by mouth   More than a month at Unknown time     ondansetron (ZOFRAN-ODT) 4 MG ODT tab    More than a month at Unknown time     parenteral nutrition - PTA/DISCHARGE ORDER The TPN formula will print on the After Visit Summary Report. 1 each 0 11/17/2021 at Unknown time     potassium chloride ER (K-TAB) 20 MEQ CR tablet    Past Week at Unknown time     ustekinumab (STELARA) 90 MG/ML    Past Month at Unknown time     vitamin D (ERGOCALCIFEROL) 55105 UNIT capsule Take 50,000 Units by mouth five times a week    Past Week at Unknown time     vitamin E (TOCOPHEROL) 400 units (180 mg) capsule Take 400 Units by mouth daily   Past Week at Unknown time     azithromycin (ZITHROMAX) 250 MG tablet         [DISCONTINUED] magnesium citrate 1.745 GM/30ML solution Take 296 mLs by mouth once for 1 dose Start at 8 pm night before surgery, unless otherwise stated in \"Getting Ready for Surgery\" instruction   Past Week at Unknown time     [DISCONTINUED] oxyCODONE (ROXICODONE) 5 MG tablet Take 1 tablet (5 mg) by mouth every 8 hours as needed for moderate to severe pain 15 tablet 0 Past Week at Unknown time     [DISCONTINUED] polyethylene glycol (MIRALAX) 17 GM/Dose powder TAKE 238 G BY MOUTH SEE ADMIN INSTRUCTIONS STARTING AT 4 PM NIGHT PRIOR TO SURGERY. REFER TO \"GETTING READY FOR SURGERY\" INSTRUCTIONS.               Discharge Medications:     Current Discharge Medication List      START taking these medications    Details   acetaminophen (TYLENOL) 325 MG tablet Take 3 tablets (975 mg) by mouth every 8 hours  Qty: 100 tablet, Refills: 0    Associated Diagnoses: Acute post-operative pain      enoxaparin ANTICOAGULANT (LOVENOX) 40 MG/0.4ML syringe Inject 0.4 mLs (40 mg) Subcutaneous every 24 hours for 21 days  Qty: 8.4 mL, Refills: 0    Associated Diagnoses: Acute " post-operative pain      methocarbamol (ROBAXIN) 500 MG tablet Take 1 tablet (500 mg) by mouth 4 times daily as needed for muscle spasms  Qty: 40 tablet, Refills: 0    Associated Diagnoses: Acute post-operative pain         CONTINUE these medications which have CHANGED    Details   HYDROmorphone (DILAUDID) 2 MG tablet Take 1-2 tablets (2-4 mg) by mouth every 3 hours as needed for moderate to severe pain Wean down on dose and or frequency next few days.  Qty: 40 tablet, Refills: 0    Associated Diagnoses: Acute post-operative pain         CONTINUE these medications which have NOT CHANGED    Details   calcitRIOL (ROCALTROL) 0.25 MCG capsule Take 0.25 mcg by mouth three times a week      colesevelam (WELCHOL) 625 MG tablet Take 1,875 mg by mouth      doxylamine (UNISOM) 25 MG TABS tablet Take 50 mg by mouth At Bedtime Been taking nightly for two years      metFORMIN osmotic (FORTAMET) 1000 MG 24 hr tablet Take 1,000 mg by mouth      ondansetron (ZOFRAN-ODT) 4 MG ODT tab       parenteral nutrition - PTA/DISCHARGE ORDER The TPN formula will print on the After Visit Summary Report.  Qty: 1 each, Refills: 0    Associated Diagnoses: Acute Crohn's disease, with intestinal obstruction (H); Partial small bowel obstruction (H)      potassium chloride ER (K-TAB) 20 MEQ CR tablet       vitamin D (ERGOCALCIFEROL) 84618 UNIT capsule Take 50,000 Units by mouth five times a week       vitamin E (TOCOPHEROL) 400 units (180 mg) capsule Take 400 Units by mouth daily         STOP taking these medications       adalimumab (HUMIRA) 40 MG/0.8ML pen kit Comments:   Reason for Stopping:         azaTHIOprine 100 MG TABS Comments:   Reason for Stopping:         azithromycin (ZITHROMAX) 250 MG tablet Comments:   Reason for Stopping:         budesonide (ENTOCORT EC) 3 MG EC capsule Comments:   Reason for Stopping:         magnesium citrate 1.745 GM/30ML solution Comments:   Reason for Stopping:         oxyCODONE (ROXICODONE) 5 MG tablet Comments:    Reason for Stopping:         polyethylene glycol (MIRALAX) 17 GM/Dose powder Comments:   Reason for Stopping:         ustekinumab (STELARA) 90 MG/ML Comments:   Reason for Stopping:                     Brief History of Illness:   41 year old F PMH of stricturing crohns disease diagnosed at age 16, s/p multiple stricturoplasties, prior ANTHONY with ileocolic resection, small bowel resections, repeat ileocolic resection, repeat ANTHONY with stricturoplasty x2 in 2013, open cholecystectomy, follows at [facility] and maintained on humira, azathioprine, budesonide, recently started stelara, despite maximal medical management pt has had progressively worsening abdominal pain, nausea, bloating consistent with chronic partial SBO.  Endoscopic evaluation and imaging shows narrow neoterminal ileum.  Now s/p Laparotomy with extensive lysis of adhesions (120 minutes), distal ileal resection with ileoileal anastomosis on 11/17/2021.           Hospital Course:   Post-operatively pt was gently fluid resuscitated.  Hamilton was removed and pt was able to void.  Pt had slow but eventual return of bowel function on POD#7 and was eventually able to tolerate small amounts of a low fiber diet. Pt was continued on her prior to admission TPN during this time.  TPN was stopped prior to discharge.     Pt was taught how to self-inject post-operative prophylactic lovenox for which she is to do for a total of 30 days.   Post-operative pain was controlled with scheduled tylenol, robaxin and prn oral dilaudid.     Prior to admission pt was on budesonide as a bridge to surgery and then azathioprine and stelara.  Pt is due to stelara on Nov 30th.  Have instructed pt to continue to hold until further discussed with Dr. Cartagena.     Patient is to follow up in the Colon and Rectal Surgery Clinic in 2-3 week with Nahomy Gandhi NP and then with Dr. Cartagena in 6-8 weeks after.          Day of Discharge Physical Exam:   Blood pressure 110/68, pulse 86,  "temperature 96.8  F (36  C), temperature source Temporal, resp. rate 18, height 1.575 m (5' 2\"), weight 52.5 kg (115 lb 11.9 oz), last menstrual period 11/02/2021, SpO2 96 %, not currently breastfeeding.    Gen: AAOx3, NAD  Pulm: Non-labored breathing  Abd: Soft, appropriately tender, no guarding/rebound   Incision C/D/I   Ext:  Warm and well-perfused         Final Pathology Result:     Surgical Pathology Report                         Case: [id number]                                   Authorizing Provider:  Pete Cartagena MD         Collected:           11/17/2021 04:32 PM           Ordering Location:     [facility] MAIN OR                 Received:            11/17/2021 05:39 PM           Pathologist:           Debbie Winters MD                                                    Specimen:    Small Intestine, Ileum, Ileum Open Call Back                                               Final Diagnosis   A. DISTAL ILEUM, RESECTION:  - Ileum with chronic inflammation, inflammatory polyp, and ulceration  - Resection margins feature viable tissue  - Negative for dysplasia or malignancy               Discharge Instructions and Follow-Up:     Discharge Procedure Orders   Home infusion referral   Referral Priority: Routine Referral Type: Consultation   Number of Visits Requested: 1     Reason for your hospital stay   Order Comments: 11/17/2021:  PROCEDURE:  Laparotomy with extensive lysis of adhesions (120 minutes), distal ileal resection with ileoileal anastomosis.     Activity   Order Comments: Your activity upon discharge:  ACTIVITY  -No lifting, pushing, pulling greater than 10 lbs and no strenuous exercise for 6 weeks   -No driving while on narcotic analgesics (i.e. Percocet, oxycodone, Vicodin)  -No driving until you are able to fully twist to both sides or slam on brakes quickly and without any pain  -We encourage walking at least 4-5 times per day     Order Specific Question Answer Comments   Is discharge order? Yes "      Adult [facility] Follow-up and recommended labs and tests   Order Comments: WOUND CARE  -Inspect your wounds daily for signs of infection (increased redness, drainage, pain)  -Keep your wound clean and dry  -You may shower, but do not soak in tub or pool    NOTIFY  Please contact Kylee Welsh RN or Arely Mackenzie RN or Ivanna FREEDMAN at [phone] for problems after discharge such as:  -Temperature > 101F, chills, rigors, dizziness  -Redness around or purulent drainage from wound  -Inability to tolerate diet, nausea or vomiting  -You stop passing gas, develop significant bloating, abdominal pain  -Have blood in stools/vomit  -Have severe diarrhea/constipation  -Any other questions or concerns.  - At nights (after 4:30pm), on weekends, or if urgent, call [phone] and ask the  to speak with the on-call Colorectal Surgery resident or fellow      Medication Instructions  Some of your medications may have changed. Please take only prescribed and resumed medications     FOLLOW-UP  1.  You will need to follow-up with Nahomy Gandhi NP in the Colon and Rectal Surgery clinic in 2-3 week(s) and then with CRS Staff: Dr. Cartagena in 6-8 weeks after.  Please contact our clinic scheduler (phone # [phone]) if you have not heard from our clinic in 3 business days afer discharge to schedule a follow-up appointment.         Appointments on [place] and/or [facility] (with [facility] or [facility] provider or service). Call [phone] if you haven't heard regarding these appointments within 7 days of discharge.     Diet   Order Comments: Follow this diet upon discharge:  DIET  -Low Residue Diet for at least 4-6 weeks unless cleared by Colorectal surgery.  No raw vegetables, fruit skins, fibrous foods that require a lot of chewing, nuts, seeds, corn, popcorn.   -We recommend eating slowly, chewing thoroughly, eating small frequent meals throughout the day  -Stay well hydrated.     Order Specific Question Answer  Comments   Is discharge order? Yes             Home Health Care:     Not needed           Discharge Disposition:     Discharged to home      Condition at discharge: Stable      Jesenia Person PA-C  Colon and Rectal Surgery     Pt was seen and discussed with Dr. Jimenez on 11/26/2021

## 2021-11-24 NOTE — PLAN OF CARE
Asumed care for pt [id number]  AOX4, VS and assessment as documented. Pain managed with prn dialudidx2, and oxycodone x1, denies nausea. Up ad svetlana, voiding spontaneously with adequate output. PICC infusing TPN and Lipids, red lumen infusing IVMF. Pt not passing gas, no BM this shift. Needs met, safety maintained. Will continue plan of care.

## 2021-11-24 NOTE — PROGRESS NOTES
Colorectal Surgery Progress Note  [facility]  POD#7    Subjective:  No acute changes.  Continues to be bloated.  Pain slowly decreasing.  Had a small smear yesterday.  But No gas.  Continue to tolerate CLD. Will try some coffee today.     Vitals:  Vitals:    11/23/21 0711 11/23/21 1440 11/23/21 2330 11/24/21 0659   BP: 104/55 109/71 108/70 106/60   BP Location: Left arm Right arm Right arm Left arm   Pulse: 77 90 94 75   Resp: 16 16 16 16   Temp: 98  F (36.7  C) 97.5  F (36.4  C) 98.4  F (36.9  C) 98.4  F (36.9  C)   TempSrc: Temporal Temporal Temporal Oral   SpO2: 94% 96% 98% 98%   Weight:       Height:         I/O:  I/O last 3 completed shifts:  In: 1998.94 [P.O.:830; I.V.:10]  Out: 1550 [Urine:1550]    Physical Exam:  Gen: AAOx3, NAD, sitting up in bed  Pulm: Non-labored breathing  Abd: Soft abdomen, distended.  Appropriately tender over transverse abdominal incision which is clean dry and intact.   Ext:  Warm and well-perfused    BMP  Recent Labs   Lab 11/24/21  0644 11/24/21  0107 11/23/21  1903 11/23/21  1324 11/23/21  0723 11/22/21  2052 11/22/21  0709 11/21/21  1004 11/21/21  0936 11/20/21  1224 11/20/21  0720     --   --   --   --   --  140  --  140  --  138   POTASSIUM 4.1  --   --   --  4.1  --  3.6  --  3.6   < > 3.2*   CHLORIDE 109  --   --   --   --   --  107  --  107  --  103   CO2 27  --   --   --   --   --  30  --  32  --  31   BUN 11  --   --   --   --   --  9  --  11  --  8   CR 0.52  --   --   --   --   --  0.50*  --  0.54  --  0.60   * 116* 107* 127*  --    < > 116*   < > 119*   < > 141*   MAG 2.2  --   --   --  2.1  --  1.9  --  1.9  --  1.8   PHOS 4.1  --   --   --  4.2  --  4.1  --  3.0  --  2.7    < > = values in this interval not displayed.     CBC  Recent Labs   Lab 11/23/21  0723 11/20/21  0720 11/18/21  0720   WBC  --   --  11.7*   HGB  --   --  10.2*   HCT  --   --  31.4*    181 182     ASSESSMENT: 41 year old female with history of  stricturing Crohn's disease with multiple bowel surgeries who underwent exploratory laparotomy with ileal resection and extensive ANTHONY on 11/18/21. Postoperatively awaiting ROBF.    -Multimodal pain control: Scheduled tylenol. PRN dilaudid/oxy  -Clear liquid diet until ROBF  -IVF: TPN+NS to equal 75mL/hr  -Encourage ambulation  Ppx: lovenox  Dispo: pending return of bowel function.  Will need 30 days lovenox on discharge.  High chance of requiring TPN on discharge also.     Patient was seen and discussed with Dr. Barbara Person PA-C  Colon and Rectal Surgery

## 2021-11-24 NOTE — PLAN OF CARE
15:00-23:30    Temp: 97.5  F (36.4  C) Temp src: Temporal BP: 109/71 Pulse: 90   Resp: 16 SpO2: 96 % O2 Device: None (Room air)        Status: Patient is S/P exploratory laparotomy with ileal resection and extensive ANTHONY on 11/18/21    -C/O abdominal/incisional pain, given scheduled Tylenol, PRN Oxycodone, IV Dilaudid and Robaxin with relief  -on clears with poor appetite  -on TPN and Lipids via PICC  -no nausea  -ambulated in hallways total of 4x  -voiding not saving  -negative gas and stool, patient stated that he had smear stool this morning ( MD is aware )  -abdomen distended  -BS hypo  -blood sugar 107    Continue with plan of care.

## 2021-11-24 NOTE — PROGRESS NOTES
AVSS on RA. Pain managed w tylenol, robaxin , prn oxycodone and prn iv dilaudid. Denies nausea. Abdominal incision is clean,dry and intact, open to air. PICC infusing w TPN/ lipids and fluids to tko. Void spont, small bm this shift. Up ad svetlana. Will continue to monitor.

## 2021-11-25 LAB
ALBUMIN SERPL-MCNC: 2 G/DL (ref 3.4–5)
ALP SERPL-CCNC: 67 U/L (ref 40–150)
ALT SERPL W P-5'-P-CCNC: 14 U/L (ref 0–50)
ANION GAP SERPL CALCULATED.3IONS-SCNC: 6 MMOL/L (ref 3–14)
AST SERPL W P-5'-P-CCNC: 11 U/L (ref 0–45)
BILIRUB DIRECT SERPL-MCNC: <0.1 MG/DL (ref 0–0.2)
BILIRUB SERPL-MCNC: 0.3 MG/DL (ref 0.2–1.3)
BUN SERPL-MCNC: 14 MG/DL (ref 7–30)
CALCIUM SERPL-MCNC: 8.4 MG/DL (ref 8.5–10.1)
CHLORIDE BLD-SCNC: 108 MMOL/L (ref 94–109)
CO2 SERPL-SCNC: 26 MMOL/L (ref 20–32)
CREAT SERPL-MCNC: 0.56 MG/DL (ref 0.52–1.04)
GFR SERPL CREATININE-BSD FRML MDRD: >90 ML/MIN/1.73M2
GLUCOSE BLD-MCNC: 114 MG/DL (ref 70–99)
GLUCOSE BLDC GLUCOMTR-MCNC: 115 MG/DL (ref 70–99)
GLUCOSE BLDC GLUCOMTR-MCNC: 115 MG/DL (ref 70–99)
GLUCOSE BLDC GLUCOMTR-MCNC: 130 MG/DL (ref 70–99)
GLUCOSE BLDC GLUCOMTR-MCNC: 95 MG/DL (ref 70–99)
HOLD SPECIMEN: NORMAL
INR PPP: 1.12 (ref 0.85–1.15)
MAGNESIUM SERPL-MCNC: 2.1 MG/DL (ref 1.6–2.3)
PHOSPHATE SERPL-MCNC: 4.5 MG/DL (ref 2.5–4.5)
POTASSIUM BLD-SCNC: 4.1 MMOL/L (ref 3.4–5.3)
PROT SERPL-MCNC: 6.2 G/DL (ref 6.8–8.8)
SODIUM SERPL-SCNC: 140 MMOL/L (ref 133–144)

## 2021-11-25 PROCEDURE — 82040 ASSAY OF SERUM ALBUMIN: CPT | Performed by: COLON & RECTAL SURGERY

## 2021-11-25 PROCEDURE — 85610 PROTHROMBIN TIME: CPT | Performed by: COLON & RECTAL SURGERY

## 2021-11-25 PROCEDURE — 250N000013 HC RX MED GY IP 250 OP 250 PS 637: Performed by: STUDENT IN AN ORGANIZED HEALTH CARE EDUCATION/TRAINING PROGRAM

## 2021-11-25 PROCEDURE — 250N000013 HC RX MED GY IP 250 OP 250 PS 637: Performed by: PHYSICIAN ASSISTANT

## 2021-11-25 PROCEDURE — 84100 ASSAY OF PHOSPHORUS: CPT | Performed by: COLON & RECTAL SURGERY

## 2021-11-25 PROCEDURE — 120N000002 HC R&B MED SURG/OB UMMC

## 2021-11-25 PROCEDURE — 250N000011 HC RX IP 250 OP 636: Performed by: STUDENT IN AN ORGANIZED HEALTH CARE EDUCATION/TRAINING PROGRAM

## 2021-11-25 PROCEDURE — 84134 ASSAY OF PREALBUMIN: CPT | Performed by: COLON & RECTAL SURGERY

## 2021-11-25 PROCEDURE — 83735 ASSAY OF MAGNESIUM: CPT | Performed by: COLON & RECTAL SURGERY

## 2021-11-25 PROCEDURE — 250N000011 HC RX IP 250 OP 636: Performed by: COLON & RECTAL SURGERY

## 2021-11-25 PROCEDURE — 82248 BILIRUBIN DIRECT: CPT | Performed by: COLON & RECTAL SURGERY

## 2021-11-25 PROCEDURE — 250N000013 HC RX MED GY IP 250 OP 250 PS 637

## 2021-11-25 PROCEDURE — 258N000003 HC RX IP 258 OP 636: Performed by: PHYSICIAN ASSISTANT

## 2021-11-25 PROCEDURE — 250N000009 HC RX 250: Performed by: COLON & RECTAL SURGERY

## 2021-11-25 PROCEDURE — 36592 COLLECT BLOOD FROM PICC: CPT | Performed by: COLON & RECTAL SURGERY

## 2021-11-25 RX ADMIN — DOXYLAMINE SUCCINATE 50 MG: 25 TABLET ORAL at 21:41

## 2021-11-25 RX ADMIN — Medication 5 ML: at 21:10

## 2021-11-25 RX ADMIN — HYDROMORPHONE HYDROCHLORIDE 0.4 MG: 0.2 INJECTION, SOLUTION INTRAMUSCULAR; INTRAVENOUS; SUBCUTANEOUS at 01:03

## 2021-11-25 RX ADMIN — OXYCODONE HYDROCHLORIDE 15 MG: 5 TABLET ORAL at 21:38

## 2021-11-25 RX ADMIN — ACETAMINOPHEN 975 MG: 325 TABLET, FILM COATED ORAL at 08:05

## 2021-11-25 RX ADMIN — HYDROMORPHONE HYDROCHLORIDE 0.4 MG: 0.2 INJECTION, SOLUTION INTRAMUSCULAR; INTRAVENOUS; SUBCUTANEOUS at 16:18

## 2021-11-25 RX ADMIN — HYDROMORPHONE HYDROCHLORIDE 0.4 MG: 0.2 INJECTION, SOLUTION INTRAMUSCULAR; INTRAVENOUS; SUBCUTANEOUS at 23:04

## 2021-11-25 RX ADMIN — METHOCARBAMOL 500 MG: 500 TABLET ORAL at 13:33

## 2021-11-25 RX ADMIN — I.V. FAT EMULSION 250 ML: 20 EMULSION INTRAVENOUS at 20:09

## 2021-11-25 RX ADMIN — OXYCODONE HYDROCHLORIDE 15 MG: 5 TABLET ORAL at 01:55

## 2021-11-25 RX ADMIN — SODIUM CHLORIDE 1000 ML: 9 INJECTION, SOLUTION INTRAVENOUS at 02:06

## 2021-11-25 RX ADMIN — Medication 5 ML: at 23:05

## 2021-11-25 RX ADMIN — HYDROMORPHONE HYDROCHLORIDE 0.4 MG: 0.2 INJECTION, SOLUTION INTRAMUSCULAR; INTRAVENOUS; SUBCUTANEOUS at 08:05

## 2021-11-25 RX ADMIN — HYDROMORPHONE HYDROCHLORIDE 0.4 MG: 0.2 INJECTION, SOLUTION INTRAMUSCULAR; INTRAVENOUS; SUBCUTANEOUS at 11:45

## 2021-11-25 RX ADMIN — HYDROMORPHONE HYDROCHLORIDE 0.4 MG: 0.2 INJECTION, SOLUTION INTRAMUSCULAR; INTRAVENOUS; SUBCUTANEOUS at 19:34

## 2021-11-25 RX ADMIN — OXYCODONE HYDROCHLORIDE 15 MG: 5 TABLET ORAL at 09:16

## 2021-11-25 RX ADMIN — METHOCARBAMOL 500 MG: 500 TABLET ORAL at 23:56

## 2021-11-25 RX ADMIN — Medication: at 20:36

## 2021-11-25 RX ADMIN — HYDROMORPHONE HYDROCHLORIDE 0.4 MG: 0.2 INJECTION, SOLUTION INTRAMUSCULAR; INTRAVENOUS; SUBCUTANEOUS at 13:33

## 2021-11-25 RX ADMIN — ENOXAPARIN SODIUM 40 MG: 40 INJECTION SUBCUTANEOUS at 11:44

## 2021-11-25 RX ADMIN — OXYCODONE HYDROCHLORIDE 15 MG: 5 TABLET ORAL at 17:31

## 2021-11-25 RX ADMIN — ACETAMINOPHEN 975 MG: 325 TABLET, FILM COATED ORAL at 23:56

## 2021-11-25 RX ADMIN — ACETAMINOPHEN 975 MG: 325 TABLET, FILM COATED ORAL at 16:18

## 2021-11-25 ASSESSMENT — ACTIVITIES OF DAILY LIVING (ADL)
ADLS_ACUITY_SCORE: 3

## 2021-11-25 NOTE — PROGRESS NOTES
Patient is alert and oriented x 4. VSS on room air. Slept between cares. Pain managed with prn Oxycodone x 1 and IV Dilaudid x 1. Full liquid diet, tolerating. Denies any nausea. R PICC double lumen with TPN/Lipids, and TKO in other lumen. TPN is now cyclic. Transverse abdominal incision VIJAYA and CDI, abdominal binder in place. Up Ind.  Voiding spontaneously, adequate output. No BM overnight, denies passing gas, +BS.

## 2021-11-25 NOTE — PROGRESS NOTES
Colorectal Surgery Progress Note  [facility]  POD#8    Subjective:  No acute changes.  Continues to be bloated but had small loose BM yesterday.  But No gas.  Advanced to fulls yesterday which was tolerated well.    Vitals:  Vitals:    11/24/21 0659 11/24/21 1422 11/24/21 2314 11/25/21 0806   BP: 106/60 110/68 115/71 112/65   BP Location: Left arm Left arm Left arm Right arm   Pulse: 75 86 91 91   Resp: 16 18 17 16   Temp: 98.4  F (36.9  C) 96.8  F (36  C) 97.7  F (36.5  C) 98.9  F (37.2  C)   TempSrc: Oral Temporal Oral Oral   SpO2: 98% 96% 98% 97%   Weight:       Height:         I/O:  I/O last 3 completed shifts:  In: 2502.95 [P.O.:680; I.V.:820]  Out: 400 [Urine:400]    Physical Exam:  Gen: AAOx3, NAD, sitting up in bed  Pulm: Non-labored breathing  Abd: Soft abdomen, distended.  Appropriately tender over transverse abdominal incision which is clean dry and intact.   Ext:  Warm and well-perfused    BMP  Recent Labs   Lab 11/25/21  0810 11/25/21  0744 11/25/21  0113 11/24/21  1909 11/24/21  1435 11/24/21  0644 11/23/21  1324 11/23/21  0723 11/22/21  2052 11/22/21  0709 11/21/21  1004 11/21/21  0936   NA  --  140  --   --   --  141  --   --   --  140  --  140   POTASSIUM  --  4.1  --   --   --  4.1  --  4.1  --  3.6  --  3.6   CHLORIDE  --  108  --   --   --  109  --   --   --  107  --  107   CO2  --  26  --   --   --  27  --   --   --  30  --  32   BUN  --  14  --   --   --  11  --   --   --  9  --  11   CR  --  0.56  --   --   --  0.52  --   --   --  0.50*  --  0.54   * 114* 115* 103*   < > 120*   < >  --    < > 116*   < > 119*   MAG  --  2.1  --   --   --  2.2  --  2.1  --  1.9  --  1.9   PHOS  --  4.5  --   --   --  4.1  --  4.2  --  4.1  --  3.0    < > = values in this interval not displayed.     CBC  Recent Labs   Lab 11/23/21  0723 11/20/21  0720    181     ASSESSMENT: 41 year old female with history of stricturing Crohn's disease with multiple bowel surgeries who  underwent exploratory laparotomy with ileal resection and extensive ANTHONY on 11/18/21. Postoperatively awaiting ROBF.    -Multimodal pain control: Scheduled tylenol. PRN dilaudid/oxy  -Continue fulls, possible LFD in PM  -IVF: TPN+NS to equal 75mL/hr, TPN cycling  -Encourage ambulation  Ppx: lovenox  Dispo: pending return of bowel function.  Will need 30 days lovenox on discharge.  Possible to require TPN on discharge also.     Catherine Jimenez MD  Colon and Rectal Surgery Fellow

## 2021-11-25 NOTE — PROGRESS NOTES
AVSS on RA. Pain managed w tylenol, prn oxycodone and prn iv dilaudid. Full liquid diet. Abdominal incision, open to air. R PICC double lumen, both heparin locked. Void spont, passing gas and one small loose bm this shift. Blood sugar checks q 6 hrs. Up ad svetlana. Will continue to monitor.

## 2021-11-25 NOTE — PLAN OF CARE
15:00-23:30    Temp: 96.8  F (36  C) Temp src: Temporal BP: 110/68 Pulse: 86   Resp: 18 SpO2: 96 % O2 Device: None (Room air)       Status: Patient is S/P exploratory laparotomy with ileal resection and extensive ANTHONY on 11/18/21     -C/O abdominal/incisional pain, given scheduled Tylenol, PRN Oxycodone, IV Dilaudid and Robaxin with relief  -on full liquid with poor appetite  -on cyclic TPN and Lipids via PICC  -no nausea  -ambulated in hallways total of 4x  -voiding not saving  -had a small stool today   -abdomen distended  -BS hypo  -abdominal incision VIJAYA with derma bond  -blood sugar 103     Continue with plan of care.

## 2021-11-26 VITALS
HEIGHT: 62 IN | RESPIRATION RATE: 15 BRPM | DIASTOLIC BLOOD PRESSURE: 54 MMHG | OXYGEN SATURATION: 97 % | WEIGHT: 112.6 LBS | HEART RATE: 94 BPM | BODY MASS INDEX: 20.72 KG/M2 | TEMPERATURE: 98.1 F | SYSTOLIC BLOOD PRESSURE: 102 MMHG

## 2021-11-26 LAB
ANION GAP SERPL CALCULATED.3IONS-SCNC: 5 MMOL/L (ref 3–14)
BUN SERPL-MCNC: 15 MG/DL (ref 7–30)
CALCIUM SERPL-MCNC: 8.4 MG/DL (ref 8.5–10.1)
CHLORIDE BLD-SCNC: 109 MMOL/L (ref 94–109)
CO2 SERPL-SCNC: 26 MMOL/L (ref 20–32)
CREAT SERPL-MCNC: 0.57 MG/DL (ref 0.52–1.04)
GFR SERPL CREATININE-BSD FRML MDRD: >90 ML/MIN/1.73M2
GLUCOSE BLD-MCNC: 112 MG/DL (ref 70–99)
GLUCOSE BLDC GLUCOMTR-MCNC: 110 MG/DL (ref 70–99)
GLUCOSE BLDC GLUCOMTR-MCNC: 119 MG/DL (ref 70–99)
MAGNESIUM SERPL-MCNC: 2.1 MG/DL (ref 1.6–2.3)
PHOSPHATE SERPL-MCNC: 4 MG/DL (ref 2.5–4.5)
PLATELET # BLD AUTO: 353 10E3/UL (ref 150–450)
POTASSIUM BLD-SCNC: 4.2 MMOL/L (ref 3.4–5.3)
PREALB SERPL IA-MCNC: 10 MG/DL (ref 15–45)
SODIUM SERPL-SCNC: 140 MMOL/L (ref 133–144)

## 2021-11-26 PROCEDURE — 250N000011 HC RX IP 250 OP 636: Performed by: COLON & RECTAL SURGERY

## 2021-11-26 PROCEDURE — 80048 BASIC METABOLIC PNL TOTAL CA: CPT | Performed by: COLON & RECTAL SURGERY

## 2021-11-26 PROCEDURE — 83735 ASSAY OF MAGNESIUM: CPT | Performed by: COLON & RECTAL SURGERY

## 2021-11-26 PROCEDURE — 250N000013 HC RX MED GY IP 250 OP 250 PS 637: Performed by: PHYSICIAN ASSISTANT

## 2021-11-26 PROCEDURE — 84100 ASSAY OF PHOSPHORUS: CPT | Performed by: COLON & RECTAL SURGERY

## 2021-11-26 PROCEDURE — 36592 COLLECT BLOOD FROM PICC: CPT | Performed by: STUDENT IN AN ORGANIZED HEALTH CARE EDUCATION/TRAINING PROGRAM

## 2021-11-26 PROCEDURE — 85049 AUTOMATED PLATELET COUNT: CPT | Performed by: STUDENT IN AN ORGANIZED HEALTH CARE EDUCATION/TRAINING PROGRAM

## 2021-11-26 PROCEDURE — 250N000011 HC RX IP 250 OP 636: Performed by: STUDENT IN AN ORGANIZED HEALTH CARE EDUCATION/TRAINING PROGRAM

## 2021-11-26 PROCEDURE — 250N000013 HC RX MED GY IP 250 OP 250 PS 637: Performed by: STUDENT IN AN ORGANIZED HEALTH CARE EDUCATION/TRAINING PROGRAM

## 2021-11-26 PROCEDURE — 250N000013 HC RX MED GY IP 250 OP 250 PS 637

## 2021-11-26 RX ORDER — ACETAMINOPHEN 325 MG/1
975 TABLET ORAL EVERY 8 HOURS
Qty: 100 TABLET | Refills: 0 | Status: SHIPPED | OUTPATIENT
Start: 2021-11-26 | End: 2022-03-09

## 2021-11-26 RX ORDER — METHOCARBAMOL 500 MG/1
500 TABLET, FILM COATED ORAL 4 TIMES DAILY PRN
Qty: 40 TABLET | Refills: 0 | Status: SHIPPED | OUTPATIENT
Start: 2021-11-26 | End: 2022-03-09

## 2021-11-26 RX ORDER — HYDROMORPHONE HYDROCHLORIDE 2 MG/1
2-4 TABLET ORAL
Status: DISCONTINUED | OUTPATIENT
Start: 2021-11-26 | End: 2021-11-26 | Stop reason: HOSPADM

## 2021-11-26 RX ORDER — HYDROMORPHONE HYDROCHLORIDE 2 MG/1
2-4 TABLET ORAL
Qty: 40 TABLET | Refills: 0 | Status: SHIPPED | OUTPATIENT
Start: 2021-11-26 | End: 2021-12-04

## 2021-11-26 RX ADMIN — METHOCARBAMOL 500 MG: 500 TABLET ORAL at 13:54

## 2021-11-26 RX ADMIN — Medication 5 ML: at 09:26

## 2021-11-26 RX ADMIN — Medication 5 ML: at 07:51

## 2021-11-26 RX ADMIN — ENOXAPARIN SODIUM 40 MG: 40 INJECTION SUBCUTANEOUS at 11:54

## 2021-11-26 RX ADMIN — METHOCARBAMOL 500 MG: 500 TABLET ORAL at 08:33

## 2021-11-26 RX ADMIN — Medication 5 ML: at 13:27

## 2021-11-26 RX ADMIN — Medication 5 ML: at 01:29

## 2021-11-26 RX ADMIN — OXYCODONE HYDROCHLORIDE 15 MG: 5 TABLET ORAL at 02:01

## 2021-11-26 RX ADMIN — ACETAMINOPHEN 975 MG: 325 TABLET, FILM COATED ORAL at 08:29

## 2021-11-26 RX ADMIN — HYDROMORPHONE HYDROCHLORIDE 0.4 MG: 0.2 INJECTION, SOLUTION INTRAMUSCULAR; INTRAVENOUS; SUBCUTANEOUS at 01:22

## 2021-11-26 RX ADMIN — HYDROMORPHONE HYDROCHLORIDE 4 MG: 2 TABLET ORAL at 09:26

## 2021-11-26 RX ADMIN — HYDROMORPHONE HYDROCHLORIDE 4 MG: 2 TABLET ORAL at 13:54

## 2021-11-26 ASSESSMENT — ACTIVITIES OF DAILY LIVING (ADL)
ADLS_ACUITY_SCORE: 3

## 2021-11-26 ASSESSMENT — MIFFLIN-ST. JEOR: SCORE: 1139

## 2021-11-26 NOTE — PROGRESS NOTES
Colorectal Surgery Progress Note  [facility]  POD#9    Subjective:  Had 2 BMs.  Tried a few bites of toast and scrambled eggs.  Did ok.  Still requiring IV dilaudid.  Feels sub-optimal relief with oxy, but IV dilaudid does not last long enough so has been using IV dilaudid as primary and oxy as break thru.  Switch to oral dilaudid    Vitals:  Vitals:    11/25/21 0806 11/25/21 1528 11/25/21 2210 11/26/21 0733   BP: 112/65 112/70 110/65 102/54   BP Location: Right arm Left arm Left arm Left arm   Pulse: 91 95 90 94   Resp: 16 16 16 15   Temp: 98.9  F (37.2  C) 98.2  F (36.8  C) 98.5  F (36.9  C) 98.1  F (36.7  C)   TempSrc: Oral Oral Temporal Oral   SpO2: 97% 99% 100% 97%   Weight:       Height:         I/O:  I/O last 3 completed shifts:  In: 150 [P.O.:120; I.V.:30]  Out: -     Physical Exam:  Gen: AAOx3, NAD, sitting up in bed  Pulm: Non-labored breathing  Abd: Soft abdomen, distended.  Appropriately tender over transverse abdominal incision which is clean dry and intact.   Ext:  Warm and well-perfused    BMP  Recent Labs   Lab 11/26/21  0841 11/26/21  0752 11/26/21  0116 11/25/21  2046 11/25/21  0810 11/25/21  0744 11/24/21  1435 11/24/21  0644 11/23/21  1324 11/23/21  0723 11/22/21  2052 11/22/21  0709   NA  --  140  --   --   --  140  --  141  --   --   --  140   POTASSIUM  --  4.2  --   --   --  4.1  --  4.1  --  4.1  --  3.6   CHLORIDE  --  109  --   --   --  108  --  109  --   --   --  107   CO2  --  26  --   --   --  26  --  27  --   --   --  30   BUN  --  15  --   --   --  14  --  11  --   --   --  9   CR  --  0.57  --   --   --  0.56  --  0.52  --   --   --  0.50*   * 112* 119* 115*   < > 114*   < > 120*   < >  --    < > 116*   MAG  --  2.1  --   --   --  2.1  --  2.2  --  2.1  --  1.9   PHOS  --  4.0  --   --   --  4.5  --  4.1  --  4.2  --  4.1    < > = values in this interval not displayed.     CBC  Recent Labs   Lab 11/26/21  0752 11/23/21  0723 11/20/21  0720   PLT  [phone]     ASSESSMENT: 41 year old female with history of stricturing Crohn's disease with multiple bowel surgeries who underwent exploratory laparotomy with ileal resection and extensive ANTHONY on 11/18/21.    -Multimodal pain control: Scheduled tylenol. PO dilaudid.  Stop oxy.  Stop IV dilaudid.   -LRD  -IVF: TPN+NS to equal 75mL/hr, TPN cycling  -Encourage ambulation    Ppx: lovenox  Dispo: pending return of bowel function.  Will need 30 days lovenox on discharge.      Jesenia Person PA-C  Colon and Rectal Surgery

## 2021-11-26 NOTE — PLAN OF CARE
Assumed care of Patient from 0700-. A&Ox4. Soft BPs-OVSS on RA. Patient reports intermittent pain control with Tylenol, Robaxin and oral Dilaudid. Tolerating low fiber diet. Nausea denied throughout shift. Patient denies passing flatus and having last BM on 11/26/2021. Transverse abdominal incision VIJAYA and CDI. Abdominal binder remains in place. Pt voiding spontaneously with adequate output. PICC- double lumen infusing TPN/lipids and other lumen heparin locked-removed prior to discharge, dressing in place for 72 hours. Up and svetlana. Using IS with encourgement. SCDs on in bed. AVS reviewed with patient and all patient questions answered. Patient discharged home by wheelchair at 1505.

## 2021-11-26 NOTE — PROGRESS NOTES
Pain managed with scheduled tylenol, prn iv dilaudid 3x, oxy 2x, and Robaxin 1x. VSS on RA. Lipids and TPN infusing all night. BS was 115 and 119. Patient reported she had BM 2x this shift and passing gas. Abdominal incision C/D/I. Patient rested between cares.

## 2021-11-29 ENCOUNTER — PATIENT OUTREACH (OUTPATIENT)
Dept: SURGERY | Facility: CLINIC | Age: 41
End: 2021-11-29

## 2021-11-29 NOTE — PROGRESS NOTES
Post Op Note     Called to check on patient postoperatively after hospital discharge.     Patient is s/p Laparotomy with extensive lysis of adhesions (120 minutes), distal ileal resection with ileoileal anastomosis with Dr. Pete Cartagena for crohns  Admitted 11/17/2021 and discharged on 11/26/2021.      Pain is not well controlled with tylenol, robaxin, and dilaudid.     Patient is not eating and drinking normally. Patient is on a low fiber diet.  Encouraged patient to drink 8-10 glasses of water a day.  On Friday she had part of a Thanksgiving dinner and ever since then she has not felt well.  She has cramping pain.  Her oral pain medications do not help with this.  I suggested taking simethicone at home and trying to clear liquid diet.  I will check in with her on Wednesday.  I will update Dr. Cartagena.    Patient is passing flats, is having loose bowel movements.    Patient is voiding normally and urine is light in color.    Patient is not set up with home care.     Patient Denies nausea and vomiting.     Patient Denies any fevers or chills.    Patient's incision is C/D/I. Patient reminded NOT to remove any dressings over their incisions.     Patient is on a activity restriction. Lifting 10 pounds for 6 weeks.     Patient Denies needing any forms completed.     Follow up is set up with Nahomy Garcia NP on 12/6 and with Dr. Pete Cartagena on 1/4.   Encouraged the patient to contact the clinic in the meantime with any fevers, chills, nausea, vomiting, increased colostomy output, no colostomy output, dizziness, lightheadedness, uncontrolled pain, changes to the incisions, or with any questions or concerns.    Patient's questions were answered to their stated satisfaction and they are in agreement with this plan.    RUTH Pichardo [phone]  Colon & Rectal Surgery Clinic  [facility]

## 2021-12-01 ENCOUNTER — PATIENT OUTREACH (OUTPATIENT)
Dept: SURGERY | Facility: CLINIC | Age: 41
End: 2021-12-01

## 2021-12-01 ENCOUNTER — HOME INFUSION (PRE-WILLOW HOME INFUSION) (OUTPATIENT)
Dept: PHARMACY | Facility: CLINIC | Age: 41
End: 2021-12-01

## 2021-12-01 NOTE — PROGRESS NOTES
I called Alok to see if she was feeling better today. She indeed is. She states that her cramping is a lot better. She restarted her diet to clear liquids and then slowly increased her diet. She has less cramping. She is worried about running out of her oxycodone. She has 11 pills left. I stated if she is still needing oxy and down to about 4 pills to contact me and we can discuss a refill. She agreed to this plan.

## 2021-12-03 ENCOUNTER — PATIENT OUTREACH (OUTPATIENT)
Dept: SURGERY | Facility: CLINIC | Age: 41
End: 2021-12-03
Payer: OTHER GOVERNMENT

## 2021-12-03 NOTE — PROGRESS NOTES
Patient called in stated that she needs a refill on dilaudid. She is taking dilaudid 4mg BID with tylenol 975mg TID. I told her to go up on tylenol 975mg QID. We will refill dilaudid with script instructions of 2mg BID. Discussed with Nahomy Gandhi NP.

## 2021-12-04 DIAGNOSIS — G89.18 ACUTE POST-OPERATIVE PAIN: ICD-10-CM

## 2021-12-04 RX ORDER — HYDROMORPHONE HYDROCHLORIDE 2 MG/1
4 TABLET ORAL 2 TIMES DAILY PRN
Qty: 12 TABLET | Refills: 0 | Status: SHIPPED | OUTPATIENT
Start: 2021-12-04 | End: 2021-12-07

## 2021-12-06 ENCOUNTER — OFFICE VISIT (OUTPATIENT)
Dept: SURGERY | Facility: CLINIC | Age: 41
End: 2021-12-06
Payer: OTHER GOVERNMENT

## 2021-12-06 VITALS
HEIGHT: 62 IN | SYSTOLIC BLOOD PRESSURE: 110 MMHG | BODY MASS INDEX: 20.06 KG/M2 | OXYGEN SATURATION: 99 % | WEIGHT: 109 LBS | HEART RATE: 86 BPM | DIASTOLIC BLOOD PRESSURE: 81 MMHG

## 2021-12-06 DIAGNOSIS — Z09 FOLLOW-UP EXAMINATION AFTER COLORECTAL SURGERY: Primary | ICD-10-CM

## 2021-12-06 DIAGNOSIS — K50.012 CROHN'S DISEASE OF SMALL INTESTINE WITH INTESTINAL OBSTRUCTION (H): ICD-10-CM

## 2021-12-06 PROCEDURE — 99024 POSTOP FOLLOW-UP VISIT: CPT | Performed by: NURSE PRACTITIONER

## 2021-12-06 ASSESSMENT — MIFFLIN-ST. JEOR: SCORE: 1122.67

## 2021-12-06 ASSESSMENT — PAIN SCALES - GENERAL: PAINLEVEL: MODERATE PAIN (4)

## 2021-12-06 NOTE — NURSING NOTE
"Chief Complaint   Patient presents with     Post-op Visit     surgery 11/17/2021       Vitals:    12/06/21 1013   BP: 110/81   BP Location: Left arm   Patient Position: Sitting   Cuff Size: Adult Regular   Pulse: 86   SpO2: 99%   Weight: 109 lb   Height: 5' 2\"       Body mass index is 19.94 kg/m .    Drea Samuel CMA    "

## 2021-12-06 NOTE — PROGRESS NOTES
"Colon and Rectal Surgery Postoperative Clinic Note    RE: Alok Nino  : 1980  AURELIO: 2021    Alok Nino is a very pleasant 41 year old female with stricturing Crohn's disease with distal ileal obstruction who is now status post laparotomy with extensive lysis of adhesions, distal ileal resection with ileoileal anastomosis on 21 with Dr. Cartagena.    Final Diagnosis   A. DISTAL ILEUM, RESECTION:  - Ileum with chronic inflammation, inflammatory polyp, and ulceration  - Resection margins feature viable tissue  - Negative for dysplasia or malignancy     Interval history: Alok has been doing well. She had some abdominal pain after having part of Thanksgiving dinner but has been feeling well since. She ate three meals yesterday and has not had any nausea. Decreased appetite but her weight is up a few pounds. No fevers or chills. She is still needing dilaudid twice a day, 2-4 mg, but has been weaning down. Taking tylenol scheduled. She is wondering when she can restart her azathioprine and Stelara and follows with Dr. Tolbert.     Physical Examination:   /81 (BP Location: Left arm, Patient Position: Sitting, Cuff Size: Adult Regular)   Pulse 86   Ht 5' 2\"   Wt 109 lb   SpO2 99%   BMI 19.94 kg/m    General: alert, oriented, in no acute distress, sitting comfortably  HEENT: mucous membranes moist  Abdomen: incision well approximated with surgical glue in place and without erythema or drainage    Assessment/Plan:  41 year old female status post laparotomy with extensive lysis of adhesions, distal ileal resection with ileoileal anastomosis on 21 with Dr. Cartagena. She is recovering well. Weaning down on dilaudid and using tylenol scheduled. Incision healing well. Would like her to remain on a low residue diet for another 2-3 weeks and then slowly add fiber back in. May want to try a protein supplement to help with weight gain while she has a low appetite. Will check with Dr. Cartagena on " timing of restarting her azathioprine and Stelara. She is scheduled for follow up with Dr. Cartagena on 1/4/22. Encouraged her to contact the clinic in the meantime with any questions or concerns. Patient's questions were answered to her stated satisfaction and she is in agreement with this plan.    Medical history:  Past Medical History:   Diagnosis Date     C. difficile enteritis      Chronic pain      Crohn disease (H)      Melanoma (H)      PONV (postoperative nausea and vomiting)        Surgical history:  Past Surgical History:   Procedure Laterality Date     ABDOMEN SURGERY      3 for crohns dx     APPENDECTOMY       APPENDECTOMY       CHOLECYSTECTOMY       CHOLECYSTECTOMY       COLONOSCOPY       ENDOSCOPIC ULTRASOUND UPPER GASTROINTESTINAL TRACT (GI) N/A 11/13/2020    Procedure: ENDOSCOPIC ULTRASOUND, ESOPHAGOSCOPY / UPPER GASTROINTESTINAL TRACT with BIOPSY;  Surgeon: Cydney Garcia MD;  Location: [facility] OR     GYN SURGERY       H STATISTIC PICC LINE INSERTION >5YR, FAILED Right 11/10/2021    LUE unsuccessful attempt from another hospital, IR deferral     IR PICC PLACEMENT > 5 YRS OF AGE  11/11/2021     OTHER SURGICAL HISTORY      strictoplasty x3     OTHER SURGICAL HISTORY      adhesion removal x1     OTHER SURGICAL HISTORY      small bowel resections     PICC  05/10/2019          RESECTION ILEOCECAL  12/06/2013    Procedure: RESECTION ILEOCECAL;  Laparotomy, Extensive Lysis of Adhesions, Stricture Plasty X2  Anesthesia general with block;  Surgeon: Pete Cartagena MD;  Location: [facility] OR     RESECTION ILEOCOLIC N/A 11/17/2021    Procedure: Exploratory laparotomy, illeal resection, extensive lysis of adhesions (2 hr);  Surgeon: Pete Cartagena MD;  Location: [facility] OR       Problem list:  Patient Active Problem List    Diagnosis Date Noted     Acute Crohn's disease, with intestinal obstruction (H) 11/09/2021     Priority: Medium     Crohn disease (H) 12/06/2013     Priority: Medium     Inflammatory bowel  disease (Crohn's disease) (H) 09/10/2013     Priority: Medium       Medications:  Current Outpatient Medications   Medication Sig Dispense Refill     acetaminophen (TYLENOL) 325 MG tablet Take 3 tablets (975 mg) by mouth every 8 hours 100 tablet 0     calcitRIOL (ROCALTROL) 0.25 MCG capsule Take 0.25 mcg by mouth three times a week       colesevelam (WELCHOL) 625 MG tablet Take 1,875 mg by mouth       doxylamine (UNISOM) 25 MG TABS tablet Take 50 mg by mouth At Bedtime Been taking nightly for two years       enoxaparin ANTICOAGULANT (LOVENOX) 40 MG/0.4ML syringe Inject 0.4 mLs (40 mg) Subcutaneous every 24 hours for 21 days 8.4 mL 0     HYDROmorphone (DILAUDID) 2 MG tablet Take 2 tablets (4 mg) by mouth 2 times daily as needed for moderate to severe pain Wean down on dose and or frequency next few days. 12 tablet 0     metFORMIN osmotic (FORTAMET) 1000 MG 24 hr tablet Take 1,000 mg by mouth       methocarbamol (ROBAXIN) 500 MG tablet Take 1 tablet (500 mg) by mouth 4 times daily as needed for muscle spasms 40 tablet 0     ondansetron (ZOFRAN-ODT) 4 MG ODT tab        parenteral nutrition - PTA/DISCHARGE ORDER The TPN formula will print on the After Visit Summary Report. 1 each 0     potassium chloride ER (K-TAB) 20 MEQ CR tablet        vitamin D (ERGOCALCIFEROL) 13189 UNIT capsule Take 50,000 Units by mouth five times a week        vitamin E (TOCOPHEROL) 400 units (180 mg) capsule Take 400 Units by mouth daily         Allergies:  Allergies   Allergen Reactions     Carafate Hives     Morphine Hcl Other (See Comments)     SPASMS OF SPHINCTER OF ODDI  (BILIARY TRACT)     Codeine Nausea and Vomiting       Family history:  Family History   Problem Relation Age of Onset     No Known Problems Mother      Clotting Disorder Father      No Known Problems Brother      No Known Problems Son      Anesthesia Reaction No family hx of      Colon Cancer No family hx of      Crohn's Disease No family hx of      Ulcerative Colitis No  "family hx of      Colon Polyps No family hx of        Social history:  Social History     Tobacco Use     Smoking status: Never Smoker     Smokeless tobacco: Never Used   Substance Use Topics     Alcohol use: Yes     Comment: occasional/ 1 drink per 1-3 month     Marital status: .    Nursing Notes:   Drea Samuel  12/6/2021 10:16 AM  Signed  Chief Complaint   Patient presents with     Post-op Visit     surgery 11/17/2021       Vitals:    12/06/21 1013   BP: 110/81   BP Location: Left arm   Patient Position: Sitting   Cuff Size: Adult Regular   Pulse: 86   SpO2: 99%   Weight: 109 lb   Height: 5' 2\"       Body mass index is 19.94 kg/m .    Drea Samuel CMA         20 minutes spent on the date of the encounter doing chart review, history and exam, documentation and further activities as noted above.   This is a postop visit.    TACOS Lara, NP-C  Colon and Rectal Surgery  [facility]    This note was created using speech recognition software and may contain unintended word substitutions.    "

## 2021-12-06 NOTE — LETTER
"2021       RE: Alok Nino  [address]  [place] [address]     Dear Colleague,    Thank you for referring your patient, Alok Nino, to the [facility] COLON [facility] RECTAL SURGERY CLINIC [place] [facility]. Please see a copy of my visit note below.    Colon and Rectal Surgery Postoperative Clinic Note    RE: Alok Nino  : 1980  AURELIO: 2021    Alok Nino is a very pleasant 41 year old female with stricturing Crohn's disease with distal ileal obstruction who is now status post laparotomy with extensive lysis of adhesions, distal ileal resection with ileoileal anastomosis on 21 with Dr. Cartagena.    Final Diagnosis   A. DISTAL ILEUM, RESECTION:  - Ileum with chronic inflammation, inflammatory polyp, and ulceration  - Resection margins feature viable tissue  - Negative for dysplasia or malignancy     Interval history: Alok has been doing well. She had some abdominal pain after having part of Thanksgiving dinner but has been feeling well since. She ate three meals yesterday and has not had any nausea. Decreased appetite but her weight is up a few pounds. No fevers or chills. She is still needing dilaudid twice a day, 2-4 mg, but has been weaning down. Taking tylenol scheduled. She is wondering when she can restart her azathioprine and Stelara and follows with Dr. Tolbert.     Physical Examination:   /81 (BP Location: Left arm, Patient Position: Sitting, Cuff Size: Adult Regular)   Pulse 86   Ht 5' 2\"   Wt 109 lb   SpO2 99%   BMI 19.94 kg/m    General: alert, oriented, in no acute distress, sitting comfortably  HEENT: mucous membranes moist  Abdomen: incision well approximated with surgical glue in place and without erythema or drainage    Assessment/Plan:  41 year old female status post laparotomy with extensive lysis of adhesions, distal ileal resection with ileoileal anastomosis on 21 with Dr. Cartagena. " She is recovering well. Weaning down on dilaudid and using tylenol scheduled. Incision healing well. Would like her to remain on a low residue diet for another 2-3 weeks and then slowly add fiber back in. May want to try a protein supplement to help with weight gain while she has a low appetite. Will check with Dr. Cartagena on timing of restarting her azathioprine and Stelara. She is scheduled for follow up with Dr. Cartagena on 1/4/22. Encouraged her to contact the clinic in the meantime with any questions or concerns. Patient's questions were answered to her stated satisfaction and she is in agreement with this plan.    Medical history:  Past Medical History:   Diagnosis Date     C. difficile enteritis      Chronic pain      Crohn disease (H)      Melanoma (H)      PONV (postoperative nausea and vomiting)        Surgical history:  Past Surgical History:   Procedure Laterality Date     ABDOMEN SURGERY      3 for crohns dx     APPENDECTOMY       APPENDECTOMY       CHOLECYSTECTOMY       CHOLECYSTECTOMY       COLONOSCOPY       ENDOSCOPIC ULTRASOUND UPPER GASTROINTESTINAL TRACT (GI) N/A 11/13/2020    Procedure: ENDOSCOPIC ULTRASOUND, ESOPHAGOSCOPY / UPPER GASTROINTESTINAL TRACT with BIOPSY;  Surgeon: Cydney Garcia MD;  Location:  OR     GYN SURGERY       H STATISTIC PICC LINE INSERTION >5YR, FAILED Right 11/10/2021    LUE unsuccessful attempt from another hospital, IR deferral     IR PICC PLACEMENT > 5 YRS OF AGE  11/11/2021     OTHER SURGICAL HISTORY      strictoplasty x3     OTHER SURGICAL HISTORY      adhesion removal x1     OTHER SURGICAL HISTORY      small bowel resections     PICC  05/10/2019          RESECTION ILEOCECAL  12/06/2013    Procedure: RESECTION ILEOCECAL;  Laparotomy, Extensive Lysis of Adhesions, Stricture Plasty X2  Anesthesia general with block;  Surgeon: Pete Cartagena MD;  Location: [facility] OR     RESECTION ILEOCOLIC N/A 11/17/2021    Procedure: Exploratory laparotomy, illeal resection,  extensive lysis of adhesions (2 hr);  Surgeon: Pete Cartagena MD;  Location: [facility] OR       Problem list:  Patient Active Problem List    Diagnosis Date Noted     Acute Crohn's disease, with intestinal obstruction (H) 11/09/2021     Priority: Medium     Crohn disease (H) 12/06/2013     Priority: Medium     Inflammatory bowel disease (Crohn's disease) (H) 09/10/2013     Priority: Medium       Medications:  Current Outpatient Medications   Medication Sig Dispense Refill     acetaminophen (TYLENOL) 325 MG tablet Take 3 tablets (975 mg) by mouth every 8 hours 100 tablet 0     calcitRIOL (ROCALTROL) 0.25 MCG capsule Take 0.25 mcg by mouth three times a week       colesevelam (WELCHOL) 625 MG tablet Take 1,875 mg by mouth       doxylamine (UNISOM) 25 MG TABS tablet Take 50 mg by mouth At Bedtime Been taking nightly for two years       enoxaparin ANTICOAGULANT (LOVENOX) 40 MG/0.4ML syringe Inject 0.4 mLs (40 mg) Subcutaneous every 24 hours for 21 days 8.4 mL 0     HYDROmorphone (DILAUDID) 2 MG tablet Take 2 tablets (4 mg) by mouth 2 times daily as needed for moderate to severe pain Wean down on dose and or frequency next few days. 12 tablet 0     metFORMIN osmotic (FORTAMET) 1000 MG 24 hr tablet Take 1,000 mg by mouth       methocarbamol (ROBAXIN) 500 MG tablet Take 1 tablet (500 mg) by mouth 4 times daily as needed for muscle spasms 40 tablet 0     ondansetron (ZOFRAN-ODT) 4 MG ODT tab        parenteral nutrition - PTA/DISCHARGE ORDER The TPN formula will print on the After Visit Summary Report. 1 each 0     potassium chloride ER (K-TAB) 20 MEQ CR tablet        vitamin D (ERGOCALCIFEROL) 28699 UNIT capsule Take 50,000 Units by mouth five times a week        vitamin E (TOCOPHEROL) 400 units (180 mg) capsule Take 400 Units by mouth daily         Allergies:  Allergies   Allergen Reactions     Carafate Hives     Morphine Hcl Other (See Comments)     SPASMS OF SPHINCTER OF ODDI  (BILIARY TRACT)     Codeine Nausea and Vomiting  "      Family history:  Family History   Problem Relation Age of Onset     No Known Problems Mother      Clotting Disorder Father      No Known Problems Brother      No Known Problems Son      Anesthesia Reaction No family hx of      Colon Cancer No family hx of      Crohn's Disease No family hx of      Ulcerative Colitis No family hx of      Colon Polyps No family hx of        Social history:  Social History     Tobacco Use     Smoking status: Never Smoker     Smokeless tobacco: Never Used   Substance Use Topics     Alcohol use: Yes     Comment: occasional/ 1 drink per 1-3 month     Marital status: .    Nursing Notes:   Drea Samuel  12/6/2021 10:16 AM  Signed  Chief Complaint   Patient presents with     Post-op Visit     surgery 11/17/2021       Vitals:    12/06/21 1013   BP: 110/81   BP Location: Left arm   Patient Position: Sitting   Cuff Size: Adult Regular   Pulse: 86   SpO2: 99%   Weight: 109 lb   Height: 5' 2\"       Body mass index is 19.94 kg/m .    Drea Samuel CMA         20 minutes spent on the date of the encounter doing chart review, history and exam, documentation and further activities as noted above.   This is a postop visit.    TACOS Lara, NP-C  Colon and Rectal Surgery  [facility]    This note was created using speech recognition software and may contain unintended word substitutions.        Again, thank you for allowing me to participate in the care of your patient.      Sincerely,    TACOS Lara CNP      "

## 2021-12-07 DIAGNOSIS — G89.18 ACUTE POST-OPERATIVE PAIN: ICD-10-CM

## 2021-12-07 RX ORDER — HYDROMORPHONE HYDROCHLORIDE 2 MG/1
2 TABLET ORAL 2 TIMES DAILY PRN
Qty: 15 TABLET | Refills: 0 | Status: SHIPPED | OUTPATIENT
Start: 2021-12-07 | End: 2022-03-09

## 2021-12-09 LAB — PREALB SERPL IA-MCNC: 7 MG/DL (ref 15–45)

## 2021-12-27 NOTE — PROGRESS NOTES
"Colon and Rectal Surgery Clinic Note      RE: Alok Nino  : 1980  AURELIO: 2022    Alok Nino is a very pleasant 41 year old female with stricturing Crohn's disease with distal ileal obstruction who is now status post laparotomy with extensive lysis of adhesions, distal ileal resection with ileoileal anastomosis on 21.    Alok is doing generally well, but she notes a 10 pound weight loss since her surgery.  She has been eating a somewhat restricted diet per the postoperative instructions but has recently liberalized this.  She is moving her bowels up to 10 times daily, which is more than her usual but she always has some degree of chronic diarrhea.  She has spoken to Dr. Tolbert about this.  A C. difficile study was negative.  He started her on cholestyramine, which she has used successfully in the past.    Final Diagnosis   A. DISTAL ILEUM, RESECTION:  - Ileum with chronic inflammation, inflammatory polyp, and ulceration  - Resection margins feature viable tissue  - Negative for dysplasia or malignancy         Physical examination: Alok looks healthy but visibly thin.    Vitals: /69 (BP Location: Left arm, Patient Position: Sitting, Cuff Size: Adult Regular)   Pulse 92   Temp 97.8  F (36.6  C) (Oral)   Ht 5' 2\"   Wt 102 lb 11.2 oz   SpO2 99%   BMI 18.78 kg/m    BMI= Body mass index is 18.78 kg/m .    Abdomen is soft, flat, and nontender.  Transverse incision has healed nicely.    Laboratory data:    Recent Labs   Lab Test 21  0752 21  0744 21  0722 21  0720   WBC  --   --   --  11.7*   HGB  --   --   --  10.2*     --    < > 182   CR 0.57 0.56   < > 0.68   ALBUMIN  --  2.0*   < >  --    BILITOTAL  --  0.3   < >  --    ALKPHOS  --  67   < >  --    ALT  --  14   < >  --    AST  --  11   < >  --    INR  --  1.12   < >  --     < > = values in this interval not displayed.       Assessment/plan:  Alok is doing generally well but has had an increase in " her diarrhea and has lost significant weight for her.  She is liberalizing her diet and is considering going on a paleo diet, which has been very successful for her in the past.  She started with a relatively short gut but her resection was very limited in length and I do not think we substantially shortened her small bowel.  She has had success with cholestyramine in the past, and she is trying this.  I suggested she also try Metamucil and Imodium as needed.  I also discussed that based on the anatomically distorted and chronically dilated small bowel segments, she is a possible candidate for small intestinal bowel overgrowth.  Alok reports that she has been treated for this in the past with rifaximin.  I do not think this is the most likely cause of her problem, however, so I advised against starting rifaximin at present.  I think a combination of improved diet and other more conservative measures will probably address the problem.  In any case, Alok can continue her follow-up with Dr. Tolbert and can follow-up with me on an as-needed basis.  I answered all of her questions to her stated satisfaction.  She expressed her understanding and agreement.    For details of past medical history, surgical history, family history, medications, allergies, and review of systems, please see details below.    Medical history:  Past Medical History:   Diagnosis Date     C. difficile enteritis      Chronic pain      Crohn disease (H)      Melanoma (H)      PONV (postoperative nausea and vomiting)        Surgical history:  Past Surgical History:   Procedure Laterality Date     ABDOMEN SURGERY      3 for crohns dx     APPENDECTOMY       APPENDECTOMY       CHOLECYSTECTOMY       CHOLECYSTECTOMY       COLONOSCOPY       ENDOSCOPIC ULTRASOUND UPPER GASTROINTESTINAL TRACT (GI) N/A 11/13/2020    Procedure: ENDOSCOPIC ULTRASOUND, ESOPHAGOSCOPY / UPPER GASTROINTESTINAL TRACT with BIOPSY;  Surgeon: Cydney Garcia MD;  Location:  [facility] OR     GYN SURGERY       H STATISTIC PICC LINE INSERTION >5YR, FAILED Right 11/10/2021    LUE unsuccessful attempt from another hospital, IR deferral     IR PICC PLACEMENT > 5 YRS OF AGE  11/11/2021     OTHER SURGICAL HISTORY      strictoplasty x3     OTHER SURGICAL HISTORY      adhesion removal x1     OTHER SURGICAL HISTORY      small bowel resections     PICC  05/10/2019          RESECTION ILEOCECAL  12/06/2013    Procedure: RESECTION ILEOCECAL;  Laparotomy, Extensive Lysis of Adhesions, Stricture Plasty X2  Anesthesia general with block;  Surgeon: Pete Cartagena MD;  Location: [facility] OR     RESECTION ILEOCOLIC N/A 11/17/2021    Procedure: Exploratory laparotomy, illeal resection, extensive lysis of adhesions (2 hr);  Surgeon: Pete Cartagena MD;  Location: [facility] OR       Family history:  Family History   Problem Relation Age of Onset     No Known Problems Mother      Clotting Disorder Father      No Known Problems Brother      No Known Problems Son      Anesthesia Reaction No family hx of      Colon Cancer No family hx of      Crohn's Disease No family hx of      Ulcerative Colitis No family hx of      Colon Polyps No family hx of        Medications:  Current Outpatient Medications   Medication Sig Dispense Refill     acetaminophen (TYLENOL) 325 MG tablet Take 3 tablets (975 mg) by mouth every 8 hours 100 tablet 0     calcitRIOL (ROCALTROL) 0.25 MCG capsule Take 0.25 mcg by mouth three times a week       doxylamine (UNISOM) 25 MG TABS tablet Take 50 mg by mouth At Bedtime Been taking nightly for two years       magnesium oxide (MAG-OX) 400 (240 Mg) MG tablet Take 400 mg by mouth daily       ustekinumab (STELARA) 90 MG/ML Inject 90 mg Subcutaneous       vitamin D (ERGOCALCIFEROL) 85505 UNIT capsule Take 50,000 Units by mouth five times a week        vitamin E (TOCOPHEROL) 400 units (180 mg) capsule Take 400 Units by mouth daily       colesevelam (WELCHOL) 625 MG tablet Take 1,875 mg by mouth (Patient not taking:  "Reported on 1/4/2022)       HYDROmorphone (DILAUDID) 2 MG tablet Take 1 tablet (2 mg) by mouth 2 times daily as needed for moderate to severe pain Wean down on dose and or frequency next few days. (Patient not taking: Reported on 1/4/2022) 15 tablet 0     metFORMIN osmotic (FORTAMET) 1000 MG 24 hr tablet Take 1,000 mg by mouth (Patient not taking: Reported on 1/4/2022)       methocarbamol (ROBAXIN) 500 MG tablet Take 1 tablet (500 mg) by mouth 4 times daily as needed for muscle spasms (Patient not taking: Reported on 1/4/2022) 40 tablet 0     ondansetron (ZOFRAN-ODT) 4 MG ODT tab  (Patient not taking: Reported on 1/4/2022)       parenteral nutrition - PTA/DISCHARGE ORDER The TPN formula will print on the After Visit Summary Report. (Patient not taking: Reported on 1/4/2022) 1 each 0     potassium chloride ER (K-TAB) 20 MEQ CR tablet  (Patient not taking: Reported on 1/4/2022)         Allergies:  The patientis allergic to carafate, morphine hcl, and codeine.    Social history:  Social History     Tobacco Use     Smoking status: Never Smoker     Smokeless tobacco: Never Used   Substance Use Topics     Alcohol use: Yes     Comment: occasional/ 1 drink per 1-3 month     Marital status: .    Review of Systems:  Nursing Notes:   Ivanna Baca CMA  1/4/2022 10:30 AM  Signed  Chief Complaint   Patient presents with     Surgical Followup     Post-op follow up, DOS:11/17/2021       Vitals:    01/04/22 1025   BP: 113/69   BP Location: Left arm   Patient Position: Sitting   Cuff Size: Adult Regular   Pulse: 92   Temp: 97.8  F (36.6  C)   TempSrc: Oral   SpO2: 99%   Weight: 102 lb 11.2 oz   Height: 5' 2\"       Body mass index is 18.78 kg/m .          Ivanna Henderson CMA         20 minutes spent on the date of the encounter doing chart review, history and exam, documentation, review of imaging, and further activities as noted above.     Pete Cartagena MD   Professor and Chief  Division of Colon " and Rectal Surgery  [facility]      Referring Provider:  Pete Cartagena MD  [address]  [place] [address]     Primary Care Provider:  Nakita Odonnell    This note was created using speech recognition software and may contain unintended word substitutions.

## 2022-01-04 ENCOUNTER — OFFICE VISIT (OUTPATIENT)
Dept: SURGERY | Facility: CLINIC | Age: 42
End: 2022-01-04
Payer: OTHER GOVERNMENT

## 2022-01-04 VITALS
HEIGHT: 62 IN | SYSTOLIC BLOOD PRESSURE: 113 MMHG | DIASTOLIC BLOOD PRESSURE: 69 MMHG | TEMPERATURE: 97.8 F | OXYGEN SATURATION: 99 % | BODY MASS INDEX: 18.9 KG/M2 | HEART RATE: 92 BPM | WEIGHT: 102.7 LBS

## 2022-01-04 DIAGNOSIS — K50.012 CROHN'S DISEASE OF SMALL INTESTINE WITH INTESTINAL OBSTRUCTION (H): ICD-10-CM

## 2022-01-04 DIAGNOSIS — Z09 FOLLOW-UP EXAMINATION AFTER COLORECTAL SURGERY: Primary | ICD-10-CM

## 2022-01-04 PROCEDURE — 99024 POSTOP FOLLOW-UP VISIT: CPT | Performed by: COLON & RECTAL SURGERY

## 2022-01-04 RX ORDER — LANOLIN ALCOHOL/MO/W.PET/CERES
400 CREAM (GRAM) TOPICAL EVERY EVENING
COMMUNITY
End: 2023-08-22

## 2022-01-04 RX ORDER — USTEKINUMAB 90 MG/ML
90 INJECTION, SOLUTION SUBCUTANEOUS
COMMUNITY
Start: 2021-12-21

## 2022-01-04 ASSESSMENT — MIFFLIN-ST. JEOR: SCORE: 1094.09

## 2022-01-04 ASSESSMENT — PAIN SCALES - GENERAL: PAINLEVEL: NO PAIN (0)

## 2022-01-04 NOTE — LETTER
"2022       RE: Alok Nino  [address]  [facility] [address]     Dear Colleague,    Thank you for referring your patient, Alok Nino, to the [facility] [place] at [facility]. Please see a copy of my visit note below.    Colon and Rectal Surgery Clinic Note      RE: Alok Nino  : 1980  AURELIO: 2022    Alok Nino is a very pleasant 41 year old female with stricturing Crohn's disease with distal ileal obstruction who is now status post laparotomy with extensive lysis of adhesions, distal ileal resection with ileoileal anastomosis on 21.    Alok is doing generally well, but she notes a 10 pound weight loss since her surgery.  She has been eating a somewhat restricted diet per the postoperative instructions but has recently liberalized this.  She is moving her bowels up to 10 times daily, which is more than her usual but she always has some degree of chronic diarrhea.  She has spoken to Dr. Tolbert about this.  A C. difficile study was negative.  He started her on cholestyramine, which she has used successfully in the past.    Final Diagnosis   A. DISTAL ILEUM, RESECTION:  - Ileum with chronic inflammation, inflammatory polyp, and ulceration  - Resection margins feature viable tissue  - Negative for dysplasia or malignancy         Physical examination: Alok looks healthy but visibly thin.    Vitals: /69 (BP Location: Left arm, Patient Position: Sitting, Cuff Size: Adult Regular)   Pulse 92   Temp 97.8  F (36.6  C) (Oral)   Ht 5' 2\"   Wt 102 lb 11.2 oz   SpO2 99%   BMI 18.78 kg/m    BMI= Body mass index is 18.78 kg/m .    Abdomen is soft, flat, and nontender.  Transverse incision has healed nicely.    Laboratory data:    Recent Labs   Lab Test 21  0752 21  0744 21  0722 21  0720   WBC  --   --   --  11.7*   HGB  --   --   --  10.2*     --    < > " 182   CR 0.57 0.56   < > 0.68   ALBUMIN  --  2.0*   < >  --    BILITOTAL  --  0.3   < >  --    ALKPHOS  --  67   < >  --    ALT  --  14   < >  --    AST  --  11   < >  --    INR  --  1.12   < >  --     < > = values in this interval not displayed.       Assessment/plan:  Alok is doing generally well but has had an increase in her diarrhea and has lost significant weight for her.  She is liberalizing her diet and is considering going on a paleo diet, which has been very successful for her in the past.  She started with a relatively short gut but her resection was very limited in length and I do not think we substantially shortened her small bowel.  She has had success with cholestyramine in the past, and she is trying this.  I suggested she also try Metamucil and Imodium as needed.  I also discussed that based on the anatomically distorted and chronically dilated small bowel segments, she is a possible candidate for small intestinal bowel overgrowth.  Alok reports that she has been treated for this in the past with rifaximin.  I do not think this is the most likely cause of her problem, however, so I advised against starting rifaximin at present.  I think a combination of improved diet and other more conservative measures will probably address the problem.  In any case, Alok can continue her follow-up with Dr. Tolbert and can follow-up with me on an as-needed basis.  I answered all of her questions to her stated satisfaction.  She expressed her understanding and agreement.    For details of past medical history, surgical history, family history, medications, allergies, and review of systems, please see details below.    Medical history:  Past Medical History:   Diagnosis Date     C. difficile enteritis      Chronic pain      Crohn disease (H)      Melanoma (H)      PONV (postoperative nausea and vomiting)        Surgical history:  Past Surgical History:   Procedure Laterality Date     ABDOMEN SURGERY      3 for  crohns dx     APPENDECTOMY       APPENDECTOMY       CHOLECYSTECTOMY       CHOLECYSTECTOMY       COLONOSCOPY       ENDOSCOPIC ULTRASOUND UPPER GASTROINTESTINAL TRACT (GI) N/A 11/13/2020    Procedure: ENDOSCOPIC ULTRASOUND, ESOPHAGOSCOPY / UPPER GASTROINTESTINAL TRACT with BIOPSY;  Surgeon: Cydney Garcia MD;  Location: [facility] OR     GYN SURGERY       H STATISTIC PICC LINE INSERTION >5YR, FAILED Right 11/10/2021    LUE unsuccessful attempt from another hospital, IR deferral     IR PICC PLACEMENT > 5 YRS OF AGE  11/11/2021     OTHER SURGICAL HISTORY      strictoplasty x3     OTHER SURGICAL HISTORY      adhesion removal x1     OTHER SURGICAL HISTORY      small bowel resections     PICC  05/10/2019          RESECTION ILEOCECAL  12/06/2013    Procedure: RESECTION ILEOCECAL;  Laparotomy, Extensive Lysis of Adhesions, Stricture Plasty X2  Anesthesia general with block;  Surgeon: Pete Cartagena MD;  Location: [facility] OR     RESECTION ILEOCOLIC N/A 11/17/2021    Procedure: Exploratory laparotomy, illeal resection, extensive lysis of adhesions (2 hr);  Surgeon: Pete Cartagena MD;  Location: [facility] OR       Family history:  Family History   Problem Relation Age of Onset     No Known Problems Mother      Clotting Disorder Father      No Known Problems Brother      No Known Problems Son      Anesthesia Reaction No family hx of      Colon Cancer No family hx of      Crohn's Disease No family hx of      Ulcerative Colitis No family hx of      Colon Polyps No family hx of        Medications:  Current Outpatient Medications   Medication Sig Dispense Refill     acetaminophen (TYLENOL) 325 MG tablet Take 3 tablets (975 mg) by mouth every 8 hours 100 tablet 0     calcitRIOL (ROCALTROL) 0.25 MCG capsule Take 0.25 mcg by mouth three times a week       doxylamine (UNISOM) 25 MG TABS tablet Take 50 mg by mouth At Bedtime Been taking nightly for two years       magnesium oxide (MAG-OX) 400 (240 Mg) MG tablet Take 400 mg by mouth daily        ustekinumab (STELARA) 90 MG/ML Inject 90 mg Subcutaneous       vitamin D (ERGOCALCIFEROL) 91530 UNIT capsule Take 50,000 Units by mouth five times a week        vitamin E (TOCOPHEROL) 400 units (180 mg) capsule Take 400 Units by mouth daily       colesevelam (WELCHOL) 625 MG tablet Take 1,875 mg by mouth (Patient not taking: Reported on 1/4/2022)       HYDROmorphone (DILAUDID) 2 MG tablet Take 1 tablet (2 mg) by mouth 2 times daily as needed for moderate to severe pain Wean down on dose and or frequency next few days. (Patient not taking: Reported on 1/4/2022) 15 tablet 0     metFORMIN osmotic (FORTAMET) 1000 MG 24 hr tablet Take 1,000 mg by mouth (Patient not taking: Reported on 1/4/2022)       methocarbamol (ROBAXIN) 500 MG tablet Take 1 tablet (500 mg) by mouth 4 times daily as needed for muscle spasms (Patient not taking: Reported on 1/4/2022) 40 tablet 0     ondansetron (ZOFRAN-ODT) 4 MG ODT tab  (Patient not taking: Reported on 1/4/2022)       parenteral nutrition - PTA/DISCHARGE ORDER The TPN formula will print on the After Visit Summary Report. (Patient not taking: Reported on 1/4/2022) 1 each 0     potassium chloride ER (K-TAB) 20 MEQ CR tablet  (Patient not taking: Reported on 1/4/2022)         Allergies:  The patientis allergic to carafate, morphine hcl, and codeine.    Social history:  Social History     Tobacco Use     Smoking status: Never Smoker     Smokeless tobacco: Never Used   Substance Use Topics     Alcohol use: Yes     Comment: occasional/ 1 drink per 1-3 month     Marital status: .    Review of Systems:  Nursing Notes:   Ivanna Baca CMA  1/4/2022 10:30 AM  Signed  Chief Complaint   Patient presents with     Surgical Followup     Post-op follow up, DOS:11/17/2021       Vitals:    01/04/22 1025   BP: 113/69   BP Location: Left arm   Patient Position: Sitting   Cuff Size: Adult Regular   Pulse: 92   Temp: 97.8  F (36.6  C)   TempSrc: Oral   SpO2: 99%   Weight: 102 lb 11.2 oz  "  Height: 5' 2\"       Body mass index is 18.78 kg/m .          Ivanna Henderson CMA         20 minutes spent on the date of the encounter doing chart review, history and exam, documentation, review of imaging, and further activities as noted above.     Pete Cartagena MD   Professor and Chief  Division of Colon and Rectal Surgery  [facility]      Referring Provider:  Pete Cartagena MD  [address] [place] [address] [address]  [place] [address]     Primary Care Provider:  Nakita Odonnell    This note was created using speech recognition software and may contain unintended word substitutions.      Again, thank you for allowing me to participate in the care of your patient.      Sincerely,    Pete Cartagena MD      "

## 2022-01-04 NOTE — NURSING NOTE
"Chief Complaint   Patient presents with     Surgical Followup     Post-op follow up, DOS:11/17/2021       Vitals:    01/04/22 1025   BP: 113/69   BP Location: Left arm   Patient Position: Sitting   Cuff Size: Adult Regular   Pulse: 92   Temp: 97.8  F (36.6  C)   TempSrc: Oral   SpO2: 99%   Weight: 102 lb 11.2 oz   Height: 5' 2\"       Body mass index is 18.78 kg/m .          Ivanna Henderson CMA    "

## 2022-02-04 ENCOUNTER — TELEPHONE (OUTPATIENT)
Dept: SURGERY | Facility: CLINIC | Age: 42
End: 2022-02-04
Payer: OTHER GOVERNMENT

## 2022-02-04 NOTE — TELEPHONE ENCOUNTER
I received a phone call from Dr. Padilla Tran of [facility].  Alok presented yesterday with a small bowel obstruction.  She is not vomiting and her stomach is not dilated so they have not placed a nasogastric tube.  White count was slightly elevated to 12,000 yesterday but is normalized to 6000 today.  She is generally comfortable.  At present, this appears to be a simple small bowel obstruction that can be managed conservatively.  The patient prefers to stay in [place] and less surgery is required and Dr. Tran is comfortable managing her.  We discussed her abdomen including her multiple previous surgeries, chronic dilated bowel segments from previous resections and stricturoplasties, and relatively short gut that requires all possible small bowel preservation.  The plan is for Dr. Tran to continue managing the patient but if things are worsening he will be in touch to transfer her here.  I advised that if she needs any surgery I feel strongly that should be done at the [facility], and he is in agreement with this.  He has requested that the images be pushed but they have not yet arrived.  He will be in touch if things are not resolving and certainly if there are worsening.

## 2022-02-07 ENCOUNTER — TELEPHONE (OUTPATIENT)
Dept: SURGERY | Facility: CLINIC | Age: 42
End: 2022-02-07
Payer: OTHER GOVERNMENT

## 2022-02-07 NOTE — TELEPHONE ENCOUNTER
Dr. Cartagena was informed about Alok's recent SBO by the MD who was managing her care.LVM with pt stating that per Dr. Cartagena if pt is doing well no need to follow up.     Alok stated she had questions for Dr. Cartagena and would like to have a video visit. I set up for 2/22/2022.

## 2022-02-07 NOTE — TELEPHONE ENCOUNTER
[facility] Call Center    Phone Message    May a detailed message be left on voicemail: yes     Reason for Call: Other:       Alok was at [facility] over the weekend for a bowel obstruction.     Please  Call Alok back to discuss what her next steps should be.       Action Taken: Message routed to:  [facility] CRS    Travel Screening: Not Applicable

## 2022-02-22 ENCOUNTER — VIRTUAL VISIT (OUTPATIENT)
Dept: SURGERY | Facility: CLINIC | Age: 42
End: 2022-02-22
Payer: OTHER GOVERNMENT

## 2022-02-22 VITALS — HEIGHT: 62 IN | WEIGHT: 99 LBS | BODY MASS INDEX: 18.22 KG/M2

## 2022-02-22 DIAGNOSIS — K50.012 CROHN'S DISEASE OF SMALL INTESTINE WITH INTESTINAL OBSTRUCTION (H): Primary | ICD-10-CM

## 2022-02-22 PROCEDURE — 99215 OFFICE O/P EST HI 40 MIN: CPT | Mod: 95 | Performed by: COLON & RECTAL SURGERY

## 2022-02-22 ASSESSMENT — PAIN SCALES - GENERAL: PAINLEVEL: MILD PAIN (3)

## 2022-02-22 NOTE — PROGRESS NOTES
"Colon and Rectal Surgery Clinic Note      RE: Alok Nino  : 1980  AURELIO: 2022    Alok Nino is a 41 year old female who is being evaluated via a billable video visit.      The patient has been notified of following:     \"This video visit will be conducted via a call between you and your physician/provider. We have found that certain health care needs can be provided without the need for an in-person physical exam.  This service lets us provide the care you need with a video conversation.  If a prescription is necessary we can send it directly to your pharmacy.  If lab work is needed we can place an order for that and you can then stop by our lab to have the test done at a later time.    Video visits are billed at different rates depending on your insurance coverage.  Please reach out to your insurance provider with any questions.    If during the course of the call the physician/provider feels a video visit is not appropriate, you will not be charged for this service.\"    Patient has given verbal consent for Video visit? Yes    Video Start Time: 820     Alok Nino is a very pleasant 41 year old female with stricturing Crohn's disease with distal ileal obstruction status post laparotomy with extensive lysis of adhesions, distal ileal resection with ileoileal anastomosis on 21.    Alok initially did well postoperatively.  I saw her in clinic on 2022 at which time she was having some diarrhea (C. difficile negative) and she had been started on cholestyramine by Dr. Tolbert.  I spoke with Dr. Padilla Tran, a general surgeon at [facility], on 2022.  Alok had been admitted with what seemed to be an uncomplicated small bowel obstruction.  She did not need an NG tube at that time and elected to stay there and right out her obstruction.  She was discharged after 3 days but had to be readmitted and had a subsequent 5-day stay, again for small bowel obstruction that was treated " "conservatively.  Since that time, she has been doing only \"fair.\".  She feels weak and not her normal self.  She continues to have diarrheal stools, which she has often had in the past but when she is well she can have formed stools.  She has been maintaining herself on a restricted diet that includes thick soups and shakes but roughly 1 hour after she eats she says she can see visible peristalsis through her abdominal wall.  She rates her current pain at 3 out of 10 after eating.  She has not been vomiting.  She saw Dr. Tolbert who was concerned about the possibility of active Crohn's disease so he started her on budesonide 9 mg daily.  She is scheduled for colonoscopy with him in mid March.  Her stools did test positive for norovirus.  Alok has been treated in the past for bacterial overgrowth with Xifaxan, and this has been helpful.        Laboratory data:    Recent Labs   Lab Test 11/26/21  0752 11/25/21  0744 11/19/21  0722 11/18/21  0720   WBC  --   --   --  11.7*   HGB  --   --   --  10.2*     --    < > 182   CR 0.57 0.56   < > 0.68   ALBUMIN  --  2.0*   < >  --    BILITOTAL  --  0.3   < >  --    ALKPHOS  --  67   < >  --    ALT  --  14   < >  --    AST  --  11   < >  --    INR  --  1.12   < >  --     < > = values in this interval not displayed.       Assessment/plan: Persisting obstructive symptoms now 3 months following short ileocolic resection for stricturing Crohn's disease.  I reviewed her imaging with Dr. Eliceo Mitchell in radiology.  She has multiple chronically dilated/surgically deformed small bowel segments within her abdomen that have been stable.  Looking at the area of the anastomosis, the immediate upstream bowel does not seem dilated, but this eventually leads to the large, chronically dilated, boggy small bowel segments clearly visible in the left midabdomen.  There is nothing obvious to suggest recurrent Crohn's disease, though there is some scattered mucosal enhancement.    Alok does " have symptoms suggestive of a chronic partial small bowel obstruction, but her most recent scan is not suggestive of an anastomotic problem and overall the scan is quite difficult to interpret due to the multiple chronic intestinal deformities that have always been present.  She is now on budesonide but I do not see any convincing evidence of recurrent Crohn's disease.  We will get an MR enterogram to see if there are any subtle Crohn's changes.  It is possible that she has bacterial overgrowth, given the dilated and possibly stagnant bowel segments.  She may benefit from a hydrogen breath test.  She is scheduled for a colonoscopy in the next couple of weeks, and this should afford an opportunity to assess the patency of her anastomosis, and to dilate it if necessary.    I answered all of Alok's questions to her stated satisfaction.  She expressed her understanding and agreement.    Video-Visit Details    Type of service:  Video Visit    Video End Time (time video stopped): 0839    Originating Location (pt. Location): [place]    Distant Location (provider location):  [facility] COLON AND RECTAL SURGERY CLINIC [place]     Mode of Communication:  Video Conference via Dot    40 minutes spent on the date of the encounter doing chart review, history, review of imaging including thorough review with Dr. Mitchell,  Documentation, counseling, and further activities as noted above, which includes my time spent on video with the patient/family.       For details of past medical history, surgical history, family history, medications, allergies, and review of systems, please see details below.    Medical history:  Past Medical History:   Diagnosis Date     C. difficile enteritis      Chronic pain      Crohn disease (H)      Melanoma (H)      PONV (postoperative nausea and vomiting)        Surgical history:  Past Surgical History:   Procedure Laterality Date     ABDOMEN SURGERY      3 for crohns dx     APPENDECTOMY        APPENDECTOMY       CHOLECYSTECTOMY       CHOLECYSTECTOMY       COLONOSCOPY       ENDOSCOPIC ULTRASOUND UPPER GASTROINTESTINAL TRACT (GI) N/A 11/13/2020    Procedure: ENDOSCOPIC ULTRASOUND, ESOPHAGOSCOPY / UPPER GASTROINTESTINAL TRACT with BIOPSY;  Surgeon: Cydney Garcia MD;  Location: [facility] OR     GYN SURGERY       H STATISTIC PICC LINE INSERTION >5YR, FAILED Right 11/10/2021    LUE unsuccessful attempt from another hospital, IR deferral     IR PICC PLACEMENT > 5 YRS OF AGE  11/11/2021     OTHER SURGICAL HISTORY      strictoplasty x3     OTHER SURGICAL HISTORY      adhesion removal x1     OTHER SURGICAL HISTORY      small bowel resections     PICC  05/10/2019          RESECTION ILEOCECAL  12/06/2013    Procedure: RESECTION ILEOCECAL;  Laparotomy, Extensive Lysis of Adhesions, Stricture Plasty X2  Anesthesia general with block;  Surgeon: Pete aCrtagena MD;  Location: [facility] OR     RESECTION ILEOCOLIC N/A 11/17/2021    Procedure: Exploratory laparotomy, illeal resection, extensive lysis of adhesions (2 hr);  Surgeon: Pete Cartagena MD;  Location: [facility] OR       Family history:  Family History   Problem Relation Age of Onset     No Known Problems Mother      Clotting Disorder Father      No Known Problems Brother      No Known Problems Son      Anesthesia Reaction No family hx of      Colon Cancer No family hx of      Crohn's Disease No family hx of      Ulcerative Colitis No family hx of      Colon Polyps No family hx of        Medications:  Current Outpatient Medications   Medication Sig Dispense Refill     acetaminophen (TYLENOL) 325 MG tablet Take 3 tablets (975 mg) by mouth every 8 hours 100 tablet 0     calcitRIOL (ROCALTROL) 0.25 MCG capsule Take 0.25 mcg by mouth three times a week       colesevelam (WELCHOL) 625 MG tablet Take 1,875 mg by mouth (Patient not taking: Reported on 1/4/2022)       doxylamine (UNISOM) 25 MG TABS tablet Take 50 mg by mouth At Bedtime Been taking nightly for two years        HYDROmorphone (DILAUDID) 2 MG tablet Take 1 tablet (2 mg) by mouth 2 times daily as needed for moderate to severe pain Wean down on dose and or frequency next few days. (Patient not taking: Reported on 1/4/2022) 15 tablet 0     magnesium oxide (MAG-OX) 400 (240 Mg) MG tablet Take 400 mg by mouth daily       metFORMIN osmotic (FORTAMET) 1000 MG 24 hr tablet Take 1,000 mg by mouth (Patient not taking: Reported on 1/4/2022)       methocarbamol (ROBAXIN) 500 MG tablet Take 1 tablet (500 mg) by mouth 4 times daily as needed for muscle spasms (Patient not taking: Reported on 1/4/2022) 40 tablet 0     ondansetron (ZOFRAN-ODT) 4 MG ODT tab  (Patient not taking: Reported on 1/4/2022)       parenteral nutrition - PTA/DISCHARGE ORDER The TPN formula will print on the After Visit Summary Report. (Patient not taking: Reported on 1/4/2022) 1 each 0     potassium chloride ER (K-TAB) 20 MEQ CR tablet  (Patient not taking: Reported on 1/4/2022)       ustekinumab (STELARA) 90 MG/ML Inject 90 mg Subcutaneous       vitamin D (ERGOCALCIFEROL) 84419 UNIT capsule Take 50,000 Units by mouth five times a week        vitamin E (TOCOPHEROL) 400 units (180 mg) capsule Take 400 Units by mouth daily         Allergies:  The patientis allergic to carafate, morphine hcl, and codeine.    Social history:  Social History     Tobacco Use     Smoking status: Never Smoker     Smokeless tobacco: Never Used   Substance Use Topics     Alcohol use: Yes     Comment: occasional/ 1 drink per 1-3 month     Marital status: .    Review of Systems:  There are no exam notes on file for this visit.     Pete Cartagena MD   Professor and Chief  Division of Colon and Rectal Surgery  [facility]      Referring Provider:  No referring provider defined for this encounter.     Primary Care Provider:  Nakita Odonnell    This note was created using speech recognition software and may contain unintended word substitutions.

## 2022-02-22 NOTE — NURSING NOTE
"Chief Complaint   Patient presents with     Follow Up     Follow up to discuss recent SBO.       Vitals:    02/22/22 0750   Weight: 99 lb   Height: 5' 2\"       Body mass index is 18.11 kg/m .       Ivanna Henderson CMA    "

## 2022-02-22 NOTE — LETTER
"2022       RE: Alok Nino  [address]  [place] [address]     Dear Colleague,    Thank you for referring your patient, Alok Nino, to the [facility] COLON AND RECTAL SURGERY CLINIC [place] at [facility]. Please see a copy of my visit note below.    Colon and Rectal Surgery Clinic Note      RE: Alok Nino  : 1980  AURELIO: 2022    Alok Nino is a very pleasant 41 year old female with stricturing Crohn's disease with distal ileal obstruction status post laparotomy with extensive lysis of adhesions, distal ileal resection with ileoileal anastomosis on 21.    Alok initially did well postoperatively.  I saw her in clinic on 2022 at which time she was having some diarrhea (C. difficile negative) and she had been started on cholestyramine by Dr. Tolbert.  I spoke with Dr. Padilla Tran, a general surgeon at [facility], on 2022.  Alok had been admitted with what seemed to be an uncomplicated small bowel obstruction.  She did not need an NG tube at that time and elected to stay there and right out her obstruction.  She was discharged after 3 days but had to be readmitted and had a subsequent 5-day stay, again for small bowel obstruction that was treated conservatively.  Since that time, she has been doing only \"fair.\".  She feels weak and not her normal self.  She continues to have diarrheal stools, which she has often had in the past but when she is well she can have formed stools.  She has been maintaining herself on a restricted diet that includes thick soups and shakes but roughly 1 hour after she eats she says she can see visible peristalsis through her abdominal wall.  She rates her current pain at 3 out of 10 after eating.  She has not been vomiting.  She saw Dr. Tolbert who was concerned about the possibility of active Crohn's disease so he started her on budesonide 9 mg daily.  She is scheduled " for colonoscopy with him in mid March.  Her stools did test positive for norovirus.  Alok has been treated in the past for bacterial overgrowth with Xifaxan, and this has been helpful.        Laboratory data:    Recent Labs   Lab Test 11/26/21  0752 11/25/21  0744 11/19/21  0722 11/18/21  0720   WBC  --   --   --  11.7*   HGB  --   --   --  10.2*     --    < > 182   CR 0.57 0.56   < > 0.68   ALBUMIN  --  2.0*   < >  --    BILITOTAL  --  0.3   < >  --    ALKPHOS  --  67   < >  --    ALT  --  14   < >  --    AST  --  11   < >  --    INR  --  1.12   < >  --     < > = values in this interval not displayed.       Assessment/plan: Persisting obstructive symptoms now 3 months following short ileocolic resection for stricturing Crohn's disease.  I reviewed her imaging with Dr. Eliceo Mitchell in radiology.  She has multiple chronically dilated/surgically deformed small bowel segments within her abdomen that have been stable.  Looking at the area of the anastomosis, the immediate upstream bowel does not seem dilated, but this eventually leads to the large, chronically dilated, boggy small bowel segments clearly visible in the left midabdomen.  There is nothing obvious to suggest recurrent Crohn's disease, though there is some scattered mucosal enhancement.    Alok does have symptoms suggestive of a chronic partial small bowel obstruction, but her most recent scan is not suggestive of an anastomotic problem and overall the scan is quite difficult to interpret due to the multiple chronic intestinal deformities that have always been present.  She is now on budesonide but I do not see any convincing evidence of recurrent Crohn's disease.  We will get an MR enterogram to see if there are any subtle Crohn's changes.  It is possible that she has bacterial overgrowth, given the dilated and possibly stagnant bowel segments.  She may benefit from a hydrogen breath test.  She is scheduled for a colonoscopy in the next couple of  weeks, and this should afford an opportunity to assess the patency of her anastomosis, and to dilate it if necessary.    I answered all of Alok's questions to her stated satisfaction.  She expressed her understanding and agreement.    Video-Visit Details    Type of service:  Video Visit    Video End Time (time video stopped): 0839    Originating Location (pt. Location): Home    Distant Location (provider location):  [facility] COLON AND RECTAL SURGERY CLINIC [place]     Mode of Communication:  Video Conference via Doximity    40 minutes spent on the date of the encounter doing chart review, history, review of imaging including thorough review with Dr. Mitchell,  Documentation, counseling, and further activities as noted above, which includes my time spent on video with the patient/family.       For details of past medical history, surgical history, family history, medications, allergies, and review of systems, please see details below.    Medical history:  Past Medical History:   Diagnosis Date     C. difficile enteritis      Chronic pain      Crohn disease (H)      Melanoma (H)      PONV (postoperative nausea and vomiting)        Surgical history:  Past Surgical History:   Procedure Laterality Date     ABDOMEN SURGERY      3 for crohns dx     APPENDECTOMY       APPENDECTOMY       CHOLECYSTECTOMY       CHOLECYSTECTOMY       COLONOSCOPY       ENDOSCOPIC ULTRASOUND UPPER GASTROINTESTINAL TRACT (GI) N/A 11/13/2020    Procedure: ENDOSCOPIC ULTRASOUND, ESOPHAGOSCOPY / UPPER GASTROINTESTINAL TRACT with BIOPSY;  Surgeon: Cydney Garcia MD;  Location: [facility] OR     GYN SURGERY       H STATISTIC PICC LINE INSERTION >5YR, FAILED Right 11/10/2021    LUE unsuccessful attempt from another hospital, IR deferral     IR PICC PLACEMENT > 5 YRS OF AGE  11/11/2021     OTHER SURGICAL HISTORY      strictoplasty x3     OTHER SURGICAL HISTORY      adhesion removal x1     OTHER SURGICAL HISTORY      small bowel resections      [facility]  05/10/2019          RESECTION ILEOCECAL  12/06/2013    Procedure: RESECTION ILEOCECAL;  Laparotomy, Extensive Lysis of Adhesions, Stricture Plasty X2  Anesthesia general with block;  Surgeon: Pete Cartagena MD;  Location: [facility] OR     RESECTION ILEOCOLIC N/A 11/17/2021    Procedure: Exploratory laparotomy, illeal resection, extensive lysis of adhesions (2 hr);  Surgeon: Pete Cartagena MD;  Location: [facility] OR       Family history:  Family History   Problem Relation Age of Onset     No Known Problems Mother      Clotting Disorder Father      No Known Problems Brother      No Known Problems Son      Anesthesia Reaction No family hx of      Colon Cancer No family hx of      Crohn's Disease No family hx of      Ulcerative Colitis No family hx of      Colon Polyps No family hx of        Medications:  Current Outpatient Medications   Medication Sig Dispense Refill     acetaminophen (TYLENOL) 325 MG tablet Take 3 tablets (975 mg) by mouth every 8 hours 100 tablet 0     calcitRIOL (ROCALTROL) 0.25 MCG capsule Take 0.25 mcg by mouth three times a week       colesevelam (WELCHOL) 625 MG tablet Take 1,875 mg by mouth (Patient not taking: Reported on 1/4/2022)       doxylamine (UNISOM) 25 MG TABS tablet Take 50 mg by mouth At Bedtime Been taking nightly for two years       HYDROmorphone (DILAUDID) 2 MG tablet Take 1 tablet (2 mg) by mouth 2 times daily as needed for moderate to severe pain Wean down on dose and or frequency next few days. (Patient not taking: Reported on 1/4/2022) 15 tablet 0     magnesium oxide (MAG-OX) 400 (240 Mg) MG tablet Take 400 mg by mouth daily       metFORMIN osmotic (FORTAMET) 1000 MG 24 hr tablet Take 1,000 mg by mouth (Patient not taking: Reported on 1/4/2022)       methocarbamol (ROBAXIN) 500 MG tablet Take 1 tablet (500 mg) by mouth 4 times daily as needed for muscle spasms (Patient not taking: Reported on 1/4/2022) 40 tablet 0     ondansetron (ZOFRAN-ODT) 4 MG ODT tab  (Patient not taking:  Reported on 1/4/2022)       parenteral nutrition - PTA/DISCHARGE ORDER The TPN formula will print on the After Visit Summary Report. (Patient not taking: Reported on 1/4/2022) 1 each 0     potassium chloride ER (K-TAB) 20 MEQ CR tablet  (Patient not taking: Reported on 1/4/2022)       ustekinumab (STELARA) 90 MG/ML Inject 90 mg Subcutaneous       vitamin D (ERGOCALCIFEROL) 44443 UNIT capsule Take 50,000 Units by mouth five times a week        vitamin E (TOCOPHEROL) 400 units (180 mg) capsule Take 400 Units by mouth daily         Allergies:  The patientis allergic to carafate, morphine hcl, and codeine.    Social history:  Social History     Tobacco Use     Smoking status: Never Smoker     Smokeless tobacco: Never Used   Substance Use Topics     Alcohol use: Yes     Comment: occasional/ 1 drink per 1-3 month     Marital status: .    Review of Systems:  There are no exam notes on file for this visit.     Pete Cartagena MD   Professor and Chief  Division of Colon and Rectal Surgery  [facility]      Referring Provider:  No referring provider defined for this encounter.     Primary Care Provider:  Nakita Odonnell    This note was created using speech recognition software and may contain unintended word substitutions.

## 2022-02-23 ENCOUNTER — TELEPHONE (OUTPATIENT)
Dept: GASTROENTEROLOGY | Facility: CLINIC | Age: 42
End: 2022-02-23
Payer: OTHER GOVERNMENT

## 2022-02-23 DIAGNOSIS — K50.012 CROHN'S DISEASE OF SMALL INTESTINE WITH INTESTINAL OBSTRUCTION (H): Primary | ICD-10-CM

## 2022-02-23 NOTE — TELEPHONE ENCOUNTER
Screening Questions  BlueKIND OF PREP RedLOCATION [review exclusion criteria] GreenSEDATION TYPE  1. Are you active on mychart? Y    2. What insurance is in the chart? [employer]     3.  Ordering/Referring Provider: YINKA    4. BMI 18.7 [BMI OVER 40-EXTENDED PREP]  If greater than 40 review exclusion criteria [PAC APPT IF @ [facility]]    5.  Respiratory Screening :  [If yes to any of the following HOSPITAL setting only]     Do you use daily home oxygen? N    Do you have mod to severe Obstructive Sleep Apnea? N  [OKAY @ [facility] SH PH RI]   Do you have Pulmonary Hypertension? N     Do you have UNCONTROLLED asthma? N      6. Have you had a heart or lung transplant? N      7. Are you currently on dialysis? N [ If yes, G-PREP & HOSPITAL setting only]     8. Do you have chronic kidney disease? N [ If yes, G-PREP ]    9. Have you had a stroke or Transient ischemic attack (TIA) within 6 months? N   (If yes, do not schedule at [facility])    10. In the past 6 months, have you had any heart related issues including cardiomyopathy or heart attack? N        If yes, did it require cardiac stenting or other implantable device? N      11. Do you have any implantable devices in your body (pacemaker, defib, LVAD)? N (If yes, schedule at [facility])    12. Do you take nitroglycerin? N   If yes, how often? N  (if yes, HOSPITAL setting ONLY)    13. Are you currently taking any blood thinners? N   [IF YES, INFORM PATIENT TO FOLLOW UP W/ ORDERING PROVIDER FOR BRIDGING INSTRUCTIONS]     14. Are you a diabetic? N   [ If yes, G-PREP ]    15. [FEMALES] Are you currently pregnant? N    If yes, how many weeks? N    16. Are you taking any prescription pain medications on a routine schedule?  N  [ If yes, EXTENDED PREP.]    17. Do you have any chemical dependencies such as alcohol, street drugs, or methadone?  N     18. Do you have any history of post-traumatic stress syndrome, severe anxiety or history of psychosis?  N      19. Do you transfer independently?   Y    20.  Do you have any issues with constipation?  N  [ If yes, EXTENDED PREP.]    21. Preferred LOCAL Pharmacy for Pre Prescription     [id number] - [place], [place] - [address] [id number] - [place], [place] - [address]    Scheduling Details      Caller : BASSEM  (Please ask for phone number if not scheduled by patient)    Type of Procedure Scheduled: COLON  Which Colonoscopy Prep was Sent?: YOGESH M   KRISSY CF PATIENTS & GROEN'S PATIENTS NEEDS EXTENDED PREP  Surgeon: YINKA  Date of Procedure: 3/7/2022  Location: [facility]      Sedation Type: MAC  Conscious Sedation- Needs  for 6 hours after the procedure  MAC/General-Needs  for 24 hours after procedure    Pre-op Required at [facility], [place], [place] and OR for MAC sedation: N  (advise patient they will need a pre-op prior to procedure -)      Informed patient they will need an adult  Y  Cannot take any type of public or medical transportation alone    Pre-Procedure Covid test to be completed at [facility] or Externally: Y    Confirmed Nurse will call to complete assessment Y    Additional comments:

## 2022-02-28 ENCOUNTER — TELEPHONE (OUTPATIENT)
Dept: GASTROENTEROLOGY | Facility: CLINIC | Age: 42
End: 2022-02-28
Payer: OTHER GOVERNMENT

## 2022-02-28 NOTE — TELEPHONE ENCOUNTER
Patient scheduled for colonoscopy on 3.7.2022.     Covid test scheduled: 3.3.2022    Arrival time: 1300    Facility location: [facility]    Sedation type: MAC    Indication for procedure: screening, Crohns    Referring provider: Pete Cartagena MD    Bowel prep recommendation: Miralax/Magnesium citrate/Dulcolax; discuss bowel prep with pt.  Excellent prep in 2013; Fair prep for 2021 colonoscopy    Pre visit planning completed.    Dayanara Choudhary RN

## 2022-02-28 NOTE — TELEPHONE ENCOUNTER
Attempted to contact patient regarding upcoming colonoscopy procedure on 3.7.2022 for pre assessment questions. No answer.     Left message to return call to [phone] #2    Dayanara Choudhary RN

## 2022-03-02 NOTE — TELEPHONE ENCOUNTER
Pre assessment questions completed for upcoming colonoscopy procedure scheduled on 3.7.2022    COVID test scheduled 3.3.2022    Reviewed procedural arrival time 1300 and facility location [facility].    Designated  policy reviewed. Instructed to have someone stay 24 hours post procedure.     Anticoagulation/blood thinners? no    Electronic implanted devices? No    RN discussed prep with patient in relation to pt's last 2 colonoscopies.  Pt does not want the Golytely prep and stated she intends to take extra miralax prep.  RN discussed a double Miralax/Magnesium citrate/Dulcolax prep with pt.  Updated prep instructions sent via Rerecipe.    Reviewed Double Miralax/Magnesium citrate/Dulcolax prep instructions with patient. No fiber/iron supplements or foods that contain nuts/seeds prior to procedure.     Patient verbalized understanding and had no questions or concerns at this time.    Dayanara Choudhary RN

## 2022-03-03 ENCOUNTER — HOSPITAL ENCOUNTER (OUTPATIENT)
Dept: MRI IMAGING | Facility: CLINIC | Age: 42
Discharge: HOME | End: 2022-03-03
Attending: COLON & RECTAL SURGERY | Admitting: COLON & RECTAL SURGERY
Payer: OTHER GOVERNMENT

## 2022-03-03 ENCOUNTER — LAB (OUTPATIENT)
Dept: LAB | Facility: CLINIC | Age: 42
End: 2022-03-03
Payer: OTHER GOVERNMENT

## 2022-03-03 ENCOUNTER — TELEPHONE (OUTPATIENT)
Dept: SURGERY | Facility: CLINIC | Age: 42
End: 2022-03-03
Payer: OTHER GOVERNMENT

## 2022-03-03 DIAGNOSIS — Z11.59 ENCOUNTER FOR SCREENING FOR OTHER VIRAL DISEASES: ICD-10-CM

## 2022-03-03 DIAGNOSIS — K50.012 CROHN'S DISEASE OF SMALL INTESTINE WITH INTESTINAL OBSTRUCTION (H): ICD-10-CM

## 2022-03-03 LAB — SARS-COV-2 RNA RESP QL NAA+PROBE: NEGATIVE

## 2022-03-03 PROCEDURE — U0003 INFECTIOUS AGENT DETECTION BY NUCLEIC ACID (DNA OR RNA); SEVERE ACUTE RESPIRATORY SYNDROME CORONAVIRUS 2 (SARS-COV-2) (CORONAVIRUS DISEASE [COVID-19]), AMPLIFIED PROBE TECHNIQUE, MAKING USE OF HIGH THROUGHPUT TECHNOLOGIES AS DESCRIBED BY CMS-2020-01-R: HCPCS

## 2022-03-03 PROCEDURE — 255N000002 HC RX 255 OP 636: Performed by: COLON & RECTAL SURGERY

## 2022-03-03 PROCEDURE — 72197 MRI PELVIS W/O & W/DYE: CPT

## 2022-03-03 PROCEDURE — A9585 GADOBUTROL INJECTION: HCPCS | Performed by: COLON & RECTAL SURGERY

## 2022-03-03 PROCEDURE — 74183 MRI ABD W/O CNTR FLWD CNTR: CPT

## 2022-03-03 PROCEDURE — 250N000011 HC RX IP 250 OP 636: Performed by: RADIOLOGY

## 2022-03-03 PROCEDURE — U0005 INFEC AGEN DETEC AMPLI PROBE: HCPCS

## 2022-03-03 RX ORDER — GADOBUTROL 604.72 MG/ML
4.5 INJECTION INTRAVENOUS ONCE
Status: COMPLETED | OUTPATIENT
Start: 2022-03-03 | End: 2022-03-03

## 2022-03-03 RX ADMIN — GLUCAGON HYDROCHLORIDE 0.5 MG: KIT at 09:49

## 2022-03-03 RX ADMIN — GADOBUTROL 4.5 ML: 604.72 INJECTION INTRAVENOUS at 10:20

## 2022-03-03 RX ADMIN — GLUCAGON HYDROCHLORIDE 0.5 MG: KIT at 10:16

## 2022-03-03 NOTE — PROGRESS NOTES
This is a recent snapshot of the patient's [facility] medical record.  For current drug dose and complete information and questions, call [phone] or In Basket pool, [facility] home infusion ([id number])  CSN Number:  [id number]

## 2022-03-03 NOTE — TELEPHONE ENCOUNTER
Health Call Center    Phone Message    May a detailed message be left on voicemail: yes     Reason for Call: Other: Per Stefania would like if  care team fax over the written orders that Kylee MIRANDA has done verbally. Per Stefania the reason being things are changing at the hospital and they are doing Sbars. Please if any questions please call Stefania back at . Fax # is  .     Action Taken: Message routed to:  [facility] ([facility]): Colon and Rec    Travel Screening: Not Applicable

## 2022-03-07 ENCOUNTER — TRANSFERRED RECORDS (OUTPATIENT)
Dept: HEALTH INFORMATION MANAGEMENT | Facility: CLINIC | Age: 42
End: 2022-03-07
Payer: OTHER GOVERNMENT

## 2022-03-07 ENCOUNTER — DOCUMENTATION ONLY (OUTPATIENT)
Dept: GASTROENTEROLOGY | Facility: OUTPATIENT CENTER | Age: 42
End: 2022-03-07
Payer: OTHER GOVERNMENT

## 2022-03-07 PROCEDURE — 88305 TISSUE EXAM BY PATHOLOGIST: CPT | Mod: TC,ORL | Performed by: COLON & RECTAL SURGERY

## 2022-03-07 PROCEDURE — 88305 TISSUE EXAM BY PATHOLOGIST: CPT | Mod: 26 | Performed by: PATHOLOGY

## 2022-03-08 ENCOUNTER — LAB REQUISITION (OUTPATIENT)
Dept: LAB | Facility: CLINIC | Age: 42
End: 2022-03-08
Payer: OTHER GOVERNMENT

## 2022-03-09 ENCOUNTER — HOSPITAL ENCOUNTER (INPATIENT)
Facility: HOSPITAL | Age: 42
LOS: 2 days | Discharge: HOME | DRG: 386 | End: 2022-03-11
Attending: EMERGENCY MEDICINE | Admitting: STUDENT IN AN ORGANIZED HEALTH CARE EDUCATION/TRAINING PROGRAM
Payer: OTHER GOVERNMENT

## 2022-03-09 ENCOUNTER — APPOINTMENT (OUTPATIENT)
Dept: RADIOLOGY | Facility: HOSPITAL | Age: 42
DRG: 386 | End: 2022-03-09
Attending: EMERGENCY MEDICINE
Payer: OTHER GOVERNMENT

## 2022-03-09 DIAGNOSIS — E87.6 HYPOKALEMIA: ICD-10-CM

## 2022-03-09 DIAGNOSIS — K56.609 INTESTINAL OBSTRUCTION, UNSPECIFIED CAUSE, UNSPECIFIED WHETHER PARTIAL OR COMPLETE (H): ICD-10-CM

## 2022-03-09 DIAGNOSIS — E83.42 HYPOMAGNESEMIA: ICD-10-CM

## 2022-03-09 LAB
ALBUMIN SERPL-MCNC: 3.7 G/DL (ref 3.5–5)
ALP SERPL-CCNC: 105 U/L (ref 45–120)
ALT SERPL W P-5'-P-CCNC: 160 U/L (ref 0–45)
ANION GAP SERPL CALCULATED.3IONS-SCNC: 14 MMOL/L (ref 5–18)
AST SERPL W P-5'-P-CCNC: 77 U/L (ref 0–40)
BASOPHILS # BLD AUTO: 0 10E3/UL (ref 0–0.2)
BASOPHILS NFR BLD AUTO: 0 %
BILIRUB DIRECT SERPL-MCNC: 0.3 MG/DL
BILIRUB SERPL-MCNC: 0.7 MG/DL (ref 0–1)
BUN SERPL-MCNC: 9 MG/DL (ref 8–22)
CALCIUM SERPL-MCNC: 8.8 MG/DL (ref 8.5–10.5)
CHLORIDE BLD-SCNC: 107 MMOL/L (ref 98–107)
CO2 SERPL-SCNC: 21 MMOL/L (ref 22–31)
CREAT SERPL-MCNC: 0.64 MG/DL (ref 0.6–1.1)
EOSINOPHIL # BLD AUTO: 0.1 10E3/UL (ref 0–0.7)
EOSINOPHIL NFR BLD AUTO: 1 %
ERYTHROCYTE [DISTWIDTH] IN BLOOD BY AUTOMATED COUNT: 13.2 % (ref 10–15)
GFR SERPL CREATININE-BSD FRML MDRD: >90 ML/MIN/1.73M2
GLUCOSE BLD-MCNC: 104 MG/DL (ref 70–125)
HCT VFR BLD AUTO: 36.3 % (ref 35–47)
HGB BLD-MCNC: 11.9 G/DL (ref 11.7–15.7)
IMM GRANULOCYTES # BLD: 0.1 10E3/UL
IMM GRANULOCYTES NFR BLD: 1 %
LIPASE SERPL-CCNC: 44 U/L (ref 0–52)
LYMPHOCYTES # BLD AUTO: 1.7 10E3/UL (ref 0.8–5.3)
LYMPHOCYTES NFR BLD AUTO: 18 %
MAGNESIUM SERPL-MCNC: 1.7 MG/DL (ref 1.8–2.6)
MAGNESIUM SERPL-MCNC: 1.9 MG/DL (ref 1.8–2.6)
MCH RBC QN AUTO: 30.1 PG (ref 26.5–33)
MCHC RBC AUTO-ENTMCNC: 32.8 G/DL (ref 31.5–36.5)
MCV RBC AUTO: 92 FL (ref 78–100)
MONOCYTES # BLD AUTO: 0.6 10E3/UL (ref 0–1.3)
MONOCYTES NFR BLD AUTO: 6 %
NEUTROPHILS # BLD AUTO: 7.4 10E3/UL (ref 1.6–8.3)
NEUTROPHILS NFR BLD AUTO: 74 %
NRBC # BLD AUTO: 0 10E3/UL
NRBC BLD AUTO-RTO: 0 /100
PATH REPORT.COMMENTS IMP SPEC: NORMAL
PATH REPORT.COMMENTS IMP SPEC: NORMAL
PATH REPORT.FINAL DX SPEC: NORMAL
PATH REPORT.GROSS SPEC: NORMAL
PATH REPORT.MICROSCOPIC SPEC OTHER STN: NORMAL
PATH REPORT.RELEVANT HX SPEC: NORMAL
PHOTO IMAGE: NORMAL
PLATELET # BLD AUTO: 267 10E3/UL (ref 150–450)
POTASSIUM BLD-SCNC: 2.6 MMOL/L (ref 3.5–5)
POTASSIUM BLD-SCNC: 2.6 MMOL/L (ref 3.5–5)
POTASSIUM BLD-SCNC: 2.7 MMOL/L (ref 3.5–5)
POTASSIUM BLD-SCNC: 3.2 MMOL/L (ref 3.5–5)
PROT SERPL-MCNC: 6.5 G/DL (ref 6–8)
RBC # BLD AUTO: 3.96 10E6/UL (ref 3.8–5.2)
SARS-COV-2 RNA RESP QL NAA+PROBE: NEGATIVE
SODIUM SERPL-SCNC: 142 MMOL/L (ref 136–145)
WBC # BLD AUTO: 9.8 10E3/UL (ref 4–11)

## 2022-03-09 PROCEDURE — 87635 SARS-COV-2 COVID-19 AMP PRB: CPT | Performed by: EMERGENCY MEDICINE

## 2022-03-09 PROCEDURE — 83735 ASSAY OF MAGNESIUM: CPT | Performed by: STUDENT IN AN ORGANIZED HEALTH CARE EDUCATION/TRAINING PROGRAM

## 2022-03-09 PROCEDURE — C9803 HOPD COVID-19 SPEC COLLECT: HCPCS

## 2022-03-09 PROCEDURE — 99222 1ST HOSP IP/OBS MODERATE 55: CPT | Performed by: STUDENT IN AN ORGANIZED HEALTH CARE EDUCATION/TRAINING PROGRAM

## 2022-03-09 PROCEDURE — 96367 TX/PROPH/DG ADDL SEQ IV INF: CPT

## 2022-03-09 PROCEDURE — 96365 THER/PROPH/DIAG IV INF INIT: CPT

## 2022-03-09 PROCEDURE — 84132 ASSAY OF SERUM POTASSIUM: CPT | Performed by: STUDENT IN AN ORGANIZED HEALTH CARE EDUCATION/TRAINING PROGRAM

## 2022-03-09 PROCEDURE — 96368 THER/DIAG CONCURRENT INF: CPT

## 2022-03-09 PROCEDURE — 82248 BILIRUBIN DIRECT: CPT | Performed by: EMERGENCY MEDICINE

## 2022-03-09 PROCEDURE — 250N000013 HC RX MED GY IP 250 OP 250 PS 637: Performed by: INTERNAL MEDICINE

## 2022-03-09 PROCEDURE — 83690 ASSAY OF LIPASE: CPT | Performed by: EMERGENCY MEDICINE

## 2022-03-09 PROCEDURE — 250N000011 HC RX IP 250 OP 636: Performed by: STUDENT IN AN ORGANIZED HEALTH CARE EDUCATION/TRAINING PROGRAM

## 2022-03-09 PROCEDURE — 258N000003 HC RX IP 258 OP 636: Performed by: EMERGENCY MEDICINE

## 2022-03-09 PROCEDURE — 258N000001 HC RX 258: Performed by: INTERNAL MEDICINE

## 2022-03-09 PROCEDURE — 99285 EMERGENCY DEPT VISIT HI MDM: CPT | Mod: 25

## 2022-03-09 PROCEDURE — 96376 TX/PRO/DX INJ SAME DRUG ADON: CPT

## 2022-03-09 PROCEDURE — 250N000011 HC RX IP 250 OP 636: Performed by: INTERNAL MEDICINE

## 2022-03-09 PROCEDURE — 82374 ASSAY BLOOD CARBON DIOXIDE: CPT | Performed by: EMERGENCY MEDICINE

## 2022-03-09 PROCEDURE — 85004 AUTOMATED DIFF WBC COUNT: CPT | Performed by: EMERGENCY MEDICINE

## 2022-03-09 PROCEDURE — 120N000001 HC R&B MED SURG/OB

## 2022-03-09 PROCEDURE — 36415 COLL VENOUS BLD VENIPUNCTURE: CPT | Performed by: INTERNAL MEDICINE

## 2022-03-09 PROCEDURE — 96366 THER/PROPH/DIAG IV INF ADDON: CPT

## 2022-03-09 PROCEDURE — 36415 COLL VENOUS BLD VENIPUNCTURE: CPT | Performed by: STUDENT IN AN ORGANIZED HEALTH CARE EDUCATION/TRAINING PROGRAM

## 2022-03-09 PROCEDURE — 96375 TX/PRO/DX INJ NEW DRUG ADDON: CPT

## 2022-03-09 PROCEDURE — 83735 ASSAY OF MAGNESIUM: CPT | Performed by: EMERGENCY MEDICINE

## 2022-03-09 PROCEDURE — 258N000003 HC RX IP 258 OP 636: Performed by: STUDENT IN AN ORGANIZED HEALTH CARE EDUCATION/TRAINING PROGRAM

## 2022-03-09 PROCEDURE — 74019 RADEX ABDOMEN 2 VIEWS: CPT

## 2022-03-09 PROCEDURE — 250N000011 HC RX IP 250 OP 636: Performed by: EMERGENCY MEDICINE

## 2022-03-09 PROCEDURE — 84132 ASSAY OF SERUM POTASSIUM: CPT | Performed by: INTERNAL MEDICINE

## 2022-03-09 PROCEDURE — 36415 COLL VENOUS BLD VENIPUNCTURE: CPT | Performed by: EMERGENCY MEDICINE

## 2022-03-09 RX ORDER — ACETAMINOPHEN 325 MG/1
650 TABLET ORAL EVERY 6 HOURS PRN
COMMUNITY
End: 2023-08-22

## 2022-03-09 RX ORDER — POTASSIUM CHLORIDE 1500 MG/1
40 TABLET, EXTENDED RELEASE ORAL ONCE
Status: COMPLETED | OUTPATIENT
Start: 2022-03-09 | End: 2022-03-09

## 2022-03-09 RX ORDER — ONDANSETRON 2 MG/ML
4 INJECTION INTRAMUSCULAR; INTRAVENOUS ONCE
Status: COMPLETED | OUTPATIENT
Start: 2022-03-09 | End: 2022-03-09

## 2022-03-09 RX ORDER — POTASSIUM CHLORIDE 7.45 MG/ML
10 INJECTION INTRAVENOUS
Status: COMPLETED | OUTPATIENT
Start: 2022-03-09 | End: 2022-03-09

## 2022-03-09 RX ORDER — ACETAMINOPHEN 650 MG/1
650 SUPPOSITORY RECTAL EVERY 6 HOURS PRN
Status: DISCONTINUED | OUTPATIENT
Start: 2022-03-09 | End: 2022-03-11 | Stop reason: HOSPADM

## 2022-03-09 RX ORDER — ONDANSETRON 2 MG/ML
4 INJECTION INTRAMUSCULAR; INTRAVENOUS EVERY 6 HOURS PRN
Status: DISCONTINUED | OUTPATIENT
Start: 2022-03-09 | End: 2022-03-11 | Stop reason: HOSPADM

## 2022-03-09 RX ORDER — BUDESONIDE 3 MG/1
9 CAPSULE, COATED PELLETS ORAL DAILY
Status: DISCONTINUED | OUTPATIENT
Start: 2022-03-09 | End: 2022-03-11 | Stop reason: HOSPADM

## 2022-03-09 RX ORDER — ONDANSETRON 4 MG/1
4 TABLET, ORALLY DISINTEGRATING ORAL EVERY 6 HOURS PRN
Status: DISCONTINUED | OUTPATIENT
Start: 2022-03-09 | End: 2022-03-11 | Stop reason: HOSPADM

## 2022-03-09 RX ORDER — MAGNESIUM SULFATE HEPTAHYDRATE 40 MG/ML
2 INJECTION, SOLUTION INTRAVENOUS ONCE
Status: COMPLETED | OUTPATIENT
Start: 2022-03-09 | End: 2022-03-09

## 2022-03-09 RX ORDER — LIDOCAINE 40 MG/G
CREAM TOPICAL
Status: DISCONTINUED | OUTPATIENT
Start: 2022-03-09 | End: 2022-03-11 | Stop reason: HOSPADM

## 2022-03-09 RX ORDER — ACETAMINOPHEN 325 MG/1
650 TABLET ORAL EVERY 6 HOURS PRN
Status: DISCONTINUED | OUTPATIENT
Start: 2022-03-09 | End: 2022-03-11 | Stop reason: HOSPADM

## 2022-03-09 RX ORDER — SODIUM CHLORIDE 9 MG/ML
INJECTION, SOLUTION INTRAVENOUS ONCE
Status: COMPLETED | OUTPATIENT
Start: 2022-03-09 | End: 2022-03-09

## 2022-03-09 RX ORDER — CALCIUM CARBONATE 500 MG/1
1000 TABLET, CHEWABLE ORAL 4 TIMES DAILY PRN
Status: DISCONTINUED | OUTPATIENT
Start: 2022-03-09 | End: 2022-03-11 | Stop reason: HOSPADM

## 2022-03-09 RX ORDER — DEXTROSE MONOHYDRATE, SODIUM CHLORIDE, AND POTASSIUM CHLORIDE 50; 1.49; 9 G/1000ML; G/1000ML; G/1000ML
INJECTION, SOLUTION INTRAVENOUS CONTINUOUS
Status: DISCONTINUED | OUTPATIENT
Start: 2022-03-09 | End: 2022-03-11

## 2022-03-09 RX ORDER — BUDESONIDE 3 MG/1
9 CAPSULE, COATED PELLETS ORAL EVERY MORNING
COMMUNITY
End: 2023-08-19

## 2022-03-09 RX ORDER — DIPHENHYDRAMINE HYDROCHLORIDE 50 MG/ML
25 INJECTION INTRAMUSCULAR; INTRAVENOUS EVERY 6 HOURS PRN
Status: DISCONTINUED | OUTPATIENT
Start: 2022-03-09 | End: 2022-03-11 | Stop reason: HOSPADM

## 2022-03-09 RX ORDER — POTASSIUM CHLORIDE 1500 MG/1
20 TABLET, EXTENDED RELEASE ORAL ONCE
Status: COMPLETED | OUTPATIENT
Start: 2022-03-09 | End: 2022-03-09

## 2022-03-09 RX ADMIN — POTASSIUM CHLORIDE 10 MEQ: 7.46 INJECTION, SOLUTION INTRAVENOUS at 05:12

## 2022-03-09 RX ADMIN — HYDROMORPHONE HYDROCHLORIDE 0.4 MG: 1 INJECTION, SOLUTION INTRAMUSCULAR; INTRAVENOUS; SUBCUTANEOUS at 21:27

## 2022-03-09 RX ADMIN — DEXTROSE AND SODIUM CHLORIDE: 5; 900 INJECTION, SOLUTION INTRAVENOUS at 05:14

## 2022-03-09 RX ADMIN — DIPHENHYDRAMINE HYDROCHLORIDE 25 MG: 50 INJECTION, SOLUTION INTRAMUSCULAR; INTRAVENOUS at 23:51

## 2022-03-09 RX ADMIN — POTASSIUM CHLORIDE 20 MEQ: 1500 TABLET, EXTENDED RELEASE ORAL at 21:26

## 2022-03-09 RX ADMIN — HYDROMORPHONE HYDROCHLORIDE 0.5 MG: 1 INJECTION, SOLUTION INTRAMUSCULAR; INTRAVENOUS; SUBCUTANEOUS at 11:27

## 2022-03-09 RX ADMIN — POTASSIUM CHLORIDE 40 MEQ: 1500 TABLET, EXTENDED RELEASE ORAL at 14:44

## 2022-03-09 RX ADMIN — HYDROMORPHONE HYDROCHLORIDE 0.5 MG: 1 INJECTION, SOLUTION INTRAMUSCULAR; INTRAVENOUS; SUBCUTANEOUS at 03:49

## 2022-03-09 RX ADMIN — POTASSIUM CHLORIDE 10 MEQ: 7.46 INJECTION, SOLUTION INTRAVENOUS at 02:15

## 2022-03-09 RX ADMIN — HYDROMORPHONE HYDROCHLORIDE 0.4 MG: 1 INJECTION, SOLUTION INTRAMUSCULAR; INTRAVENOUS; SUBCUTANEOUS at 16:26

## 2022-03-09 RX ADMIN — POTASSIUM CHLORIDE, DEXTROSE MONOHYDRATE AND SODIUM CHLORIDE: 150; 5; 900 INJECTION, SOLUTION INTRAVENOUS at 15:54

## 2022-03-09 RX ADMIN — POTASSIUM CHLORIDE 10 MEQ: 7.46 INJECTION, SOLUTION INTRAVENOUS at 03:19

## 2022-03-09 RX ADMIN — BUDESONIDE 9 MG: 3 CAPSULE ORAL at 14:45

## 2022-03-09 RX ADMIN — MAGNESIUM SULFATE HEPTAHYDRATE 2 G: 40 INJECTION, SOLUTION INTRAVENOUS at 02:53

## 2022-03-09 RX ADMIN — POTASSIUM CHLORIDE 10 MEQ: 7.46 INJECTION, SOLUTION INTRAVENOUS at 04:13

## 2022-03-09 RX ADMIN — SODIUM CHLORIDE: 9 INJECTION, SOLUTION INTRAVENOUS at 02:14

## 2022-03-09 RX ADMIN — HYDROMORPHONE HYDROCHLORIDE 0.5 MG: 1 INJECTION, SOLUTION INTRAMUSCULAR; INTRAVENOUS; SUBCUTANEOUS at 01:22

## 2022-03-09 RX ADMIN — ONDANSETRON 4 MG: 2 INJECTION INTRAMUSCULAR; INTRAVENOUS at 01:41

## 2022-03-09 RX ADMIN — HYDROMORPHONE HYDROCHLORIDE 0.5 MG: 1 INJECTION, SOLUTION INTRAMUSCULAR; INTRAVENOUS; SUBCUTANEOUS at 07:28

## 2022-03-09 RX ADMIN — HYDROMORPHONE HYDROCHLORIDE 0.5 MG: 1 INJECTION, SOLUTION INTRAMUSCULAR; INTRAVENOUS; SUBCUTANEOUS at 01:47

## 2022-03-09 RX ADMIN — DIPHENHYDRAMINE HYDROCHLORIDE 25 MG: 50 INJECTION, SOLUTION INTRAMUSCULAR; INTRAVENOUS at 17:22

## 2022-03-09 RX ADMIN — POTASSIUM CHLORIDE 40 MEQ: 1500 TABLET, EXTENDED RELEASE ORAL at 08:44

## 2022-03-09 ASSESSMENT — ACTIVITIES OF DAILY LIVING (ADL)
CONCENTRATING,_REMEMBERING_OR_MAKING_DECISIONS_DIFFICULTY: NO
FALL_HISTORY_WITHIN_LAST_SIX_MONTHS: NO
DEPENDENT_IADLS:: INDEPENDENT
ADLS_ACUITY_SCORE: 3
ADLS_ACUITY_SCORE: 4
DRESSING/BATHING_DIFFICULTY: NO
ADLS_ACUITY_SCORE: 4
ADLS_ACUITY_SCORE: 3
ADLS_ACUITY_SCORE: 4
ADLS_ACUITY_SCORE: 3
WALKING_OR_CLIMBING_STAIRS_DIFFICULTY: NO
DIFFICULTY_EATING/SWALLOWING: NO
ADLS_ACUITY_SCORE: 4
ADLS_ACUITY_SCORE: 4
ADLS_ACUITY_SCORE: 12
TOILETING_ISSUES: NO
DOING_ERRANDS_INDEPENDENTLY_DIFFICULTY: NO
ADLS_ACUITY_SCORE: 4
ADLS_ACUITY_SCORE: 4
ADLS_ACUITY_SCORE: 12
ADLS_ACUITY_SCORE: 4
WEAR_GLASSES_OR_BLIND: NO
ADLS_ACUITY_SCORE: 4
ADLS_ACUITY_SCORE: 3
ADLS_ACUITY_SCORE: 4
ADLS_ACUITY_SCORE: 3
ADLS_ACUITY_SCORE: 4
DIFFICULTY_COMMUNICATING: NO
CHANGE_IN_FUNCTIONAL_STATUS_SINCE_ONSET_OF_CURRENT_ILLNESS/INJURY: NO

## 2022-03-09 NOTE — ED PROVIDER NOTES
EMERGENCY DEPARTMENT ENCOUNTER      NAME: Alok Nino  AGE: 41 year old female  YOB: 1980  MRN: [id number]  EVALUATION DATE & TIME: 3/9/2022 12:55 AM    PCP: Nakita Odonnell    ED PROVIDER: Cee Jackson MD    Chief Complaint   Patient presents with     Abdominal Pain         FINAL IMPRESSION:  1. Hypokalemia    2. Intestinal obstruction, unspecified cause, unspecified whether partial or complete (H)    3. Hypomagnesemia          ED COURSE & MEDICAL DECISION MAKING:    Pertinent Labs & Imaging studies reviewed. (See chart for details)  41 year old female with history of Crohn's disease status post bowel resection most recently November 2021 with recurrent obstruction who is approximately 36 hours status post colonoscopy and dilation of her anastomotic stricture who presents to the Emergency Department for evaluation of abdominal pain nausea and distention of the abdomen that started after dinner.  Seems so she is 36 hours post dilation and only developed symptoms in the last several hours my suspicion for perforation is low.  She does not have any infectious symptomatology either.  She is certainly distended on exam and states this feels very similar to her previous bowel obstructions, highly suspicious for same.  Patient has had upwards of 40 CT scans of her abdomen and pelvis she estimates in her lifetime and given risk for radiation and overall my suspicion for bowel obstruction high, suspicion for perforation low was agreeable to plain film imaging only.    Patient placed on monitor, IV established and blood obtained.  Placed on maintenance IV fluids made n.p.o., given Zofran with improvement of her nausea.  Dilaudid as needed for pain.  CBC, BMP, LFTs, lipase, magnesium and Covid swab notable for potassium of 2.6 and magnesium of 1.7, replaced IV.  Plain film imaging of the abdomen shows marked amount of fluid and gas distention throughout the colon, distal colonic obstruction or low  mechanical obstruction.  Associated small bowel dilation throughout the abdomen and pelvis but no evidence of free air.  Historically, at least as of late, patient has managed bowel obstructions with bowel rest only and no NG tube.  She would like to trial several hours to see if she can get relief with medications alone, wishing to forego an NG tube at this time citing a history of a deviated septum and issues with placement historically.  Instructed her that we will certainly consult colorectal in the morning, but if she is requiring significant amount of opioids overnight will recommend NG tube placement.  Patient agreeable with plan and admitted to medicine.      ED Course as of 03/09/22 0258   Wed Mar 09, 2022   0156 Potassium(!!): 2.6   0232 Magnesium(!): 1.7       1:35 AM I met with the patient, obtained history, performed an initial exam, and discussed options and plan for diagnostics and treatment here in the ED.  2:20 AM I discussed case with Dr. Daniels, hospitalist.  2:24 AM I updated patient.    At the conclusion of the encounter I discussed the results of all of the tests and the disposition. The questions were answered. The patient or family acknowledged understanding and was agreeable with the care plan.    CONSULTS:  Dr. Daniels - Hospitalist      MEDICATIONS GIVEN IN THE EMERGENCY:  Medications   potassium chloride 10 mEq in 100 mL sterile water intermittent infusion (premix) (10 mEq Intravenous New Bag 3/9/22 0215)   HYDROmorphone (DILAUDID) injection 0.5 mg (has no administration in time range)   magnesium sulfate 2 g in water intermittent infusion (2 g Intravenous New Bag 3/9/22 0253)   HYDROmorphone (DILAUDID) injection 0.5 mg (0.5 mg Intravenous Given 3/9/22 0122)   ondansetron (ZOFRAN) injection 4 mg (4 mg Intravenous Given 3/9/22 0141)   HYDROmorphone (DILAUDID) injection 0.5 mg (0.5 mg Intravenous Given 3/9/22 0147)   sodium chloride 0.9% infusion ( Intravenous New Bag 3/9/22 0214)       NEW  PRESCRIPTIONS STARTED AT TODAY'S ER VISIT  New Prescriptions    No medications on file          =================================================================    HPI    Patient information was obtained from: Patient    Use of Intrepreter: N/A      Alok Nino is a 41 year old female with pertinent medical history of Crohn's disease, small bowel obstruction, chronic anemia, who presents to the ED via walk-in for the evaluation of abdominal pain.    Patient reports she had a bowel resection in November 2021. Since then, she has had multiple bowel obstructions. She states she had a MR enterography six days ago and a colonoscopy two days ago with dilation of surgical site stricture. Patient reports that afterwards, she was feeling fine. Yesterday after dinner, she developed diffuse abdominal pain, distension, and nausea. She notes that since onset of symptoms, she has been able to pass gas and had a bowel movement tonight. Patient reports that with her most recent bowel obstruction, she was able to manage with bowel rest. She notes she follow with [facility]. No other complaints at this time.    Per chart review, patient was seen at [facility] on 3/3/22 for MRI Enterography/MRI Abdomen and MRI Pelvis. Results show no MR signs of active inflammatory bowel disease. No bowel obstruction. Ileocecal resection with anastomosis mid right abdomen. The middle third of the small intestine is comprised of two consecutive surgically altered, chronically dilated segments of small bowel that appear reservoir-like and have ineffective peristalsis. At present, the torturous, redundant, large caliber colon is decompressed, but during episodes of small bowel obstructions, the right, transverse and descending colon become significant distended.    Per chart review, patient was seen at [facility] on 2/9/22 for Crohn's disease. Patient was admitted recently from 2/3 - 2/5 for obstruction symptoms and discharged home  upon improvement of symptoms. She presented again with obstruction symptoms 2/9 and was admitted. CT scan shows marked dilation of small bowel and colon, wall thickening of ileum and rectum. We have appreciated surgery consultation and coordination with Dr. Cartagena. She has symptoms of obstruction but a later presentation of nausea and emesis in setting of small bowel movements and new abdominal pain features which are worsening after emesis. Ultimately, with symptoms inconsistent with either obstruction or Crohn's flare it was recommended that she transfer to the [facility] for ongoing evaluation and management of care, however, in awaiting this transfer she continued to show resolution of her bowel obstruction and was able to tolerate a liberal PO diet. Patient was interested in discharge home with close GI and surgical follow up. We have reviewed med management and as no surgical management is planned have elected to start management for a Crohn's flare on discharge - her typical dosing of 9 mg daily is supported and med is ordered for discharge. She has follow up arranged with Dr. Cartagena later this month and will call to arrange follow up with Dr. Tolbert at [facility].     REVIEW OF SYSTEMS  Constitutional:  Denies fever, chills, weight loss or weakness  Respiratory: No SOB, wheeze or cough  Cardiovascular:  No CP, palpitations  GI:  Denies vomiting, diarrhea. Positive for abdominal pain, abdominal distension, and nausea.  Musculoskeletal:  Denies any new muscle/joint pain, swelling or loss of function.  All other systems negative unless noted in HPI.      PAST MEDICAL HISTORY:  Past Medical History:   Diagnosis Date     C. difficile enteritis      Chronic pain      Crohn disease (H)      Melanoma (H)      PONV (postoperative nausea and vomiting)        PAST SURGICAL HISTORY:  Past Surgical History:   Procedure Laterality Date     ABDOMEN SURGERY      3 for crohns dx     APPENDECTOMY       APPENDECTOMY        CHOLECYSTECTOMY       CHOLECYSTECTOMY       COLONOSCOPY       ENDOSCOPIC ULTRASOUND UPPER GASTROINTESTINAL TRACT (GI) N/A 11/13/2020    Procedure: ENDOSCOPIC ULTRASOUND, ESOPHAGOSCOPY / UPPER GASTROINTESTINAL TRACT with BIOPSY;  Surgeon: Cydney Garcia MD;  Location: [facility] OR     GYN SURGERY       H STATISTIC PICC LINE INSERTION >5YR, FAILED Right 11/10/2021    LUE unsuccessful attempt from another hospital, IR deferral     IR PICC PLACEMENT > 5 YRS OF AGE  11/11/2021     OTHER SURGICAL HISTORY      strictoplasty x3     OTHER SURGICAL HISTORY      adhesion removal x1     OTHER SURGICAL HISTORY      small bowel resections     PICC  05/10/2019          RESECTION ILEOCECAL  12/06/2013    Procedure: RESECTION ILEOCECAL;  Laparotomy, Extensive Lysis of Adhesions, Stricture Plasty X2  Anesthesia general with block;  Surgeon: Pete Cartagena MD;  Location: [facility] OR     RESECTION ILEOCOLIC N/A 11/17/2021    Procedure: Exploratory laparotomy, illeal resection, extensive lysis of adhesions (2 hr);  Surgeon: Pete Cartagena MD;  Location: [facility] OR       CURRENT MEDICATIONS:    Prior to Admission Medications   Prescriptions Last Dose Informant Patient Reported? Taking?   HYDROmorphone (DILAUDID) 2 MG tablet   No No   Sig: Take 1 tablet (2 mg) by mouth 2 times daily as needed for moderate to severe pain Wean down on dose and or frequency next few days.   Patient not taking: Reported on 1/4/2022   acetaminophen (TYLENOL) 325 MG tablet   No No   Sig: Take 3 tablets (975 mg) by mouth every 8 hours   calcitRIOL (ROCALTROL) 0.25 MCG capsule   Yes No   Sig: Take 0.25 mcg by mouth three times a week   colesevelam (WELCHOL) 625 MG tablet   Yes No   Sig: Take 1,875 mg by mouth   Patient not taking: Reported on 1/4/2022   doxylamine (UNISOM) 25 MG TABS tablet   Yes No   Sig: Take 50 mg by mouth At Bedtime Been taking nightly for two years   magnesium oxide (MAG-OX) 400 (240 Mg) MG tablet   Yes No   Sig: Take 400 mg by mouth daily    metFORMIN osmotic (FORTAMET) 1000 MG 24 hr tablet   Yes No   Sig: Take 1,000 mg by mouth   Patient not taking: Reported on 1/4/2022   methocarbamol (ROBAXIN) 500 MG tablet   No No   Sig: Take 1 tablet (500 mg) by mouth 4 times daily as needed for muscle spasms   Patient not taking: Reported on 1/4/2022   ondansetron (ZOFRAN-ODT) 4 MG ODT tab   Yes No   Patient not taking: Reported on 1/4/2022   parenteral nutrition - PTA/DISCHARGE ORDER   No No   Sig: The TPN formula will print on the After Visit Summary Report.   Patient not taking: Reported on 1/4/2022   potassium chloride ER (K-TAB) 20 MEQ CR tablet   Yes No   Patient not taking: Reported on 1/4/2022   ustekinumab (STELARA) 90 MG/ML   Yes No   Sig: Inject 90 mg Subcutaneous   vitamin D (ERGOCALCIFEROL) 36903 UNIT capsule   Yes No   Sig: Take 50,000 Units by mouth five times a week    vitamin E (TOCOPHEROL) 400 units (180 mg) capsule   Yes No   Sig: Take 400 Units by mouth daily      Facility-Administered Medications: None       ALLERGIES:  Allergies   Allergen Reactions     Carafate Hives     Morphine Hcl Other (See Comments)     SPASMS OF SPHINCTER OF ODDI  (BILIARY TRACT)     Codeine Nausea and Vomiting       FAMILY HISTORY:  Family History   Problem Relation Age of Onset     No Known Problems Mother      Clotting Disorder Father      No Known Problems Brother      No Known Problems Son      Anesthesia Reaction No family hx of      Colon Cancer No family hx of      Crohn's Disease No family hx of      Ulcerative Colitis No family hx of      Colon Polyps No family hx of        SOCIAL HISTORY:  Social History     Tobacco Use     Smoking status: Never Smoker     Smokeless tobacco: Never Used   Substance Use Topics     Alcohol use: Yes     Comment: occasional/ 1 drink per 1-3 month     Drug use: No        VITALS:  Patient Vitals for the past 24 hrs:   BP Temp Temp src Pulse Resp SpO2 Weight   03/09/22 0041 -- -- -- -- -- -- 46.7 kg (103 lb)   03/09/22 0040 128/81  97.6  F (36.4  C) Temporal 80 16 98 % --       PHYSICAL EXAM    General Appearance: Well-appearing, well-nourished, no acute distress. Thin-appearing.  Head:  Normocephalic  Eyes:   conjunctiva/corneas clear  ENT:   membranes are moist without pallor  Neck:  Supple  Cardio:  Regular rate and rhythm  Pulm:  No respiratory distress  Back:  No CVA tenderness, normal ROM  Abdomen:  Soft, no rebound or guarding. Abdomen obviously distended. Diffusely tender. Hyperactive bowel sounds. Tympanitic abdomen.  Extremities: Moves extremities normally, normal gait  Skin:  Skin warm, dry, no rashes  Neuro:  Alert and oriented ×3, moving all extremities, no gross sensory defects     RADIOLOGY/LABS:  Reviewed all pertinent imaging. Please see official radiology report. All pertinent labs reviewed and interpreted.    Results for orders placed or performed during the hospital encounter of 03/09/22   Abdomen XR, 2 vw, flat and upright    Impression    IMPRESSION: Marked amount of fluid and gas distention throughout the colon extending deep into the pelvis. Distal colonic obstruction or low mechanical obstruction cannot be excluded. Associated small bowel dilatation throughout the abdomen and pelvis.   No free intraperitoneal air.    Basic metabolic panel   Result Value Ref Range    Sodium 142 136 - 145 mmol/L    Potassium 2.6 (LL) 3.5 - 5.0 mmol/L    Chloride 107 98 - 107 mmol/L    Carbon Dioxide (CO2) 21 (L) 22 - 31 mmol/L    Anion Gap 14 5 - 18 mmol/L    Urea Nitrogen 9 8 - 22 mg/dL    Creatinine 0.64 0.60 - 1.10 mg/dL    Calcium 8.8 8.5 - 10.5 mg/dL    Glucose 104 70 - 125 mg/dL    GFR Estimate >90 >60 mL/min/1.73m2   Hepatic panel   Result Value Ref Range    Bilirubin Total 0.7 0.0 - 1.0 mg/dL    Bilirubin Direct 0.3 <=0.5 mg/dL    Protein Total 6.5 6.0 - 8.0 g/dL    Albumin 3.7 3.5 - 5.0 g/dL    Alkaline Phosphatase 105 45 - 120 U/L    AST 77 (H) 0 - 40 U/L     (H) 0 - 45 U/L   Result Value Ref Range    Lipase 44 0 - 52  U/L   CBC with platelets and differential   Result Value Ref Range    WBC Count 9.8 4.0 - 11.0 10e3/uL    RBC Count 3.96 3.80 - 5.20 10e6/uL    Hemoglobin 11.9 11.7 - 15.7 g/dL    Hematocrit 36.3 35.0 - 47.0 %    MCV 92 78 - 100 fL    MCH 30.1 26.5 - 33.0 pg    MCHC 32.8 31.5 - 36.5 g/dL    RDW 13.2 10.0 - 15.0 %    Platelet Count 267 150 - 450 10e3/uL    % Neutrophils 74 %    % Lymphocytes 18 %    % Monocytes 6 %    % Eosinophils 1 %    % Basophils 0 %    % Immature Granulocytes 1 %    NRBCs per 100 WBC 0 <1 /100    Absolute Neutrophils 7.4 1.6 - 8.3 10e3/uL    Absolute Lymphocytes 1.7 0.8 - 5.3 10e3/uL    Absolute Monocytes 0.6 0.0 - 1.3 10e3/uL    Absolute Eosinophils 0.1 0.0 - 0.7 10e3/uL    Absolute Basophils 0.0 0.0 - 0.2 10e3/uL    Absolute Immature Granulocytes 0.1 <=0.4 10e3/uL    Absolute NRBCs 0.0 10e3/uL   Result Value Ref Range    Magnesium 1.7 (L) 1.8 - 2.6 mg/dL   Asymptomatic COVID-19 Virus (Coronavirus) by PCR Nasopharyngeal    Specimen: Nasopharyngeal; Swab   Result Value Ref Range    SARS CoV2 PCR Negative Negative       The creation of this record is based on the scribe s observations of the work being performed by Cee Jackson MD and the provider s statements to them. It was created on his behalf by Ashly Morales, a trained medical scribe. This document has been checked and approved by the attending provider.    Cee Jackson MD  Emergency Medicine  [facility] EMERGENCY DEPARTMENT  [facility] [facility] [address] [phone]  Dept: [phone]     Cee Jackson MD  03/09/22 1772

## 2022-03-09 NOTE — PLAN OF CARE
Problem: Plan of Care - These are the overarching goals to be used throughout the patient stay.    Goal: Optimal Comfort and Wellbeing  3/9/2022 1453 by Glenn Thomson, RN  Outcome: Ongoing, Progressing  3/9/2022 1222 by Glenn Thomson, RN  Outcome: Ongoing, Progressing   Goal Outcome Evaluation:      Potassium was 2.7 mmol/L upon recheck. Potassium protocol ran again. Pt's pain managed with PRN IV dilaudid. Pt tolerated clear liquids well. Pt encouraged to take more fluids by mouth so that continuous IV infusion can be discontinued.  Pt to move to P4 room 427.  Glenn Thomson RN  3/9/2022  2:56 PM

## 2022-03-09 NOTE — CONSULTS
[facility]   Consultation      Place of Service: [facility]  Reason for Consultation: Bowel obstruction, Crohn's disease  Consult Requested by: Dr. Dany Daniels    History of Present Illness: Alok Nino is a 42 y/o female with history of severe complicated Crohn's disease with multiple past abdominal surgeries, most recent in November 2021. She has had intermittent obstructive symptoms since her last surgery with 3 hospitalizations since November that have been managed with bowel rest without NGT placement. She had an MR enterography on 3/3/22 which showed no signs of active disease, no bowel obstruction, and large caliber redundant colon that was decompressed at that time. She has an ileocolonic anastomosis which was strictured and a more proximal ileal ileal anastomosis. She recently underwent a colonoscopy with stricture dilation at the site of the ileocolonic anastomosis on 3/7/22 with Dr. Cartagena and the plan was to discuss possible stent placement of the stricture.    She had severe abdominal pain and distention yesterday evening and presented to the ER for further evaluation. Labs were significant for K+ 2.6, Mg 1.7, and elevated AST/ALT. No leukocytosis or anemia. She was afebrile and vitals were otherwise stable. Abdominal XR showed evidence of a distal colonic obstruction with distension throughout the colon and small bowel dilation, no evidence of free air. She was made NPO and started on IV fluids.    On interview today, she reports that her abdominal pain is slightly improved compared to last night, and her pain is less crampy. She rates her pain 7/10 currently, down from 8-9/10 last night. She still feels distended, but states this is somewhat improved. She denies current nausea, fevers, chills. She continues to pass gas and small stools. At baseline, she has about 5-15 loose stools per day. She states she has lost about 10 lbs since November. She follows with  Dr. Tolbert from [facility] for her Crohn's disease. She was previously on Humira and switch to Stelara around October 2021, and just received a 2nd loading dose this week. She is also on budesonide 9 mg daily.       Pertinent Labs:   Lab Results: personally reviewed   Lab Results   Component Value Date     03/09/2022     11/26/2021     11/25/2021     12/14/2013     12/13/2013     12/12/2013    CO2 21 03/09/2022    CO2 26 11/26/2021    CO2 26 11/25/2021    CO2 27 12/14/2013    CO2 28 12/13/2013    CO2 27 12/12/2013    BUN 9 03/09/2022    BUN 15 11/26/2021    BUN 14 11/25/2021    BUN 3 12/14/2013    BUN 3 12/13/2013    BUN 3 12/12/2013     Lab Results   Component Value Date    WBC 9.8 03/09/2022    WBC 11.7 11/18/2021    WBC 8.8 11/15/2021    WBC 8.6 12/14/2013    WBC 10.0 12/13/2013    WBC 7.9 12/12/2013    HGB 11.9 03/09/2022    HGB 10.2 11/18/2021    HGB 13.4 11/15/2021    HGB 9.3 12/14/2013    HGB 10.1 12/13/2013    HGB 9.9 12/12/2013    HCT 36.3 03/09/2022    HCT 31.4 11/18/2021    HCT 39.6 11/15/2021    HCT 29.4 12/14/2013    HCT 31.3 12/13/2013    HCT 30.3 12/12/2013    MCV 92 03/09/2022    MCV 97 11/18/2021    MCV 93 11/15/2021    MCV 89 12/14/2013    MCV 89 12/13/2013    MCV 89 12/12/2013     03/09/2022     11/26/2021     11/23/2021     12/15/2013     12/14/2013     12/13/2013       Pertinent Radiology: I have personally reviewed the images and report of XR abdomen, MR enterography and my impression/s include:     Abdominal XR 3/9/22  IMPRESSION: Marked amount of fluid and gas distention throughout the colon extending deep into the pelvis. Distal colonic obstruction or low mechanical obstruction cannot be excluded. Associated small bowel dilatation throughout the abdomen and pelvis. No free intraperitoneal air.       MR Enterography 3/3/22  IMPRESSION: 1.  No MR signs of active inflammatory bowel disease. No bowel obstruction. 2.   Ileocecal resection with anastomosis mid right abdomen. 3.  The middle third of the small intestine is comprised of two consecutive surgically altered, chronically dilated segments of small bowel that appear reservoir-like and have ineffective peristalsis. 4.  At present, the tortuous, redundant, large caliber colon is decompressed, but during episodes of small bowel obstructions, the right, transverse and descending colon become significantly distended.     Past Medical History:   Diagnosis Date     C. difficile enteritis      Chronic pain      Crohn disease (H)      Melanoma (H)      PONV (postoperative nausea and vomiting)        Past Surgical History:   Procedure Laterality Date     ABDOMEN SURGERY      3 for crohns dx     APPENDECTOMY       APPENDECTOMY       CHOLECYSTECTOMY       CHOLECYSTECTOMY       COLONOSCOPY       ENDOSCOPIC ULTRASOUND UPPER GASTROINTESTINAL TRACT (GI) N/A 11/13/2020    Procedure: ENDOSCOPIC ULTRASOUND, ESOPHAGOSCOPY / UPPER GASTROINTESTINAL TRACT with BIOPSY;  Surgeon: Cydney Garcia MD;  Location: [facility] OR     GYN SURGERY       H STATISTIC PICC LINE INSERTION >5YR, FAILED Right 11/10/2021    LUE unsuccessful attempt from another hospital, IR deferral     IR PICC PLACEMENT > 5 YRS OF AGE  11/11/2021     OTHER SURGICAL HISTORY      strictoplasty x3     OTHER SURGICAL HISTORY      adhesion removal x1     OTHER SURGICAL HISTORY      small bowel resections     PICC  05/10/2019          RESECTION ILEOCECAL  12/06/2013    Procedure: RESECTION ILEOCECAL;  Laparotomy, Extensive Lysis of Adhesions, Stricture Plasty X2  Anesthesia general with block;  Surgeon: Pete Cartagena MD;  Location: [facility] OR     RESECTION ILEOCOLIC N/A 11/17/2021    Procedure: Exploratory laparotomy, illeal resection, extensive lysis of adhesions (2 hr);  Surgeon: Pete Cartagena MD;  Location: [facility] OR       Family History   Problem Relation Age of Onset     No Known Problems Mother      Clotting Disorder Father      No  Known Problems Brother      No Known Problems Son      Anesthesia Reaction No family hx of      Colon Cancer No family hx of      Crohn's Disease No family hx of      Ulcerative Colitis No family hx of      Colon Polyps No family hx of        Social History     Socioeconomic History     Marital status:      Spouse name: Not on file     Number of children: Not on file     Years of education: Not on file     Highest education level: Not on file   Occupational History     Not on file   Tobacco Use     Smoking status: Never Smoker     Smokeless tobacco: Never Used   Substance and Sexual Activity     Alcohol use: Yes     Comment: occasional/ 1 drink per 1-3 month     Drug use: No     Sexual activity: Not on file   Other Topics Concern     Parent/sibling w/ CABG, MI or angioplasty before 65F 55M? Not Asked   Social History Narrative     Not on file     Social Determinants of Health     Financial Resource Strain: Not on file   Food Insecurity: Not on file   Transportation Needs: Not on file   Physical Activity: Not on file   Stress: Not on file   Social Connections: Not on file   Intimate Partner Violence: Not on file   Housing Stability: Not on file       Current Facility-Administered Medications   Medication     acetaminophen (TYLENOL) tablet 650 mg    Or     acetaminophen (TYLENOL) Suppository 650 mg     calcium carbonate (TUMS) chewable tablet 1,000 mg     dextrose 5% and 0.9% NaCl infusion     HYDROmorphone (DILAUDID) injection 0.2-0.4 mg     HYDROmorphone (DILAUDID) injection 0.5 mg     lidocaine (LMX4) cream     lidocaine 1 % 0.1-1 mL     melatonin tablet 1 mg     ondansetron (ZOFRAN-ODT) ODT tab 4 mg    Or     ondansetron (ZOFRAN) injection 4 mg     sodium chloride (PF) 0.9% PF flush 3 mL     sodium chloride (PF) 0.9% PF flush 3 mL     Current Outpatient Medications   Medication     acetaminophen (TYLENOL) 325 MG tablet     budesonide (ENTOCORT EC) 3 MG EC capsule     calcitRIOL (ROCALTROL) 0.25 MCG capsule  "    doxylamine (UNISOM) 25 MG TABS tablet     magnesium oxide (MAG-OX) 400 (240 Mg) MG tablet     ustekinumab (STELARA) 90 MG/ML     vitamin D (ERGOCALCIFEROL) 99334 UNIT capsule     vitamin E (TOCOPHEROL) 400 units (180 mg) capsule       Allergies:   Allergies   Allergen Reactions     Carafate Hives     Morphine Hcl Other (See Comments)     SPASMS OF SPHINCTER OF ODDI  (BILIARY TRACT)     Codeine Nausea and Vomiting       Review of Systems:   \"A full 10 point review of systems was taken and is negative aside from what is noted above in the HPI.\"     Consitutional / General: Denies fevers, chills or sweats. Down 10 lbs since November  Skin: Denies skin changes   ENT: Denies headache, hearing changes  Eyes: Denies changes in vision  Respiratory: Denies SOB, cough, sputum production or dyspnea on exertion.   Cardiovascular: Denies chest pain, palpitations  Hematology / Lymph: Denies hx of blood clots or pulmonary embolism  Gastrointestinal:  + recent nausea and loose stools at baseline. Denies loss of appetite, vomiting, constipation   Genitourinary: Denies urinary symptoms  Neurologic: Denies headache, LOC, syncope   Musculoskeletal: Denies musculoskeletal pain  Psychological: alert and oriented    Intake/Output past 24 hrs:  No intake or output data in the 24 hours ending 03/09/22 1029    Physical Exam:  Temp:  [97.6  F (36.4  C)] 97.6  F (36.4  C)  Pulse:  [62-83] 62  Resp:  [12-21] 21  BP: ()/(51-81) 102/58  SpO2:  [96 %-99 %] 98 %     General: NAD, alert, cooperative  Head: normocephalic, without abnormality / atraumatic  Respiratory: non-labored breathing  Abdomen: moderately distended, pt reports slightly improved since yesterday. Mildly tender to deep palpation, worse in LLQ. Well healed surgical scars present without evidence of infection. No rebound, guarding, or peritoneal signs.   Skin: no rashes or lesions  Musculoskeletal: moves all four extremities equally  Psychological: alert and oriented, " answers questions appropriately  Neurological: cranial nerves grossly intact    Assessment/Plan: Alok Nino is a 42 y/o female with history of complicated Crohn's disease with multiple past surgeries, most recently in November 2021. She has had intermittent obstructive symptoms since her last surgery with 3 hospitalizations since November that have been managed with bowel rest without NGT placement. She recently underwent a colonoscopy with stricture dilation at the site of the ileocolonic anastomosis on 3/7/22 with Dr. Cartagena. She had severe abdominal pain and distention yesterday evening and presented to the ER. Labs significant for hypokalemia and hypomagnesemia. XR showed evidence of a distal bowel obstruction. She continues to have abdominal pain and distention, though this is somewhat improved since presentation without NG tube placement. She continues to pass gas and stools. She remains vitally stable and appears non toxic. It is difficult to determine the location of the obstruction based on imaging, but it is possibly an ileus/coleus due to low potassium considering there was no obstruction on her recent colonoscopy.     1. No urgent surgical intervention indicated at this time. If surgery became necessary, would require transfer to [facility] due to her complicated surgical history  2. Recommend correction of electrolytes and replacement protocols  3. Continue NPO and bowel rest. Advance diet per GI  4. If symptoms persist or worsen, would recommend a CT scan to evaluate the location of the obstruction  5. Crohn's medical management per GI    Medical Decision Making:   Additional workup / testing ordered: Possibly CT scan if no improvement  Procedure / Surgery recommended: No urgent surgical intervention indicated at this time.   Old records reviewed - ED notes, H&P, XR, MR enterograpgy, CRS old notes.   Medications added / changed: None    This case was discussed with: Dr. [place].    Thank you for consulting  [facility] regarding this patient's care. Please contact us with questions or concerns.     Time spent: 70 minutes, with 50 minutes spent in counseling and coordinating care    Denise Bailey PA-C  [facility]  [phone]

## 2022-03-09 NOTE — PROGRESS NOTES
[facility] Update    Patient passing flatus and stool.  Feels better.    Discussed with Dr Tolbert.  Dr Tolbert discussed with Dr Cartagena.    Plan:    Clear liquid diet  Resume Budesonide 9mg/day  Dr Tolbert will arrange for outpatient Budesonide taper  Colonoscopy with stent placement at  [facility] in 2 months  If doing well tomorrow, resume low residue diet and discharge  Our office will arrange follow up.    Thanks,    Herb Blanton MD  [facility]

## 2022-03-09 NOTE — ED NOTES
Pt placed on cardiac monitor, BP cuff and pulse oximetry monitor. Vital signs stable. Pt reported mild lower abdomen pain. Report to Will RN for continuation of care.

## 2022-03-09 NOTE — PROGRESS NOTES
Patient Arrived to  Room 427 at 1500. Alert and oriented. PIV in right arm. Stand by assist for transfers. Assisted to bathroom right after arrival from ER. Ashly Kellogg RN

## 2022-03-09 NOTE — ED TRIAGE NOTES
Patient has crohn's and had a bowel resection in November an dhas since been having bowel obstructions. Had a colonoscopy yesterday with dilation and she started to the bowel obstruction symptoms including abdominal pain, bloating and nausea.

## 2022-03-09 NOTE — H&P
[facility]    History and Physical - Hospitalist Service       Date of Admission:  3/9/2022    Assessment & Plan         Alok Nino is a 41-year-old female with history of Crohn's disease, multiple prior abdominal surgeries and multiple prior small bowel obstructions who follows [facility] and colorectal surgery presents with abdominal pain and found to have bowel obstruction.       Bowel obstruction  -History of Crohn's disease, multiple prior abdominal surgeries, recurrent bowel obstructions.  -Had colonoscopy and dilatation of stricture by colorectal 2 days ago.  Interval improvement of symptoms afterwards but started to have abdominal pain and nausea since last night.  -Abdominal x-ray with distended bowel loops suggestive of distal colonic obstruction.  -N.p.o., IV hydration, pain control, colorectal and GI consults        Crohn's disease  -She had MR enterography on 3/3 which did not show any evidence of flares  -On budesonide and Stelara  -GI consult    Hypokalemia and Hypomagnesemia  -Replace per Protocol      Elevated liver enzymes-,  alk phos 105.  Bili normal.  Had previous cholecystectomy.  GI to see.       Diet: NPO for Medical/Clinical Reasons Except for: Meds    DVT Prophylaxis: Pneumatic Compression Devices  Hamilton Catheter: Not present  Central Lines: PRESENT     Cardiac Monitoring: None  Code Status:   Full     Clinically Significant Risk Factors Present on Admission        # Hypokalemia: K = 2.6 mmol/L (Ref range: 3.5 - 5.0 mmol/L) on admission, will replace as needed              Disposition Plan   Expected Discharge: The patient's care was discussed with the Patient.    Dany Daniels MD  Hospitalist Service  [facility]  Securely message with the Vocera Web Console (learn more here)  Text page via [employer] Paging/Directory         ______________________________________________________________________    Chief Complaint   Abdominal  Pain    History is obtained from the patient    History of Present Illness   Alok Nino is a 41-year-old female with history of Crohn's disease, multiple prior abdominal surgeries and multiple prior small bowel obstructions who follows [facility] and colorectal surgery presents with abdominal pain and found to have small bowel obstruction.  She has been having persistent obstructive symptoms following ileocolic resection 3 months ago for stricturing Crohn's disease.  Underwent MR enterography 6 days ago which did not show any flares and patient then underwent colonoscopy and dilation of the stricture site by colorectal team 2 days ago.  Has been doing well since then but last evening after having dinner started to have severe abdominal pain, with associated nausea and distention.  She does not have any vomiting and last bowel movement this yesterday prior to the onset of pain.  Still passing some gas, no vomiting, fever, or chills.  Work-up at ED showing x-ray with suggestive of bowel obstruction.  Admitted for further management    Review of Systems    The 10 point Review of Systems is negative other than noted in the HPI or here.     Past Medical History    I have reviewed this patient's medical history and updated it with pertinent information if needed.   Past Medical History:   Diagnosis Date     C. difficile enteritis      Chronic pain      Crohn disease (H)      Melanoma (H)      PONV (postoperative nausea and vomiting)        Past Surgical History   I have reviewed this patient's surgical history and updated it with pertinent information if needed.  Past Surgical History:   Procedure Laterality Date     ABDOMEN SURGERY      3 for crohns dx     APPENDECTOMY       APPENDECTOMY       CHOLECYSTECTOMY       CHOLECYSTECTOMY       COLONOSCOPY       ENDOSCOPIC ULTRASOUND UPPER GASTROINTESTINAL TRACT (GI) N/A 11/13/2020    Procedure: ENDOSCOPIC ULTRASOUND, ESOPHAGOSCOPY / UPPER GASTROINTESTINAL TRACT with BIOPSY;   Surgeon: Cydney Garcia MD;  Location:  OR     GYN SURGERY       H STATISTIC PICC LINE INSERTION >5YR, FAILED Right 11/10/2021    LUE unsuccessful attempt from another hospital, IR deferral     IR PICC PLACEMENT > 5 YRS OF AGE  11/11/2021     OTHER SURGICAL HISTORY      strictoplasty x3     OTHER SURGICAL HISTORY      adhesion removal x1     OTHER SURGICAL HISTORY      small bowel resections     PICC  05/10/2019          RESECTION ILEOCECAL  12/06/2013    Procedure: RESECTION ILEOCECAL;  Laparotomy, Extensive Lysis of Adhesions, Stricture Plasty X2  Anesthesia general with block;  Surgeon: Pete Cartagena MD;  Location: [facility] OR     RESECTION ILEOCOLIC N/A 11/17/2021    Procedure: Exploratory laparotomy, illeal resection, extensive lysis of adhesions (2 hr);  Surgeon: Pete Cartagena MD;  Location: [facility] OR       Social History   I have reviewed this patient's social history and updated it with pertinent information if needed.  Social History     Tobacco Use     Smoking status: Never Smoker     Smokeless tobacco: Never Used   Substance Use Topics     Alcohol use: Yes     Comment: occasional/ 1 drink per 1-3 month     Drug use: No       Family History   I have reviewed this patient's family history and updated it with pertinent information if needed.  Family History   Problem Relation Age of Onset     No Known Problems Mother      Clotting Disorder Father      No Known Problems Brother      No Known Problems Son      Anesthesia Reaction No family hx of      Colon Cancer No family hx of      Crohn's Disease No family hx of      Ulcerative Colitis No family hx of      Colon Polyps No family hx of        Prior to Admission Medications   Prior to Admission Medications   Prescriptions Last Dose Informant Patient Reported? Taking?   HYDROmorphone (DILAUDID) 2 MG tablet   No No   Sig: Take 1 tablet (2 mg) by mouth 2 times daily as needed for moderate to severe pain Wean down on dose and or frequency next few days.    Patient not taking: Reported on 1/4/2022   acetaminophen (TYLENOL) 325 MG tablet   No No   Sig: Take 3 tablets (975 mg) by mouth every 8 hours   calcitRIOL (ROCALTROL) 0.25 MCG capsule   Yes No   Sig: Take 0.25 mcg by mouth three times a week   colesevelam (WELCHOL) 625 MG tablet   Yes No   Sig: Take 1,875 mg by mouth   Patient not taking: Reported on 1/4/2022   doxylamine (UNISOM) 25 MG TABS tablet   Yes No   Sig: Take 50 mg by mouth At Bedtime Been taking nightly for two years   magnesium oxide (MAG-OX) 400 (240 Mg) MG tablet   Yes No   Sig: Take 400 mg by mouth daily   metFORMIN osmotic (FORTAMET) 1000 MG 24 hr tablet   Yes No   Sig: Take 1,000 mg by mouth   Patient not taking: Reported on 1/4/2022   methocarbamol (ROBAXIN) 500 MG tablet   No No   Sig: Take 1 tablet (500 mg) by mouth 4 times daily as needed for muscle spasms   Patient not taking: Reported on 1/4/2022   ondansetron (ZOFRAN-ODT) 4 MG ODT tab   Yes No   Patient not taking: Reported on 1/4/2022   parenteral nutrition - PTA/DISCHARGE ORDER   No No   Sig: The TPN formula will print on the After Visit Summary Report.   Patient not taking: Reported on 1/4/2022   potassium chloride ER (K-TAB) 20 MEQ CR tablet   Yes No   Patient not taking: Reported on 1/4/2022   ustekinumab (STELARA) 90 MG/ML   Yes No   Sig: Inject 90 mg Subcutaneous   vitamin D (ERGOCALCIFEROL) 72948 UNIT capsule   Yes No   Sig: Take 50,000 Units by mouth five times a week    vitamin E (TOCOPHEROL) 400 units (180 mg) capsule   Yes No   Sig: Take 400 Units by mouth daily      Facility-Administered Medications: None     Allergies   Allergies   Allergen Reactions     Carafate Hives     Morphine Hcl Other (See Comments)     SPASMS OF SPHINCTER OF ODDI  (BILIARY TRACT)     Codeine Nausea and Vomiting       Physical Exam   Vital Signs: Temp: 97.6  F (36.4  C) Temp src: Temporal BP: 128/81 Pulse: 80   Resp: 16 SpO2: 98 % O2 Device: None (Room air)    Weight: 103 lbs 0 oz    General  Appearance: Alert oriented  Eyes: Pink conjunctiva  HEENT: PERRLA  Respiratory: Bilaterally clear  Cardiovascular: S1, S2 no murmur  GI: Slightly distended, hyperactive bowel sounds, nontender  Genitourinary: No CVA or suprapubic tenderness  Skin: No skin rash  Musculoskeletal: No edema  Neurologic: AOx3      Data   Data reviewed today: I reviewed all medications, new labs and imaging results over the last 24 hours. I personally reviewed   Recent Labs   Lab 03/09/22  0117   WBC 9.8   HGB 11.9   MCV 92         POTASSIUM 2.6*   CHLORIDE 107   CO2 21*   BUN 9   CR 0.64   ANIONGAP 14   BLACK 8.8      ALBUMIN 3.7   PROTTOTAL 6.5   BILITOTAL 0.7   ALKPHOS 105   *   AST 77*   LIPASE 44     Recent Results (from the past 24 hour(s))   Abdomen XR, 2 vw, flat and upright    Narrative    EXAM: XR ABDOMEN 2VIEWS  LOCATION: [facility]  DATE/TIME: 3/9/2022 1:58 AM    INDICATION: Evaluate small bowel obstruction.  COMPARISON: None.      Impression    IMPRESSION: Marked amount of fluid and gas distention throughout the colon extending deep into the pelvis. Distal colonic obstruction or low mechanical obstruction cannot be excluded. Associated small bowel dilatation throughout the abdomen and pelvis.   No free intraperitoneal air.

## 2022-03-09 NOTE — PLAN OF CARE
Problem: Plan of Care - These are the overarching goals to be used throughout the patient stay.    Goal: Optimal Comfort and Wellbeing  Outcome: Ongoing, Progressing     Problem: Plan of Care - These are the overarching goals to be used throughout the patient stay.    Goal: Absence of Hospital-Acquired Illness or Injury  Intervention: Identify and Manage Fall Risk  Recent Flowsheet Documentation  Taken 3/9/2022 0728 by Glenn Thomson, RN  Safety Promotion/Fall Prevention: activity supervised   Goal Outcome Evaluation:      Pt's abdominal pain has been managed with PRN IV dilaudid x1 this AM shift. Pt switched to clear liquid diet. Bowel sounds hyperactive. Abdomen remains tender to the touch. Pt was able to tolerate apple juice.   Glenn Thomson RN  3/9/2022  12:33 PM

## 2022-03-09 NOTE — PROGRESS NOTES
Patient was admitted by my colleague earlier this morning.  Admission H&P reviewed. Agree with the assessments and plan.    Alok Nino is a 41-year-old female with history of Crohn's disease, multiple prior abdominal surgeries and multiple prior small bowel obstructions who follows [facility] and colorectal surgery presents with abdominal pain and found to have bowel obstruction.        Bowel obstruction  -History of Crohn's disease, multiple prior abdominal surgeries, recurrent bowel obstructions.  -Had colonoscopy and dilatation of stricture by colorectal 2 days ago.  Interval improvement of symptoms afterwards but started to have abdominal pain and nausea since last night.  -Abdominal x-ray with distended bowel loops suggestive of distal colonic obstruction.  -N.p.o., IV hydration, pain control, colorectal and GI consults         Crohn's disease  -She had MR enterography on 3/3 which did not show any evidence of flares  -On budesonide and Stelara  -GI consult     Hypokalemia and Hypomagnesemia  -Replace per Protocol        Elevated liver enzymes-,  alk phos 105.  Bili normal.  Had previous cholecystectomy.  GI to see.           Diet: NPO for Medical/Clinical Reasons Except for: Meds    DVT Prophylaxis: Pneumatic Compression Devices  Hamilton Catheter: Not present  Central Lines: PRESENT     Cardiac Monitoring: None  Code Status:   Full

## 2022-03-09 NOTE — PHARMACY-ADMISSION MEDICATION HISTORY
Pharmacy Note - Admission Medication History    Pertinent Provider Information: Patient received a loading dose of Stellara yesterday     ______________________________________________________________________    Prior To Admission (PTA) med list completed and updated in EMR.       PTA Med List   Medication Sig Last Dose     acetaminophen (TYLENOL) 325 MG tablet Take 650 mg by mouth every 6 hours as needed for mild pain Unknown     budesonide (ENTOCORT EC) 3 MG EC capsule Take 9 mg by mouth every morning 3/8/2022     calcitRIOL (ROCALTROL) 0.25 MCG capsule Take 0.25 mcg by mouth three times a week 3/7/2022     doxylamine (UNISOM) 25 MG TABS tablet Take 50 mg by mouth At Bedtime  3/7/2022     magnesium oxide (MAG-OX) 400 (240 Mg) MG tablet Take 400 mg by mouth every evening  3/7/2022     ustekinumab (STELARA) 90 MG/ML Inject 90 mg Subcutaneous every 28 days  3/8/2022 at Got loading dose in clinic     vitamin D (ERGOCALCIFEROL) 34916 UNIT capsule Take 50,000 Units by mouth five times a week  3/7/2022     vitamin E (TOCOPHEROL) 400 units (180 mg) capsule Take 400 Units by mouth every evening  3/7/2022       Information source(s): [facility]  Method of interview communication: in-person    Summary of Changes to PTA Med List  New: Budesonide  Discontinued: Welchol, hydromorphone, metformin, methocarbamol, TPN, ondansetron, potassium  Changed: None    Patient was asked about OTC/herbal products specifically.  PTA med list reflects this.    In the past week, patient estimated taking medication this percent of the time:  greater than 90%.    Allergies were reviewed, assessed, and updated with the patient.      Patient does not use any multi-dose medications prior to admission.    The information provided in this note is only as accurate as the sources available at the time of the update(s).    Thank you for the opportunity to participate in the care of this patient.    Sue Guardado,  [facility]  3/9/2022 7:35 AM

## 2022-03-09 NOTE — PLAN OF CARE
Pt reports she is feeling itchy after having IV Dilaudid, and that this sometimes occurs and IV benadryl has helped in the past.  Text to Dr. Harding to update.

## 2022-03-09 NOTE — CONSULTS
"Care Management Initial Consult    General Information  Assessment completed with: Alok Wilson  Type of CM/SW Visit: Initial Assessment    Primary Care Provider verified and updated as needed: Yes   Readmission within the last 30 days: no previous admission in last 30 days      Reason for Consult: discharge planning  Advance Care Planning: Advance Care Planning Reviewed: other (see comments) (\"I don't have one\")          Communication Assessment  Patient's communication style: spoken language (English or Bilingual)    Hearing Difficulty or Deaf: no   Wear Glasses or Blind: no                Living Environment:   People in home: spouse, child(isabelle), dependent (\"kids age 9, 4, 2\")  Jose  and 3 kids  Current living Arrangements: house      Able to return to prior arrangements: yes       Family/Social Support:  Care provided by: self  Provides care for: child(isabelle) (\"kids age 9, 4, 2\")  Marital Status:     Jose       Description of Support System: Supportive, Involved    Support Assessment: Adequate family and caregiver support, Adequate social supports, Patient communicates needs well met    Current Resources:   Patient receiving home care services: No     Community Resources: None  Equipment currently used at home: none  Supplies currently used at home: None    Employment/Financial:  Employment Status: other (see comments) (\"somewhere between full time and part time depending on week)     Employment/ Comments: \" is in the  and has benefits so I use his benefits, but I don't see a VA MD or go to the VA hospital\"  Financial Concerns:             Lifestyle & Psychosocial Needs:  Social Determinants of Health     Tobacco Use: Low Risk      Smoking Tobacco Use: Never Smoker     Smokeless Tobacco Use: Never Used   Alcohol Use: Not At Risk     Frequency of Alcohol Consumption: Never     Average Number of Drinks: Not on file     Frequency of Binge Drinking: Not on file "   Financial Resource Strain: Not on file   Food Insecurity: Not on file   Transportation Needs: Not on file   Physical Activity: Not on file   Stress: Not on file   Social Connections: Not on file   Intimate Partner Violence: Not on file   Depression: Not at risk     PHQ-2 Score: 0   Housing Stability: Not on file       Functional Status:  Prior to admission patient needed assistance:   Dependent ADLs:: Independent, Ambulation-no assistive device  Dependent IADLs:: Independent  Assesssment of Functional Status: At functional baseline    Mental Health Status:          Chemical Dependency Status:                Values/Beliefs:  Spiritual, Cultural Beliefs, Gnosticism Practices, Values that affect care:                 Additional Information:  Alok lives in a house with her  and kids age 9, 4 and 2.     She is independent with ADLs at baseline and drives and works.    Likely no discharge needs at this time.    Family to transport at discharge.    Parvin Conrad RN

## 2022-03-09 NOTE — CONSULTS
[facility] Consult     Impression:     Suspect resolving obstruction at the site of the ileocolonic anastomosis which was dilated by Dr. Pete Cartagena on 3/7/2022.  Her abdomen last night was distended similar to pregnancy, but since then she has been passing gas and some stool and her abdomen has decreased in size dramatically.  She still has some mild tenderness without rebound.  She is on budesonide 9 mg a day and is getting ready to take Stelara on a every 4-week basis.  She has received her second loading dose of Stelara this week.  I put in a call to Dr. Tolbert to get his input on next steps since he is directing her care and knows her well.  It appeared that there may have been discussion with Dr. Cartagena about placing an Axios stent at some port to keep the anastomotic stricture open.  She has a history of severe fibrostenotic Crohn's disease.   Imaging results (MRI enterography/Abdominal XR) are noted at the bottom of the consult.    Recommendations:     NPO for now until discussion with Dr Tolbert. Inquire whether he has had discussion with Dr Cartagena?  Correct electrolytes  Likely, we'll restart Budesonide 9 mg/day and clear liquids if she continues to improve when we begin clear liquids.    Name: Alok Nino    Medical Record #: [id number]    YOB: 1980    Date/Time: 3/9/2022/8:38 AM    Reason for Consultation: Armaan Harding MD has asked me to evaluate Alok Nino regarding abdominal pain and bowel obstruction..    HPI:     The patient is a 41-year-old female with underlying complicated Crohn's who came to the ED because of abdominal pain, nausea and distention which started after having tacos last night.  She had no fevers or chills.  She has had about 8 abdominal surgeries in the past with the last one being in November 2021.  Her previous surgery was 8 years prior to her last operation.  She has an ileocolonic anastomosis which was strictured and a more proximal ileal  ileal anastomosis.  She saw Dr. Pete Cartagena for colonoscopy on 3/7/2022 and he performed a through-the-scope dilatation of the stricture with sequential balloons up to 15 mm.  The plan was to discuss with Dr. Tolbert placement of an Axios stent.  Her weight is stable.  She denies any rectal bleeding, fever, chills, vomiting, new non-GI related medical issues.  She has had previous cholecystectomy.  She has had melanoma in the past.  She has been on Remicade, Azathioprine and Humira in the distant past.  History of erythema nodosum and SIBO.  She has a distant history of C. difficile.  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  She does not use tobacco or alcohol.  Dr. Tolbert has a thorough note which outlines her Crohn's history and Crohn's health maintenance data in the [facility] dated 9/21/2021.    Review of Systems (ROS):    Complete ROS otherwise negative except for as above.    Past Medical History:  Past Medical History:   Diagnosis Date     C. difficile enteritis      Chronic pain      Crohn disease (H)      Melanoma (H)      PONV (postoperative nausea and vomiting)        Medications:   (Not in a hospital admission)      Current Facility-Administered Medications:      acetaminophen (TYLENOL) tablet 650 mg, 650 mg, Oral, Q6H PRN **OR** acetaminophen (TYLENOL) Suppository 650 mg, 650 mg, Rectal, Q6H PRN, Dany Daniels MD     calcium carbonate (TUMS) chewable tablet 1,000 mg, 1,000 mg, Oral, 4x Daily PRN, Dany Daniels MD     dextrose 5% and 0.9% NaCl infusion, , Intravenous, Continuous, Dany Daniels MD, Last Rate: 100 mL/hr at 03/09/22 0729, 100 mL/hr at 03/09/22 0729     HYDROmorphone (DILAUDID) injection 0.2-0.4 mg, 0.2-0.4 mg, Intravenous, Q3H PRN, Dany Daniels MD     HYDROmorphone (DILAUDID) injection 0.5 mg, 0.5 mg, Intravenous, Q1H PRN, Dany Daniels MD, 0.5 mg at 03/09/22 0728     lidocaine (LMX4) cream, , Topical, Q1H PRN, Dany Daniels MD      lidocaine 1 % 0.1-1 mL, 0.1-1 mL, Other, Q1H PRN, Dany Daniels MD     melatonin tablet 1 mg, 1 mg, Oral, At Bedtime PRN, Dany Daniels MD     ondansetron (ZOFRAN-ODT) ODT tab 4 mg, 4 mg, Oral, Q6H PRN **OR** ondansetron (ZOFRAN) injection 4 mg, 4 mg, Intravenous, Q6H PRN, Dany Daniels MD     potassium chloride ER (KLOR-CON M) CR tablet 40 mEq, 40 mEq, Oral, Once, Armaan Harding MD     sodium chloride (PF) 0.9% PF flush 3 mL, 3 mL, Intracatheter, Q8H, Dany Daniels MD     sodium chloride (PF) 0.9% PF flush 3 mL, 3 mL, Intracatheter, q1 min prn, Dany Daniels MD    Current Outpatient Medications:      acetaminophen (TYLENOL) 325 MG tablet, Take 650 mg by mouth every 6 hours as needed for mild pain, Disp: , Rfl:      budesonide (ENTOCORT EC) 3 MG EC capsule, Take 9 mg by mouth every morning, Disp: , Rfl:      calcitRIOL (ROCALTROL) 0.25 MCG capsule, Take 0.25 mcg by mouth three times a week, Disp: , Rfl:      doxylamine (UNISOM) 25 MG TABS tablet, Take 50 mg by mouth At Bedtime , Disp: , Rfl:      magnesium oxide (MAG-OX) 400 (240 Mg) MG tablet, Take 400 mg by mouth every evening , Disp: , Rfl:      ustekinumab (STELARA) 90 MG/ML, Inject 90 mg Subcutaneous every 28 days , Disp: , Rfl:      vitamin D (ERGOCALCIFEROL) 22758 UNIT capsule, Take 50,000 Units by mouth five times a week , Disp: , Rfl:      vitamin E (TOCOPHEROL) 400 units (180 mg) capsule, Take 400 Units by mouth every evening , Disp: , Rfl:        Allergies: Carafate, Morphine hcl, and Codeine    Family History:  Family History   Problem Relation Age of Onset     No Known Problems Mother      Clotting Disorder Father      No Known Problems Brother      No Known Problems Son      Anesthesia Reaction No family hx of      Colon Cancer No family hx of      Crohn's Disease No family hx of      Ulcerative Colitis No family hx of      Colon Polyps No family hx of        Social History:  Social History     Tobacco Use     Smoking status: Never  Smoker     Smokeless tobacco: Never Used   Substance Use Topics     Alcohol use: Yes     Comment: occasional/ 1 drink per 1-3 month     Drug use: No       Physical Exam: /58   Pulse 67   Temp 97.6  F (36.4  C) (Temporal)   Resp 14   Wt 46.7 kg (103 lb)   LMP  (Within Days)   SpO2 97%   BMI 18.84 kg/m      General: NAD; comfortable  Eyes: No scleral icterus or conjunctivitis  Oropharynx: WNL  Neck/Thyroid: No neck masses or thyromegaly  Pulmonary: Lungs are clear to auscultation bilaterally  Cardiovascular: Regular, no lower extermity edema   Gastrointestinal: Positive bowel sounds, soft, mild tenderness lower abdomen, no rebound or guarding. No HSM.Multiple surgical scars  Lymph nodes: No cervical or supraclavicular lymphadenopathy  Skin: The patient is not jaundiced.  Neurologic: Alert and oriented ×3  Musculoskeletal:  Normal muscle tone    Labs:  CMP Results:   Recent Labs   Lab Test 03/09/22  0747 03/09/22  0117   NA  --  142   POTASSIUM 2.6* 2.6*   CHLORIDE  --  107   CO2  --  21*   ANIONGAP  --  14   GLC  --  104   BUN  --  9   CR  --  0.64   BILITOTAL  --  0.7   ALKPHOS  --  105   ALT  --  160*   AST  --  77*      CBC  Recent Labs   Lab 03/09/22  0117   WBC 9.8   RBC 3.96   HGB 11.9   HCT 36.3   MCV 92   MCH 30.1   MCHC 32.8   RDW 13.2        INRNo lab results found in last 7 days.   Lipase   Date Value Ref Range Status   03/09/2022 44 0 - 52 U/L Final   06/26/2019 21 0 - 52 U/L Final       Radiology:  Abdomen XR, 2 vw, flat and upright    Result Date: 3/9/2022  EXAM: XR ABDOMEN 2VIEWS LOCATION: [facility] DATE/TIME: 3/9/2022 1:58 AM INDICATION: Evaluate small bowel obstruction. COMPARISON: None.     IMPRESSION: Marked amount of fluid and gas distention throughout the colon extending deep into the pelvis. Distal colonic obstruction or low mechanical obstruction cannot be excluded. Associated small bowel dilatation throughout the abdomen and pelvis. No free  intraperitoneal air.     MR Enterography wo and w Contrast    Result Date: 3/3/2022  EXAM: MRI ENTEROGRAPHY/MRI ABDOMEN AND MRI PELVIS WITHOUT AND WITH CONTRAST LOCATION: [facility] DATE/TIME: 3/3/2022 8:26 AM INDICATION: 41-year-old woman with Crohn's disease. Multiple episodes of small bowel obstruction. Hospitalized for small bowel obstruction 1 month ago that was managed conservatively. Prior to that, a small bowel obstruction managed surgically November of 2021. COMPARISON: [facility] CT 02/09/2022, MR enterography 2/4/2022 and CT 02/03/2022. [facility] CT 11/09/2021 and [facility] MR enterography 09/30/2021. TECHNIQUE: Routine enterography protocol including imaging of abdomen and pelvis with axial T1 in/out phase, axial T2, coronal T2. Post contrast abdomen and pelvis axial and coronal thin-section T1 with fat sat. 1 mg Glucagon. 1500 ml Breeza oral contrast material. CONTRAST: 4.5mL Gadavist  FINDINGS: ENTEROGRAPHY: Ileocecal resection with anastomosis mid right abdomen. No MR signs of active inflammatory bowel disease. Mild patulous dilatation of the proximal third of the small intestine, which is otherwise normal in appearance. The middle third of the small intestine is comprised of two consecutive surgically altered segments of small bowel with the more proximal located in the mid to upper left abdomen and the second in the central abdomen. Today these measure up to 7-8 cm diameter, but when obstructed at prior studies, measure up to 12 cm diameter. At cine imaging these dilated segments appear almost reservoir-like with ineffective peristalsis. Mild to moderate patulous dilatation of the distal third of the small intestine, which is otherwise normal in appearance. At present, the tortuous, redundant, large caliber colon is decompressed with no obstruction or MR signs of active inflammation. During episodes of small bowel obstructions, the right,  transverse and descending colon are also significantly distended. ADDITIONAL FINDINGS: Cholecystectomy. Mild uniform bile duct dilatation to the level of the ampulla with no stone, stricture or mass unchanged. No significant abnormality in the liver, spleen, kidneys, pancreas, and adrenal glands where visualized. No adenopathy. Normal pelvis.     IMPRESSION: 1.  No MR signs of active inflammatory bowel disease. No bowel obstruction. 2.  Ileocecal resection with anastomosis mid right abdomen. 3.  The middle third of the small intestine is comprised of two consecutive surgically altered, chronically dilated segments of small bowel that appear reservoir-like and have ineffective peristalsis. 4.  At present, the tortuous, redundant, large caliber colon is decompressed, but during episodes of small bowel obstructions, the right, transverse and descending colon become significantly distended.       I spent 50 minutes reviewing the Epic chart, [facility] records, talking with and examining the patient, synthesizing data and formulating an impression/recommendation.                                                Herb Blanton MD  Thank you for the opportunity to participate in the care of this patient.   Please feel free to call me with any questions or concerns.  Phone number [phone].

## 2022-03-10 LAB
ALBUMIN SERPL-MCNC: 2.7 G/DL (ref 3.5–5)
ALP SERPL-CCNC: 86 U/L (ref 45–120)
ALT SERPL W P-5'-P-CCNC: 88 U/L (ref 0–45)
ANION GAP SERPL CALCULATED.3IONS-SCNC: 6 MMOL/L (ref 5–18)
AST SERPL W P-5'-P-CCNC: 28 U/L (ref 0–40)
BASOPHILS # BLD AUTO: 0 10E3/UL (ref 0–0.2)
BASOPHILS NFR BLD AUTO: 0 %
BILIRUB SERPL-MCNC: 0.4 MG/DL (ref 0–1)
BUN SERPL-MCNC: 4 MG/DL (ref 8–22)
CALCIUM SERPL-MCNC: 7.5 MG/DL (ref 8.5–10.5)
CHLORIDE BLD-SCNC: 110 MMOL/L (ref 98–107)
CO2 SERPL-SCNC: 26 MMOL/L (ref 22–31)
CREAT SERPL-MCNC: 0.56 MG/DL (ref 0.6–1.1)
EOSINOPHIL # BLD AUTO: 0.1 10E3/UL (ref 0–0.7)
EOSINOPHIL NFR BLD AUTO: 3 %
ERYTHROCYTE [DISTWIDTH] IN BLOOD BY AUTOMATED COUNT: 13.2 % (ref 10–15)
GFR SERPL CREATININE-BSD FRML MDRD: >90 ML/MIN/1.73M2
GLUCOSE BLD-MCNC: 91 MG/DL (ref 70–125)
HCT VFR BLD AUTO: 32.6 % (ref 35–47)
HGB BLD-MCNC: 10.2 G/DL (ref 11.7–15.7)
IMM GRANULOCYTES # BLD: 0 10E3/UL
IMM GRANULOCYTES NFR BLD: 0 %
LYMPHOCYTES # BLD AUTO: 1.8 10E3/UL (ref 0.8–5.3)
LYMPHOCYTES NFR BLD AUTO: 40 %
MAGNESIUM SERPL-MCNC: 1.7 MG/DL (ref 1.8–2.6)
MCH RBC QN AUTO: 29.9 PG (ref 26.5–33)
MCHC RBC AUTO-ENTMCNC: 31.3 G/DL (ref 31.5–36.5)
MCV RBC AUTO: 96 FL (ref 78–100)
MONOCYTES # BLD AUTO: 0.4 10E3/UL (ref 0–1.3)
MONOCYTES NFR BLD AUTO: 9 %
NEUTROPHILS # BLD AUTO: 2.2 10E3/UL (ref 1.6–8.3)
NEUTROPHILS NFR BLD AUTO: 48 %
NRBC # BLD AUTO: 0 10E3/UL
NRBC BLD AUTO-RTO: 0 /100
PHOSPHATE SERPL-MCNC: 3 MG/DL (ref 2.5–4.5)
PLATELET # BLD AUTO: 194 10E3/UL (ref 150–450)
POTASSIUM BLD-SCNC: 3.3 MMOL/L (ref 3.5–5)
POTASSIUM BLD-SCNC: 3.7 MMOL/L (ref 3.5–5)
POTASSIUM BLD-SCNC: 3.7 MMOL/L (ref 3.5–5)
PROT SERPL-MCNC: 4.8 G/DL (ref 6–8)
RBC # BLD AUTO: 3.41 10E6/UL (ref 3.8–5.2)
SODIUM SERPL-SCNC: 142 MMOL/L (ref 136–145)
WBC # BLD AUTO: 4.4 10E3/UL (ref 4–11)

## 2022-03-10 PROCEDURE — 250N000013 HC RX MED GY IP 250 OP 250 PS 637: Performed by: COLON & RECTAL SURGERY

## 2022-03-10 PROCEDURE — 250N000011 HC RX IP 250 OP 636: Performed by: INTERNAL MEDICINE

## 2022-03-10 PROCEDURE — 36415 COLL VENOUS BLD VENIPUNCTURE: CPT | Performed by: STUDENT IN AN ORGANIZED HEALTH CARE EDUCATION/TRAINING PROGRAM

## 2022-03-10 PROCEDURE — 83735 ASSAY OF MAGNESIUM: CPT | Performed by: COLON & RECTAL SURGERY

## 2022-03-10 PROCEDURE — 250N000013 HC RX MED GY IP 250 OP 250 PS 637: Performed by: INTERNAL MEDICINE

## 2022-03-10 PROCEDURE — 258N000001 HC RX 258: Performed by: INTERNAL MEDICINE

## 2022-03-10 PROCEDURE — 99233 SBSQ HOSP IP/OBS HIGH 50: CPT | Performed by: INTERNAL MEDICINE

## 2022-03-10 PROCEDURE — 84132 ASSAY OF SERUM POTASSIUM: CPT | Performed by: STUDENT IN AN ORGANIZED HEALTH CARE EDUCATION/TRAINING PROGRAM

## 2022-03-10 PROCEDURE — 80053 COMPREHEN METABOLIC PANEL: CPT | Performed by: COLON & RECTAL SURGERY

## 2022-03-10 PROCEDURE — 84100 ASSAY OF PHOSPHORUS: CPT | Performed by: INTERNAL MEDICINE

## 2022-03-10 PROCEDURE — 85014 HEMATOCRIT: CPT | Performed by: STUDENT IN AN ORGANIZED HEALTH CARE EDUCATION/TRAINING PROGRAM

## 2022-03-10 PROCEDURE — 999N000127 HC STATISTIC PERIPHERAL IV START W US GUIDANCE

## 2022-03-10 PROCEDURE — 120N000001 HC R&B MED SURG/OB

## 2022-03-10 PROCEDURE — 36415 COLL VENOUS BLD VENIPUNCTURE: CPT | Performed by: COLON & RECTAL SURGERY

## 2022-03-10 PROCEDURE — 250N000013 HC RX MED GY IP 250 OP 250 PS 637: Performed by: STUDENT IN AN ORGANIZED HEALTH CARE EDUCATION/TRAINING PROGRAM

## 2022-03-10 PROCEDURE — 250N000011 HC RX IP 250 OP 636: Performed by: STUDENT IN AN ORGANIZED HEALTH CARE EDUCATION/TRAINING PROGRAM

## 2022-03-10 RX ORDER — POTASSIUM CHLORIDE 750 MG/1
10 TABLET, EXTENDED RELEASE ORAL ONCE
Status: COMPLETED | OUTPATIENT
Start: 2022-03-10 | End: 2022-03-10

## 2022-03-10 RX ORDER — POTASSIUM CHLORIDE 1500 MG/1
20 TABLET, EXTENDED RELEASE ORAL ONCE
Status: COMPLETED | OUTPATIENT
Start: 2022-03-10 | End: 2022-03-10

## 2022-03-10 RX ORDER — MAGNESIUM SULFATE HEPTAHYDRATE 40 MG/ML
2 INJECTION, SOLUTION INTRAVENOUS ONCE
Status: COMPLETED | OUTPATIENT
Start: 2022-03-10 | End: 2022-03-10

## 2022-03-10 RX ORDER — HEPARIN SODIUM 5000 [USP'U]/.5ML
5000 INJECTION, SOLUTION INTRAVENOUS; SUBCUTANEOUS 2 TIMES DAILY
Status: DISCONTINUED | OUTPATIENT
Start: 2022-03-10 | End: 2022-03-11 | Stop reason: HOSPADM

## 2022-03-10 RX ADMIN — MAGNESIUM SULFATE HEPTAHYDRATE 2 G: 40 INJECTION, SOLUTION INTRAVENOUS at 09:57

## 2022-03-10 RX ADMIN — DIPHENHYDRAMINE HYDROCHLORIDE 25 MG: 50 INJECTION, SOLUTION INTRAMUSCULAR; INTRAVENOUS at 16:33

## 2022-03-10 RX ADMIN — HYDROMORPHONE HYDROCHLORIDE 0.4 MG: 1 INJECTION, SOLUTION INTRAMUSCULAR; INTRAVENOUS; SUBCUTANEOUS at 12:10

## 2022-03-10 RX ADMIN — POTASSIUM CHLORIDE 10 MEQ: 750 TABLET, EXTENDED RELEASE ORAL at 08:48

## 2022-03-10 RX ADMIN — ACETAMINOPHEN 650 MG: 325 TABLET ORAL at 16:32

## 2022-03-10 RX ADMIN — HYDROMORPHONE HYDROCHLORIDE 0.4 MG: 1 INJECTION, SOLUTION INTRAMUSCULAR; INTRAVENOUS; SUBCUTANEOUS at 21:55

## 2022-03-10 RX ADMIN — DIPHENHYDRAMINE HYDROCHLORIDE 25 MG: 50 INJECTION, SOLUTION INTRAMUSCULAR; INTRAVENOUS at 22:41

## 2022-03-10 RX ADMIN — HYDROMORPHONE HYDROCHLORIDE 0.4 MG: 1 INJECTION, SOLUTION INTRAMUSCULAR; INTRAVENOUS; SUBCUTANEOUS at 16:32

## 2022-03-10 RX ADMIN — POTASSIUM CHLORIDE, DEXTROSE MONOHYDRATE AND SODIUM CHLORIDE: 150; 5; 900 INJECTION, SOLUTION INTRAVENOUS at 14:14

## 2022-03-10 RX ADMIN — ACETAMINOPHEN 650 MG: 325 TABLET ORAL at 22:41

## 2022-03-10 RX ADMIN — HEPARIN SODIUM 5000 UNITS: 10000 INJECTION, SOLUTION INTRAVENOUS; SUBCUTANEOUS at 11:04

## 2022-03-10 RX ADMIN — POTASSIUM CHLORIDE, DEXTROSE MONOHYDRATE AND SODIUM CHLORIDE: 150; 5; 900 INJECTION, SOLUTION INTRAVENOUS at 01:36

## 2022-03-10 RX ADMIN — HEPARIN SODIUM 5000 UNITS: 10000 INJECTION, SOLUTION INTRAVENOUS; SUBCUTANEOUS at 21:44

## 2022-03-10 RX ADMIN — POTASSIUM CHLORIDE 20 MEQ: 1500 TABLET, EXTENDED RELEASE ORAL at 01:36

## 2022-03-10 RX ADMIN — MAGNESIUM SULFATE HEPTAHYDRATE 2 G: 40 INJECTION, SOLUTION INTRAVENOUS at 17:58

## 2022-03-10 RX ADMIN — BUDESONIDE 9 MG: 3 CAPSULE ORAL at 08:48

## 2022-03-10 ASSESSMENT — ACTIVITIES OF DAILY LIVING (ADL)
ADLS_ACUITY_SCORE: 3

## 2022-03-10 NOTE — UTILIZATION REVIEW
Admission Status; Secondary Review Determination   Under the authority of the Utilization Management Committee, the utilization review process indicated a secondary review on Alok Nino. The review outcome is based on review of the medical records, discussions with staff, and applying clinical experience noted on the date of the review.   (x) Inpatient Status Appropriate - This patient's medical care is consistent with medical management for inpatient care and reasonable inpatient medical practice.     RATIONALE FOR DETERMINATION   41 yr old female with Crohns disease and multiple surgeries with hx obstructions who presented with abdominal pain on 3/9/22 with bowel obstruction.  She had a colonscopy with stricture dilation at ileocolonic anastomosis day prior to presentation.  GI and CRS have been following.  Ongoing supportive cares with IVF and slow monitoring of advancement of oral intake.  Replacing electrolytes which have dropped to 2.6 and needing replacement.  Watching closely for nausea or pain and would need CT scan.  At this time after discussion with Dr. Sandoval---CRS recommends ongoing monitoring in hospital at this time.    At the time of admission with the information available to the attending physician more than 2 nights Hospital complex care was anticipated, based on patient risk of adverse outcome if treated as outpatient and complex care required. Inpatient admission is appropriate based on the Medicare guidelines.   The information on this document is developed by the utilization review team in order for the business office to ensure compliance. This only denotes the appropriateness of proper admission status and does not reflect the quality of care rendered.   The definitions of Inpatient Status and Observation Status used in making the determination above are those provided in the CMS Coverage Manual, Chapter 1 and Chapter 6, section 70.4.   Sincerely,   Shannan Guadalupe,  MD  Utilization Review  Physician Advisor  [facility]

## 2022-03-10 NOTE — PLAN OF CARE
Pt reports pain in abdomen is ongoing despite administration of IV dilaudid.  She has itching as side-effect of the dilaudid, but it is tolerable at this point, but may need another IV benadryl as she is disturbing her Telemetry leads.  HR has been running cheri, but pt reports this is her baseline.  Nausea is resolved.  Pt had a formed BM this shift.  Potassium running in continuous IV fluids and she got a bump of Potassium PO for 3.2 at 1905.  She is due for a re-check at 0030.    Problem: Plan of Care - These are the overarching goals to be used throughout the patient stay.    Goal: Plan of Care Review/Shift Note  Description: The Plan of Care Review/Shift note should be completed every shift.  The Outcome Evaluation is a brief statement about your assessment that the patient is improving, declining, or no change.  This information will be displayed automatically on your shift note.  Outcome: Ongoing, Progressing  Flowsheets (Taken 3/9/2022 1811)  Plan of Care Reviewed With: patient  Outcome Evaluation: resolution of abdominal pain  Overall Patient Progress: improving  Goal: Absence of Hospital-Acquired Illness or Injury  Intervention: Identify and Manage Fall Risk  Recent Flowsheet Documentation  Taken 3/9/2022 1600 by Ashly Bettencourt RN  Safety Promotion/Fall Prevention: nonskid shoes/slippers when out of bed     Problem: Pain Acute  Goal: Acceptable Pain Control and Functional Ability  Outcome: Ongoing, Progressing  Intervention: Prevent or Manage Pain  Recent Flowsheet Documentation  Taken 3/9/2022 1600 by Ashly Bettencourt, RN  Medication Review/Management: medications reviewed     Problem: Nausea and Vomiting  Goal: Fluid and Electrolyte Balance  Outcome: Ongoing, Progressing   Goal Outcome Evaluation:    Plan of Care Reviewed With: patient     Overall Patient Progress: improving    Outcome Evaluation: resolution of abdominal pain

## 2022-03-10 NOTE — CARE PLAN
Pt reports she had hand cramps earlier today that went away after Magnesium infusion.  She reports they are coming back now as she is trying to cut her food.  Updated Dr. Sandoval via text page.

## 2022-03-10 NOTE — PLAN OF CARE
Problem: Pain Acute  Goal: Acceptable Pain Control and Functional Ability  Outcome: Ongoing, Progressing  Intervention: Develop Pain Management Plan  Recent Flowsheet Documentation  Taken 3/10/2022 1210 by Adelita Marshall RN  Pain Management Interventions: medication (see MAR)  Intervention: Prevent or Manage Pain  Recent Flowsheet Documentation  Taken 3/10/2022 0845 by Adelita Marshall, RN  Medication Review/Management: medications reviewed     Problem: Nausea and Vomiting  Goal: Fluid and Electrolyte Balance  Outcome: Ongoing, Progressing   Goal Outcome Evaluation:        Pt on Tele and is sinus cheri.  Pt has intermittent abdominal pain.  Received dilaudid 0.4mg IV x 1 this shift.  Denied N&V and diet advanced from Full liquids to soft diet and tolerated well

## 2022-03-10 NOTE — PLAN OF CARE
"  Problem: Plan of Care - These are the overarching goals to be used throughout the patient stay.    Goal: Plan of Care Review/Shift Note  Description: The Plan of Care Review/Shift note should be completed every shift.  The Outcome Evaluation is a brief statement about your assessment that the patient is improving, declining, or no change.  This information will be displayed automatically on your shift note.  Outcome: Ongoing, Progressing  Goal: Patient-Specific Goal (Individualized)  Description: You can add care plan individualizations to a care plan. Examples of Individualization might be:  \"Parent requests to be called daily at 9am for status\", \"I have a hard time hearing out of my right ear\", or \"Do not touch me to wake me up as it startles me\".  Outcome: Ongoing, Progressing  Goal: Absence of Hospital-Acquired Illness or Injury  Outcome: Ongoing, Progressing  Intervention: Identify and Manage Fall Risk  Recent Flowsheet Documentation  Taken 3/10/2022 0500 by Pooja Stearns RN  Safety Promotion/Fall Prevention: lighting adjusted  Taken 3/9/2022 2351 by Pooja Stearns RN  Safety Promotion/Fall Prevention: lighting adjusted  Intervention: Prevent Skin Injury  Recent Flowsheet Documentation  Taken 3/10/2022 0500 by Pooja Stearns RN  Body Position: position changed independently  Taken 3/9/2022 2351 by Pooja Stearns RN  Body Position: position changed independently  Intervention: Prevent and Manage VTE (Venous Thromboembolism) Risk  Recent Flowsheet Documentation  Taken 3/10/2022 0500 by Pooja Stearns, RN  Activity Management: activity adjusted per tolerance  Taken 3/9/2022 2351 by Pooja Stearns RN  Activity Management: activity adjusted per tolerance  Goal: Optimal Comfort and Wellbeing  Outcome: Ongoing, Progressing  Intervention: Monitor Pain and Promote Comfort  Recent Flowsheet Documentation  Taken 3/10/2022 0500 by Pooja Stearns RN  Pain Management Interventions:   emotional " support   declines  Taken 3/9/2022 2351 by Pooja Stearns, RN  Pain Management Interventions: (benadryl given for c/o itchy) emotional support  Goal: Readiness for Transition of Care  Outcome: Ongoing, Progressing     Problem: Pain Acute  Goal: Acceptable Pain Control and Functional Ability  Outcome: Ongoing, Progressing  Intervention: Develop Pain Management Plan  Recent Flowsheet Documentation  Taken 3/10/2022 0500 by Pooja Stearns, RN  Pain Management Interventions:   emotional support   declines  Taken 3/9/2022 2351 by Pooja Stearns, RN  Pain Management Interventions: (benadryl given for c/o itchy) emotional support     Problem: Nausea and Vomiting  Goal: Fluid and Electrolyte Balance  Outcome: Ongoing, Progressing   Goal Outcome Evaluation:        Pt a/o without nausea. Pt pain 5-6 tonight. Prn benadryl given for itching. Pt sleeping between cares. Tele sinus cheri. Encouraged to call with questions or needs. Will monitor.

## 2022-03-10 NOTE — PROGRESS NOTES
"CLINICAL NUTRITION SERVICES - ASSESSMENT NOTE     Nutrition Prescription    RECOMMENDATIONS FOR MDs/PROVIDERS TO ORDER:  10 gm thiamine daily    Malnutrition Status:    moderate    Recommendations already ordered by Registered Dietitian (RD):  Daily multivitamin with min  Ensure Enlive with breakfast  Gelatein plus at 8 PM    Future/Additional Recommendations:  Po, wt     REASON FOR ASSESSMENT  Alok Nino is a/an 41 year old female assessed by the dietitian for Admission Nutrition Risk Screen for positive, eating poorly with weight loss    Per chart and pt, pt with with complicated severe fibrostenotic Crohn's disease with history of SBO who was admitted on 3/9 for abdominal pains.      NUTRITION HISTORY  Pt reports not eating well since last November due to bowel obstructions. She reports having a bowel resection in November - she was on tpn and lipids.  She believes she's lost 10 lbs over time since then.  She also reports being weaker.    CURRENT NUTRITION ORDERS  Diet: Regular, low fiber at noon today  Intake/Tolerance: RD visited pt this am and she was tolerating full liquids.  She ate 100% of her lunch -scrambled eggs, grilled cheese, tomato soup, apple juice x 2, apple sauce, hot cocoa and hot water (472 kcal, 17 gm protein)    Pt will try Ensure and Gelatein plus - okay with chewable multivitamin (takes one childrens gummy at home)    LABS  Labs reviewed  Mg 1.7 L    MEDICATIONS  Medications reviewed  Magnesium sulfate,kcl  Cont, IV D 5 and Nacl + 20 meq/L kcl x 75 mL/hr    ANTHROPOMETRICS  Height: 5' 2\"  Most Recent Weight: 46.7 kg (103 lb)   10.4% weight loss in < 6 months  IBW: 50 kg  BMI: Normal BMI  Weight History:   Wt Readings from Last 3 Encounters:   03/09/22 46.7 kg (103 lb)   02/22/22 44.9 kg (99 lb)   01/04/22 46.6 kg (102 lb 11.2 oz)   11/22/21 highest weight during hospital admit in Nov 2021 115 lb    Dosing Weight: 46.7 kg    ASSESSED NUTRITION NEEDS  Estimated Energy Needs: 1400 - 1635 " kcals/day (30 - 35 kcals/kg )  Justification: Repletion  Estimated Protein Needs:56-70 grams protein/day (1.2 - 1.5 grams of pro/kg)  Justification: Repletion  Estimated Fluid Needs: 1400 + mL/day (30 mL/kg +)   Justification: Maintenance    PHYSICAL FINDINGS  See malnutrition section below.  Per chart and pt  GI: discomfort is an ache that isn't changing with intake (not having sharp pain or crampling)  Abdomen is tender  Last BM: 3/10/22    MALNUTRITION:  % Weight Loss:  > 10% in 6 months (severe malnutrition)  % Intake:  < 75% for >/= 1 month (moderate malnutrition)  Subcutaneous Fat Loss:  Mild buccual and Orbital region mild depletion  Muscle Loss:  Temporal region mild depletion, Clavicle bone region moderate depletion and Acromion bone region mild depletion  Fluid Retention:  None noted    Malnutrition Diagnosis: Moderate malnutrition  In Context of:  Chronic illness or disease    NUTRITION DIAGNOSIS  Malnutrition related to Crohn's disease as evidenced by weight loss, prolonged decreased intake, mild to moderate fat and muscle losses      INTERVENTIONS  Implementation  Multivitamin w/ mineral  Medical Nutrition Supplement    Goals  Patient to consume % of nutritionally adequate meals three times per day, or the equivalent with supplements/snacks.     Monitoring/Evaluation  Progress toward goals will be monitored and evaluated per protocol.

## 2022-03-10 NOTE — PROGRESS NOTES
[facility]    Medicine Progress Note - Hospitalist Service    Date of Admission:  3/9/2022    Assessment & Plan          42 y/o female with hx of Crohn's dz,  multiple prior abdominal surgeries and multiple prior small bowel obstructions, who follows [facility] and colorectal surgery presents with abdominal pain and found to have bowel obstruction. She recently underwent a colonoscopy with stricture dilation at the site of the ileocolonic anastomosis on 3/7/22        1.Bowel obstruction  -History of Crohn's disease, multiple prior abdominal surgeries, recurrent bowel obstructions.  -Had colonoscopy and dilatation of stricture by colorectal 2 days PTA  -Interval improvement of symptoms afterwards but started to have abdominal pain and nausea PTA  -Abdominal x-ray with distended bowel loops suggestive of distal colonic obstruction.  -started clears 3/9 and had some stool  -diet per CRS--they will advance to LFD  -GI seeing too    2.Hypokalemia  -replaced    3.hypomag  -today iv mag now  -check phos now too    4.Crohn's disease  -She had MR enterography on 3/3 which did not show any evidence of flares  -On budesonide and Stelara  -GI consult following  -Colonoscopy with stent placement at [facility] in 2 months     5.Elevated liver enzymes-,  alk phos 105.  Bili normal.  Had previous cholecystectomy.    -now trending down  -gi following    6.Moderate malnutrition  -now advancing diet  -check phos  -replace lytes  -albumin 2.7             Diet: Full Liquid Diet    DVT Prophylaxis: Heparin SQ  Hamilton Catheter: Not present  Central Lines: None  Cardiac Monitoring: ACTIVE order. Indication: Electrolyte Imbalance (24 hours)- Magnesium <1.3 mg/ml; Potassium < =2.8 or > 5.5 mg/ml  Code Status: Full Code      Disposition Plan   Expected Discharge: 03/11/2022     Anticipated discharge location:  Awaiting care coordination huddle  Delays:   days , per CRS        The patient's care was discussed with  the Care Coordinator/ and Patient.    Marcelina Sandoval MD  Hospitalist Service  [facility]  Securely message with the [employer] Web Console (learn more here)  Text page via [employer] Paging/Directory         Clinically Significant Risk Factors Present on Admission                 ______________________________________________________________________    Interval History   She notes small stool this am, not bloody  Less abd pain  Some aches in hand , right   Tolerated clears    Data reviewed today: I reviewed all medications, new labs and imaging results over the last 24 hours. I personally reviewed no images or EKG's today.    Physical Exam   Vital Signs: Temp: 97.7  F (36.5  C) Temp src: Oral BP: 115/64 Pulse: 54   Resp: 18 SpO2: 99 % O2 Device: None (Room air)    Weight: 103 lbs 0 oz  Constitutional: awake, fatigued, alert, cooperative and no apparent distress  Eyes: sclera clear  Respiratory: No increased work of breathing, good air exchange, clear to auscultation bilaterally, no crackles or wheezing  Cardiovascular: Normal apical impulse, regular rate and rhythm, normal S1 and S2, no S3 or S4, and no murmur noted  GI: scars noted umbilical, hypoactive bowel sounds, soft, non-distended and tenderness noted lower quads mild  Skin: no bruising or bleeding  Musculoskeletal: no lower extremity pitting edema present  Neurologic: Mental Status Exam:  Level of Alertness:   awake  Neuropsychiatric: General: normal, calm and normal eye contact    Data   Recent Labs   Lab 03/10/22  0606 03/10/22  0034 03/09/22  1905 03/09/22  0747 03/09/22  0117   WBC 4.4  --   --   --  9.8   HGB 10.2*  --   --   --  11.9   MCV 96  --   --   --  92     --   --   --  267     --   --   --  142   POTASSIUM 3.7  3.7 3.3* 3.2*   < > 2.6*   CHLORIDE 110*  --   --   --  107   CO2 26  --   --   --  21*   BUN 4*  --   --   --  9   CR 0.56*  --   --   --  0.64   ANIONGAP 6  --   --   --  14   BLACK 7.5*   --   --   --  8.8   GLC 91  --   --   --  104   ALBUMIN 2.7*  --   --   --  3.7   PROTTOTAL 4.8*  --   --   --  6.5   BILITOTAL 0.4  --   --   --  0.7   ALKPHOS 86  --   --   --  105   ALT 88*  --   --   --  160*   AST 28  --   --   --  77*   LIPASE  --   --   --   --  44    < > = values in this interval not displayed.     No results found for this or any previous visit (from the past 24 hour(s)).

## 2022-03-10 NOTE — PROGRESS NOTES
[facility] - Digestive Health Progress Note     IMPRESSION:  42 yo female with complicated severe fibrostenotic Crohn's disease with history of SBO who was admitted on 3/9 for abdominal pains.     1. Abdominal pains  2. Abdominal distension (improved)  - XR 3/9 with marked fluid and gas distension throughout colon extending deep in to the pelvis. ?distal colonic obstruction or low mechanical obstruction vs ileus/ogilvies.   - Mr enterography 3/3 without active inflammatory bowel disease or obstruction.   - Recent colonoscopy with stricture dilation on 3/7.   - Clinically patient is doing better and is less distended. She is passing gas and stools  - Discussed with Dr. Tolbert (primary IBD provider) - recommending resuming Budesonide 9mg daily with eventual taper. Plan for colonoscopy with stent placement at the [facility] in 2 months.       RECOMMENDATIONS:  - Advance diet to LOW-RESIDUE. She should maintain a low-residue diet moving forward given her stricture  - Continue Budesonide 9mg daily. Should try to have a follow up with Dr. Tolbert to discuss taper.  - There are discussions about having colonic stent placed at the [facility] in 2 months. Patient will try to discuss further with Dr. Cartagena.    - If she tolerates diet, she can likely be discharged later today.       Approximately 25 minutes of total time was spent providing patient care, including patient evaluation, reviewing documentation/test results, and     Nasir Friedman PA-C  [facility]  [phone]  ________________________________________________________________________      SUBJECTIVE:    Mild abdominal discomfort but much improved. She feels abdomen is much less distended. She is passing gas and stools. No nausea or vomiting. Tolerating liquid diet.      OBJECTIVE:  /64 (BP Location: Left arm)   Pulse 54   Temp 97.7  F (36.5  C) (Oral)   Resp 18   Wt 46.7 kg (103 lb)   LMP  (Within Days)   SpO2 99%   BMI 18.84 kg/m    Temp (24hrs),  Av.9  F (36.6  C), Min:97.5  F (36.4  C), Max:98.2  F (36.8  C)    Patient Vitals for the past 72 hrs:   Weight   22 0041 46.7 kg (103 lb)       Intake/Output Summary (Last 24 hours) at 3/10/2022 1056  Last data filed at 3/10/2022 0805  Gross per 24 hour   Intake 2423 ml   Output --   Net 2423 ml        PHYSICAL EXAM  GEN: Alert, oriented x3, communicative and in NAD.    LYMPH: No LAD noted.  HRT: RRR  LUNGS: CTA  ABD:  ND, +BS, no guarding or pain to palpation, no rebound, no HSM.  SKIN: No rash, jaundice or spider angiomata      LABS:  I have reviewed the patient's new clinical lab results:     Recent Labs   Lab Test 03/10/22  0606 22  0117 21  0752 21  0744 21  0723 21  0709 21  0720 21  0722 21  0720   WBC 4.4 9.8  --   --   --   --   --   --  11.7*   HGB 10.2* 11.9  --   --   --   --   --   --  10.2*   MCV 96 92  --   --   --   --   --   --  97    267 353  --    < >  --    < >  --  182   INR  --   --   --  1.12  --  1.02  --  1.28*  --     < > = values in this interval not displayed.     Recent Labs   Lab Test 03/10/22  0606 03/10/22  0034 22  1905 22  0747 22  0117 21  0752   POTASSIUM 3.7  3.7 3.3* 3.2*   < > 2.6* 4.2   CHLORIDE 110*  --   --   --  107 109   CO2 26  --   --   --  21* 26   BUN 4*  --   --   --  9 15   CR 0.56*  --   --   --  0.64 0.57   ANIONGAP 6  --   --   --  14 5    < > = values in this interval not displayed.     Recent Labs   Lab Test 03/10/22  0606 22  0117 21  0744 20  1203 19  1645 19  1848 19  0910 19  0858 19  1614   ALBUMIN 2.7* 3.7 2.0*   < >  --    < > 2.7*  --   --    BILITOTAL 0.4 0.7 0.3   < >  --    < > 0.6  --   --    ALT 88* 160* 14   < >  --    < > 22  --   --    AST 28 77* 11   < >  --    < > 19  --   --    PROTEIN  --   --   --   --  Trace*  --   --  Negative 30 mg/dL*   LIPASE  --  44  --   --   --   --  21  --   --     < > = values  in this interval not displayed.         IMAGING:  reviewed

## 2022-03-10 NOTE — PLAN OF CARE
Pt reports increased fullness in abdomen after eating but she is having formed BMs.  Bowel sounds are active.  Up to BR independently.  She got a dose of Magnesium and this reportedly improved her hand cramping.  She has fluids with 20 meq of K+ running at 50ml/hr.  Abdominal pain improved with PRN dilaudid IV and PO tylenol and itching relieved with IV benadryl.  Pt is getting heparin subcutaneous for VTE prevention.  Problem: Plan of Care - These are the overarching goals to be used throughout the patient stay.    Goal: Absence of Hospital-Acquired Illness or Injury  Intervention: Identify and Manage Fall Risk  Recent Flowsheet Documentation  Taken 3/10/2022 1621 by Ashly Bettencourt RN  Safety Promotion/Fall Prevention:    nonskid shoes/slippers when out of bed    clutter free environment maintained  Intervention: Prevent and Manage VTE (Venous Thromboembolism) Risk  Recent Flowsheet Documentation  Taken 3/10/2022 1621 by Ashly Bettencourt RN  VTE Prevention/Management: (heparin) other (see comments)  Goal: Optimal Comfort and Wellbeing  Intervention: Monitor Pain and Promote Comfort  Recent Flowsheet Documentation  Taken 3/10/2022 1620 by Ashly Bettencourt RN  Pain Management Interventions: medication (see MAR)     Problem: Pain Acute  Goal: Acceptable Pain Control and Functional Ability  Intervention: Develop Pain Management Plan  Recent Flowsheet Documentation  Taken 3/10/2022 1620 by Ashly Bettencourt RN  Pain Management Interventions: medication (see MAR)  Intervention: Prevent or Manage Pain  Recent Flowsheet Documentation  Taken 3/10/2022 1621 by Ashly Bettencourt RN  Medication Review/Management: medications reviewed   Goal Outcome Evaluation:    Plan of Care Reviewed With: patient     Overall Patient Progress: improving    Outcome Evaluation: resolution of abdominal pain

## 2022-03-10 NOTE — PROGRESS NOTES
Colon and Rectal Surgery  Daily Progress Note    Subjective    Patient reports improvement from yesterday.  Her pain has slightly decreased and bloating is better. She is passing gas and small stools. Denies N/V. AVSS.    K 3.7 < 3.3 < 3.2 < 2.7 < 2.6  Mg 1.7 from 1.9    Objective  Intake/Output last 24 hrs:    Intake/Output Summary (Last 24 hours) at 3/10/2022 0820  Last data filed at 3/10/2022 0805  Gross per 24 hour   Intake 2303 ml   Output --   Net 2303 ml     Temp:  [97.5  F (36.4  C)-98.2  F (36.8  C)] 97.7  F (36.5  C)  Pulse:  [53-62] 54  Resp:  [14-21] 18  BP: (102-119)/(58-77) 115/64  SpO2:  [97 %-100 %] 99 %    Physical Exam:  General: awake, alert, lying up in bed, in no acute distress  Head: normocephalic, atraumatic  Respiratory: non-labored breathing  Abdomen: soft, mildly tender in right abdomen, mildly distended. No rebound or guarding.  Skin: No rashes or lesions  Musculoskeletal: moves all four extremities equally; no calf edema or tenderness  Psychological: alert and oriented, answers questions appropriately    Pertinent Labs  Lab Results: personally reviewed.  Lab Results   Component Value Date     03/10/2022     03/09/2022     11/26/2021     12/14/2013     12/13/2013     12/12/2013    CO2 26 03/10/2022    CO2 21 03/09/2022    CO2 26 11/26/2021    CO2 27 12/14/2013    CO2 28 12/13/2013    CO2 27 12/12/2013    BUN 4 03/10/2022    BUN 9 03/09/2022    BUN 15 11/26/2021    BUN 3 12/14/2013    BUN 3 12/13/2013    BUN 3 12/12/2013     Lab Results   Component Value Date    WBC 4.4 03/10/2022    WBC 9.8 03/09/2022    WBC 11.7 11/18/2021    WBC 8.6 12/14/2013    WBC 10.0 12/13/2013    WBC 7.9 12/12/2013    HGB 10.2 03/10/2022    HGB 11.9 03/09/2022    HGB 10.2 11/18/2021    HGB 9.3 12/14/2013    HGB 10.1 12/13/2013    HGB 9.9 12/12/2013    HCT 32.6 03/10/2022    HCT 36.3 03/09/2022    HCT 31.4 11/18/2021    HCT 29.4 12/14/2013    HCT 31.3 12/13/2013    HCT 30.3  12/12/2013    MCV 96 03/10/2022    MCV 92 03/09/2022    MCV 97 11/18/2021    MCV 89 12/14/2013    MCV 89 12/13/2013    MCV 89 12/12/2013     03/10/2022     03/09/2022     11/26/2021     12/15/2013     12/14/2013     12/13/2013       Assessment/Plan: Alok Nino is a 40 y/o female with history of complicated Crohn's disease with multiple past surgeries. She recently underwent a colonoscopy with stricture dilation at the site of the ileocolonic anastomosis on 3/7/22 with Dr. Cartagena. Presented to the ER on 3/6/22 with findings of hypokalemia, hypomagnesemia and distal bowel obstruction on abd XR.     Improved from yesterday with bowel rest and electrolyte repletion. No urgent surgical intervention indicated. Will continue with conservative management.    K 3.7 < 3.3 < 3.2 < 2.7 < 2.6  Mg 1.7 from 1.9    - Continue to monitor and correct electrolytes   - OK to advance diet as tolerated to LFD, back down if nauseated   - Crohn's management per GI  - If symptoms persist or worsen, would recommend a CT scan to evaluate the location of the obstruction  - Will continue to follow  - Dr. Birmingham notified Dr. Cartagena of patient's status yesterday    Discussed with DERECK Vargas PA-C  [facility]  [phone]..............................main

## 2022-03-11 VITALS
SYSTOLIC BLOOD PRESSURE: 117 MMHG | OXYGEN SATURATION: 99 % | TEMPERATURE: 97.2 F | RESPIRATION RATE: 17 BRPM | WEIGHT: 103 LBS | DIASTOLIC BLOOD PRESSURE: 65 MMHG | HEART RATE: 73 BPM | BODY MASS INDEX: 18.84 KG/M2

## 2022-03-11 LAB
ALBUMIN SERPL-MCNC: 2.7 G/DL (ref 3.5–5)
ALP SERPL-CCNC: 93 U/L (ref 45–120)
ALT SERPL W P-5'-P-CCNC: 122 U/L (ref 0–45)
ANION GAP SERPL CALCULATED.3IONS-SCNC: 6 MMOL/L (ref 5–18)
AST SERPL W P-5'-P-CCNC: 69 U/L (ref 0–40)
BILIRUB SERPL-MCNC: 0.4 MG/DL (ref 0–1)
BUN SERPL-MCNC: 5 MG/DL (ref 8–22)
CALCIUM SERPL-MCNC: 7.6 MG/DL (ref 8.5–10.5)
CHLORIDE BLD-SCNC: 109 MMOL/L (ref 98–107)
CO2 SERPL-SCNC: 26 MMOL/L (ref 22–31)
CREAT SERPL-MCNC: 0.57 MG/DL (ref 0.6–1.1)
ERYTHROCYTE [DISTWIDTH] IN BLOOD BY AUTOMATED COUNT: 13 % (ref 10–15)
GFR SERPL CREATININE-BSD FRML MDRD: >90 ML/MIN/1.73M2
GLUCOSE BLD-MCNC: 84 MG/DL (ref 70–125)
HCT VFR BLD AUTO: 34.3 % (ref 35–47)
HGB BLD-MCNC: 10.6 G/DL (ref 11.7–15.7)
MAGNESIUM SERPL-MCNC: 1.9 MG/DL (ref 1.8–2.6)
MCH RBC QN AUTO: 29.8 PG (ref 26.5–33)
MCHC RBC AUTO-ENTMCNC: 30.9 G/DL (ref 31.5–36.5)
MCV RBC AUTO: 96 FL (ref 78–100)
PHOSPHATE SERPL-MCNC: 3.3 MG/DL (ref 2.5–4.5)
PLATELET # BLD AUTO: 221 10E3/UL (ref 150–450)
POTASSIUM BLD-SCNC: 3.5 MMOL/L (ref 3.5–5)
PROT SERPL-MCNC: 5.2 G/DL (ref 6–8)
RBC # BLD AUTO: 3.56 10E6/UL (ref 3.8–5.2)
SODIUM SERPL-SCNC: 141 MMOL/L (ref 136–145)
WBC # BLD AUTO: 5.3 10E3/UL (ref 4–11)

## 2022-03-11 PROCEDURE — 80053 COMPREHEN METABOLIC PANEL: CPT | Performed by: INTERNAL MEDICINE

## 2022-03-11 PROCEDURE — 85027 COMPLETE CBC AUTOMATED: CPT | Performed by: INTERNAL MEDICINE

## 2022-03-11 PROCEDURE — 36415 COLL VENOUS BLD VENIPUNCTURE: CPT | Performed by: INTERNAL MEDICINE

## 2022-03-11 PROCEDURE — 84100 ASSAY OF PHOSPHORUS: CPT | Performed by: INTERNAL MEDICINE

## 2022-03-11 PROCEDURE — 83735 ASSAY OF MAGNESIUM: CPT | Performed by: INTERNAL MEDICINE

## 2022-03-11 PROCEDURE — 99239 HOSP IP/OBS DSCHRG MGMT >30: CPT | Performed by: INTERNAL MEDICINE

## 2022-03-11 PROCEDURE — 250N000013 HC RX MED GY IP 250 OP 250 PS 637: Performed by: INTERNAL MEDICINE

## 2022-03-11 RX ORDER — POTASSIUM CHLORIDE 750 MG/1
10 TABLET, EXTENDED RELEASE ORAL ONCE
Status: COMPLETED | OUTPATIENT
Start: 2022-03-11 | End: 2022-03-11

## 2022-03-11 RX ADMIN — POTASSIUM CHLORIDE 10 MEQ: 750 TABLET, EXTENDED RELEASE ORAL at 09:42

## 2022-03-11 RX ADMIN — BUDESONIDE 9 MG: 3 CAPSULE ORAL at 09:42

## 2022-03-11 RX ADMIN — Medication 1 TABLET: at 09:42

## 2022-03-11 ASSESSMENT — ACTIVITIES OF DAILY LIVING (ADL)
ADLS_ACUITY_SCORE: 3

## 2022-03-11 NOTE — PLAN OF CARE
"  Problem: Plan of Care - These are the overarching goals to be used throughout the patient stay.    Goal: Plan of Care Review/Shift Note  Description: The Plan of Care Review/Shift note should be completed every shift.  The Outcome Evaluation is a brief statement about your assessment that the patient is improving, declining, or no change.  This information will be displayed automatically on your shift note.  Outcome: Ongoing, Progressing  Goal: Patient-Specific Goal (Individualized)  Description: You can add care plan individualizations to a care plan. Examples of Individualization might be:  \"Parent requests to be called daily at 9am for status\", \"I have a hard time hearing out of my right ear\", or \"Do not touch me to wake me up as it startles me\".  Outcome: Ongoing, Progressing  Goal: Absence of Hospital-Acquired Illness or Injury  Outcome: Ongoing, Progressing  Intervention: Identify and Manage Fall Risk  Recent Flowsheet Documentation  Taken 3/11/2022 0030 by Hazel Stearns RN  Safety Promotion/Fall Prevention:   clutter free environment maintained   nonskid shoes/slippers when out of bed  Intervention: Prevent Skin Injury  Recent Flowsheet Documentation  Taken 3/11/2022 0030 by Hazel Stearns RN  Body Position: position changed independently  Intervention: Prevent and Manage VTE (Venous Thromboembolism) Risk  Recent Flowsheet Documentation  Taken 3/11/2022 0030 by Hazel Stearns, RN  Activity Management: up ad svetlana  Goal: Optimal Comfort and Wellbeing  Outcome: Ongoing, Progressing  Goal: Readiness for Transition of Care  Outcome: Ongoing, Progressing     Problem: Pain Acute  Goal: Acceptable Pain Control and Functional Ability  Outcome: Ongoing, Progressing  Intervention: Prevent or Manage Pain  Recent Flowsheet Documentation  Taken 3/11/2022 0030 by Hazel Stearns RN  Medication Review/Management: medications reviewed     Problem: Nausea and Vomiting  Goal: Fluid and Electrolyte " Balance  Outcome: Ongoing, Progressing   Goal Outcome Evaluation:        Pt rates abdominal pain 4/10. Declined pain meds. Denies nausea. Reports passing flatus. Abdomen slightly rounded

## 2022-03-11 NOTE — PLAN OF CARE
"  Problem: Plan of Care - These are the overarching goals to be used throughout the patient stay.    Goal: Plan of Care Review/Shift Note  Description: The Plan of Care Review/Shift note should be completed every shift.  The Outcome Evaluation is a brief statement about your assessment that the patient is improving, declining, or no change.  This information will be displayed automatically on your shift note.  Outcome: Adequate for Care Transition  Goal: Patient-Specific Goal (Individualized)  Description: You can add care plan individualizations to a care plan. Examples of Individualization might be:  \"Parent requests to be called daily at 9am for status\", \"I have a hard time hearing out of my right ear\", or \"Do not touch me to wake me up as it startles me\".  Outcome: Adequate for Care Transition  Goal: Absence of Hospital-Acquired Illness or Injury  Outcome: Adequate for Care Transition  Intervention: Identify and Manage Fall Risk  Recent Flowsheet Documentation  Taken 3/11/2022 0935 by Margarita Florence RN  Safety Promotion/Fall Prevention:   patient and family education   nonskid shoes/slippers when out of bed  Intervention: Prevent Skin Injury  Recent Flowsheet Documentation  Taken 3/11/2022 0935 by Margarita Florence RN  Body Position: position changed independently  Intervention: Prevent and Manage VTE (Venous Thromboembolism) Risk  Recent Flowsheet Documentation  Taken 3/11/2022 0935 by Margarita Florence RN  Activity Management:   activity adjusted per tolerance   activity encouraged  Goal: Optimal Comfort and Wellbeing  Outcome: Adequate for Care Transition  Goal: Readiness for Transition of Care  3/11/2022 1106 by Margarita Florence RN  Outcome: Adequate for Care Transition  3/11/2022 1104 by Margarita Florence RN  Outcome: Ongoing, Progressing     Problem: Pain Acute  Goal: Acceptable Pain Control and Functional Ability  3/11/2022 1106 by Margarita Florence RN  Outcome: Adequate for Care Transition  3/11/2022 1104 " by Margarita Florence, RN  Outcome: Ongoing, Progressing     Problem: Nausea and Vomiting  Goal: Fluid and Electrolyte Balance  3/11/2022 1106 by Margarita Florence, RN  Outcome: Adequate for Care Transition  3/11/2022 1104 by Margarita Florence, RN  Outcome: Ongoing, Progressing   Goal Outcome Evaluation:

## 2022-03-11 NOTE — PLAN OF CARE
Goal Outcome Evaluation:           Denied nausea, BS+, passing gas, good appetite       Pt to discharge home today with no home care  Problem: Pain Acute  Goal: Acceptable Pain Control and Functional Ability  Outcome: Ongoing, Progressing     Denied pain, reports abd is tender  Problem: Nausea and Vomiting  Goal: Fluid and Electrolyte Balance  Outcome: Ongoing, Progressing   Received potassium this am  Problem: Plan of Care - These are the overarching goals to be used throughout the patient stay.    Goal: Absence of Hospital-Acquired Illness or Injury  Intervention: Identify and Manage Fall Risk  Recent Flowsheet Documentation  Taken 3/11/2022 0935 by Margarita Florence, RN  Safety Promotion/Fall Prevention:   patient and family education   nonskid shoes/slippers when out of bed  Intervention: Prevent Skin Injury  Recent Flowsheet Documentation  Taken 3/11/2022 0935 by Margarita Florence RN  Body Position: position changed independently  Intervention: Prevent and Manage VTE (Venous Thromboembolism) Risk  Recent Flowsheet Documentation  Taken 3/11/2022 0935 by Margarita Florence, RN  Activity Management:   activity adjusted per tolerance   activity encouraged

## 2022-03-11 NOTE — PROGRESS NOTES
[facility]  Daily Progress Note    Subjective  No acute events overnight. Advance to LFD and tolerating this without nausea. Reports intermittent bloating after eating, but denies increasing abdominal pain or nausea. Abdominal pain slightly improving. Passing gas and multiple formed bowel movements.     Objective  Intake/Output last 24 hrs:    Intake/Output Summary (Last 24 hours) at 3/11/2022 0943  Last data filed at 3/11/2022 0655  Gross per 24 hour   Intake 1201 ml   Output --   Net 1201 ml     Temp:  [97.4  F (36.3  C)-98.2  F (36.8  C)] 97.4  F (36.3  C)  Pulse:  [60-87] 64  Resp:  [16-18] 16  BP: (101-113)/(65-75) 110/66  SpO2:  [97 %-99 %] 99 %    Physical Exam:  General: awake, alert, laying in bed, in no acute distress  Head: normocephalic, atraumatic  Respiratory: non-labored breathing  Abdomen: soft, minimally tender in right abdomen, mildly distended - slightly more from yesterday. No rebound or guarding.   Skin: No rashes or lesions  Musculoskeletal: moves all four extremities equally; no calf edema or tenderness  Psychological: alert and oriented, answers questions appropriately    Pertinent Labs  Lab Results: personally reviewed.  Lab Results   Component Value Date     03/11/2022     03/10/2022     03/09/2022     12/14/2013     12/13/2013     12/12/2013    CO2 26 03/11/2022    CO2 26 03/10/2022    CO2 21 03/09/2022    CO2 27 12/14/2013    CO2 28 12/13/2013    CO2 27 12/12/2013    BUN 5 03/11/2022    BUN 4 03/10/2022    BUN 9 03/09/2022    BUN 3 12/14/2013    BUN 3 12/13/2013    BUN 3 12/12/2013     Lab Results   Component Value Date    WBC 5.3 03/11/2022    WBC 4.4 03/10/2022    WBC 9.8 03/09/2022    WBC 8.6 12/14/2013    WBC 10.0 12/13/2013    WBC 7.9 12/12/2013    HGB 10.6 03/11/2022    HGB 10.2 03/10/2022    HGB 11.9 03/09/2022    HGB 9.3 12/14/2013    HGB 10.1 12/13/2013    HGB 9.9 12/12/2013    HCT 34.3 03/11/2022    HCT 32.6 03/10/2022    HCT  36.3 03/09/2022    HCT 29.4 12/14/2013    HCT 31.3 12/13/2013    HCT 30.3 12/12/2013    MCV 96 03/11/2022    MCV 96 03/10/2022    MCV 92 03/09/2022    MCV 89 12/14/2013    MCV 89 12/13/2013    MCV 89 12/12/2013     03/11/2022     03/10/2022     03/09/2022     12/15/2013     12/14/2013     12/13/2013       Assessment/Plan: Alok Nino is a 42 y/o female with history of complicated Crohn's disease with multiple past surgeries. She recently underwent a colonoscopy with stricture dilation at the site of the ileocolonic anastomosis on 3/7/22 with Dr. Cartagena. Presented to the ER on 3/6/22 with findings of hypokalemia, hypomagnesemia and distal bowel obstruction on abd XR.      Doing well, tolerating LFD without nausea and more stool production. Adequate electrolyte repletion. No urgent surgical intervention indicated.     K 3.5 from 3.7  Mag 1.9 from 1.7  AVSS    -Continue LFD, will stay on LFD on discharge  -Crohn's management per GI  -OK to discharge from CRS perspective  -Recommend follow-up with Dr. Cartagena at [facility] and Dr. Tolbert with GI to discuss colonic stent placement in near future and Crohn's managment    Discussed with Dr. Vinnie Calderon PA-C  [facility]  [phone]..............................main    COLORECTAL STAFF ADDENDUM  Patient seen and examined by me. Agree with excellent note by PA. Patient feeling well. Tolerating low fiber diet. Minimal pain.     Vitals reviewed  Aaox3, nad  Breathing nonlabored  abd soft, mild distension, NTTP    A/P:   - low fiber diet  - followup with Dr. Cartagena/Tomasz.   - okay to discharge from colorectal standpoint    Total time spent: 10 minutes, with 5 minutes spent in counseling and coordinating care      Monique Birmingham MD, MS, FACS, FASCRS  [facility]  Office: [phone]  3/11/2022 11:28 AM

## 2022-03-11 NOTE — DISCHARGE SUMMARY
[facility] MEDICINE  DISCHARGE SUMMARY     Primary Care Physician: Nakita Odonnell  Admission Date: 3/9/2022   Discharge Provider: Marcelina Sandoval MD Discharge Date: 3/11/2022   Diet:   Active Diet and Nourishment Order   Procedures     Snacks/Supplements Adult: Gelatein Plus; Between Meals     Snacks/Supplements Adult: Ensure Enlive; With Meals     Advance Diet as Tolerated: Regular Diet Adult; Low Fiber     Diet       Code Status: Full Code   Activity: DCACTIVITY: Activity as tolerated        Condition at Discharge: Stable     REASON FOR PRESENTATION(See Admission Note for Details)   abd pain    PRINCIPAL & ACTIVE DISCHARGE DIAGNOSES     Active Problems:    Hypokalemia    Intestinal obstruction, unspecified cause, unspecified whether partial or complete (H)    Hypomagnesemia      PENDING LABS     Unresulted Labs Ordered in the Past 30 Days of this Admission     No orders found from 2/7/2022 to 3/10/2022.            PROCEDURES ( this hospitalization only)          RECOMMENDATIONS TO OUTPATIENT PROVIDER FOR F/U VISIT     Follow-up Appointments     Follow-up and recommended labs and tests       Follow up with primary care provider, Nakita Odonnell, within 3-4days   for hospital follow- up.  The following labs/tests are recommended:   CMP--needs LFT's, cbc, mag.    Follow up with [facility]  2 weeks  -also follow with  Dr. Cartagena at [facility]                 DISPOSITION     Home    SUMMARY OF HOSPITAL COURSE:          40 y/o female with hx of Crohn's dz,  multiple prior abdominal surgeries and multiple prior small bowel obstructions, who follows [facility] and colorectal surgery presents with abdominal pain and found to have bowel obstruction. She recently underwent a colonoscopy with stricture dilation at the site of the ileocolonic anastomosis on 3/7/22        1.Bowel obstruction  -History of Crohn's disease, multiple prior abdominal surgeries, recurrent bowel obstructions.  -Had  colonoscopy and dilatation of stricture by colorectal 2 days PTA  -Interval improvement of symptoms afterwards but started to have abdominal pain and nausea PTA  -Abdominal x-ray with distended bowel loops suggestive of distal colonic obstruction.  -started clears 3/9 and had some stool aleady  -she was advanced to LFD diet and is doing well  -ok home today     2.Hypokalemia  -replaced     3.hypomag  -replaced, good today     4.Crohn's disease  -She had MR enterography on 3/3 which did not show any evidence of flares  -On budesonide and Stelara  -GI consult following  -Colonoscopy with stent placement at [facility] in 2 months     5.Elevated liver enzymes-,  alk phos 105.  Bili normal.  Had previous cholecystectomy.    -needs to have LFT's followed in clinic  -at day of discharge AST 69  ALT-122     6.Moderate malnutrition  -now advancing diet  -check phos-ok  -replace lytes    Discharge Medications with Med changes:     Current Discharge Medication List      CONTINUE these medications which have NOT CHANGED    Details   acetaminophen (TYLENOL) 325 MG tablet Take 650 mg by mouth every 6 hours as needed for mild pain      budesonide (ENTOCORT EC) 3 MG EC capsule Take 9 mg by mouth every morning      calcitRIOL (ROCALTROL) 0.25 MCG capsule Take 0.25 mcg by mouth three times a week      doxylamine (UNISOM) 25 MG TABS tablet Take 50 mg by mouth At Bedtime       magnesium oxide (MAG-OX) 400 (240 Mg) MG tablet Take 400 mg by mouth every evening       ustekinumab (STELARA) 90 MG/ML Inject 90 mg Subcutaneous every 28 days       vitamin D (ERGOCALCIFEROL) 88705 UNIT capsule Take 50,000 Units by mouth five times a week       vitamin E (TOCOPHEROL) 400 units (180 mg) capsule Take 400 Units by mouth every evening                    Rationale for medication changes:      none        Consults       COLORECTAL SURGERY IP CONSULT  GASTROENTEROLOGY IP CONSULT  COLORECTAL SURGERY IP CONSULT  SOCIAL WORK IP  CONSULT    Immunizations given this encounter     Most Recent Immunizations   Administered Date(s) Administered     COVID-19,PF,Pfizer (12+ Yrs) 12/21/2021     Influenza Vaccine IM > 6 months Valent IIV4 (Alfuria,Fluzone) 11/09/2021     Influenza Vaccine, 6+MO IM (QUADRIVALENT W/PRESERVATIVES) 10/15/2019     Pneumococcal, Unspecified 09/24/2014           Anticoagulation Information      Recent INR results: No results for input(s): INR in the last 168 hours.  Warfarin doses (if applicable) or name of other anticoagulant: none      SIGNIFICANT IMAGING FINDINGS     Results for orders placed or performed during the hospital encounter of 03/09/22   Abdomen XR, 2 vw, flat and upright    Impression    IMPRESSION: Marked amount of fluid and gas distention throughout the colon extending deep into the pelvis. Distal colonic obstruction or low mechanical obstruction cannot be excluded. Associated small bowel dilatation throughout the abdomen and pelvis.   No free intraperitoneal air.        SIGNIFICANT LABORATORY FINDINGS     Most Recent 3 CBC's:Recent Labs   Lab Test 03/11/22  0618 03/10/22  0606 03/09/22  0117   WBC 5.3 4.4 9.8   HGB 10.6* 10.2* 11.9   MCV 96 96 92    194 267     Most Recent 3 BMP's:Recent Labs   Lab Test 03/11/22 0618 03/10/22  0606 03/10/22  0034 03/09/22  0747 03/09/22  0117    142  --   --  142   POTASSIUM 3.5 3.7  3.7 3.3*   < > 2.6*   CHLORIDE 109* 110*  --   --  107   CO2 26 26  --   --  21*   BUN 5* 4*  --   --  9   CR 0.57* 0.56*  --   --  0.64   ANIONGAP 6 6  --   --  14   BLACK 7.6* 7.5*  --   --  8.8   GLC 84 91  --   --  104    < > = values in this interval not displayed.     Most Recent 2 LFT's:Recent Labs   Lab Test 03/11/22  0618 03/10/22  0606   AST 69* 28   * 88*   ALKPHOS 93 86   BILITOTAL 0.4 0.4       Results for orders placed or performed during the hospital encounter of 03/09/22   Abdomen XR, 2 vw, flat and upright     Status: None    Narrative    EXAM: XR ABDOMEN  2VIEWS  LOCATION: [facility]  DATE/TIME: 3/9/2022 1:58 AM    INDICATION: Evaluate small bowel obstruction.  COMPARISON: None.      Impression    IMPRESSION: Marked amount of fluid and gas distention throughout the colon extending deep into the pelvis. Distal colonic obstruction or low mechanical obstruction cannot be excluded. Associated small bowel dilatation throughout the abdomen and pelvis.   No free intraperitoneal air.    Basic metabolic panel     Status: Abnormal   Result Value Ref Range    Sodium 142 136 - 145 mmol/L    Potassium 2.6 (LL) 3.5 - 5.0 mmol/L    Chloride 107 98 - 107 mmol/L    Carbon Dioxide (CO2) 21 (L) 22 - 31 mmol/L    Anion Gap 14 5 - 18 mmol/L    Urea Nitrogen 9 8 - 22 mg/dL    Creatinine 0.64 0.60 - 1.10 mg/dL    Calcium 8.8 8.5 - 10.5 mg/dL    Glucose 104 70 - 125 mg/dL    GFR Estimate >90 >60 mL/min/1.73m2   Hepatic panel     Status: Abnormal   Result Value Ref Range    Bilirubin Total 0.7 0.0 - 1.0 mg/dL    Bilirubin Direct 0.3 <=0.5 mg/dL    Protein Total 6.5 6.0 - 8.0 g/dL    Albumin 3.7 3.5 - 5.0 g/dL    Alkaline Phosphatase 105 45 - 120 U/L    AST 77 (H) 0 - 40 U/L     (H) 0 - 45 U/L   Lipase     Status: Normal   Result Value Ref Range    Lipase 44 0 - 52 U/L   CBC with platelets and differential     Status: None   Result Value Ref Range    WBC Count 9.8 4.0 - 11.0 10e3/uL    RBC Count 3.96 3.80 - 5.20 10e6/uL    Hemoglobin 11.9 11.7 - 15.7 g/dL    Hematocrit 36.3 35.0 - 47.0 %    MCV 92 78 - 100 fL    MCH 30.1 26.5 - 33.0 pg    MCHC 32.8 31.5 - 36.5 g/dL    RDW 13.2 10.0 - 15.0 %    Platelet Count 267 150 - 450 10e3/uL    % Neutrophils 74 %    % Lymphocytes 18 %    % Monocytes 6 %    % Eosinophils 1 %    % Basophils 0 %    % Immature Granulocytes 1 %    NRBCs per 100 WBC 0 <1 /100    Absolute Neutrophils 7.4 1.6 - 8.3 10e3/uL    Absolute Lymphocytes 1.7 0.8 - 5.3 10e3/uL    Absolute Monocytes 0.6 0.0 - 1.3 10e3/uL    Absolute Eosinophils 0.1 0.0 -  0.7 10e3/uL    Absolute Basophils 0.0 0.0 - 0.2 10e3/uL    Absolute Immature Granulocytes 0.1 <=0.4 10e3/uL    Absolute NRBCs 0.0 10e3/uL   Magnesium     Status: Abnormal   Result Value Ref Range    Magnesium 1.7 (L) 1.8 - 2.6 mg/dL   Asymptomatic COVID-19 Virus (Coronavirus) by PCR Nasopharyngeal     Status: Normal    Specimen: Nasopharyngeal; Swab   Result Value Ref Range    SARS CoV2 PCR Negative Negative    Narrative    Testing was performed using the arsh  SARS-CoV-2 & Influenza A/B Assay on the arsh  Juanita  System.  This test should be ordered for the detection of SARS-COV-2 in individuals who meet SARS-CoV-2 clinical and/or epidemiological criteria. Test performance is unknown in asymptomatic patients.  This test is for in vitro diagnostic use under the FDA EUA for laboratories certified under CLIA to perform moderate and/or high complexity testing. This test has not been FDA cleared or approved.  A negative test does not rule out the presence of PCR inhibitors in the specimen or target RNA in concentration below the limit of detection for the assay. The possibility of a false negative should be considered if the patient's recent exposure or clinical presentation suggests COVID-19.  [facility] are certified under the Clinical Laboratory Improvement Amendments of 1988 (CLIA-88) as qualified to perform moderate and/or high complexity laboratory testing.   Magnesium     Status: Normal   Result Value Ref Range    Magnesium 1.9 1.8 - 2.6 mg/dL   Potassium     Status: Abnormal   Result Value Ref Range    Potassium 2.6 (LL) 3.5 - 5.0 mmol/L   Potassium     Status: Abnormal   Result Value Ref Range    Potassium 2.7 (LL) 3.5 - 5.0 mmol/L   Potassium     Status: Abnormal   Result Value Ref Range    Potassium 3.2 (L) 3.5 - 5.0 mmol/L   Potassium     Status: Abnormal   Result Value Ref Range    Potassium 3.3 (L) 3.5 - 5.0 mmol/L   Comprehensive metabolic panel     Status: Abnormal   Result Value Ref  Range    Sodium 142 136 - 145 mmol/L    Potassium 3.7 3.5 - 5.0 mmol/L    Chloride 110 (H) 98 - 107 mmol/L    Carbon Dioxide (CO2) 26 22 - 31 mmol/L    Anion Gap 6 5 - 18 mmol/L    Urea Nitrogen 4 (L) 8 - 22 mg/dL    Creatinine 0.56 (L) 0.60 - 1.10 mg/dL    Calcium 7.5 (L) 8.5 - 10.5 mg/dL    Glucose 91 70 - 125 mg/dL    Alkaline Phosphatase 86 45 - 120 U/L    AST 28 0 - 40 U/L    ALT 88 (H) 0 - 45 U/L    Protein Total 4.8 (L) 6.0 - 8.0 g/dL    Albumin 2.7 (L) 3.5 - 5.0 g/dL    Bilirubin Total 0.4 0.0 - 1.0 mg/dL    GFR Estimate >90 >60 mL/min/1.73m2   Magnesium     Status: Abnormal   Result Value Ref Range    Magnesium 1.7 (L) 1.8 - 2.6 mg/dL   Potassium     Status: Normal   Result Value Ref Range    Potassium 3.7 3.5 - 5.0 mmol/L   CBC with platelets and differential     Status: Abnormal   Result Value Ref Range    WBC Count 4.4 4.0 - 11.0 10e3/uL    RBC Count 3.41 (L) 3.80 - 5.20 10e6/uL    Hemoglobin 10.2 (L) 11.7 - 15.7 g/dL    Hematocrit 32.6 (L) 35.0 - 47.0 %    MCV 96 78 - 100 fL    MCH 29.9 26.5 - 33.0 pg    MCHC 31.3 (L) 31.5 - 36.5 g/dL    RDW 13.2 10.0 - 15.0 %    Platelet Count 194 150 - 450 10e3/uL    % Neutrophils 48 %    % Lymphocytes 40 %    % Monocytes 9 %    % Eosinophils 3 %    % Basophils 0 %    % Immature Granulocytes 0 %    NRBCs per 100 WBC 0 <1 /100    Absolute Neutrophils 2.2 1.6 - 8.3 10e3/uL    Absolute Lymphocytes 1.8 0.8 - 5.3 10e3/uL    Absolute Monocytes 0.4 0.0 - 1.3 10e3/uL    Absolute Eosinophils 0.1 0.0 - 0.7 10e3/uL    Absolute Basophils 0.0 0.0 - 0.2 10e3/uL    Absolute Immature Granulocytes 0.0 <=0.4 10e3/uL    Absolute NRBCs 0.0 10e3/uL   Phosphorus     Status: Normal   Result Value Ref Range    Phosphorus 3.0 2.5 - 4.5 mg/dL   Magnesium     Status: Normal   Result Value Ref Range    Magnesium 1.9 1.8 - 2.6 mg/dL   Phosphorus     Status: Normal   Result Value Ref Range    Phosphorus 3.3 2.5 - 4.5 mg/dL   Comprehensive metabolic panel     Status: Abnormal   Result Value Ref  Range    Sodium 141 136 - 145 mmol/L    Potassium 3.5 3.5 - 5.0 mmol/L    Chloride 109 (H) 98 - 107 mmol/L    Carbon Dioxide (CO2) 26 22 - 31 mmol/L    Anion Gap 6 5 - 18 mmol/L    Urea Nitrogen 5 (L) 8 - 22 mg/dL    Creatinine 0.57 (L) 0.60 - 1.10 mg/dL    Calcium 7.6 (L) 8.5 - 10.5 mg/dL    Glucose 84 70 - 125 mg/dL    Alkaline Phosphatase 93 45 - 120 U/L    AST 69 (H) 0 - 40 U/L     (H) 0 - 45 U/L    Protein Total 5.2 (L) 6.0 - 8.0 g/dL    Albumin 2.7 (L) 3.5 - 5.0 g/dL    Bilirubin Total 0.4 0.0 - 1.0 mg/dL    GFR Estimate >90 >60 mL/min/1.73m2   CBC with platelets     Status: Abnormal   Result Value Ref Range    WBC Count 5.3 4.0 - 11.0 10e3/uL    RBC Count 3.56 (L) 3.80 - 5.20 10e6/uL    Hemoglobin 10.6 (L) 11.7 - 15.7 g/dL    Hematocrit 34.3 (L) 35.0 - 47.0 %    MCV 96 78 - 100 fL    MCH 29.8 26.5 - 33.0 pg    MCHC 30.9 (L) 31.5 - 36.5 g/dL    RDW 13.0 10.0 - 15.0 %    Platelet Count 221 150 - 450 10e3/uL   CBC with platelets differential     Status: None    Narrative    The following orders were created for panel order CBC with platelets differential.  Procedure                               Abnormality         Status                     ---------                               -----------         ------                     CBC with platelets and d...[[id number]]                      Final result                 Please view results for these tests on the individual orders.   CBC with platelets differential     Status: Abnormal    Narrative    The following orders were created for panel order CBC with platelets differential.  Procedure                               Abnormality         Status                     ---------                               -----------         ------                     CBC with platelets and d...[[id number]]  Abnormal            Final result                 Please view results for these tests on the individual orders.     Abdomen XR, 2 vw, flat and upright    Result Date:  3/9/2022  EXAM: XR ABDOMEN 2VIEWS LOCATION: [facility] DATE/TIME: 3/9/2022 1:58 AM INDICATION: Evaluate small bowel obstruction. COMPARISON: None.     IMPRESSION: Marked amount of fluid and gas distention throughout the colon extending deep into the pelvis. Distal colonic obstruction or low mechanical obstruction cannot be excluded. Associated small bowel dilatation throughout the abdomen and pelvis. No free intraperitoneal air.     MR Enterography wo and w Contrast    Result Date: 3/3/2022  EXAM: MRI ENTEROGRAPHY/MRI ABDOMEN AND MRI PELVIS WITHOUT AND WITH CONTRAST LOCATION: [facility] DATE/TIME: 3/3/2022 8:26 AM INDICATION: 41-year-old woman with Crohn's disease. Multiple episodes of small bowel obstruction. Hospitalized for small bowel obstruction 1 month ago that was managed conservatively. Prior to that, a small bowel obstruction managed surgically November of 2021. COMPARISON: [facility] CT 02/09/2022, MR enterography 2/4/2022 and CT 02/03/2022. [facility] CT 11/09/2021 and [facility] MR enterography 09/30/2021. TECHNIQUE: Routine enterography protocol including imaging of abdomen and pelvis with axial T1 in/out phase, axial T2, coronal T2. Post contrast abdomen and pelvis axial and coronal thin-section T1 with fat sat. 1 mg Glucagon. 1500 ml Breeza oral contrast material. CONTRAST: 4.5mL Gadavist  FINDINGS: ENTEROGRAPHY: Ileocecal resection with anastomosis mid right abdomen. No MR signs of active inflammatory bowel disease. Mild patulous dilatation of the proximal third of the small intestine, which is otherwise normal in appearance. The middle third of the small intestine is comprised of two consecutive surgically altered segments of small bowel with the more proximal located in the mid to upper left abdomen and the second in the central abdomen. Today these measure up to 7-8 cm diameter, but when obstructed at prior studies,  measure up to 12 cm diameter. At cine imaging these dilated segments appear almost reservoir-like with ineffective peristalsis. Mild to moderate patulous dilatation of the distal third of the small intestine, which is otherwise normal in appearance. At present, the tortuous, redundant, large caliber colon is decompressed with no obstruction or MR signs of active inflammation. During episodes of small bowel obstructions, the right, transverse and descending colon are also significantly distended. ADDITIONAL FINDINGS: Cholecystectomy. Mild uniform bile duct dilatation to the level of the ampulla with no stone, stricture or mass unchanged. No significant abnormality in the liver, spleen, kidneys, pancreas, and adrenal glands where visualized. No adenopathy. Normal pelvis.     IMPRESSION: 1.  No MR signs of active inflammatory bowel disease. No bowel obstruction. 2.  Ileocecal resection with anastomosis mid right abdomen. 3.  The middle third of the small intestine is comprised of two consecutive surgically altered, chronically dilated segments of small bowel that appear reservoir-like and have ineffective peristalsis. 4.  At present, the tortuous, redundant, large caliber colon is decompressed, but during episodes of small bowel obstructions, the right, transverse and descending colon become significantly distended.       Discharge Orders        Reason for your hospital stay    Abdominal pain     Activity    Your activity upon discharge: activity as tolerated     Follow-up and recommended labs and tests     Follow up with primary care provider, Nakita Odonnell, within 3-4days for hospital follow- up.  The following labs/tests are recommended: CMP--needs LFT's, cbc, mag.    Follow up with [facility]  2 weeks  -also follow with  Dr. Cartagena at [facility]     Diet    Follow this diet upon discharge: Orders Placed This Encounter      Snacks/Supplements Adult: Gelatein Plus; Between Meals      Snacks/Supplements Adult: Ensure  Enlive; With Meals      Advance Diet as Tolerated: Regular Diet Adult; Low Fiber       Examination   Physical Exam   Temp:  [97.4  F (36.3  C)-98.2  F (36.8  C)] 97.4  F (36.3  C)  Pulse:  [60-87] 64  Resp:  [16-18] 16  BP: (101-113)/(65-75) 110/66  SpO2:  [97 %-99 %] 99 %  Wt Readings from Last 1 Encounters:   03/09/22 46.7 kg (103 lb)       See today's note      Please see EMR for more detailed significant labs, imaging, consultant notes etc.    I, Marcelina Sandoval MD, personally saw the patient today and spent greater than 30 minutes discharging this patient.    Marcelina Sandoval MD  [facility]    CC:Nakita Odonnell

## 2022-03-11 NOTE — PROGRESS NOTES
[facility] Progress Note     IMPRESSION:  40 yo female with complicated severe fibrostenotic Crohn's disease with history of SBO who was admitted on 3/9 for abdominal pains.     1. Abdominal pains  2. Abdominal distension (improved)  - XR 3/9 with marked fluid and gas distension throughout colon extending deep in to the pelvis. ?distal colonic obstruction or low mechanical obstruction vs ileus/ogilvies.   - Mr enterography 3/3 without active inflammatory bowel disease or obstruction.   - Recent colonoscopy with stricture dilation on 3/7.   - Clinically patient is doing better and is less distended. She is passing gas and stools  - Discussed with Dr. Tolbert (primary IBD provider) - recommending resuming Budesonide 9mg daily with eventual taper. Plan for colonoscopy with stent placement at the [facility] in 2 months.       RECOMMENDATIONS:  - Advance diet to LOW-RESIDUE. She should maintain a low-residue diet moving forward given her stricture  - Continue Budesonide 9mg daily. Should try to have a follow up with Dr. Tolbert to discuss taper.  - There are discussions about having colonic stent placed at the [facility] in 2 months. Patient will try to discuss further with Dr. Cartagena.    - Ok for discharge today    GI will sign off at this time. Thank you for consulting us on this pleasant patient. Please call if questions arise or the patient's status changes.         Approximately 25 minutes of total time was spent providing patient care, including patient evaluation, reviewing documentation/test results, and     Nasir Friedman PA-C  [facility]  [phone]  ________________________________________________________________________      SUBJECTIVE:    No complaints today. Tolerating diet. Passing gas.      OBJECTIVE:  /66 (BP Location: Right arm)   Pulse 64   Temp 97.4  F (36.3  C) (Oral)   Resp 16   Wt 46.7 kg (103 lb)   LMP  (Within Days)   SpO2 99%   BMI 18.84 kg/m    Temp (24hrs),  Av.9  F (36.6  C), Min:97.5  F (36.4  C), Max:98.2  F (36.8  C)    Patient Vitals for the past 72 hrs:   Weight   22 0041 46.7 kg (103 lb)       Intake/Output Summary (Last 24 hours) at 3/10/2022 1056  Last data filed at 3/10/2022 0805  Gross per 24 hour   Intake 2423 ml   Output --   Net 2423 ml        PHYSICAL EXAM  GEN: Alert, oriented x3, communicative and in NAD.    LYMPH: No LAD noted.  HRT: RRR  LUNGS: CTA  ABD:  ND, +BS, no guarding or pain to palpation, no rebound, no HSM.  SKIN: No rash, jaundice or spider angiomata      LABS:  I have reviewed the patient's new clinical lab results:     Recent Labs   Lab Test 03/11/22  0618 03/10/22  0606 22  0117 21  0752 21  0744 21  0723 21  0709 21  0720 21  0722   WBC 5.3 4.4 9.8  --   --   --   --   --   --    HGB 10.6* 10.2* 11.9  --   --   --   --   --   --    MCV 96 96 92  --   --   --   --   --   --     194 267   < >  --    < >  --    < >  --    INR  --   --   --   --  1.12  --  1.02  --  1.28*    < > = values in this interval not displayed.     Recent Labs   Lab Test 03/11/22  0618 03/10/22  0606 03/10/22  0034 22  0747 22  0117   POTASSIUM 3.5 3.7  3.7 3.3*   < > 2.6*   CHLORIDE 109* 110*  --   --  107   CO2 26 26  --   --  21*   BUN 5* 4*  --   --  9   CR 0.57* 0.56*  --   --  0.64   ANIONGAP 6 6  --   --  14    < > = values in this interval not displayed.     Recent Labs   Lab Test 03/11/22  0618 03/10/22  0606 03/09/22  0117 20  1203 19  1645 19  1848 19  0910 19  0858 19  1614   ALBUMIN 2.7* 2.7* 3.7   < >  --    < > 2.7*  --   --    BILITOTAL 0.4 0.4 0.7   < >  --    < > 0.6  --   --    * 88* 160*   < >  --    < > 22  --   --    AST 69* 28 77*   < >  --    < > 19  --   --    PROTEIN  --   --   --   --  Trace*  --   --  Negative 30 mg/dL*   LIPASE  --   --  44  --   --   --  21  --   --     < > = values in this interval not displayed.          IMAGING:  reviewed

## 2022-03-11 NOTE — PROGRESS NOTES
reveiwed AVS with pt at bedside including follow up appts. No new meds. Pt discharge home with no homecare

## 2022-03-13 ENCOUNTER — HOSPITAL ENCOUNTER (EMERGENCY)
Facility: HOSPITAL | Age: 42
Discharge: ANOTHER HOSPITAL | End: 2022-03-14
Attending: EMERGENCY MEDICINE | Admitting: EMERGENCY MEDICINE
Payer: OTHER GOVERNMENT

## 2022-03-13 ENCOUNTER — APPOINTMENT (OUTPATIENT)
Dept: CT IMAGING | Facility: HOSPITAL | Age: 42
End: 2022-03-13
Attending: EMERGENCY MEDICINE
Payer: OTHER GOVERNMENT

## 2022-03-13 DIAGNOSIS — R10.84 ABDOMINAL PAIN, GENERALIZED: ICD-10-CM

## 2022-03-13 DIAGNOSIS — K56.601 COMPLETE INTESTINAL OBSTRUCTION, UNSPECIFIED CAUSE (H): ICD-10-CM

## 2022-03-13 DIAGNOSIS — Z87.19 HISTORY OF CROHN'S DISEASE: ICD-10-CM

## 2022-03-13 LAB
ANION GAP SERPL CALCULATED.3IONS-SCNC: 12 MMOL/L (ref 5–18)
BASOPHILS # BLD AUTO: 0 10E3/UL (ref 0–0.2)
BASOPHILS NFR BLD AUTO: 0 %
BUN SERPL-MCNC: 10 MG/DL (ref 8–22)
CALCIUM SERPL-MCNC: 9 MG/DL (ref 8.5–10.5)
CHLORIDE BLD-SCNC: 108 MMOL/L (ref 98–107)
CO2 SERPL-SCNC: 20 MMOL/L (ref 22–31)
CREAT SERPL-MCNC: 0.7 MG/DL (ref 0.6–1.1)
EOSINOPHIL # BLD AUTO: 0.1 10E3/UL (ref 0–0.7)
EOSINOPHIL NFR BLD AUTO: 1 %
ERYTHROCYTE [DISTWIDTH] IN BLOOD BY AUTOMATED COUNT: 13.2 % (ref 10–15)
GFR SERPL CREATININE-BSD FRML MDRD: >90 ML/MIN/1.73M2
GLUCOSE BLD-MCNC: 111 MG/DL (ref 70–125)
HCT VFR BLD AUTO: 41.3 % (ref 35–47)
HGB BLD-MCNC: 13.3 G/DL (ref 11.7–15.7)
IMM GRANULOCYTES # BLD: 0 10E3/UL
IMM GRANULOCYTES NFR BLD: 0 %
LACTATE SERPL-SCNC: 1.7 MMOL/L (ref 0.7–2)
LYMPHOCYTES # BLD AUTO: 0.9 10E3/UL (ref 0.8–5.3)
LYMPHOCYTES NFR BLD AUTO: 11 %
MAGNESIUM SERPL-MCNC: 1.7 MG/DL (ref 1.8–2.6)
MCH RBC QN AUTO: 30.3 PG (ref 26.5–33)
MCHC RBC AUTO-ENTMCNC: 32.2 G/DL (ref 31.5–36.5)
MCV RBC AUTO: 94 FL (ref 78–100)
MONOCYTES # BLD AUTO: 0.5 10E3/UL (ref 0–1.3)
MONOCYTES NFR BLD AUTO: 6 %
NEUTROPHILS # BLD AUTO: 6.8 10E3/UL (ref 1.6–8.3)
NEUTROPHILS NFR BLD AUTO: 82 %
NRBC # BLD AUTO: 0 10E3/UL
NRBC BLD AUTO-RTO: 0 /100
PLATELET # BLD AUTO: 278 10E3/UL (ref 150–450)
POTASSIUM BLD-SCNC: 3.3 MMOL/L (ref 3.5–5)
RBC # BLD AUTO: 4.39 10E6/UL (ref 3.8–5.2)
SODIUM SERPL-SCNC: 140 MMOL/L (ref 136–145)
WBC # BLD AUTO: 8.3 10E3/UL (ref 4–11)

## 2022-03-13 PROCEDURE — 83605 ASSAY OF LACTIC ACID: CPT | Performed by: EMERGENCY MEDICINE

## 2022-03-13 PROCEDURE — 87635 SARS-COV-2 COVID-19 AMP PRB: CPT | Performed by: EMERGENCY MEDICINE

## 2022-03-13 PROCEDURE — 36415 COLL VENOUS BLD VENIPUNCTURE: CPT | Performed by: EMERGENCY MEDICINE

## 2022-03-13 PROCEDURE — 258N000003 HC RX IP 258 OP 636: Performed by: EMERGENCY MEDICINE

## 2022-03-13 PROCEDURE — 83735 ASSAY OF MAGNESIUM: CPT | Performed by: EMERGENCY MEDICINE

## 2022-03-13 PROCEDURE — 85025 COMPLETE CBC W/AUTO DIFF WBC: CPT | Performed by: EMERGENCY MEDICINE

## 2022-03-13 PROCEDURE — 250N000011 HC RX IP 250 OP 636: Performed by: EMERGENCY MEDICINE

## 2022-03-13 PROCEDURE — 96361 HYDRATE IV INFUSION ADD-ON: CPT

## 2022-03-13 PROCEDURE — C9803 HOPD COVID-19 SPEC COLLECT: HCPCS

## 2022-03-13 PROCEDURE — 96375 TX/PRO/DX INJ NEW DRUG ADDON: CPT

## 2022-03-13 PROCEDURE — 74176 CT ABD & PELVIS W/O CONTRAST: CPT

## 2022-03-13 PROCEDURE — 99285 EMERGENCY DEPT VISIT HI MDM: CPT | Mod: 25

## 2022-03-13 PROCEDURE — 80048 BASIC METABOLIC PNL TOTAL CA: CPT | Performed by: EMERGENCY MEDICINE

## 2022-03-13 RX ORDER — ONDANSETRON 2 MG/ML
4 INJECTION INTRAMUSCULAR; INTRAVENOUS ONCE
Status: COMPLETED | OUTPATIENT
Start: 2022-03-13 | End: 2022-03-13

## 2022-03-13 RX ORDER — KETOROLAC TROMETHAMINE 30 MG/ML
15 INJECTION, SOLUTION INTRAMUSCULAR; INTRAVENOUS ONCE
Status: COMPLETED | OUTPATIENT
Start: 2022-03-13 | End: 2022-03-13

## 2022-03-13 RX ORDER — SODIUM CHLORIDE 9 MG/ML
1000 INJECTION, SOLUTION INTRAVENOUS CONTINUOUS
Status: DISCONTINUED | OUTPATIENT
Start: 2022-03-13 | End: 2022-03-14 | Stop reason: HOSPADM

## 2022-03-13 RX ADMIN — ONDANSETRON 4 MG: 2 INJECTION INTRAMUSCULAR; INTRAVENOUS at 22:39

## 2022-03-13 RX ADMIN — SODIUM CHLORIDE 1000 ML: 9 INJECTION, SOLUTION INTRAVENOUS at 22:41

## 2022-03-13 RX ADMIN — SODIUM CHLORIDE 500 ML: 9 INJECTION, SOLUTION INTRAVENOUS at 21:50

## 2022-03-13 RX ADMIN — KETOROLAC TROMETHAMINE 15 MG: 30 INJECTION, SOLUTION INTRAMUSCULAR; INTRAVENOUS at 21:49

## 2022-03-13 RX ADMIN — HYDROMORPHONE HYDROCHLORIDE 0.5 MG: 1 INJECTION, SOLUTION INTRAMUSCULAR; INTRAVENOUS; SUBCUTANEOUS at 22:10

## 2022-03-13 ASSESSMENT — ENCOUNTER SYMPTOMS
ABDOMINAL PAIN: 1
NAUSEA: 1
VOMITING: 0
BACK PAIN: 1
CHILLS: 0
FEVER: 0
COUGH: 0

## 2022-03-14 ENCOUNTER — HOSPITAL ENCOUNTER (INPATIENT)
Facility: CLINIC | Age: 42
LOS: 5 days | Discharge: HOME HEALTH | DRG: 385 | End: 2022-03-19
Attending: INTERNAL MEDICINE | Admitting: STUDENT IN AN ORGANIZED HEALTH CARE EDUCATION/TRAINING PROGRAM
Payer: OTHER GOVERNMENT

## 2022-03-14 VITALS
DIASTOLIC BLOOD PRESSURE: 78 MMHG | HEART RATE: 72 BPM | WEIGHT: 103 LBS | TEMPERATURE: 98.5 F | BODY MASS INDEX: 18.84 KG/M2 | SYSTOLIC BLOOD PRESSURE: 126 MMHG | RESPIRATION RATE: 18 BRPM | OXYGEN SATURATION: 99 %

## 2022-03-14 DIAGNOSIS — K50.90 INFLAMMATORY BOWEL DISEASE (CROHN'S DISEASE) (H): ICD-10-CM

## 2022-03-14 DIAGNOSIS — K56.609 SBO (SMALL BOWEL OBSTRUCTION) (H): Primary | ICD-10-CM

## 2022-03-14 LAB
ANION GAP SERPL CALCULATED.3IONS-SCNC: 6 MMOL/L (ref 3–14)
BUN SERPL-MCNC: 10 MG/DL (ref 7–30)
CALCIUM SERPL-MCNC: 8.2 MG/DL (ref 8.5–10.1)
CHLORIDE BLD-SCNC: 110 MMOL/L (ref 94–109)
CO2 SERPL-SCNC: 26 MMOL/L (ref 20–32)
CREAT SERPL-MCNC: 0.6 MG/DL (ref 0.52–1.04)
CRP SERPL-MCNC: 17 MG/L (ref 0–8)
ERYTHROCYTE [DISTWIDTH] IN BLOOD BY AUTOMATED COUNT: 13.2 % (ref 10–15)
GFR SERPL CREATININE-BSD FRML MDRD: >90 ML/MIN/1.73M2
GLUCOSE BLD-MCNC: 86 MG/DL (ref 70–99)
HCT VFR BLD AUTO: 32.8 % (ref 35–47)
HGB BLD-MCNC: 10.5 G/DL (ref 11.7–15.7)
MAGNESIUM SERPL-MCNC: 1.5 MG/DL (ref 1.6–2.3)
MCH RBC QN AUTO: 29.5 PG (ref 26.5–33)
MCHC RBC AUTO-ENTMCNC: 32 G/DL (ref 31.5–36.5)
MCV RBC AUTO: 92 FL (ref 78–100)
PLATELET # BLD AUTO: 190 10E3/UL (ref 150–450)
POTASSIUM BLD-SCNC: 2.8 MMOL/L (ref 3.4–5.3)
POTASSIUM BLD-SCNC: 3 MMOL/L (ref 3.4–5.3)
POTASSIUM BLD-SCNC: 3.4 MMOL/L (ref 3.4–5.3)
RBC # BLD AUTO: 3.56 10E6/UL (ref 3.8–5.2)
SARS-COV-2 RNA RESP QL NAA+PROBE: NEGATIVE
SODIUM SERPL-SCNC: 142 MMOL/L (ref 133–144)
WBC # BLD AUTO: 5.4 10E3/UL (ref 4–11)

## 2022-03-14 PROCEDURE — 36415 COLL VENOUS BLD VENIPUNCTURE: CPT | Performed by: HOSPITALIST

## 2022-03-14 PROCEDURE — 96375 TX/PRO/DX INJ NEW DRUG ADDON: CPT

## 2022-03-14 PROCEDURE — 83735 ASSAY OF MAGNESIUM: CPT | Performed by: STUDENT IN AN ORGANIZED HEALTH CARE EDUCATION/TRAINING PROGRAM

## 2022-03-14 PROCEDURE — 250N000011 HC RX IP 250 OP 636: Performed by: EMERGENCY MEDICINE

## 2022-03-14 PROCEDURE — 96365 THER/PROPH/DIAG IV INF INIT: CPT

## 2022-03-14 PROCEDURE — 36415 COLL VENOUS BLD VENIPUNCTURE: CPT | Performed by: STUDENT IN AN ORGANIZED HEALTH CARE EDUCATION/TRAINING PROGRAM

## 2022-03-14 PROCEDURE — 250N000013 HC RX MED GY IP 250 OP 250 PS 637: Performed by: PHYSICIAN ASSISTANT

## 2022-03-14 PROCEDURE — 96366 THER/PROPH/DIAG IV INF ADDON: CPT

## 2022-03-14 PROCEDURE — 99223 1ST HOSP IP/OBS HIGH 75: CPT | Mod: GC | Performed by: INTERNAL MEDICINE

## 2022-03-14 PROCEDURE — 86140 C-REACTIVE PROTEIN: CPT | Performed by: STUDENT IN AN ORGANIZED HEALTH CARE EDUCATION/TRAINING PROGRAM

## 2022-03-14 PROCEDURE — 80048 BASIC METABOLIC PNL TOTAL CA: CPT | Performed by: HOSPITALIST

## 2022-03-14 PROCEDURE — 84132 ASSAY OF SERUM POTASSIUM: CPT | Performed by: STUDENT IN AN ORGANIZED HEALTH CARE EDUCATION/TRAINING PROGRAM

## 2022-03-14 PROCEDURE — 96376 TX/PRO/DX INJ SAME DRUG ADON: CPT

## 2022-03-14 PROCEDURE — 258N000003 HC RX IP 258 OP 636: Performed by: STUDENT IN AN ORGANIZED HEALTH CARE EDUCATION/TRAINING PROGRAM

## 2022-03-14 PROCEDURE — 120N000002 HC R&B MED SURG/OB UMMC

## 2022-03-14 PROCEDURE — 85027 COMPLETE CBC AUTOMATED: CPT | Performed by: HOSPITALIST

## 2022-03-14 PROCEDURE — 250N000011 HC RX IP 250 OP 636: Performed by: STUDENT IN AN ORGANIZED HEALTH CARE EDUCATION/TRAINING PROGRAM

## 2022-03-14 PROCEDURE — 99223 1ST HOSP IP/OBS HIGH 75: CPT | Mod: AI | Performed by: STUDENT IN AN ORGANIZED HEALTH CARE EDUCATION/TRAINING PROGRAM

## 2022-03-14 PROCEDURE — 250N000013 HC RX MED GY IP 250 OP 250 PS 637: Performed by: STUDENT IN AN ORGANIZED HEALTH CARE EDUCATION/TRAINING PROGRAM

## 2022-03-14 RX ORDER — HYDROMORPHONE HYDROCHLORIDE 1 MG/ML
0.5 INJECTION, SOLUTION INTRAMUSCULAR; INTRAVENOUS; SUBCUTANEOUS
Status: DISCONTINUED | OUTPATIENT
Start: 2022-03-14 | End: 2022-03-19 | Stop reason: HOSPADM

## 2022-03-14 RX ORDER — NALOXONE HYDROCHLORIDE 0.4 MG/ML
0.4 INJECTION, SOLUTION INTRAMUSCULAR; INTRAVENOUS; SUBCUTANEOUS
Status: DISCONTINUED | OUTPATIENT
Start: 2022-03-14 | End: 2022-03-19 | Stop reason: HOSPADM

## 2022-03-14 RX ORDER — NALOXONE HYDROCHLORIDE 0.4 MG/ML
0.2 INJECTION, SOLUTION INTRAMUSCULAR; INTRAVENOUS; SUBCUTANEOUS
Status: DISCONTINUED | OUTPATIENT
Start: 2022-03-14 | End: 2022-03-19 | Stop reason: HOSPADM

## 2022-03-14 RX ORDER — SODIUM CHLORIDE 9 MG/ML
INJECTION, SOLUTION INTRAVENOUS CONTINUOUS
Status: DISCONTINUED | OUTPATIENT
Start: 2022-03-14 | End: 2022-03-15

## 2022-03-14 RX ORDER — POTASSIUM CHLORIDE 7.45 MG/ML
10 INJECTION INTRAVENOUS ONCE
Status: COMPLETED | OUTPATIENT
Start: 2022-03-14 | End: 2022-03-14

## 2022-03-14 RX ORDER — CALCITRIOL 0.25 UG/1
0.25 CAPSULE, LIQUID FILLED ORAL
Status: DISCONTINUED | OUTPATIENT
Start: 2022-03-14 | End: 2022-03-19 | Stop reason: HOSPADM

## 2022-03-14 RX ORDER — MAGNESIUM OXIDE 400 MG/1
400 TABLET ORAL EVERY EVENING
Status: DISCONTINUED | OUTPATIENT
Start: 2022-03-14 | End: 2022-03-19 | Stop reason: HOSPADM

## 2022-03-14 RX ORDER — BUDESONIDE 3 MG/1
9 CAPSULE, COATED PELLETS ORAL EVERY MORNING
Status: DISCONTINUED | OUTPATIENT
Start: 2022-03-14 | End: 2022-03-19 | Stop reason: HOSPADM

## 2022-03-14 RX ORDER — DIPHENHYDRAMINE HYDROCHLORIDE 50 MG/ML
25 INJECTION INTRAMUSCULAR; INTRAVENOUS ONCE
Status: COMPLETED | OUTPATIENT
Start: 2022-03-14 | End: 2022-03-14

## 2022-03-14 RX ORDER — BUDESONIDE 3 MG/1
9 CAPSULE, COATED PELLETS ORAL EVERY MORNING
Status: DISCONTINUED | OUTPATIENT
Start: 2022-03-14 | End: 2022-03-14

## 2022-03-14 RX ORDER — POTASSIUM CHLORIDE 7.45 MG/ML
10 INJECTION INTRAVENOUS
Status: DISPENSED | OUTPATIENT
Start: 2022-03-14 | End: 2022-03-14

## 2022-03-14 RX ORDER — POTASSIUM CHLORIDE 7.45 MG/ML
10 INJECTION INTRAVENOUS
Status: COMPLETED | OUTPATIENT
Start: 2022-03-14 | End: 2022-03-15

## 2022-03-14 RX ORDER — MAGNESIUM SULFATE HEPTAHYDRATE 40 MG/ML
4 INJECTION, SOLUTION INTRAVENOUS ONCE
Status: COMPLETED | OUTPATIENT
Start: 2022-03-14 | End: 2022-03-14

## 2022-03-14 RX ORDER — LIDOCAINE 40 MG/G
CREAM TOPICAL
Status: DISCONTINUED | OUTPATIENT
Start: 2022-03-14 | End: 2022-03-19 | Stop reason: HOSPADM

## 2022-03-14 RX ORDER — DIPHENHYDRAMINE HCL 25 MG
25-50 CAPSULE ORAL EVERY 6 HOURS PRN
Status: DISCONTINUED | OUTPATIENT
Start: 2022-03-14 | End: 2022-03-19 | Stop reason: HOSPADM

## 2022-03-14 RX ADMIN — HYDROMORPHONE HYDROCHLORIDE 0.5 MG: 1 INJECTION, SOLUTION INTRAMUSCULAR; INTRAVENOUS; SUBCUTANEOUS at 16:17

## 2022-03-14 RX ADMIN — POTASSIUM CHLORIDE 10 MEQ: 10 INJECTION, SOLUTION INTRAVENOUS at 22:02

## 2022-03-14 RX ADMIN — POTASSIUM CHLORIDE 10 MEQ: 7.46 INJECTION, SOLUTION INTRAVENOUS at 02:57

## 2022-03-14 RX ADMIN — HYDROMORPHONE HYDROCHLORIDE 0.5 MG: 1 INJECTION, SOLUTION INTRAMUSCULAR; INTRAVENOUS; SUBCUTANEOUS at 12:54

## 2022-03-14 RX ADMIN — CALCITRIOL CAPSULES 0.25 MCG 0.25 MCG: 0.25 CAPSULE ORAL at 11:30

## 2022-03-14 RX ADMIN — HYDROMORPHONE HYDROCHLORIDE 0.5 MG: 1 INJECTION, SOLUTION INTRAMUSCULAR; INTRAVENOUS; SUBCUTANEOUS at 19:17

## 2022-03-14 RX ADMIN — HYDROMORPHONE HYDROCHLORIDE 0.5 MG: 1 INJECTION, SOLUTION INTRAMUSCULAR; INTRAVENOUS; SUBCUTANEOUS at 21:57

## 2022-03-14 RX ADMIN — SODIUM CHLORIDE 1000 ML: 9 INJECTION, SOLUTION INTRAVENOUS at 09:54

## 2022-03-14 RX ADMIN — HYDROMORPHONE HYDROCHLORIDE 1 MG: 1 INJECTION, SOLUTION INTRAMUSCULAR; INTRAVENOUS; SUBCUTANEOUS at 00:02

## 2022-03-14 RX ADMIN — DIPHENHYDRAMINE HYDROCHLORIDE 25 MG: 25 CAPSULE ORAL at 21:56

## 2022-03-14 RX ADMIN — DIPHENHYDRAMINE HYDROCHLORIDE 25 MG: 50 INJECTION, SOLUTION INTRAMUSCULAR; INTRAVENOUS at 02:54

## 2022-03-14 RX ADMIN — HYDROMORPHONE HYDROCHLORIDE 0.5 MG: 1 INJECTION, SOLUTION INTRAMUSCULAR; INTRAVENOUS; SUBCUTANEOUS at 09:52

## 2022-03-14 RX ADMIN — POTASSIUM CHLORIDE 10 MEQ: 7.46 INJECTION, SOLUTION INTRAVENOUS at 11:30

## 2022-03-14 RX ADMIN — MAGNESIUM SULFATE IN WATER 4 G: 40 INJECTION, SOLUTION INTRAVENOUS at 19:17

## 2022-03-14 RX ADMIN — HYDROMORPHONE HYDROCHLORIDE 0.5 MG: 1 INJECTION, SOLUTION INTRAMUSCULAR; INTRAVENOUS; SUBCUTANEOUS at 02:56

## 2022-03-14 RX ADMIN — POTASSIUM CHLORIDE 10 MEQ: 7.46 INJECTION, SOLUTION INTRAVENOUS at 16:20

## 2022-03-14 RX ADMIN — BUDESONIDE 9 MG: 3 CAPSULE ORAL at 09:53

## 2022-03-14 RX ADMIN — SODIUM CHLORIDE: 9 INJECTION, SOLUTION INTRAVENOUS at 14:31

## 2022-03-14 RX ADMIN — POTASSIUM CHLORIDE 10 MEQ: 7.46 INJECTION, SOLUTION INTRAVENOUS at 18:09

## 2022-03-14 RX ADMIN — POTASSIUM CHLORIDE 10 MEQ: 7.46 INJECTION, SOLUTION INTRAVENOUS at 09:55

## 2022-03-14 ASSESSMENT — ACTIVITIES OF DAILY LIVING (ADL)
WALKING_OR_CLIMBING_STAIRS_DIFFICULTY: NO
ADLS_ACUITY_SCORE: 3
TOILETING_ISSUES: NO
ADLS_ACUITY_SCORE: 3
DIFFICULTY_EATING/SWALLOWING: NO
WEAR_GLASSES_OR_BLIND: NO
ADLS_ACUITY_SCORE: 3
ADLS_ACUITY_SCORE: 3
CHANGE_IN_FUNCTIONAL_STATUS_SINCE_ONSET_OF_CURRENT_ILLNESS/INJURY: NO
DRESSING/BATHING_DIFFICULTY: NO
ADLS_ACUITY_SCORE: 3
DOING_ERRANDS_INDEPENDENTLY_DIFFICULTY: NO
CONCENTRATING,_REMEMBERING_OR_MAKING_DECISIONS_DIFFICULTY: NO
ADLS_ACUITY_SCORE: 3
FALL_HISTORY_WITHIN_LAST_SIX_MONTHS: NO
ADLS_ACUITY_SCORE: 3

## 2022-03-14 NOTE — PLAN OF CARE
/56 (BP Location: Left arm)   Pulse 54   Temp 96.8  F (36  C) (Oral)   Resp 18   Wt 46.9 kg (103 lb 8 oz)   LMP 03/06/2022   SpO2 97%   BMI 18.93 kg/m      AVSS. A&Ox4. Up independently voiding voiding in the bathroom. K+ at 2.8, replacement ongoing. Mg at 1.4, replacement scheduled Pt endorses abdominal pain of 9/10. On NPO. Dilaudid IV PRN q 2 Hrs. Reports flatus and small loose BM. On continuous NS @ 75 ml/hr. Continue with PO.      Goal Outcome Evaluation: No change

## 2022-03-14 NOTE — H&P
[facility]    History and Physical - Hospitalist Service, GOLD TEAM 12       Date of Admission:  3/14/2022    Assessment & Plan      Alok Nino is a 41 year old female admitted on 3/14/2022. She has a PMH of crohn's followed by [facility] s/p partial resection and most recently colonoscopy with stricture dilation on 3/7, discharged 3/11. Now readmitted from [facility] for worsening pain and nausea.     SBO  Crohn's disease s/p partial resection  Follows with [facility]. On budesonide and stelara. Planning for stent placement with [facility] ~2 months. CT scan showing multiple loops without transition point.    - NPO   - Colorectal surgery consulted, appreciate assistance.   - GI consulted, appreciate assistance.   - Continue budesonide (planning on tapering off as outpatient)   - Pain meds: IV dilaudid 0.5mg q2hr prn   - IVF 100cc/hr for 1 L while NPO     Hypokalemia  Hypomagnesemia  Secondary to poor oral intake.   - Replete PRN    Malnutrition:    - Level of malnutrition: Moderate   - Currently NPO, will monitor input when able to safely eat         Diet: NPO for Medical/Clinical Reasons Except for: Meds, Ice Chips    DVT Prophylaxis: Pneumatic Compression Devices  Hamilton Catheter: Not present  Central Lines: None  Cardiac Monitoring: None  Code Status: Full Code      Clinically Significant Risk Factors Present on Admission                     Disposition Plan   Expected Discharge: 03/17/2022   Anticipated discharge location:  Awaiting care coordination huddle  Delays: { TIP  Update expected discharge date and delays and refresh note (tipsheet)     :[id number]}           The patient's care was discussed with the Bedside Nurse, Patient and colorectal surgery Consultant.    Delfina Anderson MD  Hospitalist Service, GOLD TEAM 12  [facility]  Securely message with the Vocera Web Console (learn more here)  Text page via [employer] Paging/Directory    Please see signed in provider for up to date coverage information      ______________________________________________________________________    Chief Complaint   Abdominal pain    History is obtained from the patient and electronic health record    History of Present Illness   Alok Nino is a 41 year old female who has a PMH of crohn's disease c/b multiple SBOs requiring partial resection recently discharged from [facility]. She represented to [place] last night for worsening abdominal pain in the setting of recurrent SBO seen on CT, although without a transition point. She was initially planned to transfer to [facility] however given no colorectal team there, she was brought to [facility] for colorectal consultation. She is endorsing worsening abdominal pain, but does say that she has passed a small amount of gas and a small amount of watery stool this AM and that her bloating does feel improved. She does not have any chest pain or SOB. Nausea is improving but still not hungry. She is frustrated that [facility] does not come here, but is agreeable to seeing our GI team as they were recommended to do a stent placement in a few months.     Review of Systems    The 10 point Review of Systems is negative other than noted in the HPI or here.     Past Medical History    I have reviewed this patient's medical history and updated it with pertinent information if needed.   Past Medical History:   Diagnosis Date     C. difficile enteritis      Chronic pain      Crohn disease (H)      Melanoma (H)      PONV (postoperative nausea and vomiting)        Past Surgical History   I have reviewed this patient's surgical history and updated it with pertinent information if needed.  Past Surgical History:   Procedure Laterality Date     ABDOMEN SURGERY      3 for crohns dx     APPENDECTOMY       APPENDECTOMY       CHOLECYSTECTOMY       CHOLECYSTECTOMY       COLONOSCOPY       ENDOSCOPIC ULTRASOUND UPPER GASTROINTESTINAL TRACT (GI) N/A  11/13/2020    Procedure: ENDOSCOPIC ULTRASOUND, ESOPHAGOSCOPY / UPPER GASTROINTESTINAL TRACT with BIOPSY;  Surgeon: Cydney Garcia MD;  Location: [facility] OR     GYN SURGERY       H STATISTIC PICC LINE INSERTION >5YR, FAILED Right 11/10/2021    LUE unsuccessful attempt from another hospital, IR deferral     IR PICC PLACEMENT > 5 YRS OF AGE  11/11/2021     OTHER SURGICAL HISTORY      strictoplasty x3     OTHER SURGICAL HISTORY      adhesion removal x1     OTHER SURGICAL HISTORY      small bowel resections     PICC  05/10/2019          RESECTION ILEOCECAL  12/06/2013    Procedure: RESECTION ILEOCECAL;  Laparotomy, Extensive Lysis of Adhesions, Stricture Plasty X2  Anesthesia general with block;  Surgeon: Pete Cartagena MD;  Location: [facility] OR     RESECTION ILEOCOLIC N/A 11/17/2021    Procedure: Exploratory laparotomy, illeal resection, extensive lysis of adhesions (2 hr);  Surgeon: Pete Cartagena MD;  Location: [facility] OR       Social History   I have reviewed this patient's social history and updated it with pertinent information if needed.  Social History     Tobacco Use     Smoking status: Never Smoker     Smokeless tobacco: Never Used   Substance Use Topics     Alcohol use: Yes     Comment: occasional/ 1 drink per 1-3 month     Drug use: No       Family History   I have reviewed this patient's family history and updated it with pertinent information if needed.  Family History   Problem Relation Age of Onset     No Known Problems Mother      Clotting Disorder Father      No Known Problems Brother      No Known Problems Son      Anesthesia Reaction No family hx of      Colon Cancer No family hx of      Crohn's Disease No family hx of      Ulcerative Colitis No family hx of      Colon Polyps No family hx of        Prior to Admission Medications   Prior to Admission Medications   Prescriptions Last Dose Informant Patient Reported? Taking?   acetaminophen (TYLENOL) 325 MG tablet Past Week at Unknown time  Yes Yes   Sig:  Take 650 mg by mouth every 6 hours as needed for mild pain   budesonide (ENTOCORT EC) 3 MG EC capsule 3/13/2022 at Unknown time  Yes Yes   Sig: Take 9 mg by mouth every morning   calcitRIOL (ROCALTROL) 0.25 MCG capsule Past Week at Unknown time  Yes Yes   Sig: Take 0.25 mcg by mouth three times a week   doxylamine (UNISOM) 25 MG TABS tablet   Yes No   Sig: Take 50 mg by mouth At Bedtime    magnesium oxide (MAG-OX) 400 (240 Mg) MG tablet 3/13/2022 at Unknown time  Yes Yes   Sig: Take 400 mg by mouth every evening    ustekinumab (STELARA) 90 MG/ML Past Week at Unknown time  Yes Yes   Sig: Inject 90 mg Subcutaneous every 28 days    vitamin D (ERGOCALCIFEROL) 23650 UNIT capsule 3/13/2022 at Unknown time  Yes Yes   Sig: Take 50,000 Units by mouth five times a week    vitamin E (TOCOPHEROL) 400 units (180 mg) capsule 3/13/2022 at Unknown time  Yes Yes   Sig: Take 400 Units by mouth every evening       Facility-Administered Medications: None     Allergies   Allergies   Allergen Reactions     Carafate Hives     Morphine Hcl Other (See Comments)     SPASMS OF SPHINCTER OF ODDI  (BILIARY TRACT)     Codeine Nausea and Vomiting       Physical Exam   Vital Signs: Temp: 97.7  F (36.5  C) Temp src: Oral BP: 111/65 Pulse: 71   Resp: 16 SpO2: 96 % O2 Device: None (Room air)    Weight: 103 lbs 8 oz     Gen: NAD, alert, pleasant, cooperative, non-toxic  HEENT: EOMI, no conjunctival icterus, tracking appropriately  Resp: CTAB, no crackles or wheezes, no increased WOB  Cardiac: RRR, no S3/S4, no M/R/G appreciated  GI: soft, mildly tender diffusely, worse in the RLQ, distended and tympanic  Ext: WWP, no edema, spontaneous movement in all 4  Neuro: AOx3, CN 2-12 grossly intact, appropriate mentation    Data   Data reviewed today: I reviewed all medications, new labs and imaging results over the last 24 hours. Labs and Imaging reviewed in Epic, pertinent discussed in A&P.

## 2022-03-14 NOTE — CONSULTS
GASTROENTEROLOGY CONSULTATION      Date of Admission:  3/14/2022          ASSESSMENT AND RECOMMENDATIONS:   41-year-old female with a history of Crohn's disease diagnosed in 1997 s/p 8 small bowel surgeries including TI resection, several small bowel resections, stricturoplasty, recurrent SBO who presents with recurrence of abdominal pain and nausea, CT showing marked fluid distention of multiple loops of bowel without a definite transition point. 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 Patient unfortunately developed an  SBO next day after the colonoscopy and was admitted for conservative management with plans to wean her off budesonide and place a stent in her colon in attempt to manage recurrent symptoms.   ~Patient unfortunately presents 2 days after she was discharged again with recurring SBO. Repeat CT imaging on this admission with recurrent marked fluid distention of multiple loops of bowel throughout the abdomen and pelvis without definite transition point~Patient appears to have ongoing SOB related to strictures from CD and adhesions from several small bowel resection which has been refractory to conservative management at this time.  ~She appears hemodynamically stable with no evidence of acute abdomen at this time. She does report some improvement in her symptoms since admission yesterday.  ~we will have a  multidisciplinary discussion with CRS team, if they want us to go ahead with a stent placement we will discuss with our advanced GI team regarding stent placement and appropriate timing, SBO will need to resolve at the very least to allow patient prep for colonoscopy should we decide to proceed with colonic stent placement      RECOMMENDATIONS  -Continue supportive care with NPO, IVF and electrolyte repletion  -We will have a multidisciplinary discussion with CRS team regarding appropriate timing of colonic stent.SBO will need to resolve at the very least to allow patient prep for colonoscopy should we decide to proceed with colonic stent placement    -Appreciate CRS input      Thank you for involving us in this patient's care. Please do not hesitate to contact the GI service with any questions or concerns.     Patient care plan discussed with Dr. Moises Arriaza, GI staff physician.    Moisés Resendiz MD  Gastroenterology Fellow  Pager             Chief Complaint:   We were asked by Delfina Anderson MD of the hospital medicine team to evaluate this patient with SBO in the setting of stricturing crohn's disease    History is obtained from the patient and the medical record.          History of Present Illness:   Alok Nino is a 41 year old female with a history of with a history of Crohn's disease diagnosed in 1997 s/p 8 small bowel surgeries including TI resection, several small bowel resections, stricturoplasty, recurrent SBO who presents with worsening abdominal pain, nausea, vomiting yesterday. Of note patient was recently admitted to [facility] from 3/9-3/11 after she had presented with similar complaints and was found to have SBO this was managed conservatively and patient was discharged with plans to wean off budesonide therapy and consider placement of colonic stent outpatient to hopefully prevent recurrence of symptoms and hopefully prevent needing several dilations unfortunately patient presented on  this admission with recurrence of symptom before she could get the colonic stent. Prior to her initial presentation to [facility] on 3/9. She had a colonoscopy done by Dr. Gudino on 3/7 which showed no active Crohn's disease but did show end to end ilealileal anastomosis at the proximal site of ileocolic anastomosis with stricture to 5 mm s/p serial balloon dilations with 6, 8, 12, and 15 mm balloons patient unfortunately developed SBO the day after the colonoscopy which prompted her intial admission to [facility]    Of note patient also reported having a small segment of her small bowel resected in November 2021 by Dr. Gudino and states that all of these events of recurring episodes of SBO started after that surgery. She has been on low residue diet and still continues to have these recurring symptoms. She was initially managed on Humira and Azathioprine but was recently switched to Humira and received her 2nd loading dose last Tuesday which she has been tolerating well so far.     Patient has presented back to [facility] yesterday when she started to experience recurrence of her symptoms but unfortunately no beds were available at [facility] hence she was transferred here for inpatient care. GI was consulted for consideration of colonic stent placement as initially planned at [facility] prior to her last discharge             Past Medical History:   Reviewed and edited as appropriate  Past Medical History:   Diagnosis Date     C. difficile enteritis      Chronic pain      Crohn disease (H)      Melanoma (H)      PONV (postoperative nausea and vomiting)             Past Surgical History:   Reviewed and edited as appropriate   Past Surgical History:   Procedure Laterality Date     ABDOMEN SURGERY      3 for crohns dx     APPENDECTOMY       APPENDECTOMY       CHOLECYSTECTOMY       CHOLECYSTECTOMY       COLONOSCOPY       ENDOSCOPIC ULTRASOUND UPPER GASTROINTESTINAL TRACT (GI) N/A 11/13/2020    Procedure: ENDOSCOPIC  ULTRASOUND, ESOPHAGOSCOPY / UPPER GASTROINTESTINAL TRACT with BIOPSY;  Surgeon: Cydney Garcia MD;  Location: [facility] OR     GYN SURGERY       H STATISTIC PICC LINE INSERTION >5YR, FAILED Right 11/10/2021    LUE unsuccessful attempt from another hospital, IR deferral     IR PICC PLACEMENT > 5 YRS OF AGE  11/11/2021     OTHER SURGICAL HISTORY      strictoplasty x3     OTHER SURGICAL HISTORY      adhesion removal x1     OTHER SURGICAL HISTORY      small bowel resections     PICC  05/10/2019          RESECTION ILEOCECAL  12/06/2013    Procedure: RESECTION ILEOCECAL;  Laparotomy, Extensive Lysis of Adhesions, Stricture Plasty X2  Anesthesia general with block;  Surgeon: Pete Cartagena MD;  Location: [facility] OR     RESECTION ILEOCOLIC N/A 11/17/2021    Procedure: Exploratory laparotomy, illeal resection, extensive lysis of adhesions (2 hr);  Surgeon: Pete Cartagena MD;  Location: [facility] OR            Previous Endoscopy:   No results found for this or any previous visit.         Social History:   Reviewed and edited as appropriate  Social History     Socioeconomic History     Marital status:      Spouse name: Not on file     Number of children: Not on file     Years of education: Not on file     Highest education level: Not on file   Occupational History     Not on file   Tobacco Use     Smoking status: Never Smoker     Smokeless tobacco: Never Used   Substance and Sexual Activity     Alcohol use: Yes     Comment: occasional/ 1 drink per 1-3 month     Drug use: No     Sexual activity: Not on file   Other Topics Concern     Parent/sibling w/ CABG, MI or angioplasty before 65F 55M? Not Asked   Social History Narrative     Not on file     Social Determinants of Health     Financial Resource Strain: Not on file   Food Insecurity: Not on file   Transportation Needs: Not on file   Physical Activity: Not on file   Stress: Not on file   Social Connections: Not on file   Intimate Partner Violence: Not on file   Housing  Stability: Not on file            Family History:   Reviewed and edited as appropriate  No known history of gastrointestinal/liver disease or  gastrointestinal malignancies       Allergies:   Reviewed and edited as appropriate     Allergies   Allergen Reactions     Carafate Hives     Morphine Hcl Other (See Comments)     SPASMS OF SPHINCTER OF ODDI  (BILIARY TRACT)     Codeine Nausea and Vomiting            Review of Systems:     A complete review of systems was performed and is negative except as noted in the HPI           Physical Exam:   /57 (BP Location: Left arm)   Pulse 71   Temp 97.8  F (36.6  C) (Oral)   Resp 18   Wt 46.9 kg (103 lb 8 oz)   LMP 03/06/2022   SpO2 99%   BMI 18.93 kg/m    Wt:   Wt Readings from Last 2 Encounters:   03/14/22 46.9 kg (103 lb 8 oz)   03/13/22 46.7 kg (103 lb)      Constitutional: cooperative, pleasant, not dyspneic/diaphoretic, no acute distress  Eyes: Sclera anicteric/injected  Ears/nose/mouth/throat: Mucus membranes moist  Neck: supple  CV:No edema  Respiratory: Unlabored breathing  Abdomen: Distended, +bs, mild tenderness to palpation diffusely, no peritoneal signs  Skin: warm, perfused, no jaundice  Neuro: AAO x 3, No asterixis  Psych: Normal affect  MSK: Normal gait         Data:   Labs and imaging below were independently reviewed and interpreted    BMP  Recent Labs   Lab 03/14/22  0830 03/14/22  0644 03/13/22  2142 03/11/22  0618 03/10/22  0606   NA  --  142 140 141 142   POTASSIUM 2.8* 3.0* 3.3* 3.5 3.7  3.7   CHLORIDE  --  110* 108* 109* 110*   BLACK  --  8.2* 9.0 7.6* 7.5*   CO2  --  26 20* 26 26   BUN  --  10 10 5* 4*   CR  --  0.60 0.70 0.57* 0.56*   GLC  --  86 111 84 91     CBC  Recent Labs   Lab 03/14/22  0644 03/13/22  2142 03/11/22  0618 03/10/22  0606   WBC 5.4 8.3 5.3 4.4   RBC 3.56* 4.39 3.56* 3.41*   HGB 10.5* 13.3 10.6* 10.2*   HCT 32.8* 41.3 34.3* 32.6*   MCV 92 94 96 96   MCH 29.5 30.3 29.8 29.9   MCHC 32.0 32.2 30.9* 31.3*   RDW 13.2 13.2 13.0  13.2    278 221 194     INRNo lab results found in last 7 days.  LFTs  Recent Labs   Lab 03/11/22  0618 03/10/22  0606 03/09/22  0117   ALKPHOS 93 86 105   AST 69* 28 77*   * 88* 160*   BILITOTAL 0.4 0.4 0.7   PROTTOTAL 5.2* 4.8* 6.5   ALBUMIN 2.7* 2.7* 3.7      PANC  Recent Labs   Lab 03/09/22  0117   LIPASE 44       Imaging:  CT Abdomen                                                                   IMPRESSION:   1.  Recurrent marked fluid distention of multiple loops of bowel throughout the abdomen and pelvis without definite transition point. The overall configuration is similar to 03/03/2022 MR enterography as well as CT abdomen 02/09/2022

## 2022-03-14 NOTE — ED TRIAGE NOTES
Patient was discharged on Friday from this facility, was admitted for bowel obstruction. Patient states that symptoms started today 1400, states that it again feels like a bowel obstruction. Endorses nausea, denies emesis.

## 2022-03-14 NOTE — CONSULTS
Colon and Rectal Surgery Consultation Note  [facility]    Alok Nino MRN# [id number]   Age: 41 year old YOB: 1980     Date of Admission:  3/14/2022    Reason for consult: Recurrent abdominal pain, small bowel obstruction       Requesting physician: Delfina Anderson MD, Hospitalist Service       Level of consult: One-time consult to assist in determining a diagnosis and to recommend an appropriate treatment plan           Assessment:   Ms. Alok Nino is a 41 year old female with past medical history of Crohn's disease s/p 8 prior surgeries including stricturoplasties, multiple recurrent SBOs since her last surgery 11/21, now with ileocolic stricture requiring balloon dilatations who presented with recurrent abdominal pain with CT imaging concerning for another small bowel obstruction, without a definite transition point. The small bowel is dilated to 11cm at the prior ileocecal anastomosis site. She seems to have had a chronic obstruction related to strictures and/or adhesions from prior surgeries for several months which may have only partially responded to conservative management/medical therapy. Other etiologies of current obstruction could be an active Crohn's flare; she does have an elevated CRP and a biopsy from colonoscopy in early March revealed evidence of acute inflammation. Currently, she is hemodynamically stable, without signs of ischemic bowel, strangulation, perforation, or peritonitis. She is also passing gas and had a bowel movement this morning. There were plans to consider stenting of the intestinal stricture once she has stopped budesonide for some amount of time, this will be a discussion to have with her gastroenterologist. Given her electrolyte abnormalities and difficulty with keeping up with PO nutrition, she will be admitted for expectant management of this SBO with possible nutritional support with TPN.          Recommendations:   Small bowel  obstruction in the setting of strictures, adhesions related to Crohn's resections   - Bowel rest, NPO for now  - Possible full liquid diet tomorrow pending clinical status  - Consult nutrition for input re: calorie counts given recent weight loss   - IVF, K and Mag replacement  - No indication for surgical intervention at this time     - will discuss w/ Dr. Cartagena         History of Present Illness:   CC: abdominal pain    Ms. Alok Nino is a 41 year old female with a past medical history of Crohn's disease s/p 8 prior surgeries (3 SB resections, TI resection),C diff enteritis, melanoma, and most recently recurrent SBOs related to ileoileal and ileocecal anastomoses requiring balloon dilatations who presented as a transfer from [facility] for recurrent abdominal pain concerning for small bowel obstruction.     She notes that she had about 5 episodes of small bowel obstructions this year, and has had a difficult recovery ever since her stricturoplasty in 11/2021. All prior obstructions were treated with conservative management. Most recently on 3/7/2022 she underwent a colonoscopy which did not show active Crohn's lesions, but did have a 5 mm stricture at the proximal ileocolic anastomosis site. She had several balloon diltations for this (6,8,12,15 mm). She was hospitalized 3/9 to 3/11/2022 at [facility] for a small bowel obstruction, hypokalemia and hypomagnesemia and was medically treated. She notes she has lost about 15 lbs since her surgery on 11/2021. During the last week, she was primarily NPO, transitioned to soft solids, liquids on Friday which she was tolerating fine. Yesterday 3/13/22, she had a donut and some rice and developed recurrent abdominal pains, presented to the [facility] ED and was found to have K+ of 3.3, Mg2+ of 1.7, and was tachycardic to 110bpm. A CT abd pelvis showed fluid distention, loops of small bowel without a transition point. She was transferred because there were no available  beds for admission. Of note, she follows with Dr. Cartagena here but her primary gastroenterologist is not on [facility].     Hospitalist medicine requested a colorectal surgery consultation given her Crohn's disease, extensive prior surgical history, and multiple recurrent SBOs. For the past two days, she initially had some twisting, cramping type of diffuse abdominal pains. She thinks now it is more tender, she feels very bloated. She has been passing gas, and had some liquidy diarrhea this morning. She is nauseous, no vomiting. She also reports some submental tingling.           Past Medical History:     Past Medical History:   Diagnosis Date     C. difficile enteritis      Chronic pain      Crohn disease (H)      Melanoma (H)      PONV (postoperative nausea and vomiting)           Past Surgical History:     Past Surgical History:   Procedure Laterality Date     ABDOMEN SURGERY      3 for crohns dx     APPENDECTOMY       APPENDECTOMY       CHOLECYSTECTOMY       CHOLECYSTECTOMY       COLONOSCOPY       ENDOSCOPIC ULTRASOUND UPPER GASTROINTESTINAL TRACT (GI) N/A 11/13/2020    Procedure: ENDOSCOPIC ULTRASOUND, ESOPHAGOSCOPY / UPPER GASTROINTESTINAL TRACT with BIOPSY;  Surgeon: Cydney Garcia MD;  Location:  OR     GYN SURGERY       H STATISTIC PICC LINE INSERTION >5YR, FAILED Right 11/10/2021    LUE unsuccessful attempt from another hospital, IR deferral     IR PICC PLACEMENT > 5 YRS OF AGE  11/11/2021     OTHER SURGICAL HISTORY      strictoplasty x3     OTHER SURGICAL HISTORY      adhesion removal x1     OTHER SURGICAL HISTORY      small bowel resections     PICC  05/10/2019          RESECTION ILEOCECAL  12/06/2013    Procedure: RESECTION ILEOCECAL;  Laparotomy, Extensive Lysis of Adhesions, Stricture Plasty X2  Anesthesia general with block;  Surgeon: Pete Cartagena MD;  Location: [facility] OR     RESECTION ILEOCOLIC N/A 11/17/2021    Procedure: Exploratory laparotomy, illeal resection, extensive lysis of  adhesions (2 hr);  Surgeon: Pete Cartagena MD;  Location: [facility] OR             Social History:     Social History     Socioeconomic History     Marital status:      Spouse name: Not on file     Number of children: Not on file     Years of education: Not on file     Highest education level: Not on file   Occupational History     Not on file   Tobacco Use     Smoking status: Never Smoker     Smokeless tobacco: Never Used   Substance and Sexual Activity     Alcohol use: Yes     Comment: occasional/ 1 drink per 1-3 month     Drug use: No     Sexual activity: Not on file   Other Topics Concern     Parent/sibling w/ CABG, MI or angioplasty before 65F 55M? Not Asked   Social History Narrative     Not on file     Social Determinants of Health     Financial Resource Strain: Not on file   Food Insecurity: Not on file   Transportation Needs: Not on file   Physical Activity: Not on file   Stress: Not on file   Social Connections: Not on file   Intimate Partner Violence: Not on file   Housing Stability: Not on file             Family History:     Family History   Problem Relation Age of Onset     No Known Problems Mother      Clotting Disorder Father      No Known Problems Brother      No Known Problems Son      Anesthesia Reaction No family hx of      Colon Cancer No family hx of      Crohn's Disease No family hx of      Ulcerative Colitis No family hx of      Colon Polyps No family hx of              Allergies:      Allergies   Allergen Reactions     Carafate Hives     Morphine Hcl Other (See Comments)     SPASMS OF SPHINCTER OF ODDI  (BILIARY TRACT)     Codeine Nausea and Vomiting             Medications:   [COMPLETED] 0.9% sodium chloride BOLUS  [COMPLETED] diphenhydrAMINE (BENADRYL) injection 25 mg  [COMPLETED] HYDROmorphone (DILAUDID) injection 0.5 mg  [COMPLETED] HYDROmorphone (DILAUDID) injection 0.5 mg  [COMPLETED] HYDROmorphone (DILAUDID) injection 1 mg  [COMPLETED] ketorolac (TORADOL) injection 15 mg  [COMPLETED]  ondansetron (ZOFRAN) injection 4 mg  [COMPLETED] potassium chloride 10 mEq in 100 mL sterile water intermittent infusion (premix)    acetaminophen (TYLENOL) 325 MG tablet, Take 650 mg by mouth every 6 hours as needed for mild pain  budesonide (ENTOCORT EC) 3 MG EC capsule, Take 9 mg by mouth every morning  calcitRIOL (ROCALTROL) 0.25 MCG capsule, Take 0.25 mcg by mouth three times a week  magnesium oxide (MAG-OX) 400 (240 Mg) MG tablet, Take 400 mg by mouth every evening   ustekinumab (STELARA) 90 MG/ML, Inject 90 mg Subcutaneous every 28 days   vitamin D (ERGOCALCIFEROL) 92414 UNIT capsule, Take 50,000 Units by mouth five times a week   vitamin E (TOCOPHEROL) 400 units (180 mg) capsule, Take 400 Units by mouth every evening   doxylamine (UNISOM) 25 MG TABS tablet, Take 50 mg by mouth At Bedtime               Review of Systems:     A complete review of systems         Physical Exam:   /65 (BP Location: Left arm)   Pulse 71   Temp 97.7  F (36.5  C) (Oral)   Resp 16   Wt 46.9 kg (103 lb 8 oz)   LMP 03/06/2022   SpO2 96%   BMI 18.93 kg/m    General: Alert, interactive, NAD  Resp: CTAB, no crackles or wheezes  Cardiac: RRR, NS1,S2, No m/r/g  Abdomen: Distended, tender to palpation, worst in left lower quadrant. No HSM or masses, no rebound or guarding.  Extremities: No LE edema or obvious joint abnormalities  Skin: Warm and dry, no jaundice or rash. Bruising along right forearm.   Neuro: A&Ox3, CN 2-12 intact, BHATTI            Data:   Recent Labs  CBC RESULTS:    Lab Test 03/14/22  0644   WBC 5.4   RBC 3.56*   HGB 10.5*   HCT 32.8*   MCV 92   MCH 29.5   MCHC 32.0   RDW 13.2      CRP: 17.0   .  Last Comprehensive Metabolic Panel:  Sodium   Date Value Ref Range Status   03/14/2022 142 133 - 144 mmol/L Final     Potassium   Date Value Ref Range Status   03/14/2022 2.8 (L) 3.4 - 5.3 mmol/L Final     Chloride   Date Value Ref Range Status   03/14/2022 110 (H) 94 - 109 mmol/L Final       Carbon Dioxide (CO2)    Date Value Ref Range Status   03/14/2022 26 20 - 32 mmol/L Final     Anion Gap   Date Value Ref Range Status   03/14/2022 6 3 - 14 mmol/L Final     Glucose   Date Value Ref Range Status   03/14/2022 86 70 - 99 mg/dL Final     Urea Nitrogen   Date Value Ref Range Status   03/14/2022 10 7 - 30 mg/dL Final     Creatinine   Date Value Ref Range Status   03/14/2022 0.60 0.52 - 1.04 mg/dL Final     GFR Estimate   Date Value Ref Range Status   03/14/2022 >90 >60 mL/min/1.73m2 Final     Comment:     Effective December 21, 2021 eGFRcr in adults is calculated using the 2021 CKD-EPI creatinine equation which includes age and gender (Pearl et al., NEJ, DOI: 10.1056/UWWWht1620328)     Calcium   Date Value Ref Range Status   03/14/2022 8.2 (L) 8.5 - 10.1 mg/dL Final       Imaging  CT ABDOMEN PELVIS W/O CONTRAST  LOCATION: [facility]  DATE/TIME: 3/13/2022 10:17 PM                                           IMPRESSION:   1.  Recurrent marked fluid distention of multiple loops of bowel throughout the abdomen and pelvis without definite transition point. The overall configuration is similar to 03/03/2022 MR enterography as well as CT abdomen 02/09/2022.  BOWEL: Recurrent marked fluid distention of loops of bowel throughout the mid and lower abdomen. Prior ileocecal resection with marked chronic or recurrent dilation at the ileocecal anastomosis up to 11 cm. No evidence for pneumatosis intestinalis.   Multiple bowel anastomoses can noted. Large amount of stool in distal colon. No free air or free fluid. No definite transition point.        Amirah Guevara  Medical Student  Colon and Rectal Surgery Service  [facility]    Russ Banerjee MD  Fellow in Colon and Rectal Surgery  [facility]  Pager: [phone]

## 2022-03-14 NOTE — PROGRESS NOTES
Pt arrived on unit at 0600 from [facility] for recurrent bowel obstruction. Provider paged about arrival. VSS. C/o abdominal pain. PTA dilaudid dose ordered. NPO at this time, pt aware.

## 2022-03-14 NOTE — PROGRESS NOTES
I was paged by the systems operation center regarding direct admission from [facility].  I spoke with Dr. Dayanara Lopez who works in the emergency department there.  She asked me to accept Alok Nino in transfer.      Patient is a 41-year-old female with history of Crohn's disease, C. difficile colitis, melanoma, chronic pain, appendectomy, cholecystectomy, multiple surgeries for her Crohn's disease.  She was recently admitted to [facility] from 3/9/2022 through 3/11/2022 with small bowel obstruction, hypokalemia, and hypomagnesemia.  She was treated conservatively for small bowel obstruction.  It turns out that she had had colonoscopy on 3/7/2022 that showed no active Crohn's disease but did show end to end ilealileal anastomosis at the proximal site of ileocolic anastomosis with stricture to 5 mm.  She had serial balloon dilations with 6, 8, 12, and 15 mm balloons.  During that hospitalization she was treated conservatively.  Plan was to wean her off of budesonide and eventually place a stent in the colon.  She improved and discharged home on Friday (3/11/2022).  She had recurrence of abdominal pain, nausea, and distention so went back to [facility] emergency department on 3/13/2022.  Emergency department evaluation showed stable vital signs although she was tachycardic with heart rate of 110.  CBC was unremarkable.  Potassium was low at 3.3.  Basic metabolic panel was otherwise unremarkable.  Magnesium was low at 1.7.  Lactic acid was normal at 1.7.  CT of abdomen and pelvis was obtained and showed recurrent marked fluid distention of multiple loops of small bowel throughout the abdomen and pelvis without definite transition point.  Overall the configuration was thought to be similar to MR enterography from 3/3/2022 and CT of abdomen from 2/9/2022.  It was thought the patient needed admission for small bowel obstruction.  There were no beds at [facility] also arrangements were  made to transfer her here to [facility].

## 2022-03-14 NOTE — LETTER
Transition Communication Hand-off for Care Transitions to Next Level of Care Provider    Name: Alok Nino  : 1980  MRN #: [id number]  Primary Care Provider: Nakita Odonnell     Primary Clinic: [facility] [address]  [place] [facility] [address]     Reason for Hospitalization:  SBO (small bowel obstruction) (H) [K56.609]  Admit Date/Time: 3/14/2022  6:08 AM  Discharge Date: 3/19/2044  Payor Source: Payor: / / Plan:  WEST / Product Type: Indemnity /     Readmission Assessment Measure (EZRA) Risk Score/category: Moderate    Reason for Communication Hand-off Referral: Other [facility] Standard of Care    Discharge Plan:  Home with TPN from [facility] (Phone [phone], Fax [phone])    Concern for non-adherence with plan of care:  No  Discharge Needs Assessment:  Needs      Most Recent Value   Anticipated Changes Related to Illness none (P)    Equipment Currently Used at Home none              Referrals     Future Labs/Procedures    Home Infusion Referral     Comments:    Assist with management of TPN as ordered.            Destinee Moralez, RN, PHN  RN Care Coordinator   [address] [address]   [email or web address]   Casual Employee - Weekend Coverage only  To contact weekend RNCC, dial [phone] and enter pager number [phone] at prompt OR use [employer] to text page.  This pager can not be contacted by outside line.

## 2022-03-14 NOTE — ED PROVIDER NOTES
EMERGENCY DEPARTMENT ENCOUNTER      NAME: Alok Nino  AGE: 41 year old female  YOB: 1980  MRN: [id number]  EVALUATION DATE & TIME: 3/13/2022  9:22 PM    PCP: Nakita Odonnell    ED PROVIDER: Dayanara Lopez M.D.      Chief Complaint   Patient presents with     Abdominal Pain         FINAL IMPRESSION:  1. History of Crohn's disease    2. Abdominal pain, generalized    3. Complete intestinal obstruction, unspecified cause (H)        MEDICAL DECISION MAKIN:40 PM I met with the patient, obtained history, performed an initial exam, and discussed options and plan for diagnostics and treatment here in the ED. PPE worn: cloth mask, surgical mask, eye protection and gloves.  10:58 PM Results were discussed with the patient along with plan for admission. Patient is agreeable with plan for admission at [facility].   12:19 AM Discussed care with Dr. Mendez, hospitalist regarding the patient. They accept the patient for admission at [facility].    12:37 AM Discussed care with Dr. Leon, [facility] colorectal regarding the patient. They recommend the patient to be admitted to [facility].       Pertinent Labs & Imaging studies reviewed. (See chart for details)     Alok Nino is a 41 year old female who presents with abdominal pain.  She has a history of Crohn's disease with multiple abdominal surgeries, known stricture and recurrent bowel obstructions.  Just admitted to the hospital with a bowel obstruction.  Seen by Dr. Cartagena for a colonoscopy.  She had a plan to have stent placement by a different provider at the [facility].  Prior to this, developed recurrent symptoms.  I suspect a recurrence of her bowel obstruction because her abdomen is distended, tympanic and bowel sounds are absent.  She is not vomiting but feels nauseated.  I will start fluids, get give her pain medications, antiemetics and sent for CT scan to evaluate.    CT scan confirms recurrent obstruction.  Labs show mild electrolyte  abnormalities but normal kidney function and normal lactate.  There are no hospital beds left here at [facility] so we will plan admission to another hospital.  Initially contacted [facility] and they accepted the patient however the [facility] colorectal team does not go there so we will plan to arrange for transfer to the [facility].    I discussed with the admitting team from the [facility].  They have accepted the patient.  She signed the transfer paperwork and is in agreement with continuing care at the [facility].       MEDICATIONS GIVEN IN THE EMERGENCY:  Medications   sodium chloride 0.9% infusion ( Intravenous Rate/Dose Verify 3/14/22 0048)   diphenhydrAMINE (BENADRYL) injection 25 mg (has no administration in time range)   HYDROmorphone (DILAUDID) injection 0.5 mg (has no administration in time range)   potassium chloride 10 mEq in 100 mL sterile water intermittent infusion (premix) (has no administration in time range)   0.9% sodium chloride BOLUS (0 mLs Intravenous Stopped 3/13/22 2243)   ketorolac (TORADOL) injection 15 mg (15 mg Intravenous Given 3/13/22 2149)   ondansetron (ZOFRAN) injection 4 mg (4 mg Intravenous Given 3/13/22 2239)   HYDROmorphone (DILAUDID) injection 0.5 mg (0.5 mg Intravenous Given 3/13/22 2210)   HYDROmorphone (DILAUDID) injection 1 mg (1 mg Intravenous Given 3/14/22 0002)       =================================================================    HPI    Patient information was obtained from: patient     Use of : Use of : N/A      Alok Nino is a 41 year old female with a history of Crohn's disease, multiple intestional obstruction, multiple abdominal surgeries, who presents with abdominal pain.    Patient was recently discharged on Friday (2 days ago) where she was admitted for bowel obstruction. Today her pain reoccurred ~3PM and feels similar to her usual bowel obstruction which prompt her to come to ED. The pain is 8/10 in severity and  "feels like a \"stabbing\" sensation. She also says that her \"skin hurts\" and endorses brand new back pain which has never happened before. She does feel nauseous but has not yet vomited. She does follow up with [facility] regularly for her ongoing issues. Otherwise no fever, chills, cough, chest pain. No other medical complaints at this time.     Chart was reviewed:  3/9/22-3/11/22:: Patient was admitted for diagnosis of hypokalemia and intestinal obstruction. Thy obtained XR showing distended bowel loops suggestive of distal colonic obstruction. Started clears on 3/9 and had some stool already. She was advanced to LFD diet and is doing well. Patient was discharged home in stable condition.   3/7/22: [facility]-Patient underwent colonoscopy with stricture dilation at side of ileocolic anastomosis.     REVIEW OF SYSTEMS   Review of Systems   Constitutional: Negative for chills and fever.   Respiratory: Negative for cough.    Cardiovascular: Negative for chest pain.   Gastrointestinal: Positive for abdominal pain (\"stabbing\") and nausea. Negative for vomiting.   Musculoskeletal: Positive for back pain.   All other systems reviewed and are negative.       PAST MEDICAL HISTORY:  Past Medical History:   Diagnosis Date     C. difficile enteritis      Chronic pain      Crohn disease (H)      Melanoma (H)      PONV (postoperative nausea and vomiting)        PAST SURGICAL HISTORY:  Past Surgical History:   Procedure Laterality Date     ABDOMEN SURGERY      3 for crohns dx     APPENDECTOMY       APPENDECTOMY       CHOLECYSTECTOMY       CHOLECYSTECTOMY       COLONOSCOPY       ENDOSCOPIC ULTRASOUND UPPER GASTROINTESTINAL TRACT (GI) N/A 11/13/2020    Procedure: ENDOSCOPIC ULTRASOUND, ESOPHAGOSCOPY / UPPER GASTROINTESTINAL TRACT with BIOPSY;  Surgeon: Cydney Garcia MD;  Location: [facility] OR     GYN SURGERY       H STATISTIC PICC LINE INSERTION >5YR, FAILED Right 11/10/2021    LUE unsuccessful attempt from another " [facility], IR deferral     IR PICC PLACEMENT > 5 YRS OF AGE  11/11/2021     OTHER SURGICAL HISTORY      strictoplasty x3     OTHER SURGICAL HISTORY      adhesion removal x1     OTHER SURGICAL HISTORY      small bowel resections     PICC  05/10/2019          RESECTION ILEOCECAL  12/06/2013    Procedure: RESECTION ILEOCECAL;  Laparotomy, Extensive Lysis of Adhesions, Stricture Plasty X2  Anesthesia general with block;  Surgeon: Pete Cartagena MD;  Location: [facility] OR     RESECTION ILEOCOLIC N/A 11/17/2021    Procedure: Exploratory laparotomy, illeal resection, extensive lysis of adhesions (2 hr);  Surgeon: Pete Cartagena MD;  Location: [facility] OR       CURRENT MEDICATIONS:      Current Facility-Administered Medications:      sodium chloride 0.9% infusion, 1,000 mL, Intravenous, Continuous, Dayanara Lopez MD, Last Rate: 125 mL/hr at 03/13/22 2241, 1,000 mL at 03/13/22 2241    Current Outpatient Medications:      acetaminophen (TYLENOL) 325 MG tablet, Take 650 mg by mouth every 6 hours as needed for mild pain, Disp: , Rfl:      budesonide (ENTOCORT EC) 3 MG EC capsule, Take 9 mg by mouth every morning, Disp: , Rfl:      calcitRIOL (ROCALTROL) 0.25 MCG capsule, Take 0.25 mcg by mouth three times a week, Disp: , Rfl:      doxylamine (UNISOM) 25 MG TABS tablet, Take 50 mg by mouth At Bedtime , Disp: , Rfl:      magnesium oxide (MAG-OX) 400 (240 Mg) MG tablet, Take 400 mg by mouth every evening , Disp: , Rfl:      ustekinumab (STELARA) 90 MG/ML, Inject 90 mg Subcutaneous every 28 days , Disp: , Rfl:      vitamin D (ERGOCALCIFEROL) 03528 UNIT capsule, Take 50,000 Units by mouth five times a week , Disp: , Rfl:      vitamin E (TOCOPHEROL) 400 units (180 mg) capsule, Take 400 Units by mouth every evening , Disp: , Rfl:     ALLERGIES:  Allergies   Allergen Reactions     Carafate Hives     Morphine Hcl Other (See Comments)     SPASMS OF SPHINCTER OF ODDI  (BILIARY TRACT)     Codeine Nausea and Vomiting       FAMILY HISTORY:  Family  History   Problem Relation Age of Onset     No Known Problems Mother      Clotting Disorder Father      No Known Problems Brother      No Known Problems Son      Anesthesia Reaction No family hx of      Colon Cancer No family hx of      Crohn's Disease No family hx of      Ulcerative Colitis No family hx of      Colon Polyps No family hx of        SOCIAL HISTORY:   Social History     Tobacco Use     Smoking status: Never Smoker     Smokeless tobacco: Never Used   Substance Use Topics     Alcohol use: Yes     Comment: occasional/ 1 drink per 1-3 month     Drug use: No        PHYSICAL EXAM:    Vitals: /69   Pulse 91   Temp 98.5  F (36.9  C) (Temporal)   Resp 18   Wt 46.7 kg (103 lb)   LMP 03/06/2022   SpO2 97%   BMI 18.84 kg/m     General:. Alert and interactive, appears uncomfortable, underweight.  HENT: Oropharynx without erythema or exudates. MMM.  TMs clear bilaterally.  Eyes: Pupils mid-sized and equally reactive.   Neck: Full AROM.  No midline tenderness to palpation.  Cardiovascular: Regular rate and rhythm. Peripheral pulses 2+ bilaterally.  Chest/Pulmonary: Normal work of breathing. Lung sounds clear and equal throughout, no wheezes or crackles. No chest wall tenderness or deformities.  Abdomen: Soft, severely distended. Diffuse tenderness to palpation, tympanic with absent bowel sounds, No guarding or rebound. Multiple peeled abdominal incisions.   Back/Spine: No CVA or midline tenderness.  Extremities: Normal ROM of all major joints. No lower extremity edema.   Skin: Warm and dry. Normal skin color.   Neuro: Speech clear. CNs grossly intact. Moves all extremities appropriately. Strength and sensation grossly intact to all extremities.   Psych: Normal affect/mood, cooperative, memory appropriate.     LAB:  All pertinent labs reviewed and interpreted.  Labs Ordered and Resulted from Time of ED Arrival to Time of ED Departure   BASIC METABOLIC PANEL - Abnormal       Result Value    Sodium 140       Potassium 3.3 (*)     Chloride 108 (*)     Carbon Dioxide (CO2) 20 (*)     Anion Gap 12      Urea Nitrogen 10      Creatinine 0.70      Calcium 9.0      Glucose 111      GFR Estimate >90     MAGNESIUM - Abnormal    Magnesium 1.7 (*)    LACTIC ACID WHOLE BLOOD - Normal    Lactic Acid 1.7     COVID-19 VIRUS (CORONAVIRUS) BY PCR - Normal    SARS CoV2 PCR Negative     CBC WITH PLATELETS AND DIFFERENTIAL    WBC Count 8.3      RBC Count 4.39      Hemoglobin 13.3      Hematocrit 41.3      MCV 94      MCH 30.3      MCHC 32.2      RDW 13.2      Platelet Count 278      % Neutrophils 82      % Lymphocytes 11      % Monocytes 6      % Eosinophils 1      % Basophils 0      % Immature Granulocytes 0      NRBCs per 100 WBC 0      Absolute Neutrophils 6.8      Absolute Lymphocytes 0.9      Absolute Monocytes 0.5      Absolute Eosinophils 0.1      Absolute Basophils 0.0      Absolute Immature Granulocytes 0.0      Absolute NRBCs 0.0         RADIOLOGY:  Abd/pelvis CT no contrast - Stone Protocol   Final Result   IMPRESSION:    1.  Recurrent marked fluid distention of multiple loops of bowel throughout the abdomen and pelvis without definite transition point. The overall configuration is similar to 03/03/2022 MR enterography as well as CT abdomen 02/09/2022.               I, Felicitas Friedman, am serving as a scribe to document services personally performed by Dr. Dayanara Lopez  based on my observation and the provider's statements to me. I, Dayanara Lopez MD attest that Felicitas Friedman is acting in a scribe capacity, has observed my performance of the services and has documented them in accordance with my direction.      Dayanara Lopez M.D.  Emergency Medicine  [facility] EMERGENCY DEPARTMENT  [facility] [facility] [address]  [phone]  Dept: [phone]         Dayanara Lopez MD  03/14/22 0242

## 2022-03-15 LAB
ALBUMIN SERPL-MCNC: 2.7 G/DL (ref 3.4–5)
ALP SERPL-CCNC: 118 U/L (ref 40–150)
ALT SERPL W P-5'-P-CCNC: 62 U/L (ref 0–50)
ANION GAP SERPL CALCULATED.3IONS-SCNC: 10 MMOL/L (ref 3–14)
AST SERPL W P-5'-P-CCNC: 27 U/L (ref 0–45)
B-HCG SERPL-ACNC: <1 IU/L (ref 0–5)
BILIRUB DIRECT SERPL-MCNC: 0.2 MG/DL (ref 0–0.2)
BILIRUB SERPL-MCNC: 0.6 MG/DL (ref 0.2–1.3)
BUN SERPL-MCNC: 7 MG/DL (ref 7–30)
CALCIUM SERPL-MCNC: 8.4 MG/DL (ref 8.5–10.1)
CHLORIDE BLD-SCNC: 110 MMOL/L (ref 94–109)
CO2 SERPL-SCNC: 22 MMOL/L (ref 20–32)
CREAT SERPL-MCNC: 0.62 MG/DL (ref 0.52–1.04)
CRP SERPL-MCNC: 14 MG/L (ref 0–8)
ERYTHROCYTE [DISTWIDTH] IN BLOOD BY AUTOMATED COUNT: 13.2 % (ref 10–15)
ERYTHROCYTE [SEDIMENTATION RATE] IN BLOOD BY WESTERGREN METHOD: 11 MM/HR (ref 0–20)
GFR SERPL CREATININE-BSD FRML MDRD: >90 ML/MIN/1.73M2
GLUCOSE BLD-MCNC: 61 MG/DL (ref 70–99)
HCT VFR BLD AUTO: 36.1 % (ref 35–47)
HGB BLD-MCNC: 11.3 G/DL (ref 11.7–15.7)
MAGNESIUM SERPL-MCNC: 1.9 MG/DL (ref 1.6–2.3)
MAGNESIUM SERPL-MCNC: 2 MG/DL (ref 1.6–2.3)
MCH RBC QN AUTO: 29.4 PG (ref 26.5–33)
MCHC RBC AUTO-ENTMCNC: 31.3 G/DL (ref 31.5–36.5)
MCV RBC AUTO: 94 FL (ref 78–100)
PHOSPHATE SERPL-MCNC: 3.5 MG/DL (ref 2.5–4.5)
PLATELET # BLD AUTO: 216 10E3/UL (ref 150–450)
POTASSIUM BLD-SCNC: 3.6 MMOL/L (ref 3.4–5.3)
POTASSIUM BLD-SCNC: 3.6 MMOL/L (ref 3.4–5.3)
PROT SERPL-MCNC: 5.8 G/DL (ref 6.8–8.8)
RBC # BLD AUTO: 3.85 10E6/UL (ref 3.8–5.2)
SODIUM SERPL-SCNC: 142 MMOL/L (ref 133–144)
TRIGL SERPL-MCNC: 67 MG/DL
WBC # BLD AUTO: 4 10E3/UL (ref 4–11)

## 2022-03-15 PROCEDURE — 82962 GLUCOSE BLOOD TEST: CPT

## 2022-03-15 PROCEDURE — 250N000011 HC RX IP 250 OP 636: Performed by: STUDENT IN AN ORGANIZED HEALTH CARE EDUCATION/TRAINING PROGRAM

## 2022-03-15 PROCEDURE — 85652 RBC SED RATE AUTOMATED: CPT | Performed by: STUDENT IN AN ORGANIZED HEALTH CARE EDUCATION/TRAINING PROGRAM

## 2022-03-15 PROCEDURE — 83735 ASSAY OF MAGNESIUM: CPT | Performed by: STUDENT IN AN ORGANIZED HEALTH CARE EDUCATION/TRAINING PROGRAM

## 2022-03-15 PROCEDURE — 120N000002 HC R&B MED SURG/OB UMMC

## 2022-03-15 PROCEDURE — 258N000003 HC RX IP 258 OP 636: Performed by: STUDENT IN AN ORGANIZED HEALTH CARE EDUCATION/TRAINING PROGRAM

## 2022-03-15 PROCEDURE — 84702 CHORIONIC GONADOTROPIN TEST: CPT | Performed by: RADIOLOGY

## 2022-03-15 PROCEDURE — 250N000013 HC RX MED GY IP 250 OP 250 PS 637: Performed by: PHYSICIAN ASSISTANT

## 2022-03-15 PROCEDURE — 84100 ASSAY OF PHOSPHORUS: CPT | Performed by: STUDENT IN AN ORGANIZED HEALTH CARE EDUCATION/TRAINING PROGRAM

## 2022-03-15 PROCEDURE — 36415 COLL VENOUS BLD VENIPUNCTURE: CPT | Performed by: HOSPITALIST

## 2022-03-15 PROCEDURE — 99233 SBSQ HOSP IP/OBS HIGH 50: CPT | Performed by: STUDENT IN AN ORGANIZED HEALTH CARE EDUCATION/TRAINING PROGRAM

## 2022-03-15 PROCEDURE — 86140 C-REACTIVE PROTEIN: CPT | Performed by: STUDENT IN AN ORGANIZED HEALTH CARE EDUCATION/TRAINING PROGRAM

## 2022-03-15 PROCEDURE — 84132 ASSAY OF SERUM POTASSIUM: CPT | Performed by: HOSPITALIST

## 2022-03-15 PROCEDURE — 250N000013 HC RX MED GY IP 250 OP 250 PS 637: Performed by: STUDENT IN AN ORGANIZED HEALTH CARE EDUCATION/TRAINING PROGRAM

## 2022-03-15 PROCEDURE — 258N000001 HC RX 258: Performed by: STUDENT IN AN ORGANIZED HEALTH CARE EDUCATION/TRAINING PROGRAM

## 2022-03-15 PROCEDURE — 85027 COMPLETE CBC AUTOMATED: CPT | Performed by: STUDENT IN AN ORGANIZED HEALTH CARE EDUCATION/TRAINING PROGRAM

## 2022-03-15 PROCEDURE — 82248 BILIRUBIN DIRECT: CPT | Performed by: STUDENT IN AN ORGANIZED HEALTH CARE EDUCATION/TRAINING PROGRAM

## 2022-03-15 PROCEDURE — 84478 ASSAY OF TRIGLYCERIDES: CPT | Performed by: STUDENT IN AN ORGANIZED HEALTH CARE EDUCATION/TRAINING PROGRAM

## 2022-03-15 PROCEDURE — 36415 COLL VENOUS BLD VENIPUNCTURE: CPT | Performed by: STUDENT IN AN ORGANIZED HEALTH CARE EDUCATION/TRAINING PROGRAM

## 2022-03-15 PROCEDURE — 84132 ASSAY OF SERUM POTASSIUM: CPT | Performed by: STUDENT IN AN ORGANIZED HEALTH CARE EDUCATION/TRAINING PROGRAM

## 2022-03-15 RX ORDER — DEXTROSE MONOHYDRATE 25 G/50ML
25-50 INJECTION, SOLUTION INTRAVENOUS
Status: DISCONTINUED | OUTPATIENT
Start: 2022-03-15 | End: 2022-03-19 | Stop reason: HOSPADM

## 2022-03-15 RX ORDER — NICOTINE POLACRILEX 4 MG
15-30 LOZENGE BUCCAL
Status: DISCONTINUED | OUTPATIENT
Start: 2022-03-15 | End: 2022-03-19 | Stop reason: HOSPADM

## 2022-03-15 RX ADMIN — HYDROMORPHONE HYDROCHLORIDE 0.5 MG: 1 INJECTION, SOLUTION INTRAMUSCULAR; INTRAVENOUS; SUBCUTANEOUS at 13:13

## 2022-03-15 RX ADMIN — DIPHENHYDRAMINE HYDROCHLORIDE 25 MG: 25 CAPSULE ORAL at 22:31

## 2022-03-15 RX ADMIN — POTASSIUM CHLORIDE 10 MEQ: 10 INJECTION, SOLUTION INTRAVENOUS at 00:23

## 2022-03-15 RX ADMIN — DIPHENHYDRAMINE HYDROCHLORIDE 25 MG: 25 CAPSULE ORAL at 22:26

## 2022-03-15 RX ADMIN — HYDROMORPHONE HYDROCHLORIDE 0.5 MG: 1 INJECTION, SOLUTION INTRAMUSCULAR; INTRAVENOUS; SUBCUTANEOUS at 22:26

## 2022-03-15 RX ADMIN — HYDROMORPHONE HYDROCHLORIDE 0.5 MG: 1 INJECTION, SOLUTION INTRAMUSCULAR; INTRAVENOUS; SUBCUTANEOUS at 17:43

## 2022-03-15 RX ADMIN — HYDROMORPHONE HYDROCHLORIDE 0.5 MG: 1 INJECTION, SOLUTION INTRAMUSCULAR; INTRAVENOUS; SUBCUTANEOUS at 05:37

## 2022-03-15 RX ADMIN — SODIUM CHLORIDE: 9 INJECTION, SOLUTION INTRAVENOUS at 08:48

## 2022-03-15 RX ADMIN — BUDESONIDE 9 MG: 3 CAPSULE ORAL at 08:07

## 2022-03-15 RX ADMIN — Medication 400 MG: at 19:27

## 2022-03-15 RX ADMIN — DEXTROSE AND SODIUM CHLORIDE: 5; 450 INJECTION, SOLUTION INTRAVENOUS at 19:27

## 2022-03-15 ASSESSMENT — ACTIVITIES OF DAILY LIVING (ADL)
ADLS_ACUITY_SCORE: 3

## 2022-03-15 NOTE — PLAN OF CARE
/60 (BP Location: Left arm)   Pulse 77   Temp 98  F (36.7  C) (Oral)   Resp 16   Wt 47 kg (103 lb 9.9 oz)   LMP 03/06/2022   SpO2 97%   BMI 18.95 kg/m      AVSS. A&Ox4. Up independently voiding  in the bathroom.No replacements needed for K+= 3.6 . Pt endorses generalized abdominal pain of 9/10. On NPO for bowel rest. Hypoglycemic order paged for Bg 61, 55, 79. Pt asymptomatic, given apple juice. Blood glucose checks PRN order placed. Dilaudid IV PRN q 2 Hrs. Reports flatus, hypoactive bowel sounds. On continuous NS @ 75 ml/hr. No BM, this shift. Patient is on IR schedule Wednesday 3/16 for a dual lumen PICC placement for TPN. Labs scheduled for AM. No NPO required. Continue with POC.

## 2022-03-15 NOTE — PLAN OF CARE
Goal Outcome Evaluation:    AVSS on RA. Denied nausea and SOB. Abdominal pain managed with IV dilaudid x1. KCl replacement completed and recheck K was 3.6. IVF at 100ml/hr. Remains NPO. No BM this shift. Slept between cares. No acute events.

## 2022-03-15 NOTE — PLAN OF CARE
Goal Outcome Evaluation:    Plan of Care Reviewed With: patient     Overall Patient Progress: no change    Time [id number]    /63 (BP Location: Left arm)   Pulse 63   Temp 96.8  F (36  C) (Axillary)   Resp 18   Wt 46.9 kg (103 lb 8 oz)   LMP 03/06/2022   SpO2 97%   BMI 18.93 kg/m      Reason for admission: Crohn's with multiple surgeries  Activity: Independent   Pain: dilaudid Q 2-3 hours with some relief, pain 6/10   Neuro: A&O x 4  Cardiac: wnl  Respiratory: wnl  GI/: NPO for possible procedure, voiding and had small BM  Diet: NPO  Lines: PIV infusing  Wounds:   Labs/imaging:  K recheck is 3.4, running K at 50/ml hr secondary to irritation      New changes this shift: Pain controlled with IV dilaudid     Plan: Expected Discharge: 03/17/2022       Continue to monitor and follow POC

## 2022-03-15 NOTE — PROGRESS NOTES
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 Patient unfortunately developed an SBO next day after the colonoscopy and was admitted to [facility] for conservative management with plans to wean her off budesonide and place a stent in her colon in attempt to manage recurrent symptoms. Patient presented to us 2 days after she was discharged again with recurring SBO for which she is currently being managed conservatively.   ~SBO appears to be improving at this time. Pt reports 2 BMs and reports improvement in her abdominal pain   ~ We discussed with CRS team who is in favor of placing an colonic stent, they are leaving the timing of placement up to GI at this time          Recommendations:   --We will discuss with the advanced team regarding colonic stent placement and appropriate timing   --Continue supportive care with NPO, IVF and electrolyte repletion as needed.  ---Appreciate CRS input    It has been a pleasure to participate in the care of this patient.  Patient discussed with GI staff, Dr. Arriaza.  Please do not hesitate to contact the GI service with any questions or concerns.     Moisés Resendiz MD  Gastroenterology Fellow  Pager [phone]         Subjective/Objective:   -Pt reports improvement in symptoms. She had 2 BMs yesterday and pain is improving   - No acute events overnight         Physical Exam:   VS:  /60 (BP  Location: Left arm)   Pulse 77   Temp 98  F (36.7  C) (Oral)   Resp 16   Wt 47 kg (103 lb 9.9 oz)   LMP 03/06/2022   SpO2 97%   BMI 18.95 kg/m      Wt:   Wt Readings from Last 2 Encounters:   03/15/22 47 kg (103 lb 9.9 oz)   03/13/22 46.7 kg (103 lb)      Constitutional: cooperative, pleasant, no acute distress  Eyes: Conjunctiva anicteric  HEENT: Mucus membranes moist  CV:No edema  Respiratory: Unlabored on room air.   Abd:improving distention, nontender, no rebound or guarding   Skin: warm, perfused, no jaundice  Neuro: AAO x 3, No asterixis         Laboratory:   BMP  Recent Labs   Lab 03/15/22  0849 03/15/22  0801 03/15/22  0521 03/15/22  0419 03/14/22  2100 03/14/22  0830 03/14/22  0644 03/13/22 2142 03/11/22  0618   NA  --   --  142  --   --   --  142 140 141   POTASSIUM  --   --  3.6 3.6 3.4 2.8* 3.0* 3.3* 3.5   CHLORIDE  --   --  110*  --   --   --  110* 108* 109*   BLACK  --   --  8.4*  --   --   --  8.2* 9.0 7.6*   CO2  --   --  22  --   --   --  26 20* 26   BUN  --   --  7  --   --   --  10 10 5*   CR  --   --  0.62  --   --   --  0.60 0.70 0.57*   GLC 79 55* 61*  --   --   --  86 111 84     CBC  Recent Labs   Lab 03/15/22  0521 03/14/22  0644 03/13/22 2142 03/11/22  0618   WBC 4.0 5.4 8.3 5.3   RBC 3.85 3.56* 4.39 3.56*   HGB 11.3* 10.5* 13.3 10.6*   HCT 36.1 32.8* 41.3 34.3*   MCV 94 92 94 96   MCH 29.4 29.5 30.3 29.8   MCHC 31.3* 32.0 32.2 30.9*   RDW 13.2 13.2 13.2 13.0    190 278 221     INRNo lab results found in last 7 days.  LFTs  Recent Labs   Lab 03/11/22  0618 03/10/22  0606 03/09/22  0117   ALKPHOS 93 86 105   AST 69* 28 77*   * 88* 160*   BILITOTAL 0.4 0.4 0.7   PROTTOTAL 5.2* 4.8* 6.5   ALBUMIN 2.7* 2.7* 3.7      PANC  Recent Labs   Lab 03/09/22  0117   LIPASE 44            Imaging/Procedures/Studies:   No results found for this or any previous visit.

## 2022-03-15 NOTE — CONSULTS
Interventional Radiology Consult Service Note    Alok is a 41 year old female with history of crohn's s/p partial resection and colonoscopy with stricture dilation now admitted with worsening pain and nausea. IR previously placed a 5 Fr dual lumen PICC via right brachial vein. Prior [facility] attempts unsuccessful due to inability to advance catheter through right subclavian vein. Request for dual lumen PICC for for vascular access and TPN.    Patient is on IR schedule Wednesday 3/16 for a dual lumen PICC placement.   Labs WNL for procedure.    No NPO required.  Medications to be held include: None  Consent will be done prior to procedure.     Please [place] the [place] [phone] for estimated time of procedure.     Case discussed with Dr Plascencia.     Vitals:   /61 (BP Location: Left arm)   Pulse 64   Temp 97  F (36.1  C) (Oral)   Resp 18   Wt 47 kg (103 lb 9.9 oz)   LMP 03/06/2022   SpO2 98%   BMI 18.95 kg/m      Pertinent Labs:     Lab Results   Component Value Date    WBC 4.0 03/15/2022    WBC 5.4 03/14/2022    WBC 8.3 03/13/2022    WBC 8.6 12/14/2013    WBC 10.0 12/13/2013    WBC 7.9 12/12/2013       Lab Results   Component Value Date    HGB 11.3 03/15/2022    HGB 10.5 03/14/2022    HGB 13.3 03/13/2022    HGB 9.3 12/14/2013    HGB 10.1 12/13/2013    HGB 9.9 12/12/2013       Lab Results   Component Value Date     03/15/2022     03/14/2022     03/13/2022     12/15/2013     12/14/2013     12/13/2013       Lab Results   Component Value Date    INR 1.12 11/25/2021       Danielle Nath PA-C  Interventional Radiology  Pager: [phone]

## 2022-03-15 NOTE — PROGRESS NOTES
[facility]    Medicine Progress Note - Hospitalist Service, GOLD TEAM 12    Date of Admission:  3/14/2022    Assessment & Plan       Alok Nino is a 41 year old female admitted on 3/14/2022. She has a PMH of crohn's followed by [facility] s/p partial resection and most recently colonoscopy with stricture dilation on 3/7, discharged 3/11. Now readmitted from [facility] for worsening pain and nausea.      TODAY  - CLD, no NGT tube  - CR Surgery for stent, timing in discussion  - IR for PICC placement - anticipate 3/16  - Pharm and Nutrition for TPN, anticipate ongoing TPN on discharge.  - Will start Lovenox on 3/16 after PICC placement given active IBD, unless surgical plan firmed    SBO  Crohn's disease s/p partial resection  Follows with [facility]. On budesonide and stelara. Planning for stent placement with [facility] ~2 months. CT scan showing multiple loops without transition point.   - NPO, Patient refusing NGT for decompression at this time  - Colorectal surgery consulted - anticipate colonic stent for stricture when able  - GI consulted, appreciate assistance.  - Continue budesonide (planning on tapering off as outpatient)  - Pain meds: IV dilaudid 0.5mg q2hr prn  - IVF 100cc/hr for 1 L while NPO  - IR consult for PICC placement  - Pharmacy and Nutrition to start and manage TPN     Hypokalemia  Hypomagnesemia  Secondary to poor oral intake.   - Replete PRN     Malnutrition:    - Level of malnutrition: Moderate   - Currently NPO, will monitor input when able to safely eat        Diet: NPO for Medical/Clinical Reasons Except for: Meds, Ice Chips    DVT Prophylaxis: HOLD for PICC placement  Hamilton Catheter: Not present  Central Lines: None  Cardiac Monitoring: None  Code Status: Full Code      Disposition Plan   Expected Discharge: 03/19/2022     Anticipated discharge location:  Awaiting care coordination huddle  Delays: none.        The patient's care was discussed with the  Patient.    Nasir Corcoran DO  Hospitalist Service, GOLD TEAM 12  [facility]  Securely message with the [employer] Web Console (learn more here)  Text page via [facility] Paging/Directory   Please see signed in provider for up to date coverage information      Clinically Significant Risk Factors Present on Admission                 ______________________________________________________________________    Interval History   Ongoing abdominal pain some appetitie, no bowel movements this morning. No overnight events.    Data reviewed today: I reviewed all medications, new labs and imaging results over the last 24 hours. I personally reviewed no images or EKG's today.    Physical Exam   Vital Signs: Temp: 97  F (36.1  C) Temp src: Oral BP: 107/61 Pulse: 64   Resp: 18 SpO2: 98 % O2 Device: None (Room air)    Weight: 103 lbs 9.86 oz  General: non-distressed adult appears malnourished  HEENT: Normocephalic, atraumatic, pupils equal round reactive, membranes moist  CV: normal capillary refill, heart regular rate and rhythm  Respiratory: Non-labored breathing, bronchovesicular lung sounds  Abdominal: soft, mildly distended, somewhat tender, no rebound, no guarding, no rigidity, +bowel sounds  Genitourinary: no suprapubic tenderness  Musculoskeletal: sarcopenic  Skin: no rash, normal turgor  Neurologic: no facial droop, moving upper and lower extremities spontaneously, sensation in tact to light touch on extremities  Psychiatric: normal mood and affect    Data   Recent Labs   Lab 03/15/22  0849 03/15/22  0801 03/15/22  0521 03/15/22  0419 03/14/22  2100 03/14/22  0830 03/14/22  0644 03/13/22  2142 03/11/22  0618 03/10/22  0606 03/09/22  0747 03/09/22  0117   WBC  --   --  4.0  --   --   --  5.4 8.3 5.3 4.4  --  9.8   HGB  --   --  11.3*  --   --   --  10.5* 13.3 10.6* 10.2*  --  11.9   MCV  --   --  94  --   --   --  92 94 96 96  --  92   PLT  --   --  216  --   --   --  190 805 330 016   --  267   NA  --   --  142  --   --   --  142 140 141 142  --  142   POTASSIUM  --   --  3.6 3.6 3.4   < > 3.0* 3.3* 3.5 3.7  3.7   < > 2.6*   CHLORIDE  --   --  110*  --   --   --  110* 108* 109* 110*  --  107   CO2  --   --  22  --   --   --  26 20* 26 26  --  21*   BUN  --   --  7  --   --   --  10 10 5* 4*  --  9   CR  --   --  0.62  --   --   --  0.60 0.70 0.57* 0.56*  --  0.64   ANIONGAP  --   --  10  --   --   --  6 12 6 6  --  14   BLACK  --   --  8.4*  --   --   --  8.2* 9.0 7.6* 7.5*  --  8.8   GLC 79 55* 61*  --   --   --  86 111 84 91  --  104   ALBUMIN  --   --   --   --   --   --   --   --  2.7* 2.7*  --  3.7   PROTTOTAL  --   --   --   --   --   --   --   --  5.2* 4.8*  --  6.5   BILITOTAL  --   --   --   --   --   --   --   --  0.4 0.4  --  0.7   ALKPHOS  --   --   --   --   --   --   --   --  93 86  --  105   ALT  --   --   --   --   --   --   --   --  122* 88*  --  160*   AST  --   --   --   --   --   --   --   --  69* 28  --  77*   LIPASE  --   --   --   --   --   --   --   --   --   --   --  44    < > = values in this interval not displayed.

## 2022-03-15 NOTE — PROGRESS NOTES
CLINICAL NUTRITION SERVICES - ASSESSMENT NOTE     Nutrition Prescription    RECOMMENDATIONS FOR MDs/PROVIDERS TO ORDER:  - Total fluids/adjustment per Provider  - Recommend ordering lytes replacement protocol for K+, Mg++ and PO4 d/t risk for refeeding with initiation of PN (once IV access obtained)    Malnutrition Status:    - Severe malnutrition in the context of acute on chronic illness      Recommendations already ordered by Registered Dietitian (RD):  - Order Ensure Clear betw meals TID      Future/Additional Recommendations:  -- Once central acces obtained and lytes within acceptable limits (K+/Mg++/Phos WN), initiate PN therapy with goal volume of 1320 ml/day with initial 100 g Dex daily (340 kcal, GIR 1.5), 71 g AA daily (284 kcal), and 250 ml 20% IV lipids twice weekly d/t lipid shortage (W/ Fri).  Micro/Rx: nfuvite + MTE5  --ONLY once pt receives ~100% of initial continuous PN volume with K+/Mg++/Phos WNL, advance PN dex by 20-30 g every 1-2 days (pending lytes/Glu and Pharm D/RD discretion) to goal of 220 g Dex (748 kcal) to increase provisions to 1175 kcals (25 kcal/kg/day), 1.5 g PRO/kg/day, GIR 3.2 with 12% kcals from Fat.  - Monitor lytes for refeeding with initiation/adv to goal PN regimen  - Monitor wt trends     REASON FOR ASSESSMENT  Alok Nino is a/an 41 year old female assessed by the dietitian for positive Admission Nutrition Risk Screen for wt loss of 14 - 23 pounds, but no report of eating poorly because of a decreased appetite and Pharmacy/Nutrition to Start and Manage PN d/t Severe exacerbation of IBD (Central Line NOT in place, Line has been ordered, but not yet inserted).   Per chart review, IR schedule Wednesday 3/16 for a dual lumen PICC placement    Per H&P, pt with PMH of crohn's followed by [facility] s/p partial resection and most recently colonoscopy with stricture dilation on 3/7, discharged 3/11. Now readmitted from [facility] for worsening pain and nausea. CT scan showing multiple  "loops without transition point.    NUTRITION HISTORY  Per chart review, reported that pt has been primarily NPO over the past week and recently transitioned to soft solids, liquids on Friday (3/11) and tolerating. However, on 3/13, pt developed recurrent abd pain after eating a donut and some rice and presented to [facility] ED.     CURRENT NUTRITION ORDERS  Diet: NPO since 3/14  - Pt declining NGT for decompression at this time.    LABS  K+ and Mg++ WNL (today), PO4 WNL (3/11) - current value pending  BUN 7 (low end normal); suggestive of low protein intakes  TG 64 (WNL) on 11/24/21 - current value pending    MEDICATIONS  Budesonide  Calcitriol  MAG-OX  MIVF's @ 75 ml/hr    ANTHROPOMETRICS  Height: 0 cm (Data Unavailable). Height of 157.5 cm (5'2\") per chart review  Most Recent Weight: 47 kg (103 lb 9.9 oz) - today, Admit wt = 46.9 kg (103 lbs) on 3/14. Noted pt received IV bolus flush on 3//14.  IBW: 50 kg (94% of IBW)  BMI: Normal BMI  Weight History: Per chart review, pt previously hospitalized from 11/9/21 thru 11/14/21 with lowest wt of 49.2 kg (108 lbs) on 11/14/21.   Pt reports her UBW was approximately 115 lbs prior to her surgery in November. Based on pt's current wt, pt has had a > 10% wt loss x past 5 mos  Wt Readings from Last 10 Encounters:   03/15/22 47 kg (103 lb 9.9 oz)   03/13/22 46.7 kg (103 lb)   03/09/22 46.7 kg (103 lb)   02/22/22 44.9 kg (99 lb)   01/04/22 46.6 kg (102 lb 11.2 oz)   12/06/21 49.4 kg (109 lb)   11/26/21 51.1 kg (112 lb 9.6 oz)   11/16/21 49.7 kg (109 lb 8 oz)   11/14/21 49.2 kg (108 lb 6.4 oz)   10/05/21 52.2 kg (115 lb)     Dosing Weight: 47 kg (based on wt of 46.9 kg)    ASSESSED NUTRITION NEEDS  Estimated Energy Needs: 2312-1803 kcals/day (20 - 25 - 30 kcals/kg)  Justification: Aim lower end of range for Maintenance on PN   Estimated Protein Needs: 56-71 grams protein/day (1.2 - 1.5 grams of pro/kg)  Justification: Repletion  Estimated Fluid Needs: (1 mL/kcal) "   Justification: Maintenance    PHYSICAL FINDINGS  See malnutrition section below.    MALNUTRITION  % Intake: </= 50% for >/= 5 days (severe)  % Weight Loss: > 7.5% in 3 months (severe)  Subcutaneous Fat Loss: Facial region: Mild  Muscle Loss: Temporal: Mild and Thoracic region (clavicle, acromium bone, deltoid, trapezius, pectoral): Mild  Fluid Accumulation/Edema: None noted  Malnutrition Diagnosis: Severe malnutrition in the context of acute on chronic illness    NUTRITION DIAGNOSIS  Inadequate protein-energy intake related to altered GI function secondary to recurring bowel obstruction inhibiting  po as evidenced by minimal nutritional intakes for the past week, h/o > 10% wt loss x past 5 mos. CLs at present and consult received for PN therapy.    INTERVENTIONS  Implementation  Nutrition Education: Provided education on plan to initiate PN once PICC line obtained tomorrow  Medical food supplement therapy     Goals  1. Initiate nutrition support within 24 hrs.  2.Total avg nutritional intake to meet a minimum of 20 kcal/kg and 1.5 g PRO/kg daily (per dosing wt 47 kg).     Monitoring/Evaluation  Progress toward goals will be monitored and evaluated per protocol.    Irene Moreno RD,LD  7D pager [phone]

## 2022-03-15 NOTE — PROVIDER NOTIFICATION
Pager [phone]    [facility]# [id number]    Pt. concerned about the long wait time to place PICC line based on past hx. RE:TPN  And her hypoglycemia. Requesting for expedited IR orders.    [facility] #[id number]

## 2022-03-15 NOTE — PROVIDER NOTIFICATION
"Pager [phone]    [facility] #[id number]    Pt glucose is 61. Any diet orders lined up? Or recommendations?  Provider call back \"hypoglycemic orders placed.\"      [facility]#[id number]  "

## 2022-03-15 NOTE — PROGRESS NOTES
Colorectal Surgery Progress Note  [facility]    Subjective:  Pt feels better today.  Passing gas this AM and had BM yesterday. Abdominal pain improving. Feels less bloated. No nausea or vomiting in last 24hrs.     Reported that normally could eat more than what she ate the day that SBO started (2 days ago). She mentioned that SBO episodes are getting more frequent. Agreeable to starting TPN for now due to poor nutritional status and very recurrent SBOs. Of note, pt has hx of challenging PICC placement (as per her report this AM).        Vitals:  Vitals:    03/14/22 1910 03/14/22 2309 03/15/22 0457 03/15/22 0709   BP: 128/63 108/63 101/63 110/62   BP Location: Left arm Left arm Left arm Left arm   Pulse: 63 62 57 80   Resp: 18 18 18 18   Temp: 96.8  F (36  C) 96.8  F (36  C) (!) 96.6  F (35.9  C) 97.8  F (36.6  C)   TempSrc: Axillary Oral Oral Oral   SpO2: 97% 98% 98% 96%   Weight:    47 kg (103 lb 9.9 oz)     I/O:  I/O last 3 completed shifts:  In: 700 [I.V.:700]  Out: -     Physical Exam:  Gen: AAOx3, NAD  Pulm: Non-labored breathing  Abd: Soft, non-distended, appropriately tender, no guarding.   Ext:  Warm and well-perfused    BMP  Recent Labs   Lab 03/15/22  0521 03/15/22  0419 03/14/22  2100 03/14/22  0830 03/14/22  0644 03/13/22  2142 03/11/22  0618 03/10/22  0606     --   --   --  142 140 141 142   POTASSIUM 3.6 3.6 3.4 2.8* 3.0* 3.3* 3.5 3.7  3.7   CHLORIDE 110*  --   --   --  110* 108* 109* 110*   CO2 22  --   --   --  26 20* 26 26   BUN 7  --   --   --  10 10 5* 4*   CR 0.62  --   --   --  0.60 0.70 0.57* 0.56*   GLC 61*  --   --   --  86 111 84 91   MAG 1.9  --   --  1.5*  --  1.7* 1.9 1.7*   PHOS  --   --   --   --   --   --  3.3 3.0     CBC  Recent Labs   Lab 03/15/22  0521 03/14/22  0644 03/13/22  2142 03/11/22  0618   WBC 4.0 5.4 8.3 5.3   HGB 11.3* 10.5* 13.3 10.6*   HCT 36.1 32.8* 41.3 34.3*    190 278 221         ASSESSMENT: 41 year old female w/ hx of  long-standing Chron's disease who presents with CT and clinically confirmed recurrent SBO 2/2 ileocolic stricture w/o definite transition point. History of multiple abdominal surgeries s/p 8 prior surgeries including stricturoplasties, multiple bowel resection surgeries. Most recent surgery was on 11/21/21 for ANTHONY and distal ileal resection with ileoileal anastosmosis . Pt would like to get stent for recurrent stricture as soon as medically able but currently on budesonide. Will touch base with GI colleagues to coordinate plan. No surgical interventions at this point. Overall, SBO picture improving.     Neuro/Pain: PRN Dilaudid  CV: No concerns.   PULM: No concerns.  GI/FEN: Recommend TPN at this point due to poor nutritional status regardless of timing of future stent placement by GI. Will follow up with GI colleagues to establish timing for stent placement. K now at 3.6 < 2.8 s/p K supplement (x3).   : No concerns.  Heme: No concerns. Hgb stable @ 11.3 on 3/15.  ID: No concerns.   Endocrine: No concerns.  Lines: Peripheral IV in place.   Activity: as tolerated.  Ppx: Per primary  Dispo: pending discussion with GI colleagues.       Nj Hernandez, MS4  [facility]    Patient was seen and discussed with Dr. Banerjee, who discuss with staff.    Agree w/ above note. Plan for TPN to start due to severe malnutrition. Will discuss timing of stent w/ Gastroenterology colleagues. Plan discussed w/ Dr. Cartagena.    Russ Banerjee MD  Fellow in Colon and Rectal Surgery  [facility]  Pager: [phone]

## 2022-03-16 ENCOUNTER — APPOINTMENT (OUTPATIENT)
Dept: INTERVENTIONAL RADIOLOGY/VASCULAR | Facility: CLINIC | Age: 42
DRG: 385 | End: 2022-03-16
Attending: PHYSICIAN ASSISTANT
Payer: OTHER GOVERNMENT

## 2022-03-16 LAB
B-HCG SERPL-ACNC: <1 IU/L (ref 0–5)
CHOLEST SERPL-MCNC: <50 MG/DL
HDLC SERPL-MCNC: 19 MG/DL
HOLD SPECIMEN: NORMAL
LDLC SERPL CALC-MCNC: <23 MG/DL
NONHDLC SERPL-MCNC: ABNORMAL MG/DL
PHOSPHATE SERPL-MCNC: 3.7 MG/DL (ref 2.5–4.5)
POTASSIUM BLD-SCNC: 3.2 MMOL/L (ref 3.4–5.3)
POTASSIUM BLD-SCNC: 3.2 MMOL/L (ref 3.4–5.3)
TRIGL SERPL-MCNC: 38 MG/DL

## 2022-03-16 PROCEDURE — C1751 CATH, INF, PER/CENT/MIDLINE: HCPCS

## 2022-03-16 PROCEDURE — 250N000013 HC RX MED GY IP 250 OP 250 PS 637: Performed by: STUDENT IN AN ORGANIZED HEALTH CARE EDUCATION/TRAINING PROGRAM

## 2022-03-16 PROCEDURE — 99153 MOD SED SAME PHYS/QHP EA: CPT

## 2022-03-16 PROCEDURE — 84132 ASSAY OF SERUM POTASSIUM: CPT | Performed by: STUDENT IN AN ORGANIZED HEALTH CARE EDUCATION/TRAINING PROGRAM

## 2022-03-16 PROCEDURE — 36415 COLL VENOUS BLD VENIPUNCTURE: CPT | Performed by: STUDENT IN AN ORGANIZED HEALTH CARE EDUCATION/TRAINING PROGRAM

## 2022-03-16 PROCEDURE — 36573 INSJ PICC RS&I 5 YR+: CPT

## 2022-03-16 PROCEDURE — 99232 SBSQ HOSP IP/OBS MODERATE 35: CPT | Performed by: STUDENT IN AN ORGANIZED HEALTH CARE EDUCATION/TRAINING PROGRAM

## 2022-03-16 PROCEDURE — 250N000009 HC RX 250: Performed by: RADIOLOGY

## 2022-03-16 PROCEDURE — 36592 COLLECT BLOOD FROM PICC: CPT | Performed by: STUDENT IN AN ORGANIZED HEALTH CARE EDUCATION/TRAINING PROGRAM

## 2022-03-16 PROCEDURE — 250N000009 HC RX 250: Performed by: STUDENT IN AN ORGANIZED HEALTH CARE EDUCATION/TRAINING PROGRAM

## 2022-03-16 PROCEDURE — 258N000003 HC RX IP 258 OP 636: Performed by: STUDENT IN AN ORGANIZED HEALTH CARE EDUCATION/TRAINING PROGRAM

## 2022-03-16 PROCEDURE — 250N000011 HC RX IP 250 OP 636: Performed by: STUDENT IN AN ORGANIZED HEALTH CARE EDUCATION/TRAINING PROGRAM

## 2022-03-16 PROCEDURE — 02H633Z INSERTION OF INFUSION DEVICE INTO RIGHT ATRIUM, PERCUTANEOUS APPROACH: ICD-10-PCS | Performed by: RADIOLOGY

## 2022-03-16 PROCEDURE — 84702 CHORIONIC GONADOTROPIN TEST: CPT | Performed by: PHYSICIAN ASSISTANT

## 2022-03-16 PROCEDURE — 80061 LIPID PANEL: CPT | Performed by: STUDENT IN AN ORGANIZED HEALTH CARE EDUCATION/TRAINING PROGRAM

## 2022-03-16 PROCEDURE — 250N000013 HC RX MED GY IP 250 OP 250 PS 637: Performed by: PHYSICIAN ASSISTANT

## 2022-03-16 PROCEDURE — 3E0436Z INTRODUCTION OF NUTRITIONAL SUBSTANCE INTO CENTRAL VEIN, PERCUTANEOUS APPROACH: ICD-10-PCS | Performed by: COLON & RECTAL SURGERY

## 2022-03-16 PROCEDURE — 84100 ASSAY OF PHOSPHORUS: CPT | Performed by: STUDENT IN AN ORGANIZED HEALTH CARE EDUCATION/TRAINING PROGRAM

## 2022-03-16 PROCEDURE — 250N000011 HC RX IP 250 OP 636: Performed by: RADIOLOGY

## 2022-03-16 PROCEDURE — 99152 MOD SED SAME PHYS/QHP 5/>YRS: CPT | Performed by: RADIOLOGY

## 2022-03-16 PROCEDURE — 36573 INSJ PICC RS&I 5 YR+: CPT | Performed by: RADIOLOGY

## 2022-03-16 PROCEDURE — 258N000001 HC RX 258: Performed by: STUDENT IN AN ORGANIZED HEALTH CARE EDUCATION/TRAINING PROGRAM

## 2022-03-16 PROCEDURE — 99152 MOD SED SAME PHYS/QHP 5/>YRS: CPT

## 2022-03-16 PROCEDURE — 120N000002 HC R&B MED SURG/OB UMMC

## 2022-03-16 RX ORDER — FLUMAZENIL 0.1 MG/ML
0.2 INJECTION, SOLUTION INTRAVENOUS
Status: DISCONTINUED | OUTPATIENT
Start: 2022-03-16 | End: 2022-03-16

## 2022-03-16 RX ORDER — NALOXONE HYDROCHLORIDE 0.4 MG/ML
0.2 INJECTION, SOLUTION INTRAMUSCULAR; INTRAVENOUS; SUBCUTANEOUS
Status: DISCONTINUED | OUTPATIENT
Start: 2022-03-16 | End: 2022-03-16

## 2022-03-16 RX ORDER — FENTANYL CITRATE 0.05 MG/ML
25-50 INJECTION, SOLUTION INTRAMUSCULAR; INTRAVENOUS EVERY 5 MIN PRN
Status: DISCONTINUED | OUTPATIENT
Start: 2022-03-16 | End: 2022-03-16

## 2022-03-16 RX ORDER — HEPARIN SODIUM,PORCINE 10 UNIT/ML
5-20 VIAL (ML) INTRAVENOUS
Status: DISCONTINUED | OUTPATIENT
Start: 2022-03-16 | End: 2022-03-19 | Stop reason: HOSPADM

## 2022-03-16 RX ORDER — NALOXONE HYDROCHLORIDE 0.4 MG/ML
0.4 INJECTION, SOLUTION INTRAMUSCULAR; INTRAVENOUS; SUBCUTANEOUS
Status: DISCONTINUED | OUTPATIENT
Start: 2022-03-16 | End: 2022-03-16

## 2022-03-16 RX ORDER — HEPARIN SODIUM,PORCINE 10 UNIT/ML
5 VIAL (ML) INTRAVENOUS
Status: DISCONTINUED | OUTPATIENT
Start: 2022-03-16 | End: 2022-03-16

## 2022-03-16 RX ORDER — DEXTROSE MONOHYDRATE 100 MG/ML
INJECTION, SOLUTION INTRAVENOUS CONTINUOUS PRN
Status: DISCONTINUED | OUTPATIENT
Start: 2022-03-16 | End: 2022-03-19 | Stop reason: HOSPADM

## 2022-03-16 RX ORDER — SODIUM CHLORIDE 9 MG/ML
INJECTION, SOLUTION INTRAVENOUS CONTINUOUS
Status: DISCONTINUED | OUTPATIENT
Start: 2022-03-16 | End: 2022-03-19 | Stop reason: HOSPADM

## 2022-03-16 RX ORDER — POTASSIUM CHLORIDE 750 MG/1
20 TABLET, EXTENDED RELEASE ORAL ONCE
Status: COMPLETED | OUTPATIENT
Start: 2022-03-16 | End: 2022-03-16

## 2022-03-16 RX ORDER — HEPARIN SODIUM,PORCINE 10 UNIT/ML
5-20 VIAL (ML) INTRAVENOUS EVERY 24 HOURS
Status: DISCONTINUED | OUTPATIENT
Start: 2022-03-16 | End: 2022-03-19 | Stop reason: HOSPADM

## 2022-03-16 RX ADMIN — SODIUM CHLORIDE 1000 ML: 9 INJECTION, SOLUTION INTRAVENOUS at 18:26

## 2022-03-16 RX ADMIN — FENTANYL CITRATE 25 MCG: 0.05 INJECTION, SOLUTION INTRAMUSCULAR; INTRAVENOUS at 11:23

## 2022-03-16 RX ADMIN — Medication 400 MG: at 20:51

## 2022-03-16 RX ADMIN — MIDAZOLAM 1 MG: 1 INJECTION INTRAMUSCULAR; INTRAVENOUS at 11:28

## 2022-03-16 RX ADMIN — BUDESONIDE 9 MG: 3 CAPSULE ORAL at 08:58

## 2022-03-16 RX ADMIN — MAGNESIUM SULFATE HEPTAHYDRATE: 500 INJECTION, SOLUTION INTRAMUSCULAR; INTRAVENOUS at 20:44

## 2022-03-16 RX ADMIN — I.V. FAT EMULSION 250 ML: 20 EMULSION INTRAVENOUS at 20:43

## 2022-03-16 RX ADMIN — MIDAZOLAM 0.5 MG: 1 INJECTION INTRAMUSCULAR; INTRAVENOUS at 11:18

## 2022-03-16 RX ADMIN — DIPHENHYDRAMINE HYDROCHLORIDE 25 MG: 25 CAPSULE ORAL at 20:12

## 2022-03-16 RX ADMIN — CALCITRIOL CAPSULES 0.25 MCG 0.25 MCG: 0.25 CAPSULE ORAL at 08:58

## 2022-03-16 RX ADMIN — LIDOCAINE HYDROCHLORIDE 5 ML: 10 INJECTION, SOLUTION EPIDURAL; INFILTRATION; INTRACAUDAL; PERINEURAL at 11:27

## 2022-03-16 RX ADMIN — HYDROMORPHONE HYDROCHLORIDE 0.5 MG: 1 INJECTION, SOLUTION INTRAMUSCULAR; INTRAVENOUS; SUBCUTANEOUS at 02:31

## 2022-03-16 RX ADMIN — HYDROMORPHONE HYDROCHLORIDE 0.5 MG: 1 INJECTION, SOLUTION INTRAMUSCULAR; INTRAVENOUS; SUBCUTANEOUS at 15:15

## 2022-03-16 RX ADMIN — POTASSIUM CHLORIDE 20 MEQ: 750 TABLET, EXTENDED RELEASE ORAL at 15:17

## 2022-03-16 RX ADMIN — FENTANYL CITRATE 25 MCG: 0.05 INJECTION, SOLUTION INTRAMUSCULAR; INTRAVENOUS at 11:18

## 2022-03-16 RX ADMIN — DIPHENHYDRAMINE HYDROCHLORIDE 25 MG: 25 CAPSULE ORAL at 20:06

## 2022-03-16 RX ADMIN — MIDAZOLAM 0.5 MG: 1 INJECTION INTRAMUSCULAR; INTRAVENOUS at 11:23

## 2022-03-16 RX ADMIN — SODIUM CHLORIDE, PRESERVATIVE FREE 5 ML: 5 INJECTION INTRAVENOUS at 19:55

## 2022-03-16 RX ADMIN — FENTANYL CITRATE 50 MCG: 0.05 INJECTION, SOLUTION INTRAMUSCULAR; INTRAVENOUS at 11:28

## 2022-03-16 RX ADMIN — SODIUM CHLORIDE, PRESERVATIVE FREE 5 ML: 5 INJECTION INTRAVENOUS at 19:12

## 2022-03-16 RX ADMIN — DEXTROSE AND SODIUM CHLORIDE: 5; 450 INJECTION, SOLUTION INTRAVENOUS at 05:33

## 2022-03-16 RX ADMIN — POTASSIUM CHLORIDE 20 MEQ: 750 TABLET, EXTENDED RELEASE ORAL at 21:10

## 2022-03-16 RX ADMIN — HYDROMORPHONE HYDROCHLORIDE 0.5 MG: 1 INJECTION, SOLUTION INTRAMUSCULAR; INTRAVENOUS; SUBCUTANEOUS at 19:55

## 2022-03-16 ASSESSMENT — ACTIVITIES OF DAILY LIVING (ADL)
ADLS_ACUITY_SCORE: 3
DEPENDENT_IADLS:: INDEPENDENT
ADLS_ACUITY_SCORE: 3

## 2022-03-16 NOTE — PLAN OF CARE
Goal Outcome Evaluation:    [id number]    A/Ox4. Afebrile. Soft BP. OVSS on RA. Dilaudid given x2 for abdominal pain with partial relief. Benadryl given x1 for pruritus. Denies SOB and N/V. Clear liquid diet maintained. Pt voiding spontaneously without saving urine, but educated to save and agreeable. BM x2. R-PIV infusing D5 + N.S. at 100ml/hr. UAL. Continue with POC.

## 2022-03-16 NOTE — PROGRESS NOTES
CLINICAL NUTRITION SERVICES - BRIEF NOTE     Nutrition Prescription    RECOMMENDATIONS FOR MDs/PROVIDERS TO ORDER:  - Recommend changing MIVF's to non dextrose solution before PN hung tonight at 8 pm      Recommendations already ordered by Registered Dietitian (RD):  - Orders entered to initiate PN therapy as follows: initiate PN therapy with goal volume of 1320 ml/day with initial 100 g Dex daily (340 kcal, GIR 1.5), 71 g AA daily (284 kcal), and 250 ml 20% IV lipids twice weekly d/t lipid shortage (W/ Fri).  Micro/Rx: nfuvite + MTE5  --ONLY once pt receives ~100% of initial continuous PN volume with K+/Mg++/Phos WNL, advance PN dex by 20-30 g every 1-2 days (pending lytes/Glu and Pharm D/RD discretion) to goal of 220 g Dex (748 kcal) to increase provisions to 1175 kcals (25 kcal/kg/day), 1.5 g PRO/kg/day, GIR 3.2 with 12% kcals from Fat.      Future/Additional Recommendations:  -- Monitor lytes for refeeding with initiation/adv of Dex to goal PN regimen       Per chart review and discussion with RN, pt had her PICC line obtained this am and ready to use.    Labs:  K+ 3.2 (low) - not replaced at this time  PO4 WNL    Medications:  D5% and 0.45% NaCl @ 100 ml/hr    INTERVENTIONS  Implementation  Collaboration with other providers - RN and Pharmacy regarding plan to start PN this evening @ 8 pm  Parenteral Nutrition/IV Fluids - Initiate      Irene Moreno RD,LD  7D pager [phone]

## 2022-03-16 NOTE — IR NOTE
Patient Name: Alok Nino  Medical Record Number: [id number]  Today's Date: 3/16/2022    Procedure: Left peripherally inserted central venous catheter placement  Proceduralist: Dr. Mata    Procedure Start: 1121  Procedure end: 1141  Sedation medications administered: 100 mcg fentanyl, 2 mg versed     Report given to: Lashonda MIRANDA 7D  : NA    Other Notes: Pt arrived to IR room 2 from 7D. Consent reviewed. Pt denies any questions or concerns regarding procedure. Pt positioned supine and monitored per protocol. Pt tolerated procedure without any noted complications. Pt transferred back to 7D.    PICC heparin locked and ready for use.

## 2022-03-16 NOTE — PRE-PROCEDURE
GENERAL PRE-PROCEDURE:   Procedure:  PICC placement with sedation  Date/Time:  3/16/2022 11:09 AM    Verbal consent obtained?: Yes    Written consent obtained?: Yes    Risks and benefits: Risks, benefits and alternatives were discussed    Consent given by:  Patient  Patient states understanding of procedure being performed: Yes    Patient's understanding of procedure matches consent: Yes    Procedure consent matches procedure scheduled: Yes    Appropriately NPO:  Yes  Mallampati  :  Grade 1- soft palate, uvula, tonsillar pillars, and posterior pharyngeal wall visible  Lungs:  Lungs clear with good breath sounds bilaterally  Heart:  Normal heart sounds and rate  History & Physical reviewed:  History and physical reviewed and no updates needed  Statement of review:  I have reviewed the lab findings, diagnostic data, medications, and the plan for sedation

## 2022-03-16 NOTE — PROGRESS NOTES
Colorectal Surgery Progress Note  [facility]    Subjective:  No acute events overnight. Getting PICC today by IR. Abdominal pain is minimal. Minimal distension. Had 2 BMs yesterday. Denies n/v.     Vitals:  Vitals:    03/15/22 1513 03/15/22 1524 03/15/22 1910 03/16/22 0548   BP: 108/60 129/63 107/69 106/62   BP Location: Left arm Right leg Right arm Left arm   Pulse: 77  69 61   Resp: 16  18 18   Temp: 98  F (36.7  C)  97.2  F (36.2  C) 97.2  F (36.2  C)   TempSrc: Oral  Oral Oral   SpO2: 97%  98% 96%   Weight:         I/O:  I/O last 3 completed shifts:  In: 1520 [P.O.:720; I.V.:800]  Out: -     Physical Exam:  Gen: AAOx3, NAD  Pulm: Non-labored breathing  Abd: Non-distended.   Ext:  Warm and well-perfused    BMP  Recent Labs   Lab 03/16/22  0553 03/15/22  0849 03/15/22  0801 03/15/22  0521 03/15/22  0419 03/14/22  2100 03/14/22  0830 03/14/22  0644 03/13/22  2142 03/11/22  0618 03/10/22  0606   NA  --   --   --  142  --   --   --  142 140 141 142   POTASSIUM 3.2*  --   --  3.6 3.6 3.4 2.8* 3.0* 3.3* 3.5 3.7  3.7   CHLORIDE  --   --   --  110*  --   --   --  110* 108* 109* 110*   CO2  --   --   --  22  --   --   --  26 20* 26 26   BUN  --   --   --  7  --   --   --  10 10 5* 4*   CR  --   --   --  0.62  --   --   --  0.60 0.70 0.57* 0.56*   GLC  --  79 55* 61*  --   --   --  86 111 84 91   MAG  --   --   --  1.9 2.0  --  1.5*  --  1.7* 1.9 1.7*   PHOS 3.7  --   --  3.5  --   --   --   --   --  3.3 3.0     CBC  Recent Labs   Lab 03/15/22  0521 03/14/22  0644 03/13/22  2142 03/11/22  0618   WBC 4.0 5.4 8.3 5.3   HGB 11.3* 10.5* 13.3 10.6*   HCT 36.1 32.8* 41.3 34.3*    190 278 221         ASSESSMENT: 41 year old female w/ hx of long-standing Chron's disease who presents with recurrent SBO most likely 2/2 ineffective peristalsis causing dilation and stasis as evidenced by most recent MR on 03/22. History of multiple abdominal surgeries s/p 8 prior surgeries including  stricturoplasties, multiple bowel resection surgeries. Most recent surgery was on 11/21/21 for ANTHONY and distal ileal resection with ileoileal anastomosis.      - No surgical interventions at this point.  - PICC placement today  - Fluids and electrolyte replacement -- per primary team  - Recommend bowel rest   - Agree with TPN due to poor nutritional status and recent weight loss   - Will discuss with GI colleagues (Dr. Tolbert and Dr. Arriaza) given unsure if this is a obstruction (no clear transition on imaging and open anastamosis) vs motility issue    Nj Hernandez, MS4  [facility]    Patient was seen and discussed with Dr. Banerjee, who discuss with staff.    Resident Attestation   I, Colette Baker, was present with the medical student who participated in the service and in the documentation of the note. I have verified the history and personally performed the physical exam and medical decision making. Addenda have been made to the note as appropriate. I agree with the assessment and plan of care as documented in the note.      Colette Baker MD  General Surgery PGY1  Date of Service: 03/16/2022

## 2022-03-16 NOTE — CONSULTS
Care Management Initial Consult    General Information  Assessment completed with: Patient    Type of CM/SW Visit: Initial Assessment    Primary Care Provider verified and updated as needed: Yes   Readmission within the last 30 days: Previous discharge plan unsuccessful      Reason for Consult: Discharge Planning  Advance Care Planning: Reviewed: No concerns identified        Communication Assessment  Patient's communication style: Spoken language (English or Bilingual)    Hearing Difficulty or Deaf: no   Wear Glasses or Blind: no    Cognitive  Cognitive/Neuro/Behavioral: WDL                      Living Environment:   People in home: Spouse, child(isabelle), dependent     Current living Arrangements: House      Able to return to prior arrangements: Yes     Family/Social Support:  Care provided by: Self  Provides care for: Child(isabelle)            Description of Support System: Supportive, Involved       Current Resources:   Patient receiving home care services: No  Community Resources: None  Equipment currently used at home: None  Supplies currently used at home: None    Employment/Financial:  Employment Status: Employed full-time        Financial Concerns: No concerns identified      Lifestyle & Psychosocial Needs:  Social Determinants of Health     Tobacco Use: Low Risk      Smoking Tobacco Use: Never Smoker     Smokeless Tobacco Use: Never Used   Alcohol Use: Not At Risk     Frequency of Alcohol Consumption: Never     Average Number of Drinks: Not on file     Frequency of Binge Drinking: Not on file   Financial Resource Strain: Not on file   Food Insecurity: Not on file   Transportation Needs: Not on file   Physical Activity: Not on file   Stress: Not on file   Social Connections: Not on file   Intimate Partner Violence: Not on file   Depression: Not at risk     PHQ-2 Score: 0   Housing Stability: Not on file       Functional Status:  Prior to admission patient needed assistance:   Dependent ADLs: Independent  Dependent  "IADLs: Independent  Assesssment of Functional Status: At functional baseline    Mental Health Status:  Mental Health Status: No Current Concerns       Chemical Dependency Status:  Chemical Dependency Status: No Current Concerns           Values/Beliefs:  Spiritual, Cultural Beliefs, Roman Catholic Practices, Values that affect care: No              Care Management Follow Up    Length of Stay (days): 2    Expected Discharge Date: 03/19/2022     Concerns to be Addressed: Discharge Planning       Patient plan of care discussed at interdisciplinary rounds: Yes    Anticipated Discharge Disposition: Home      Anticipated Discharge Services: Home Infusion  Anticipated Discharge DME: None     Patient/family educated on Medicare website which has current facility and service quality ratings: N/A  Education Provided on the Discharge Plan: Yes  Patient/Family in Agreement with the Plan: Yes    Referrals Placed by CM/SW:   Home Infusion  Private pay costs discussed: Not applicable    Additional Information:    Patient is a 41 year old female with a history of Crohn's disease who presented with recurrent SBO most likely 2/2 ineffective peristalsis causing dilation and stasis as evidenced by most recent MR.     Per team, patient will discharge on TPN via PICC.    Writer met with the patient to introduce the role of the care coordinator and discuss discharge needs. Patient reported that she has discharged home on TPN in the past and used [facility]. She would like another referral sent to them. Patient reported that she feels comfortable managing TPN at home and does not feel like she would need PLC at this time for review. Patient had no questions or concerns surrounding discharge at this time.    Per protocol, writer submitted a benefit check to [facility] for TPN and received the following response, \"Pt has 100% coverage for TPN through [employer] ([employer] plan).\"    RN[facility] will " follow.    Sandra Correa, RN, BSN, PHN  Care Coordinator   P: [phone], [facility]

## 2022-03-16 NOTE — PROGRESS NOTES
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 Patient unfortunately developed an SBO next day after the colonoscopy and was admitted to [facility] for conservative management with plans to wean her off budesonide and place a stent in her colon in attempt to manage recurrent symptoms. Patient presented to us 2 days after she was discharged again with recurring SBO for which she is currently being managed conservatively.   ~SBO appears to be improving at this time. Pt reporting BMs with CLD and improved pain   ~ We discussed with patient's colorectal surgeon Dr. Gudino and he will like to hold off on placing a stent for now, after reviewing cross sectional image again with radiology there's suggestion that area of stricture is improved with dilation and SBO may be more motility related than stricture related.          Recommendations:   --Plan for TPN and continued conservative management.   --Patient to follow up with Dr. Gudino (colorectal surgery) and GI outpatient for further management plan.   --Continue supportive care with ADAT, IVF and electrolyte repletion as needed.  ---Appreciate CRS input    It has been a pleasure to participate in the care of this patient.  Patient discussed with GI staff, Dr. Arriaza.  Please do not hesitate to contact the GI service with any questions or concerns.     Moisés Resendiz MD  Gastroenterology  Fellow  Pager [phone]         Subjective/Objective:   -Improving clinically.  Having BMs and abdominal pain improved.   - No acute events overnight         Physical Exam:   VS:  /60 (BP Location: Left arm)   Pulse 57   Temp (!) 96.2  F (35.7  C) (Oral)   Resp 16   Wt 47 kg (103 lb 11.2 oz)   LMP 03/06/2022   SpO2 97%   BMI 18.97 kg/m      Wt:   Wt Readings from Last 2 Encounters:   03/16/22 47 kg (103 lb 11.2 oz)   03/13/22 46.7 kg (103 lb)      Constitutional: cooperative, pleasant, no acute distress  Eyes: Conjunctiva anicteric  HEENT: Mucus membranes moist  CV:No edema  Respiratory: Unlabored on room air.   Abd:improving distention, nontender, no rebound or guarding   Skin: warm, perfused, no jaundice  Neuro: AAO x 3, No asterixis         Laboratory:   BMP  Recent Labs   Lab 03/16/22  0553 03/15/22  0849 03/15/22  0801 03/15/22  0521 03/15/22  0419 03/14/22  2100 03/14/22  0830 03/14/22  0644 03/13/22 2142 03/11/22  0618   NA  --   --   --  142  --   --   --  142 140 141   POTASSIUM 3.2*  --   --  3.6 3.6 3.4   < > 3.0* 3.3* 3.5   CHLORIDE  --   --   --  110*  --   --   --  110* 108* 109*   BLACK  --   --   --  8.4*  --   --   --  8.2* 9.0 7.6*   CO2  --   --   --  22  --   --   --  26 20* 26   BUN  --   --   --  7  --   --   --  10 10 5*   CR  --   --   --  0.62  --   --   --  0.60 0.70 0.57*   GLC  --  79 55* 61*  --   --   --  86 111 84    < > = values in this interval not displayed.     CBC  Recent Labs   Lab 03/15/22  0521 03/14/22  0644 03/13/22 2142 03/11/22  0618   WBC 4.0 5.4 8.3 5.3   RBC 3.85 3.56* 4.39 3.56*   HGB 11.3* 10.5* 13.3 10.6*   HCT 36.1 32.8* 41.3 34.3*   MCV 94 92 94 96   MCH 29.4 29.5 30.3 29.8   MCHC 31.3* 32.0 32.2 30.9*   RDW 13.2 13.2 13.2 13.0    190 278 221     INRNo lab results found in last 7 days.  LFTs  Recent Labs   Lab 03/15/22  0521 03/11/22  0618 03/10/22  0606   ALKPHOS 118 93 86   AST 27 69* 28   ALT 62* 122* 88*   BILITOTAL 0.6 0.4 0.4   PROTTOTAL  5.8* 5.2* 4.8*   ALBUMIN 2.7* 2.7* 2.7*      PANC  No lab results found in last 7 days.         Imaging/Procedures/Studies:   No results found for this or any previous visit.

## 2022-03-16 NOTE — PLAN OF CARE
/61 (BP Location: Right arm)   Pulse 63   Temp 97.6  F (36.4  C) (Oral)   Resp 16   Wt 47 kg (103 lb 11.2 oz)   LMP 03/06/2022   SpO2 97%   BMI 18.97 kg/m      2645-6927: VSS on RA. Afebrile. Denies nausea and SOB. Pain controlled by PRN IV dilaudid given with adequate relief. Up independently. PICC placed in IR - tolerated well. Using ice packs for soreness at PICC site. K replaced for 3.2 - recheck at 1915. On clear liquids - drinking clear Ensure this afternoon. Plan to start TPN + lipids tonight with titrated NS. Resting between cares, continue to monitor.

## 2022-03-16 NOTE — PROCEDURES
[facility]    Procedure: IR Procedure Note    Date/Time: 3/16/2022 11:46 AM  Performed by: Dhaval Mata MD  Authorized by: Dhaval Mata MD       UNIVERSAL PROTOCOL   Site Marked: NA  Prior Images Obtained and Reviewed:  Yes  Required items: Required blood products, implants, devices and special equipment available    Patient identity confirmed:  Verbally with patient, arm band, provided demographic data and hospital-assigned identification number  Patient was reevaluated immediately before administering moderate or deep sedation or anesthesia  Confirmation Checklist:  Patient's identity using two indicators, relevant allergies, procedure was appropriate and matched the consent or emergent situation and correct equipment/implants were available  Time out: Immediately prior to the procedure a time out was called    Universal Protocol: the Joint Commission Universal Protocol was followed    Preparation: Patient was prepped and draped in usual sterile fashion    ESBL (mL):  2     ANESTHESIA    Anesthesia: Local infiltration  Local Anesthetic:  Lidocaine 1% without epinephrine  Anesthetic Total (mL):  3      SEDATION  Patient Sedated: Yes    Sedation Type:  Moderate (conscious) sedation  Sedation:  Fentanyl and midazolam  Vital signs: Vital signs monitored during sedation    Fluoroscopy Time: 2 minute(s)  See dictated procedure note for full details.  Findings:  5 [place] 31 cm double-lumen PowerPICC placed via left medial brachial vein. Tip in the high right atrium. 1 cm out at the skin. Heparin locked and ready for use.    Specimens: none    Complications: None    Condition: Stable      PROCEDURE    Patient Tolerance:  Patient tolerated the procedure well with no immediate complications  Length of time physician/provider present for 1:1 monitoring during sedation: 25

## 2022-03-16 NOTE — PLAN OF CARE
2254-8373: VSS on RA. C/o of abdominal pain, gave IV dilaudid x1. Denies nausea and SOB. D5 and 0.45% NACl infusing at 100 ml/hr. Mag replacement protocol in process. Independent. Pt is scheduled for IR PICC placement today. Continue with POC.

## 2022-03-17 LAB
ALBUMIN SERPL-MCNC: 2.4 G/DL (ref 3.4–5)
ALP SERPL-CCNC: 94 U/L (ref 40–150)
ALT SERPL W P-5'-P-CCNC: 54 U/L (ref 0–50)
ANION GAP SERPL CALCULATED.3IONS-SCNC: 6 MMOL/L (ref 3–14)
AST SERPL W P-5'-P-CCNC: 23 U/L (ref 0–45)
BILIRUB DIRECT SERPL-MCNC: 0.1 MG/DL (ref 0–0.2)
BILIRUB SERPL-MCNC: 0.3 MG/DL (ref 0.2–1.3)
BUN SERPL-MCNC: 6 MG/DL (ref 7–30)
CALCIUM SERPL-MCNC: 8.3 MG/DL (ref 8.5–10.1)
CHLORIDE BLD-SCNC: 112 MMOL/L (ref 94–109)
CO2 SERPL-SCNC: 26 MMOL/L (ref 20–32)
CREAT SERPL-MCNC: 0.48 MG/DL (ref 0.52–1.04)
GFR SERPL CREATININE-BSD FRML MDRD: >90 ML/MIN/1.73M2
GLUCOSE BLD-MCNC: 108 MG/DL (ref 70–99)
GLUCOSE BLDC GLUCOMTR-MCNC: 109 MG/DL (ref 70–99)
GLUCOSE BLDC GLUCOMTR-MCNC: 109 MG/DL (ref 70–99)
GLUCOSE BLDC GLUCOMTR-MCNC: 114 MG/DL (ref 70–99)
GLUCOSE BLDC GLUCOMTR-MCNC: 91 MG/DL (ref 70–99)
HOLD SPECIMEN: NORMAL
INR PPP: 1.24 (ref 0.85–1.15)
MAGNESIUM SERPL-MCNC: 1.6 MG/DL (ref 1.6–2.3)
PHOSPHATE SERPL-MCNC: 3.9 MG/DL (ref 2.5–4.5)
POTASSIUM BLD-SCNC: 3.5 MMOL/L (ref 3.4–5.3)
POTASSIUM BLD-SCNC: 3.7 MMOL/L (ref 3.4–5.3)
PREALB SERPL IA-MCNC: 8 MG/DL (ref 15–45)
PROT SERPL-MCNC: 5.2 G/DL (ref 6.8–8.8)
SODIUM SERPL-SCNC: 144 MMOL/L (ref 133–144)

## 2022-03-17 PROCEDURE — 250N000009 HC RX 250: Performed by: INTERNAL MEDICINE

## 2022-03-17 PROCEDURE — 250N000013 HC RX MED GY IP 250 OP 250 PS 637: Performed by: STUDENT IN AN ORGANIZED HEALTH CARE EDUCATION/TRAINING PROGRAM

## 2022-03-17 PROCEDURE — 84132 ASSAY OF SERUM POTASSIUM: CPT | Performed by: PHYSICIAN ASSISTANT

## 2022-03-17 PROCEDURE — 250N000013 HC RX MED GY IP 250 OP 250 PS 637: Performed by: PHYSICIAN ASSISTANT

## 2022-03-17 PROCEDURE — 84100 ASSAY OF PHOSPHORUS: CPT | Performed by: STUDENT IN AN ORGANIZED HEALTH CARE EDUCATION/TRAINING PROGRAM

## 2022-03-17 PROCEDURE — 99232 SBSQ HOSP IP/OBS MODERATE 35: CPT | Mod: GC | Performed by: INTERNAL MEDICINE

## 2022-03-17 PROCEDURE — 250N000011 HC RX IP 250 OP 636: Performed by: STUDENT IN AN ORGANIZED HEALTH CARE EDUCATION/TRAINING PROGRAM

## 2022-03-17 PROCEDURE — 82248 BILIRUBIN DIRECT: CPT | Performed by: STUDENT IN AN ORGANIZED HEALTH CARE EDUCATION/TRAINING PROGRAM

## 2022-03-17 PROCEDURE — 84134 ASSAY OF PREALBUMIN: CPT | Performed by: STUDENT IN AN ORGANIZED HEALTH CARE EDUCATION/TRAINING PROGRAM

## 2022-03-17 PROCEDURE — 250N000011 HC RX IP 250 OP 636: Performed by: RADIOLOGY

## 2022-03-17 PROCEDURE — 85610 PROTHROMBIN TIME: CPT | Performed by: STUDENT IN AN ORGANIZED HEALTH CARE EDUCATION/TRAINING PROGRAM

## 2022-03-17 PROCEDURE — 84132 ASSAY OF SERUM POTASSIUM: CPT | Performed by: STUDENT IN AN ORGANIZED HEALTH CARE EDUCATION/TRAINING PROGRAM

## 2022-03-17 PROCEDURE — 36592 COLLECT BLOOD FROM PICC: CPT | Performed by: PHYSICIAN ASSISTANT

## 2022-03-17 PROCEDURE — 120N000002 HC R&B MED SURG/OB UMMC

## 2022-03-17 PROCEDURE — 83735 ASSAY OF MAGNESIUM: CPT | Performed by: STUDENT IN AN ORGANIZED HEALTH CARE EDUCATION/TRAINING PROGRAM

## 2022-03-17 PROCEDURE — 36592 COLLECT BLOOD FROM PICC: CPT | Performed by: STUDENT IN AN ORGANIZED HEALTH CARE EDUCATION/TRAINING PROGRAM

## 2022-03-17 RX ADMIN — DIPHENHYDRAMINE HYDROCHLORIDE 25 MG: 25 CAPSULE ORAL at 12:50

## 2022-03-17 RX ADMIN — HYDROMORPHONE HYDROCHLORIDE 0.5 MG: 1 INJECTION, SOLUTION INTRAMUSCULAR; INTRAVENOUS; SUBCUTANEOUS at 22:42

## 2022-03-17 RX ADMIN — HYDROMORPHONE HYDROCHLORIDE 0.5 MG: 1 INJECTION, SOLUTION INTRAMUSCULAR; INTRAVENOUS; SUBCUTANEOUS at 02:01

## 2022-03-17 RX ADMIN — BUDESONIDE 9 MG: 3 CAPSULE ORAL at 09:10

## 2022-03-17 RX ADMIN — ENOXAPARIN SODIUM 40 MG: 40 INJECTION SUBCUTANEOUS at 09:10

## 2022-03-17 RX ADMIN — HYDROMORPHONE HYDROCHLORIDE 0.5 MG: 1 INJECTION, SOLUTION INTRAMUSCULAR; INTRAVENOUS; SUBCUTANEOUS at 09:17

## 2022-03-17 RX ADMIN — SODIUM CHLORIDE, PRESERVATIVE FREE 5 ML: 5 INJECTION INTRAVENOUS at 05:50

## 2022-03-17 RX ADMIN — DIPHENHYDRAMINE HYDROCHLORIDE 25 MG: 25 CAPSULE ORAL at 22:42

## 2022-03-17 RX ADMIN — Medication 400 MG: at 20:23

## 2022-03-17 RX ADMIN — MAGNESIUM SULFATE HEPTAHYDRATE: 500 INJECTION, SOLUTION INTRAMUSCULAR; INTRAVENOUS at 20:22

## 2022-03-17 RX ADMIN — DIPHENHYDRAMINE HYDROCHLORIDE 25 MG: 25 CAPSULE ORAL at 10:47

## 2022-03-17 RX ADMIN — SODIUM CHLORIDE, PRESERVATIVE FREE 5 ML: 5 INJECTION INTRAVENOUS at 13:42

## 2022-03-17 RX ADMIN — HYDROMORPHONE HYDROCHLORIDE 0.5 MG: 1 INJECTION, SOLUTION INTRAMUSCULAR; INTRAVENOUS; SUBCUTANEOUS at 18:06

## 2022-03-17 RX ADMIN — SODIUM CHLORIDE, PRESERVATIVE FREE 5 ML: 5 INJECTION INTRAVENOUS at 22:43

## 2022-03-17 RX ADMIN — SODIUM CHLORIDE, PRESERVATIVE FREE 5 ML: 5 INJECTION INTRAVENOUS at 02:01

## 2022-03-17 ASSESSMENT — ACTIVITIES OF DAILY LIVING (ADL)
ADLS_ACUITY_SCORE: 3

## 2022-03-17 NOTE — PROGRESS NOTES
Colorectal Surgery Progress Note  [facility]    Subjective:  No acute events overnight. PICC placed yesterday (03/17). TPN started. Tolerating CLD well. Having BMs and passing gas.     Vitals:  Vitals:    03/16/22 1940 03/16/22 2348 03/17/22 0520 03/17/22 0719   BP: 112/83 115/64 101/45 116/71   BP Location: Right arm Right arm Right arm Right arm   Pulse: 76 71 65 83   Resp: 16 16 16 18   Temp: 97.7  F (36.5  C) (!) 96.7  F (35.9  C) 97.3  F (36.3  C) 97.8  F (36.6  C)   TempSrc: Oral Oral Oral Oral   SpO2: 98% 99% 96% 97%   Weight:    46.7 kg (103 lb)     I/O:  I/O last 3 completed shifts:  In: 1710 [P.O.:480; I.V.:1230]  Out: -     Physical Exam:  Gen: AAOx3, NAD  Pulm: Non-labored breathing  Abd: Soft. Minimally distended. Non-tender.   Ext:  Warm and well-perfused    BMP  Recent Labs   Lab 03/17/22  0554 03/17/22  0518 03/17/22  0130 03/16/22  2353 03/16/22  1915 03/16/22  0553 03/15/22  0849 03/15/22  0801 03/15/22  0521 03/15/22  0419 03/14/22  2100 03/14/22  0830 03/14/22  0644 03/13/22  2142 03/11/22  0618     --   --   --   --   --   --   --  142  --   --   --  142 140 141   POTASSIUM 3.5  --  3.7  --  3.2* 3.2*  --   --  3.6 3.6   < > 2.8* 3.0* 3.3* 3.5   CHLORIDE 112*  --   --   --   --   --   --   --  110*  --   --   --  110* 108* 109*   CO2 26  --   --   --   --   --   --   --  22  --   --   --  26 20* 26   BUN 6*  --   --   --   --   --   --   --  7  --   --   --  10 10 5*   CR 0.48*  --   --   --   --   --   --   --  0.62  --   --   --  0.60 0.70 0.57*   * 114*  --  109*  --   --  79   < > 61*  --   --   --  86 111 84   MAG 1.6  --   --   --   --   --   --   --  1.9 2.0  --  1.5*  --  1.7* 1.9   PHOS 3.9  --   --   --   --  3.7  --   --  3.5  --   --   --   --   --  3.3    < > = values in this interval not displayed.     CBC  Recent Labs   Lab 03/15/22  0521 03/14/22  0644 03/13/22  2142 03/11/22  0618   WBC 4.0 5.4 8.3 5.3   HGB 11.3* 10.5* 13.3 10.6*    HCT 36.1 32.8* 41.3 34.3*    190 278 221         ASSESSMENT: 41 year old female w/ hx of long-standing Chron's disease who presents with recurrent SBO most likely 2/2 ineffective peristalsis causing dilation and stasis as evidenced by most recent MR on 03/22. History of multiple abdominal surgeries s/p 8 prior surgeries including stricturoplasties, multiple bowel resection surgeries. Most recent surgery was on 11/21/21 for ANTHONY and distal ileal resection with ileoileal anastomosis.      - No surgical interventions at this point  - Recommend continuing bowel rest with CLD + TPN until seen in outpatient follow up with Dr. Cartagena in 2-3 weeks for further evaluation  - Fluids and electrolyte replacement -- per primary team  - OK to discharge from CRS standpoint once TPN fully optimized and ready to go home with it    Nj Hernandez, MS4  [facility]    Patient was seen and discussed with Dr. Banerjee, who discuss with staff.    Resident Attestation   I, Colette Baker, was present with the medical student who participated in the service and in the documentation of the note. I have verified the history and personally performed the physical exam and medical decision making. Addenda have been made to the note as appropriate. I agree with the assessment and plan of care as documented in the note.      Colette Baker MD  General Surgery PGY1  Date of Service: 03/17/2022

## 2022-03-17 NOTE — PLAN OF CARE
0700- 1500    /69 (BP Location: Right arm)   Pulse 65   Temp 97.5  F (36.4  C) (Oral)   Resp 16   Wt 46.7 kg (103 lb)   LMP 03/06/2022   SpO2 98%   BMI 18.84 kg/m      VSS on RA, Afebrile. Pt denies N/V, SOB. Reports abdominal pain; PRN IV Dilaudid x1 with relief. PRN Benadryl 50 mg for itching after dilaudid. BS Q6- 109 A 1130, recheck at 1730. TPN running 55 ml/hr. MIVF- NS running 45 ml/hr. Clear liquid diet maintained. Reports BM this morning, good UOP.     Continue POC

## 2022-03-17 NOTE — PLAN OF CARE
3419-5194:     /45 (BP Location: Right arm)   Pulse 65   Temp 97.3  F (36.3  C) (Oral)   Resp 16   Wt 47 kg (103 lb 11.2 oz)   LMP 03/06/2022   SpO2 96%   BMI 18.97 kg/m        NEURO: Intact, A&O x4.      RESPIRATORY:  Pt denies having any SOB and no cough noted.     CARDIO: WDL.      GI/:  Pt voiding spontaneously with AUOP. Last BM: 3/16/22. Pt denies having any N/V.      SKIN: WDL.      ACTIVITY: Independent and up ad svetlana.      PAIN: Pt reports pain at PICC insertion site and abdomen, 5/10, prn IV dilaudid given x1 with some relief.      LDA: PICC (left) - dressing is CDI, NS infusing @ 45ml/hr, TPN infusing @ 55ml/hr continuous, Lipids infusing @ 20.8ml/hr. PIV (right) - removed.      BG: Pt on blood glucose checks q6 hours x 72 hours. Blood glucose @ 2330 = 109 and blood glucose @ 0530 = 114.

## 2022-03-17 NOTE — PROGRESS NOTES
[facility]    Medicine Progress Note - Hospitalist Service, GOLD TEAM 11    Date of Admission:  3/14/2022  Physician Attestation   I, Dany Elam, was present with the medical/TOY student who participated in the service and in the documentation of the note.  I have verified the history and personally performed the physical exam and medical decision making.  I agree with the assessment and plan of care as documented in the note.      I personally reviewed vital signs, medications, labs and imaging.    Patient is a 41 year old with crohn's here with a SBO which is slowly improving. In attempts to minimize invasive procedures, will take a conservative approach, TPN, sips of clears, to allow things to quiet down.  Day 2 of TPN, will need 2 more days to ensure stability at goal given high risk of refeeding syndrome.     Dany Elam MD  Date of Service (when I saw the patient): 03/17/22      Assessment & Plan       Alok Nino is a 41 year old female admitted on 3/14/2022. She has a PMH of crohn's followed by [facility] s/p partial resection and most recently colonoscopy with stricture dilation on 3/7, discharged 3/11. Now readmitted from [facility] for worsening pain and nausea.      Today:  - Progress TPN, monitor for refeeding syndrome  - SBO resolving    #SBO, improving  #Crohn's disease s/p partial resection  Follows with [facility]. On budesonide and stelara. CT scan showing multiple loops without transition point. On 3/17 pt felt much better; some residual abdominal pain, no nausea or vomiting, passing gas, having bowel movements.   - Clear liquid diet  - Colorectal surgery consulted - no intervention at this time  - GI consulted, appreciate assistance.  - Continue budesonide (planning on tapering off as outpatient)  - Pain meds: IV dilaudid 0.5mg q2hr prn  - PICC in place, pharmacy and nutrition to manage TPN    #Malnutrition     #Hypokalemia  #Hypomagnesemia  Electrolyte abnormalities likely secondary to malnutrition and poor oral intake. Per CRS, plan is to continue TPN and small amounts of clear liquids at discharge, will reevaluate at follow-up in CRS clinic in 2-3 weeks.  - Level of malnutrition: Moderate  - Replete PRN, on RN managed replacement protocols  - PICC in place, has TPN running, on clear liquid diet. Monitor for refeeding syndrome.         Diet: Clear Liquid Diet  Snacks/Supplements Adult: Ensure Clear; Between Meals  parenteral nutrition - ADULT compounded formula  parenteral nutrition - ADULT compounded formula    DVT Prophylaxis: HOLD for PICC placement  Hamilton Catheter: Not present  Central Lines: PRESENT  PICC Double Lumen 03/16/22 Left Brachial vein medial-Site Assessment: WDL  Cardiac Monitoring: None  Code Status: Full Code      Disposition Plan   Expected Discharge: 03/19/2022     Anticipated discharge location:  Awaiting care coordination huddle  Delays: none.        The patient's care was discussed with the Attending Physician, Dr. Elam and Patient.    Charlie Cabral  [facility]ist Service, GOLD TEAM [address] [place] [address]  Securely message with the Vocera Web Console (learn more here)  Text page via [facility] Paging/Directory   Please see signed in provider for up to date coverage information      Clinically Significant Risk Factors Present on Admission             # Severe Malnutrition: based on nutrition assessment     ______________________________________________________________________    Interval History   No significant overnight events. Pt feels a lot better today. Reports minimal residual abdominal pain that's well controlled with pain medications. No nausea or vomiting with clear liquid diet. Passing gas and having bowel movements. Tolerating PICC and TPN without issue. No other concerns.    Data reviewed today: I reviewed all medications, new labs and imaging  results over the last 24 hours. I personally reviewed no images or EKG's today.    Physical Exam   Vital Signs: Temp: (!) 96  F (35.6  C) Temp src: Oral BP: 114/73 Pulse: 80   Resp: 16 SpO2: 97 % O2 Device: None (Room air)    Weight: 103 lbs 0 oz   General: awake, alert, no acute distress, resting comfortably in bed  CV: regular rate and rhythm, no murmurs rubs or gallops  Pulm: lungs clear to auscultation bilaterally   Abd: normoactive bowel sounds. Soft, non-distended, mildly tender to palpation throughout. No guarding or rebound tenderness  Ext: no lower extremity edema    Data   Recent Labs   Lab 03/17/22  1144 03/17/22  0554 03/17/22  0518 03/17/22  0130 03/16/22  2353 03/16/22  1915 03/15/22  0801 03/15/22  0521 03/14/22  0830 03/14/22  0644 03/13/22  2142   WBC  --   --   --   --   --   --   --  4.0  --  5.4 8.3   HGB  --   --   --   --   --   --   --  11.3*  --  10.5* 13.3   MCV  --   --   --   --   --   --   --  94  --  92 94   PLT  --   --   --   --   --   --   --  216  --  190 278   INR  --  1.24*  --   --   --   --   --   --   --   --   --    NA  --  144  --   --   --   --   --  142  --  142 140   POTASSIUM  --  3.5  --  3.7  --  3.2*   < > 3.6   < > 3.0* 3.3*   CHLORIDE  --  112*  --   --   --   --   --  110*  --  110* 108*   CO2  --  26  --   --   --   --   --  22  --  26 20*   BUN  --  6*  --   --   --   --   --  7  --  10 10   CR  --  0.48*  --   --   --   --   --  0.62  --  0.60 0.70   ANIONGAP  --  6  --   --   --   --   --  10  --  6 12   BLACK  --  8.3*  --   --   --   --   --  8.4*  --  8.2* 9.0   * 108* 114*  --    < >  --    < > 61*  --  86 111   ALBUMIN  --  2.4*  --   --   --   --   --  2.7*  --   --   --    PROTTOTAL  --  5.2*  --   --   --   --   --  5.8*  --   --   --    BILITOTAL  --  0.3  --   --   --   --   --  0.6  --   --   --    ALKPHOS  --  94  --   --   --   --   --  118  --   --   --    ALT  --  54*  --   --   --   --   --  62*  --   --   --    AST  --  23  --   --   --   --    --  27  --   --   --     < > = values in this interval not displayed.

## 2022-03-17 NOTE — PROGRESS NOTES
[facility]    Medicine Progress Note - Hospitalist Service, GOLD TEAM 12    Date of Admission:  3/14/2022    Assessment & Plan       Alok Nino is a 41 year old female admitted on 3/14/2022. She has a PMH of crohn's followed by [facility] s/p partial resection and most recently colonoscopy with stricture dilation on 3/7, discharged 3/11. Now readmitted from [place] for worsening pain and nausea.      TODAY  - CLD  - CR Surgery with ongoing discussions / planning, no intervention at this time  - IR for PICC placement - anticipate 3/16, then initiation of TPN  - Pharm and Nutrition for TPN  - SW/CM for TPN discharge planning  - Will start Lovenox on 3/16 after PICC placement given active IBD, unless surgical plan firmed     SBO  Crohn's disease s/p partial resection  Follows with [facility]. On budesonide and stelara. Planning for stent placement with [facility] ~2 months. CT scan showing multiple loops without transition point.   - CLD  - Colorectal surgery consulted - no intervention at this time  - GI consulted, appreciate assistance.  - Continue budesonide (planning on tapering off as outpatient)  - Pain meds: IV dilaudid 0.5mg q2hr prn  - IR consult for PICC -> placed 3/16  - Pharmacy and Nutrition to start and manage TPN     Hypokalemia  Hypomagnesemia  Secondary to poor oral intake.   - Replete PRN     Malnutrition:    - Level of malnutrition: Moderate   - Currently NPO, will monitor input when able to safely eat        Diet: Clear Liquid Diet  Snacks/Supplements Adult: Ensure Clear; Between Meals  parenteral nutrition - ADULT compounded formula    DVT Prophylaxis: HOLD for PICC placement  Hamilton Catheter: Not present  Central Lines: PRESENT  PICC Double Lumen 03/16/22 Left Brachial vein medial-Site Assessment: WDL  Cardiac Monitoring: None  Code Status: Full Code      Disposition Plan   Expected Discharge: 03/19/2022     Anticipated discharge location:  Awaiting care  coordination huddle  Delays: none.        The patient's care was discussed with the Patient.    Nasir Corcoran DO  Hospitalist Service, GOLD TEAM [address] [place] [address]  Securely message with the Vocera Web Console (learn more here)  Text page via [facility] Paging/Directory   Please see signed in provider for up to date coverage information      Clinically Significant Risk Factors Present on Admission             # Severe Malnutrition: based on nutrition assessment     ______________________________________________________________________    Interval History   Resolving abdominal pain some appetitie, some bowel movements this morning. No overnight events.    Data reviewed today: I reviewed all medications, new labs and imaging results over the last 24 hours. I personally reviewed no images or EKG's today.    Physical Exam   Vital Signs: Temp: 97.7  F (36.5  C) Temp src: Oral BP: 112/83 Pulse: 76   Resp: 16 SpO2: 98 % O2 Device: None (Room air)    Weight: 103 lbs 11.2 oz  General: non-distressed adult appears malnourished  HEENT: Normocephalic, atraumatic, pupils equal round reactive, membranes moist  CV: normal capillary refill, heart regular rate and rhythm  Respiratory: Non-labored breathing, bronchovesicular lung sounds  Abdominal: soft, mildly distended, somewhat tender, no rebound, no guarding, no rigidity, +bowel sounds  Genitourinary: no suprapubic tenderness  Musculoskeletal: sarcopenic  Skin: no rash, normal turgor  Neurologic: no facial droop, moving upper and lower extremities spontaneously, sensation in tact to light touch on extremities  Psychiatric: normal mood and affect    Data   Recent Labs   Lab 03/16/22  1915 03/16/22  0553 03/15/22  0849 03/15/22  0801 03/15/22  0521 03/14/22  0830 03/14/22  0644 03/13/22  2142 03/11/22  0618   WBC  --   --   --   --  4.0  --  5.4 8.3 5.3   HGB  --   --   --   --  11.3*  --  10.5* 13.3 10.6*   MCV  --   --   --   --  94  --  92  94 96   PLT  --   --   --   --  216  --  190 278 221   NA  --   --   --   --  142  --  142 140 141   POTASSIUM 3.2* 3.2*  --   --  3.6   < > 3.0* 3.3* 3.5   CHLORIDE  --   --   --   --  110*  --  110* 108* 109*   CO2  --   --   --   --  22  --  26 20* 26   BUN  --   --   --   --  7  --  10 10 5*   CR  --   --   --   --  0.62  --  0.60 0.70 0.57*   ANIONGAP  --   --   --   --  10  --  6 12 6   BLACK  --   --   --   --  8.4*  --  8.2* 9.0 7.6*   GLC  --   --  79 55* 61*  --  86 111 84   ALBUMIN  --   --   --   --  2.7*  --   --   --  2.7*   PROTTOTAL  --   --   --   --  5.8*  --   --   --  5.2*   BILITOTAL  --   --   --   --  0.6  --   --   --  0.4   ALKPHOS  --   --   --   --  118  --   --   --  93   ALT  --   --   --   --  62*  --   --   --  122*   AST  --   --   --   --  27  --   --   --  69*    < > = values in this interval not displayed.

## 2022-03-17 NOTE — PLAN OF CARE
[id number]    VSS on RA. Pain managed with prn dilaudid x1. Denies nausea and SOB. K recheck 3.2, replaced PO. Next recheck at 0115. TPN and lipids infusing. Clear liquid diet maintained. Pt up independently. No acute events this shift. Continue with plan of care.

## 2022-03-17 NOTE — PROGRESS NOTES
Home Infusion  Alok is expected to discharge in a few days  and will be  going home on TPN.   She has been on service with [facility] in the past )12/2021) and  self administered TPN with the help of her .  Benefits have been re-verified and she will have coverage through her [employer] policy at 100%.    Met with Alok and her  Jose at bedside to discuss discharge plans and assess for needs.   Confirmed she would like to use I again.   Discussed options for hook up of TPN (if still continuous) at the hospital vs having delivery of TPN and supplies to her home on day of discharge.  Alok is agreeable to either option.  She stated her previous HC nurse would be willing to provide a review on the TPN set up and administration.  Verified no changes to address or contact info.   Will continue to follow and finalize plans once discharge is confirmed and status of TPN is determined.    Lisa DAVIS  Nurse Liaison  [facility] I [email or web address]  [address] [place] [address]  [email or web address]  [phone] M-F I [facility] main: [phone]

## 2022-03-18 LAB
ANION GAP SERPL CALCULATED.3IONS-SCNC: 3 MMOL/L (ref 3–14)
BUN SERPL-MCNC: 9 MG/DL (ref 7–30)
CALCIUM SERPL-MCNC: 7.9 MG/DL (ref 8.5–10.1)
CHLORIDE BLD-SCNC: 110 MMOL/L (ref 94–109)
CO2 SERPL-SCNC: 30 MMOL/L (ref 20–32)
CREAT SERPL-MCNC: 0.45 MG/DL (ref 0.52–1.04)
GFR SERPL CREATININE-BSD FRML MDRD: >90 ML/MIN/1.73M2
GLUCOSE BLD-MCNC: 98 MG/DL (ref 70–99)
GLUCOSE BLDC GLUCOMTR-MCNC: 103 MG/DL (ref 70–99)
GLUCOSE BLDC GLUCOMTR-MCNC: 117 MG/DL (ref 70–99)
GLUCOSE BLDC GLUCOMTR-MCNC: 117 MG/DL (ref 70–99)
GLUCOSE BLDC GLUCOMTR-MCNC: 92 MG/DL (ref 70–99)
MAGNESIUM SERPL-MCNC: 2.4 MG/DL (ref 1.6–2.3)
PHOSPHATE SERPL-MCNC: 3.9 MG/DL (ref 2.5–4.5)
POTASSIUM BLD-SCNC: 3.4 MMOL/L (ref 3.4–5.3)
POTASSIUM BLD-SCNC: 4.1 MMOL/L (ref 3.4–5.3)
SODIUM SERPL-SCNC: 143 MMOL/L (ref 133–144)

## 2022-03-18 PROCEDURE — 250N000011 HC RX IP 250 OP 636: Performed by: STUDENT IN AN ORGANIZED HEALTH CARE EDUCATION/TRAINING PROGRAM

## 2022-03-18 PROCEDURE — 999N000007 HC SITE CHECK

## 2022-03-18 PROCEDURE — 250N000011 HC RX IP 250 OP 636: Performed by: HOSPITALIST

## 2022-03-18 PROCEDURE — 99232 SBSQ HOSP IP/OBS MODERATE 35: CPT | Performed by: INTERNAL MEDICINE

## 2022-03-18 PROCEDURE — 84132 ASSAY OF SERUM POTASSIUM: CPT | Performed by: INTERNAL MEDICINE

## 2022-03-18 PROCEDURE — 36592 COLLECT BLOOD FROM PICC: CPT | Performed by: STUDENT IN AN ORGANIZED HEALTH CARE EDUCATION/TRAINING PROGRAM

## 2022-03-18 PROCEDURE — 250N000011 HC RX IP 250 OP 636: Performed by: RADIOLOGY

## 2022-03-18 PROCEDURE — 36592 COLLECT BLOOD FROM PICC: CPT | Performed by: INTERNAL MEDICINE

## 2022-03-18 PROCEDURE — 99207 PR CDG-MDM COMPONENT: MEETS MODERATE - UP CODED: CPT | Performed by: INTERNAL MEDICINE

## 2022-03-18 PROCEDURE — 83735 ASSAY OF MAGNESIUM: CPT | Performed by: STUDENT IN AN ORGANIZED HEALTH CARE EDUCATION/TRAINING PROGRAM

## 2022-03-18 PROCEDURE — 250N000009 HC RX 250: Performed by: INTERNAL MEDICINE

## 2022-03-18 PROCEDURE — 250N000013 HC RX MED GY IP 250 OP 250 PS 637: Performed by: INTERNAL MEDICINE

## 2022-03-18 PROCEDURE — 250N000013 HC RX MED GY IP 250 OP 250 PS 637: Performed by: STUDENT IN AN ORGANIZED HEALTH CARE EDUCATION/TRAINING PROGRAM

## 2022-03-18 PROCEDURE — 82310 ASSAY OF CALCIUM: CPT | Performed by: STUDENT IN AN ORGANIZED HEALTH CARE EDUCATION/TRAINING PROGRAM

## 2022-03-18 PROCEDURE — 84100 ASSAY OF PHOSPHORUS: CPT | Performed by: STUDENT IN AN ORGANIZED HEALTH CARE EDUCATION/TRAINING PROGRAM

## 2022-03-18 PROCEDURE — 258N000003 HC RX IP 258 OP 636: Performed by: STUDENT IN AN ORGANIZED HEALTH CARE EDUCATION/TRAINING PROGRAM

## 2022-03-18 PROCEDURE — 250N000013 HC RX MED GY IP 250 OP 250 PS 637: Performed by: PHYSICIAN ASSISTANT

## 2022-03-18 PROCEDURE — 120N000002 HC R&B MED SURG/OB UMMC

## 2022-03-18 RX ORDER — POTASSIUM CHLORIDE 750 MG/1
20 TABLET, EXTENDED RELEASE ORAL ONCE
Status: COMPLETED | OUTPATIENT
Start: 2022-03-18 | End: 2022-03-18

## 2022-03-18 RX ORDER — MAGNESIUM SULFATE HEPTAHYDRATE 40 MG/ML
2 INJECTION, SOLUTION INTRAVENOUS ONCE
Status: COMPLETED | OUTPATIENT
Start: 2022-03-18 | End: 2022-03-18

## 2022-03-18 RX ORDER — ACETAMINOPHEN 325 MG/1
650 TABLET ORAL EVERY 6 HOURS PRN
Status: DISCONTINUED | OUTPATIENT
Start: 2022-03-18 | End: 2022-03-19 | Stop reason: HOSPADM

## 2022-03-18 RX ADMIN — SODIUM CHLORIDE, PRESERVATIVE FREE 5 ML: 5 INJECTION INTRAVENOUS at 16:47

## 2022-03-18 RX ADMIN — SODIUM CHLORIDE, PRESERVATIVE FREE 5 ML: 5 INJECTION INTRAVENOUS at 01:21

## 2022-03-18 RX ADMIN — POTASSIUM CHLORIDE 20 MEQ: 750 TABLET, EXTENDED RELEASE ORAL at 09:55

## 2022-03-18 RX ADMIN — SODIUM CHLORIDE, PRESERVATIVE FREE 5 ML: 5 INJECTION INTRAVENOUS at 05:35

## 2022-03-18 RX ADMIN — DIPHENHYDRAMINE HYDROCHLORIDE 25 MG: 25 CAPSULE ORAL at 00:01

## 2022-03-18 RX ADMIN — MAGNESIUM SULFATE HEPTAHYDRATE: 500 INJECTION, SOLUTION INTRAMUSCULAR; INTRAVENOUS at 20:03

## 2022-03-18 RX ADMIN — HYDROMORPHONE HYDROCHLORIDE 0.5 MG: 1 INJECTION, SOLUTION INTRAMUSCULAR; INTRAVENOUS; SUBCUTANEOUS at 20:02

## 2022-03-18 RX ADMIN — ENOXAPARIN SODIUM 40 MG: 40 INJECTION SUBCUTANEOUS at 09:00

## 2022-03-18 RX ADMIN — HYDROMORPHONE HYDROCHLORIDE 0.5 MG: 1 INJECTION, SOLUTION INTRAMUSCULAR; INTRAVENOUS; SUBCUTANEOUS at 01:21

## 2022-03-18 RX ADMIN — Medication 400 MG: at 20:02

## 2022-03-18 RX ADMIN — HYDROMORPHONE HYDROCHLORIDE 0.5 MG: 1 INJECTION, SOLUTION INTRAMUSCULAR; INTRAVENOUS; SUBCUTANEOUS at 09:16

## 2022-03-18 RX ADMIN — SODIUM CHLORIDE 1000 ML: 9 INJECTION, SOLUTION INTRAVENOUS at 09:07

## 2022-03-18 RX ADMIN — MAGNESIUM SULFATE 2 G: 2 INJECTION INTRAVENOUS at 02:36

## 2022-03-18 RX ADMIN — CALCITRIOL CAPSULES 0.25 MCG 0.25 MCG: 0.25 CAPSULE ORAL at 08:58

## 2022-03-18 RX ADMIN — BUDESONIDE 9 MG: 3 CAPSULE ORAL at 08:57

## 2022-03-18 RX ADMIN — HYDROMORPHONE HYDROCHLORIDE 0.5 MG: 1 INJECTION, SOLUTION INTRAMUSCULAR; INTRAVENOUS; SUBCUTANEOUS at 16:46

## 2022-03-18 RX ADMIN — ACETAMINOPHEN 650 MG: 325 TABLET ORAL at 16:46

## 2022-03-18 ASSESSMENT — ACTIVITIES OF DAILY LIVING (ADL)
ADLS_ACUITY_SCORE: 3
ADLS_ACUITY_SCORE: 5
ADLS_ACUITY_SCORE: 3
ADLS_ACUITY_SCORE: 5
ADLS_ACUITY_SCORE: 3

## 2022-03-18 NOTE — PLAN OF CARE
[id number]:     /58 (BP Location: Right arm)   Pulse 67   Temp (!) 96.7  F (35.9  C) (Oral)   Resp 16   Wt 46.7 kg (103 lb)   LMP 03/06/2022   SpO2 97%   BMI 18.84 kg/m        NEURO: Intact, A&O x4.      RESPIRATORY:  Pt denies having any SOB and no cough noted.      CARDIO: WDL.      GI/:  Pt voiding spontaneously with AUOP. Last BM: 3/17/22. Pt denies having any N/V.      SKIN: WDL. 25mg PO benadryl given for generalized itching.      ACTIVITY: Independent and up ad svetlana.      PAIN: Pt reports pain at PICC insertion site and abdomen, prn IV dilaudid given x1 with some relief.      LDA: PICC (left) - dressing is CDI, NS infusing @ 45ml/hr, TPN infusing @ 55ml/hr continuous.     BG: Pt on blood glucose checks q6 hours x 72 hours. Blood glucose @ 2330 = 117 and blood glucose @ 0530 = 103.

## 2022-03-18 NOTE — PLAN OF CARE
Goal Outcome Evaluation:    0700-1930    /69 (BP Location: Right arm)   Pulse 71   Temp 96.8  F (36  C) (Oral)   Resp 16   Wt 46.6 kg (102 lb 12.8 oz)   LMP 03/06/2022   SpO2 99%   BMI 18.80 kg/m      Reason for admission: PMH of crohn's followed by [facility] s/p partial resection and most recently colonoscopy with stricture dilation on 3/7, discharged 3/11. Now readmitted from [place] for worsening pain and nausea.  Activity: UAL  Pain: Pt reporting up to 7/10 frontal headache, PICC site (mild this morning). Given PRN Dilaudid x2, PRN Tylenol with moderate effect.   Neuro: AxOx4. Neuros intact.   Cardiac: RRR. Afebrile. Normotensive.   Respiratory: NLB on RA. O2 sats WDL.   GI/: Voiding not saving, reports adequate UOP. LBM 3/17. +BS. +flatus.   Diet: Clear liquid diet. Continuous TPN at 55 mL/h.   Lines: DL PICC intact infusing TPN, MIVF. Site WDL.   Wounds: No noted deficits.    Labs/imaging: Reviewed. See chart.       Continue to monitor and follow POC

## 2022-03-18 NOTE — PLAN OF CARE
[id number]:  Pt A&Ox4, VSS on RA. 5/10 pain managed with IV dilaudid x2, itchiness r/t dilaudid. TPN continued via PICC with NS; purple lumen heparin locked. Pt up ambulating in mccarthy. Pt's parents at bedside this afternoon. Continue to monitor and with POC.  Goal Outcome Evaluation:

## 2022-03-18 NOTE — PROGRESS NOTES
Colorectal Surgery Progress Note  [facility]    Subjective:  No acute events overnight.TPN running, tolerating small amounts of clears. Having BMs and passing gas.     Vitals:  Vitals:    03/18/22 0003 03/18/22 0541 03/18/22 1000 03/18/22 1140   BP: 119/78 100/58  124/69   BP Location: Right arm Right arm  Right arm   Pulse: 80 67  71   Resp: 16 16  16   Temp: 96.9  F (36.1  C) (!) 96.7  F (35.9  C)  96.8  F (36  C)   TempSrc: Oral Oral  Oral   SpO2: 97% 97%  99%   Weight:   46.6 kg (102 lb 12.8 oz)      I/O:  I/O last 3 completed shifts:  In: 2879.25 [P.O.:480; I.V.:1159.25]  Out: -     Physical Exam:  Gen: AAOx3, NAD  Pulm: Non-labored breathing  Abd: Soft. Minimally distended. Non-tender.   Ext:  Warm and well-perfused    BMP  Recent Labs   Lab 03/18/22  1157 03/18/22  0537 03/18/22  0532 03/18/22  0003 03/17/22  1144 03/17/22  0554 03/17/22  0518 03/17/22  0130 03/16/22  2353 03/16/22  1915 03/16/22  0553 03/15/22  0801 03/15/22  0521 03/15/22  0419 03/14/22  0830 03/14/22  0644   NA  --  143  --   --   --  144  --   --   --   --   --   --  142  --   --  142   POTASSIUM  --  3.4  --   --   --  3.5  --  3.7  --  3.2* 3.2*  --  3.6 3.6   < > 3.0*   CHLORIDE  --  110*  --   --   --  112*  --   --   --   --   --   --  110*  --   --  110*   CO2  --  30  --   --   --  26  --   --   --   --   --   --  22  --   --  26   BUN  --  9  --   --   --  6*  --   --   --   --   --   --  7  --   --  10   CR  --  0.45*  --   --   --  0.48*  --   --   --   --   --   --  0.62  --   --  0.60   GLC 92 98 103* 117*   < > 108*   < >  --    < >  --   --    < > 61*  --   --  86   MAG  --  2.4*  --   --   --  1.6  --   --   --   --   --   --  1.9 2.0   < >  --    PHOS  --  3.9  --   --   --  3.9  --   --   --   --  3.7  --  3.5  --   --   --     < > = values in this interval not displayed.     CBC  Recent Labs   Lab 03/15/22  0521 03/14/22  0644 03/13/22  0066   WBC 4.0 5.4 8.3   HGB 11.3* 10.5* 13.3   HCT  36.1 32.8* 41.3    190 278         ASSESSMENT: 41 year old female w/ hx of long-standing Crohn's disease who presents with recurrent SBO most likely 2/2 ineffective peristalsis causing dilation and stasis as evidenced by most recent MR on 03/22. History of multiple abdominal surgeries s/p 8 prior surgeries including stricturoplasties, multiple bowel resection surgeries. Most recent surgery was on 11/21/21 for ANTHONY and distal ileal resection with ileoileal anastomosis.      - No surgical interventions at this point  - Recommend continuing bowel rest with CLD + TPN until seen in outpatient follow up with Dr. Cartagena in 2-3 weeks for further evaluation  - Fluids and electrolyte replacement -- per primary team  - OK to discharge from CRS standpoint once TPN fully optimized and ready to go home with it  - Please call if any concerns    - - - - - - - - - - - - - - - - - -  Colette Baker MD  [facility] General Surgery PGY-1  03/18/2022    See [facility] for on-call pager information.    Attestation:  This patient has been seen and evaluated by me.  Discussed with the house staff team or resident(s) and agree with the findings and plan in this note.  Overall she is feeling reasonably well and is tolerating clears.  Abdomen remains moderately but not dramatically distended, per her report far better than at admission, and is non-tender.  Advised to continue to go slowly with diet until distension resolves.  Discussed rationale for not placing a stent at this time-- lack of evidence that the anastomosis is the problem on imaging.  All questions answered to Alok's stated satisfaction and she expressed her understanding and agreement.    Pete Cartagena MD  Professor of Surgery  Chief, Division of Colon and Rectal Surgery  [phone]

## 2022-03-18 NOTE — PROGRESS NOTES
Nutrition Services Brief Note    Diet: clear liquids with ensure clear between meals    Labs (3/18): phos 3.9 mg/dL. K+ 3.4 mmol/L, Mg++ 2.4 mg/dL (H)    Interventions  1. Collaborated with PharmD. Will advance Dextrose in TPN by 50 grams in next bag.     Unit RD will continue to monitor per protocol.    Haven Eugene, MS/RD/LD  6A/7D RD pager: [phone]  Weekend/Holiday RD pager: [phone]

## 2022-03-18 NOTE — PROGRESS NOTES
[facility]    Medicine Progress Note - Hospitalist Service, GOLD TEAM 11    Date of Admission:  3/14/2022    Assessment & Plan         Alok Nino is a 41 year old female admitted on 3/14/2022. She has a PMH of crohn's followed by [facility] s/p partial resection and most recently colonoscopy with stricture dilation on 3/7, discharged 3/11. Now readmitted from [place] for worsening pain and nausea and SBO now resolving      Today:  - Progress TPN, monitor for refeeding syndrome  - Will continue clear liquid diet until outpatient apt  - Anticipate discharge tomorrow, provided labs remain stable.     #SBO, improving  #Crohn's disease s/p partial resection  Follows with [facility]. On budesonide and stelara. CT scan showing multiple loops without transition point. On 3/17 pt felt much better; some residual abdominal pain, no nausea or vomiting, passing gas, having bowel movements.   - Clear liquid diet  - Colorectal surgery consulted - no intervention at this time  - GI consulted, appreciate assistance.  - Continue budesonide (planning on tapering off as outpatient)  - Pain meds: IV dilaudid 0.5mg q2hr prn  - PICC in place, pharmacy and nutrition to manage TPN    #Malnutrition    #Hypokalemia  #Hypomagnesemia  Electrolyte abnormalities likely secondary to malnutrition and poor oral intake. Per CRS, plan is to continue TPN and small amounts of clear liquids at discharge, will reevaluate at follow-up in CRS clinic in 2-3 weeks.  - Level of malnutrition: Moderate  - Replete PRN, on RN managed replacement protocols  - PICC in place, has TPN running, on clear liquid diet. Monitor for refeeding syndrome.           Diet: Clear Liquid Diet  Snacks/Supplements Adult: Ensure Clear; Between Meals  parenteral nutrition - ADULT compounded formula  parenteral nutrition - ADULT compounded formula    DVT Prophylaxis: Heparin SQ  Hamilton Catheter: Not present  Central Lines: PRESENT  PICC Double  Lumen 03/16/22 Left Brachial vein medial-Site Assessment: WDL  Cardiac Monitoring: None  Code Status: Full Code      Disposition Plan   Expected Discharge: 03/20/2022     Anticipated discharge location:  Awaiting care coordination huddle  Delays:     Other (Add Comment)            The patient's care was discussed with the Bedside Nurse, Care Coordinator/ and Patient.    Dany Elam MD  Hospitalist Service, GOLD TEAM 11  [facility]  Securely message with the Vocera Web Console (learn more here)  Text page via [facility] Paging/Directory   Please see signed in provider for up to date coverage information      Clinically Significant Risk Factors Present on Admission                    ______________________________________________________________________    Interval History   Patient seen and examined.  No acute overnight events.  Denies HA, dizziness, CP, N/V, abdominal pain or other symptoms.     Data reviewed today: I reviewed all medications, new labs and imaging results over the last 24 hours. I personally reviewed no images or EKG's today.    Physical Exam   Vital Signs: Temp: 96.8  F (36  C) Temp src: Oral BP: 124/69 Pulse: 71   Resp: 16 SpO2: 99 % O2 Device: None (Room air)    Weight: 102 lbs 12.8 oz  General Appearance: NAD  Respiratory: CTA b/l  Cardiovascular: RRR s1/S2, no m,r,g  GI: soft, NT, ND, +BS  Skin: no rashes  Other: No edema     Data   Recent Labs   Lab 03/18/22  1157 03/18/22  0537 03/18/22  0532 03/17/22  1144 03/17/22  0554 03/17/22  0518 03/17/22  0130 03/15/22  0801 03/15/22  0521 03/14/22  0830 03/14/22  0644 03/13/22  2142   WBC  --   --   --   --   --   --   --   --  4.0  --  5.4 8.3   HGB  --   --   --   --   --   --   --   --  11.3*  --  10.5* 13.3   MCV  --   --   --   --   --   --   --   --  94  --  92 94   PLT  --   --   --   --   --   --   --   --  216  --  190 278   INR  --   --   --   --  1.24*  --   --   --   --   --    --   --    NA  --  143  --   --  144  --   --   --  142  --  142 140   POTASSIUM  --  3.4  --   --  3.5  --  3.7   < > 3.6   < > 3.0* 3.3*   CHLORIDE  --  110*  --   --  112*  --   --   --  110*  --  110* 108*   CO2  --  30  --   --  26  --   --   --  22  --  26 20*   BUN  --  9  --   --  6*  --   --   --  7  --  10 10   CR  --  0.45*  --   --  0.48*  --   --   --  0.62  --  0.60 0.70   ANIONGAP  --  3  --   --  6  --   --   --  10  --  6 12   BLACK  --  7.9*  --   --  8.3*  --   --   --  8.4*  --  8.2* 9.0   GLC 92 98 103*   < > 108*   < >  --    < > 61*  --  86 111   ALBUMIN  --   --   --   --  2.4*  --   --   --  2.7*  --   --   --    PROTTOTAL  --   --   --   --  5.2*  --   --   --  5.8*  --   --   --    BILITOTAL  --   --   --   --  0.3  --   --   --  0.6  --   --   --    ALKPHOS  --   --   --   --  94  --   --   --  118  --   --   --    ALT  --   --   --   --  54*  --   --   --  62*  --   --   --    AST  --   --   --   --  23  --   --   --  27  --   --   --     < > = values in this interval not displayed.     No results found for this or any previous visit (from the past 24 hour(s)).  Medications     dextrose       parenteral nutrition - ADULT compounded formula       parenteral nutrition - ADULT compounded formula 55 mL/hr at 03/18/22 0659     sodium chloride 1,000 mL (03/18/22 0907)       budesonide  9 mg Oral QAM     calcitRIOL  0.25 mcg Oral Once per day on Mon Wed Fri     enoxaparin ANTICOAGULANT  40 mg Subcutaneous Q24H     heparin lock flush  5-20 mL Intracatheter Q24H     lipids  250 mL Intravenous Once per day on Wed Sat     magnesium oxide  400 mg Oral QPM     sodium chloride (PF)  10-40 mL Intracatheter Q8H     sodium chloride (PF)  3 mL Intracatheter Q8H

## 2022-03-19 ENCOUNTER — HOME INFUSION (PRE-WILLOW HOME INFUSION) (OUTPATIENT)
Dept: PHARMACY | Facility: CLINIC | Age: 42
End: 2022-03-19
Payer: OTHER GOVERNMENT

## 2022-03-19 ENCOUNTER — HEALTH MAINTENANCE LETTER (OUTPATIENT)
Age: 42
End: 2022-03-19

## 2022-03-19 VITALS
BODY MASS INDEX: 18.35 KG/M2 | RESPIRATION RATE: 18 BRPM | SYSTOLIC BLOOD PRESSURE: 114 MMHG | TEMPERATURE: 97.2 F | WEIGHT: 100.3 LBS | OXYGEN SATURATION: 98 % | HEART RATE: 74 BPM | DIASTOLIC BLOOD PRESSURE: 76 MMHG

## 2022-03-19 LAB
ANION GAP SERPL CALCULATED.3IONS-SCNC: 5 MMOL/L (ref 3–14)
BUN SERPL-MCNC: 12 MG/DL (ref 7–30)
CALCIUM SERPL-MCNC: 8.4 MG/DL (ref 8.5–10.1)
CHLORIDE BLD-SCNC: 107 MMOL/L (ref 94–109)
CO2 SERPL-SCNC: 30 MMOL/L (ref 20–32)
CREAT SERPL-MCNC: 0.43 MG/DL (ref 0.52–1.04)
GFR SERPL CREATININE-BSD FRML MDRD: >90 ML/MIN/1.73M2
GLUCOSE BLD-MCNC: 108 MG/DL (ref 70–99)
GLUCOSE BLDC GLUCOMTR-MCNC: 109 MG/DL (ref 70–99)
MAGNESIUM SERPL-MCNC: 2.2 MG/DL (ref 1.6–2.3)
PHOSPHATE SERPL-MCNC: 4 MG/DL (ref 2.5–4.5)
PLATELET # BLD AUTO: 223 10E3/UL (ref 150–450)
POTASSIUM BLD-SCNC: 3.9 MMOL/L (ref 3.4–5.3)
SODIUM SERPL-SCNC: 142 MMOL/L (ref 133–144)

## 2022-03-19 PROCEDURE — 85049 AUTOMATED PLATELET COUNT: CPT | Performed by: INTERNAL MEDICINE

## 2022-03-19 PROCEDURE — 84100 ASSAY OF PHOSPHORUS: CPT | Performed by: STUDENT IN AN ORGANIZED HEALTH CARE EDUCATION/TRAINING PROGRAM

## 2022-03-19 PROCEDURE — 250N000011 HC RX IP 250 OP 636: Performed by: STUDENT IN AN ORGANIZED HEALTH CARE EDUCATION/TRAINING PROGRAM

## 2022-03-19 PROCEDURE — 99239 HOSP IP/OBS DSCHRG MGMT >30: CPT | Performed by: INTERNAL MEDICINE

## 2022-03-19 PROCEDURE — 83735 ASSAY OF MAGNESIUM: CPT | Performed by: STUDENT IN AN ORGANIZED HEALTH CARE EDUCATION/TRAINING PROGRAM

## 2022-03-19 PROCEDURE — 99207 PR CDG-CODE INCORRECT PER BILLING BASED ON TIME: CPT | Performed by: INTERNAL MEDICINE

## 2022-03-19 PROCEDURE — 250N000013 HC RX MED GY IP 250 OP 250 PS 637: Performed by: INTERNAL MEDICINE

## 2022-03-19 PROCEDURE — 250N000013 HC RX MED GY IP 250 OP 250 PS 637: Performed by: STUDENT IN AN ORGANIZED HEALTH CARE EDUCATION/TRAINING PROGRAM

## 2022-03-19 PROCEDURE — 250N000011 HC RX IP 250 OP 636: Performed by: RADIOLOGY

## 2022-03-19 PROCEDURE — 82310 ASSAY OF CALCIUM: CPT | Performed by: STUDENT IN AN ORGANIZED HEALTH CARE EDUCATION/TRAINING PROGRAM

## 2022-03-19 PROCEDURE — 36592 COLLECT BLOOD FROM PICC: CPT | Performed by: STUDENT IN AN ORGANIZED HEALTH CARE EDUCATION/TRAINING PROGRAM

## 2022-03-19 PROCEDURE — 258N000003 HC RX IP 258 OP 636: Performed by: STUDENT IN AN ORGANIZED HEALTH CARE EDUCATION/TRAINING PROGRAM

## 2022-03-19 RX ORDER — ONDANSETRON 2 MG/ML
4 INJECTION INTRAMUSCULAR; INTRAVENOUS ONCE
Status: COMPLETED | OUTPATIENT
Start: 2022-03-19 | End: 2022-03-19

## 2022-03-19 RX ADMIN — SODIUM CHLORIDE 1000 ML: 9 INJECTION, SOLUTION INTRAVENOUS at 07:25

## 2022-03-19 RX ADMIN — SODIUM CHLORIDE, PRESERVATIVE FREE 5 ML: 5 INJECTION INTRAVENOUS at 00:08

## 2022-03-19 RX ADMIN — SODIUM CHLORIDE, PRESERVATIVE FREE 10 ML: 5 INJECTION INTRAVENOUS at 14:31

## 2022-03-19 RX ADMIN — BUDESONIDE 9 MG: 3 CAPSULE ORAL at 08:55

## 2022-03-19 RX ADMIN — HYDROMORPHONE HYDROCHLORIDE 0.5 MG: 1 INJECTION, SOLUTION INTRAMUSCULAR; INTRAVENOUS; SUBCUTANEOUS at 00:08

## 2022-03-19 RX ADMIN — ACETAMINOPHEN 650 MG: 325 TABLET ORAL at 08:56

## 2022-03-19 RX ADMIN — ONDANSETRON 4 MG: 2 INJECTION INTRAMUSCULAR; INTRAVENOUS at 00:23

## 2022-03-19 RX ADMIN — ENOXAPARIN SODIUM 40 MG: 40 INJECTION SUBCUTANEOUS at 08:55

## 2022-03-19 ASSESSMENT — ACTIVITIES OF DAILY LIVING (ADL)
ADLS_ACUITY_SCORE: 5

## 2022-03-19 NOTE — PROGRESS NOTES
Care Management Discharge Note    Discharge Date: 03/20/2022       Discharge Disposition:  Home     Discharge Services:  Home infusion services    Discharge DME:  IV pump, other home infusion supplies    Discharge Transportation:  Private car    Private pay costs discussed: Not applicable at this time    PAS Confirmation Code:  Not applicable  Patient/family educated on Medicare website which has current facility and service quality ratings:  Not applicable at this time    Education Provided on the Discharge Plan:  Yes  Persons Notified of Discharge Plans: MD, RN, [facility]  Patient/Family in Agreement with the Plan: Yes, per [facility] RN    Handoff Referral Completed: Yes    Additional Information:  I spoke with Dr. Elam this morning and confirmed plan for discharge with TPN.  Patient is on continuous TPN currently, but is okay to be off TPN for transition home.  Obtained TPN orders from pharmacist and faxed to [facility] ([facility]) at 1215. Updated RUTH Hubbard with [facility], who relayed that RUTH Ozuan will meet patient at home at 1800 today for teaching and initial hook-up.     Destinee Moralez RN

## 2022-03-19 NOTE — PROGRESS NOTES
Discharge  D: Orders for discharge and outpatient medications written.  I: Home medications and return to clinic schedule reviewed with patient. Discharge instructions and parameters for calling Health Care Provider reviewed. Patient left at 1450 independently with all belongings.    A: Patient/family verbalized understanding and was ready for discharge.   P: Patient instructed to  medications in Pharmacy. Follow up as scheduled.

## 2022-03-19 NOTE — PLAN OF CARE
[id number]  VSS. Alert and oriented. PRN IV dilaudid given x2. Nausea reported early this AM. MD paged, 1x 4 mg IV zofran ordered and administered. Plan is for discharge today. No acute events, continue with POC.

## 2022-03-21 ENCOUNTER — TELEPHONE (OUTPATIENT)
Dept: SURGERY | Facility: CLINIC | Age: 42
End: 2022-03-21
Payer: OTHER GOVERNMENT

## 2022-03-21 ENCOUNTER — PATIENT OUTREACH (OUTPATIENT)
Dept: CARE COORDINATION | Facility: CLINIC | Age: 42
End: 2022-03-21

## 2022-03-21 ENCOUNTER — LAB REQUISITION (OUTPATIENT)
Dept: LAB | Facility: CLINIC | Age: 42
End: 2022-03-21
Payer: OTHER GOVERNMENT

## 2022-03-21 ENCOUNTER — HOME INFUSION (PRE-WILLOW HOME INFUSION) (OUTPATIENT)
Dept: PHARMACY | Facility: CLINIC | Age: 42
End: 2022-03-21

## 2022-03-21 DIAGNOSIS — Z71.89 OTHER SPECIFIED COUNSELING: ICD-10-CM

## 2022-03-21 DIAGNOSIS — E44.0 MODERATE PROTEIN-CALORIE MALNUTRITION (H): ICD-10-CM

## 2022-03-21 LAB
BASOPHILS # BLD AUTO: 0 10E3/UL (ref 0–0.2)
BASOPHILS NFR BLD AUTO: 0 %
EOSINOPHIL # BLD AUTO: 0.1 10E3/UL (ref 0–0.7)
EOSINOPHIL NFR BLD AUTO: 1 %
ERYTHROCYTE [DISTWIDTH] IN BLOOD BY AUTOMATED COUNT: 12.5 % (ref 10–15)
HCT VFR BLD AUTO: 36.1 % (ref 35–47)
HGB BLD-MCNC: 10.4 G/DL (ref 11.7–15.7)
HOLD SPECIMEN: NORMAL
IMM GRANULOCYTES # BLD: 0 10E3/UL
IMM GRANULOCYTES NFR BLD: 0 %
LYMPHOCYTES # BLD AUTO: 1.3 10E3/UL (ref 0.8–5.3)
LYMPHOCYTES NFR BLD AUTO: 23 %
MCH RBC QN AUTO: 29.3 PG (ref 26.5–33)
MCHC RBC AUTO-ENTMCNC: 28.8 G/DL (ref 31.5–36.5)
MCV RBC AUTO: 102 FL (ref 78–100)
MONOCYTES # BLD AUTO: 0.6 10E3/UL (ref 0–1.3)
MONOCYTES NFR BLD AUTO: 11 %
NEUTROPHILS # BLD AUTO: 3.6 10E3/UL (ref 1.6–8.3)
NEUTROPHILS NFR BLD AUTO: 65 %
NRBC # BLD AUTO: 0 10E3/UL
NRBC BLD AUTO-RTO: 0 /100
PLATELET # BLD AUTO: 259 10E3/UL (ref 150–450)
RBC # BLD AUTO: 3.55 10E6/UL (ref 3.8–5.2)
WBC # BLD AUTO: 5.6 10E3/UL (ref 4–11)

## 2022-03-21 PROCEDURE — 85025 COMPLETE CBC W/AUTO DIFF WBC: CPT | Performed by: COLON & RECTAL SURGERY

## 2022-03-21 NOTE — TELEPHONE ENCOUNTER
Patient is currently on bowel rest and TPN. Doing well at home, no questions or concerns. Will follow up with Dr Cartagena on 3/29 at 12pm.

## 2022-03-21 NOTE — PROGRESS NOTES
Clinic Care Coordination Contact  [facility]/Voicemail       Clinical Data: Care Coordinator Outreach  Outreach attempted x 1.  Left message on patient's voicemail with call back information and requested return call.  Plan: Care Coordinator will try to reach patient again in 1-2 business days.        ALOK Plasencia  [phone]  [facility]

## 2022-03-22 ENCOUNTER — HOME INFUSION (PRE-WILLOW HOME INFUSION) (OUTPATIENT)
Dept: PHARMACY | Facility: CLINIC | Age: 42
End: 2022-03-22

## 2022-03-22 NOTE — PROGRESS NOTES
Clinic Care Coordination Contact  [facility]: Post-Discharge Note  SITUATION                                                      Admission:    Admission Date: 03/14/22   Reason for Admission: Abdominal pain  Discharge:   Discharge Date: 03/19/22  Discharge Diagnosis: Abdominal pain    BACKGROUND                                                      Per hospital discharge summary and inpatient provider notes:    Alok Nino is a 41 year old female who has a PMH of crohn's disease c/b multiple SBOs requiring partial resection recently discharged from [facility]. She represented to [place] last night for worsening abdominal pain in the setting of recurrent SBO seen on CT, although without a transition point. She was initially planned to transfer to [facility] however given no colorectal team there, she was brought to [facility] for colorectal consultation. She is endorsing worsening abdominal pain, but does say that she has passed a small amount of gas and a small amount of watery stool this AM and that her bloating does feel improved. She does not have any chest pain or SOB. Nausea is improving but still not hungry. She is frustrated that [facility] does not come here, but is agreeable to seeing our GI team as they were recommended to do a stent placement in a few months.     ASSESSMENT      Enrollment  Primary Care Care Coordination Status: Not a Candidate    Discharge Assessment  How are you doing now that you are home?: I am doing fine  How are your symptoms? (Red Flag symptoms escalate to triage hotline per guidelines): Unchanged  Do you feel your condition is stable enough to be safe at home until your provider visit?: Yes  Does the patient have their discharge instructions? : Yes  Does the patient have questions regarding their discharge instructions? : No  Were you started on any new medications or were there changes to any of your previous medications? : Yes  Does the patient have all of their medications?:  Yes  Do you have questions regarding any of your medications? : No  Do you have all of your needed medical supplies or equipment (DME)?  (i.e. oxygen tank, CPAP, cane, etc.): Yes  Discharge follow-up appointment scheduled within 14 calendar days? : Yes  Discharge Follow Up Appointment Date: 03/29/22  Discharge Follow Up Appointment Scheduled with?: Specialty Care Provider                  PLAN                                                      Outpatient Plan: Follow up with primary care provider, Nakita Odonnell, within 7 days for hospital follow- up. No follow up labs or test  are needed.     Future Appointments   Date Time Provider Department Center   3/29/2022 12:00 PM Pete Cartagena MD [facility]         For any urgent concerns, please contact our 24 hour nurse triage line: [phone]         ALOK Plasencia  [phone]  [facility]

## 2022-03-22 NOTE — PROGRESS NOTES
This is a recent snapshot of the patient's [facility] medical record.  For current drug dose and complete information and questions, call [phone] or In Basket pool, [facility] home infusion ([id number])  CSN Number:  [id number]

## 2022-03-23 ENCOUNTER — LAB REQUISITION (OUTPATIENT)
Dept: LAB | Facility: CLINIC | Age: 42
End: 2022-03-23
Payer: OTHER GOVERNMENT

## 2022-03-23 ENCOUNTER — HOME INFUSION (PRE-WILLOW HOME INFUSION) (OUTPATIENT)
Dept: PHARMACY | Facility: CLINIC | Age: 42
End: 2022-03-23

## 2022-03-23 DIAGNOSIS — E44.0 MODERATE PROTEIN-CALORIE MALNUTRITION (H): ICD-10-CM

## 2022-03-23 LAB
ALBUMIN SERPL-MCNC: 3.2 G/DL (ref 3.4–5)
ALP SERPL-CCNC: 122 U/L (ref 40–150)
ALT SERPL W P-5'-P-CCNC: 89 U/L (ref 0–50)
ANION GAP SERPL CALCULATED.3IONS-SCNC: 2 MMOL/L (ref 3–14)
AST SERPL W P-5'-P-CCNC: 67 U/L (ref 0–45)
BASOPHILS # BLD AUTO: 0 10E3/UL (ref 0–0.2)
BASOPHILS NFR BLD AUTO: 0 %
BILIRUB DIRECT SERPL-MCNC: 0.1 MG/DL (ref 0–0.2)
BILIRUB SERPL-MCNC: 0.4 MG/DL (ref 0.2–1.3)
BILIRUB SERPL-MCNC: 0.4 MG/DL (ref 0.2–1.3)
BUN SERPL-MCNC: 15 MG/DL (ref 7–30)
CALCIUM SERPL-MCNC: 8.8 MG/DL (ref 8.5–10.1)
CHLORIDE BLD-SCNC: 107 MMOL/L (ref 94–109)
CO2 SERPL-SCNC: 28 MMOL/L (ref 20–32)
CREAT SERPL-MCNC: 0.5 MG/DL (ref 0.52–1.04)
EOSINOPHIL # BLD AUTO: 0.2 10E3/UL (ref 0–0.7)
EOSINOPHIL NFR BLD AUTO: 3 %
ERYTHROCYTE [DISTWIDTH] IN BLOOD BY AUTOMATED COUNT: 12.4 % (ref 10–15)
FASTING STATUS PATIENT QL REPORTED: NORMAL
GFR SERPL CREATININE-BSD FRML MDRD: >90 ML/MIN/1.73M2
GLUCOSE BLD-MCNC: 74 MG/DL (ref 70–99)
HCT VFR BLD AUTO: 37 % (ref 35–47)
HGB BLD-MCNC: 11.7 G/DL (ref 11.7–15.7)
HOLD SPECIMEN: NORMAL
IMM GRANULOCYTES # BLD: 0 10E3/UL
IMM GRANULOCYTES NFR BLD: 0 %
LYMPHOCYTES # BLD AUTO: 1.8 10E3/UL (ref 0.8–5.3)
LYMPHOCYTES NFR BLD AUTO: 26 %
MAGNESIUM SERPL-MCNC: 2.2 MG/DL (ref 1.6–2.3)
MCH RBC QN AUTO: 29.6 PG (ref 26.5–33)
MCHC RBC AUTO-ENTMCNC: 31.6 G/DL (ref 31.5–36.5)
MCV RBC AUTO: 94 FL (ref 78–100)
MONOCYTES # BLD AUTO: 0.8 10E3/UL (ref 0–1.3)
MONOCYTES NFR BLD AUTO: 11 %
NEUTROPHILS # BLD AUTO: 4.2 10E3/UL (ref 1.6–8.3)
NEUTROPHILS NFR BLD AUTO: 60 %
NRBC # BLD AUTO: 0 10E3/UL
NRBC BLD AUTO-RTO: 0 /100
PHOSPHATE SERPL-MCNC: 3.8 MG/DL (ref 2.5–4.5)
PLATELET # BLD AUTO: 272 10E3/UL (ref 150–450)
POTASSIUM BLD-SCNC: 4 MMOL/L (ref 3.4–5.3)
PROT SERPL-MCNC: 7.2 G/DL (ref 6.8–8.8)
RBC # BLD AUTO: 3.95 10E6/UL (ref 3.8–5.2)
SODIUM SERPL-SCNC: 137 MMOL/L (ref 133–144)
TRIGL SERPL-MCNC: 107 MG/DL
WBC # BLD AUTO: 6.9 10E3/UL (ref 4–11)

## 2022-03-23 PROCEDURE — 80053 COMPREHEN METABOLIC PANEL: CPT | Performed by: COLON & RECTAL SURGERY

## 2022-03-23 PROCEDURE — 85025 COMPLETE CBC W/AUTO DIFF WBC: CPT | Performed by: COLON & RECTAL SURGERY

## 2022-03-23 PROCEDURE — 83735 ASSAY OF MAGNESIUM: CPT | Performed by: COLON & RECTAL SURGERY

## 2022-03-23 PROCEDURE — 82248 BILIRUBIN DIRECT: CPT | Performed by: COLON & RECTAL SURGERY

## 2022-03-23 PROCEDURE — 84100 ASSAY OF PHOSPHORUS: CPT | Performed by: COLON & RECTAL SURGERY

## 2022-03-23 PROCEDURE — 84478 ASSAY OF TRIGLYCERIDES: CPT | Performed by: COLON & RECTAL SURGERY

## 2022-03-23 NOTE — PROGRESS NOTES
Colon and Rectal Surgery Clinic Note      RE: Alok Nino  : 1980  AURELIO: 3/29/2022    Alok Nino is a very pleasant 41 year old female with long-standing Crohn's disease here for follow up of SBO.    HPI:    8 prior surgeries including stricturoplasties, multiple bowel resection surgeries. Most recent surgery was on 21 for ANTHONY and distal ileal resection with ileoileal anastomosis.     Colonoscopy with me on 3/7/22 with dilation of anastomotic stricture. She was admitted at [facility] 3/9-3/11 with CT showing diffusely dilated bowel and no clear transition point.    She was admitted here at [facility] 3/13 with SBO most likely 2/2 ineffective peristalsis causing dilation. It was recommended that she continue on bowel rest with clear liquid diet and TPN until seen by me in follow up.    MRE 3/3/22:  IMPRESSION:   1.  No MR signs of active inflammatory bowel disease. No bowel obstruction.  2.  Ileocecal resection with anastomosis mid right abdomen.  3.  The middle third of the small intestine is comprised of two consecutive surgically altered, chronically dilated segments of small bowel that appear reservoir-like and have ineffective peristalsis.  4.  At present, the tortuous, redundant, large caliber colon is decompressed, but during episodes of small bowel obstructions, the right, transverse and descending colon become significantly distended.     CT AP 3/13/22:  IMPRESSION:   1.  Recurrent marked fluid distention of multiple loops of bowel throughout the abdomen and pelvis without definite transition point. The overall configuration is similar to 2022 MR enterography as well as CT abdomen 2022.    Interval History: Alok has been improving since discharge. She has been slowly introducing soft foods into her diet and has been tolerating this well. She has been gaining weight since discharge. Her abdominal distension is much improved.    Physical examination:  Examination was chaperoned  "by Cinda Poon.     Vitals: /82 (BP Location: Left arm, Patient Position: Sitting, Cuff Size: Adult Regular)   Pulse 106   Ht 1.575 m (5' 2\")   Wt 47.1 kg (103 lb 14.4 oz)   LMP 03/06/2022   SpO2 98%   BMI 19.00 kg/m    BMI= Body mass index is 19 kg/m .    Abdomen: soft, not tender, mildly distended, incisions have healed well.      Laboratory data:    Recent Labs   Lab Test 03/23/22  0850 03/18/22  0537 03/17/22  0554   WBC 6.9   < >  --    HGB 11.7   < >  --       < >  --    CR 0.50*   < > 0.48*   ALBUMIN 3.2*  --  2.4*   BILITOTAL 0.4  0.4  --  0.3   ALKPHOS 122  --  94   ALT 89*  --  54*   AST 67*  --  23   INR  --   --  1.24*    < > = values in this interval not displayed.       Assessment/plan:  41 year old female with a longstanding history of Crohn's and multiple previous abdominal surgeries including multiple bowel resections and stricturoplasties. She most recently was operated on 11/21/21 and had a ANTHONY, distal ileal resection with an ileoileal anastomosis. She subsequently was admitted to hospital with pronounced small bowel distension and feeding intolerance due to dysmotility. She was discharged home on TPN and has been advancing her diet as tolerated at home. She currently is doing well with a soft diet and is gaining weight. We've encouraged her to continue to advance her diet as tolerated and to keep a diet of her oral intake in preparation for weaning her TPN.  We'll see her again in a few weeks time to ensure that she continues to do well.    Cinda Poon MD  CRS Fellow      For details of past medical history, surgical history, family history, medications, allergies, and review of systems, please see details below.    Medical history:  Past Medical History:   Diagnosis Date     C. difficile enteritis      Chronic pain      Crohn disease (H)      Melanoma (H)      PONV (postoperative nausea and vomiting)        Surgical history:  Past Surgical History:   Procedure Laterality Date "     ABDOMEN SURGERY      3 for crohns dx     APPENDECTOMY       APPENDECTOMY       CHOLECYSTECTOMY       CHOLECYSTECTOMY       COLONOSCOPY       ENDOSCOPIC ULTRASOUND UPPER GASTROINTESTINAL TRACT (GI) N/A 11/13/2020    Procedure: ENDOSCOPIC ULTRASOUND, ESOPHAGOSCOPY / UPPER GASTROINTESTINAL TRACT with BIOPSY;  Surgeon: Cydney Garcia MD;  Location:  OR     GYN SURGERY       H STATISTIC PICC LINE INSERTION >5YR, FAILED Right 11/10/2021    LUE unsuccessful attempt from another hospital, IR deferral     IR PICC PLACEMENT > 5 YRS OF AGE  11/11/2021     IR PICC PLACEMENT > 5 YRS OF AGE  3/16/2022     OTHER SURGICAL HISTORY      strictoplasty x3     OTHER SURGICAL HISTORY      adhesion removal x1     OTHER SURGICAL HISTORY      small bowel resections     PICC  05/10/2019          RESECTION ILEOCECAL  12/06/2013    Procedure: RESECTION ILEOCECAL;  Laparotomy, Extensive Lysis of Adhesions, Stricture Plasty X2  Anesthesia general with block;  Surgeon: Pete Cartagena MD;  Location: [facility] OR     RESECTION ILEOCOLIC N/A 11/17/2021    Procedure: Exploratory laparotomy, illeal resection, extensive lysis of adhesions (2 hr);  Surgeon: Pete Cartagena MD;  Location: [facility] OR       Family history:  Family History   Problem Relation Age of Onset     No Known Problems Mother      Clotting Disorder Father      No Known Problems Brother      No Known Problems Son      Anesthesia Reaction No family hx of      Colon Cancer No family hx of      Crohn's Disease No family hx of      Ulcerative Colitis No family hx of      Colon Polyps No family hx of        Medications:  Current Outpatient Medications   Medication Sig Dispense Refill     acetaminophen (TYLENOL) 325 MG tablet Take 650 mg by mouth every 6 hours as needed for mild pain       budesonide (ENTOCORT EC) 3 MG EC capsule Take 9 mg by mouth every morning       calcitRIOL (ROCALTROL) 0.25 MCG capsule Take 0.25 mcg by mouth three times a week       doxylamine (UNISOM) 25 MG TABS  "tablet Take 50 mg by mouth At Bedtime        lipids (INTRALIPID) 20 % infusion Inject 250 mLs into the vein every 24 hours 250 mL 0     magnesium oxide (MAG-OX) 400 (240 Mg) MG tablet Take 400 mg by mouth every evening        parenteral nutrition - PTA/DISCHARGE ORDER The TPN formula will print on the After Visit Summary Report. 1 each 0     ustekinumab (STELARA) 90 MG/ML Inject 90 mg Subcutaneous every 28 days        vitamin D (ERGOCALCIFEROL) 15939 UNIT capsule Take 50,000 Units by mouth five times a week        vitamin E (TOCOPHEROL) 400 units (180 mg) capsule Take 400 Units by mouth every evening          Allergies:  The patientis allergic to carafate, morphine hcl, and codeine.    Social history:  Social History     Tobacco Use     Smoking status: Never Smoker     Smokeless tobacco: Never Used   Substance Use Topics     Alcohol use: Yes     Comment: occasional/ 1 drink per 1-3 month     Marital status: .    Review of Systems:  Nursing Notes:   Drea Samuel  3/29/2022 12:12 PM  Signed  Chief Complaint   Patient presents with     Follow Up     SBO follow up        Vitals:    03/29/22 1210   BP: 128/82   BP Location: Left arm   Patient Position: Sitting   Cuff Size: Adult Regular   Pulse: 106   SpO2: 98%   Weight: 103 lb 14.4 oz   Height: 5' 2\"       Body mass index is 19 kg/m .    Drea Samuel CMA       I saw and examined the patient, led the discussion and edited the resident note.  I agree with the assessment and plan as outlined.  Alok seems to be making gradual progress.  She remains on TPN and is very gradually liberalizing her diet.  Her abdomen is softer and flatter than the last time I saw her, with only minor distention now.  Her imaging shows chronically distorted/dilated segments of bowel related to her previous surgery including side-to-side anastomoses and stricturoplasties.  Her imaging during her acute \"obstructions\" have not shown appropriate transition points, so I think the main " problem is related to motility.  This seems gradually to be recovering.  Alok will continue along as she has been doing, and gradually we will continue to liberalize her diet.  I do not see a role for reoperative surgery unless there is a clear mechanical obstruction, and Alok is in agreement with this approach.  She can follow-up with me in 4 to 6 weeks.  I answered all of her questions to her stated satisfaction.  She expressed her understanding and agreement.      15 minutes spent on the date of encounter (excluding time performing procedures with or without biopsy) performing chart review, history and exam, documentation and further activities as noted above.    Pete Cartagena MD   Professor and Chief  Division of Colon and Rectal Surgery  [facility]      Referring Provider:  No referring provider defined for this encounter.     Primary Care Provider:  Nakita Odonnell    This note was created using speech recognition software and may contain unintended word substitutions.

## 2022-03-24 ENCOUNTER — HOME INFUSION (PRE-WILLOW HOME INFUSION) (OUTPATIENT)
Dept: PHARMACY | Facility: CLINIC | Age: 42
End: 2022-03-24
Payer: OTHER GOVERNMENT

## 2022-03-29 ENCOUNTER — OFFICE VISIT (OUTPATIENT)
Dept: SURGERY | Facility: CLINIC | Age: 42
End: 2022-03-29
Payer: OTHER GOVERNMENT

## 2022-03-29 VITALS
OXYGEN SATURATION: 98 % | HEART RATE: 106 BPM | WEIGHT: 103.9 LBS | HEIGHT: 62 IN | SYSTOLIC BLOOD PRESSURE: 128 MMHG | BODY MASS INDEX: 19.12 KG/M2 | DIASTOLIC BLOOD PRESSURE: 82 MMHG

## 2022-03-29 DIAGNOSIS — K50.012 CROHN'S DISEASE OF SMALL INTESTINE WITH INTESTINAL OBSTRUCTION (H): Primary | ICD-10-CM

## 2022-03-29 PROCEDURE — 99213 OFFICE O/P EST LOW 20 MIN: CPT | Performed by: COLON & RECTAL SURGERY

## 2022-03-29 ASSESSMENT — PAIN SCALES - GENERAL: PAINLEVEL: NO PAIN (0)

## 2022-03-29 NOTE — LETTER
3/29/2022       RE: Alok Nino  [id number]  [place] [facility] [place] [address]     Dear Colleague,    Thank you for referring your patient, Alok Nino, to the [facility] [place] at [facility]. Please see a copy of my visit note below.    Colon and Rectal Surgery Clinic Note      RE: Alok Nino  : 1980  AURELIO: 3/29/2022    Alok Nino is a very pleasant 41 year old female with long-standing Crohn's disease here for follow up of SBO.    HPI:    8 prior surgeries including stricturoplasties, multiple bowel resection surgeries. Most recent surgery was on 21 for ANTHONY and distal ileal resection with ileoileal anastomosis.     Colonoscopy with me on 3/7/22 with dilation of anastomotic stricture. She was admitted at [facility] 3/9-3/11 with CT showing diffusely dilated bowel and no clear transition point.    She was admitted here at [facility] 3/13 with SBO most likely 2/2 ineffective peristalsis causing dilation. It was recommended that she continue on bowel rest with clear liquid diet and TPN until seen by me in follow up.    MRE 3/3/22:  IMPRESSION:   1.  No MR signs of active inflammatory bowel disease. No bowel obstruction.  2.  Ileocecal resection with anastomosis mid right abdomen.  3.  The middle third of the small intestine is comprised of two consecutive surgically altered, chronically dilated segments of small bowel that appear reservoir-like and have ineffective peristalsis.  4.  At present, the tortuous, redundant, large caliber colon is decompressed, but during episodes of small bowel obstructions, the right, transverse and descending colon become significantly distended.     CT AP 3/13/22:  IMPRESSION:   1.  Recurrent marked fluid distention of multiple loops of bowel throughout the abdomen and pelvis without definite transition point. The overall configuration is similar to 2022 MR enterography as  "well as CT abdomen 02/09/2022.    Interval History: Alok has been improving since discharge. She has been slowly introducing soft foods into her diet and has been tolerating this well. She has been gaining weight since discharge. Her abdominal distension is much improved.    Physical examination:  Examination was chaperoned by Cinda Poon.     Vitals: /82 (BP Location: Left arm, Patient Position: Sitting, Cuff Size: Adult Regular)   Pulse 106   Ht 1.575 m (5' 2\")   Wt 47.1 kg (103 lb 14.4 oz)   LMP 03/06/2022   SpO2 98%   BMI 19.00 kg/m    BMI= Body mass index is 19 kg/m .    Abdomen: soft, not tender, mildly distended, incisions have healed well.      Laboratory data:    Recent Labs   Lab Test 03/23/22  0850 03/18/22  0537 03/17/22  0554   WBC 6.9   < >  --    HGB 11.7   < >  --       < >  --    CR 0.50*   < > 0.48*   ALBUMIN 3.2*  --  2.4*   BILITOTAL 0.4  0.4  --  0.3   ALKPHOS 122  --  94   ALT 89*  --  54*   AST 67*  --  23   INR  --   --  1.24*    < > = values in this interval not displayed.       Assessment/plan:  41 year old female with a longstanding history of Crohn's and multiple previous abdominal surgeries including multiple bowel resections and stricturoplasties. She most recently was operated on 11/21/21 and had a ANTHONY, distal ileal resection with an ileoileal anastomosis. She subsequently was admitted to hospital with pronounced small bowel distension and feeding intolerance due to dysmotility. She was discharged home on TPN and has been advancing her diet as tolerated at home. She currently is doing well with a soft diet and is gaining weight. We've encouraged her to continue to advance her diet as tolerated and to keep a diet of her oral intake in preparation for weaning her TPN.  We'll see her again in a few weeks time to ensure that she continues to do well.    Cinda Poon MD  CRS Fellow      For details of past medical history, surgical history, family history, medications, " allergies, and review of systems, please see details below.    Medical history:  Past Medical History:   Diagnosis Date     C. difficile enteritis      Chronic pain      Crohn disease (H)      Melanoma (H)      PONV (postoperative nausea and vomiting)        Surgical history:  Past Surgical History:   Procedure Laterality Date     ABDOMEN SURGERY      3 for crohns dx     APPENDECTOMY       APPENDECTOMY       CHOLECYSTECTOMY       CHOLECYSTECTOMY       COLONOSCOPY       ENDOSCOPIC ULTRASOUND UPPER GASTROINTESTINAL TRACT (GI) N/A 11/13/2020    Procedure: ENDOSCOPIC ULTRASOUND, ESOPHAGOSCOPY / UPPER GASTROINTESTINAL TRACT with BIOPSY;  Surgeon: Cydney Garcia MD;  Location: [facility] OR     GYN SURGERY       H STATISTIC PICC LINE INSERTION >5YR, FAILED Right 11/10/2021    LUE unsuccessful attempt from another hospital, IR deferral     IR PICC PLACEMENT > 5 YRS OF AGE  11/11/2021     IR PICC PLACEMENT > 5 YRS OF AGE  3/16/2022     OTHER SURGICAL HISTORY      strictoplasty x3     OTHER SURGICAL HISTORY      adhesion removal x1     OTHER SURGICAL HISTORY      small bowel resections     PICC  05/10/2019          RESECTION ILEOCECAL  12/06/2013    Procedure: RESECTION ILEOCECAL;  Laparotomy, Extensive Lysis of Adhesions, Stricture Plasty X2  Anesthesia general with block;  Surgeon: Pete Cartagena MD;  Location: [facility] OR     RESECTION ILEOCOLIC N/A 11/17/2021    Procedure: Exploratory laparotomy, illeal resection, extensive lysis of adhesions (2 hr);  Surgeon: Pete Cartagena MD;  Location: [facility] OR       Family history:  Family History   Problem Relation Age of Onset     No Known Problems Mother      Clotting Disorder Father      No Known Problems Brother      No Known Problems Son      Anesthesia Reaction No family hx of      Colon Cancer No family hx of      Crohn's Disease No family hx of      Ulcerative Colitis No family hx of      Colon Polyps No family hx of        Medications:  Current Outpatient Medications  "  Medication Sig Dispense Refill     acetaminophen (TYLENOL) 325 MG tablet Take 650 mg by mouth every 6 hours as needed for mild pain       budesonide (ENTOCORT EC) 3 MG EC capsule Take 9 mg by mouth every morning       calcitRIOL (ROCALTROL) 0.25 MCG capsule Take 0.25 mcg by mouth three times a week       doxylamine (UNISOM) 25 MG TABS tablet Take 50 mg by mouth At Bedtime        lipids (INTRALIPID) 20 % infusion Inject 250 mLs into the vein every 24 hours 250 mL 0     magnesium oxide (MAG-OX) 400 (240 Mg) MG tablet Take 400 mg by mouth every evening        parenteral nutrition - PTA/DISCHARGE ORDER The TPN formula will print on the After Visit Summary Report. 1 each 0     ustekinumab (STELARA) 90 MG/ML Inject 90 mg Subcutaneous every 28 days        vitamin D (ERGOCALCIFEROL) 97834 UNIT capsule Take 50,000 Units by mouth five times a week        vitamin E (TOCOPHEROL) 400 units (180 mg) capsule Take 400 Units by mouth every evening          Allergies:  The patientis allergic to carafate, morphine hcl, and codeine.    Social history:  Social History     Tobacco Use     Smoking status: Never Smoker     Smokeless tobacco: Never Used   Substance Use Topics     Alcohol use: Yes     Comment: occasional/ 1 drink per 1-3 month     Marital status: .    Review of Systems:  Nursing Notes:   Drea Samuel  3/29/2022 12:12 PM  Signed  Chief Complaint   Patient presents with     Follow Up     SBO follow up        Vitals:    03/29/22 1210   BP: 128/82   BP Location: Left arm   Patient Position: Sitting   Cuff Size: Adult Regular   Pulse: 106   SpO2: 98%   Weight: 103 lb 14.4 oz   Height: 5' 2\"       Body mass index is 19 kg/m .    Drea Samuel CMA       I saw and examined the patient, led the discussion and edited the resident note.  I agree with the assessment and plan as outlined.  Alok seems to be making gradual progress.  She remains on TPN and is very gradually liberalizing her diet.  Her abdomen is softer and " "flatter than the last time I saw her, with only minor distention now.  Her imaging shows chronically distorted/dilated segments of bowel related to her previous surgery including side-to-side anastomoses and stricturoplasties.  Her imaging during her acute \"obstructions\" have not shown appropriate transition points, so I think the main problem is related to motility.  This seems gradually to be recovering.  Alok will continue along as she has been doing, and gradually we will continue to liberalize her diet.  I do not see a role for reoperative surgery unless there is a clear mechanical obstruction, and Alok is in agreement with this approach.  She can follow-up with me in 4 to 6 weeks.  I answered all of her questions to her stated satisfaction.  She expressed her understanding and agreement.      15 minutes spent on the date of encounter (excluding time performing procedures with or without biopsy) performing chart review, history and exam, documentation and further activities as noted above.    Pete Cartagena MD   Professor and Chief  Division of Colon and Rectal Surgery  [facility]      Referring Provider:  No referring provider defined for this encounter.     Primary Care Provider:  Nakita Odonnell    This note was created using speech recognition software and may contain unintended word substitutions.  "

## 2022-03-29 NOTE — NURSING NOTE
"Chief Complaint   Patient presents with     Follow Up     SBO follow up        Vitals:    03/29/22 1210   BP: 128/82   BP Location: Left arm   Patient Position: Sitting   Cuff Size: Adult Regular   Pulse: 106   SpO2: 98%   Weight: 103 lb 14.4 oz   Height: 5' 2\"       Body mass index is 19 kg/m .    Drea Samuel CMA    "

## 2022-03-30 ENCOUNTER — HOME INFUSION (PRE-WILLOW HOME INFUSION) (OUTPATIENT)
Dept: PHARMACY | Facility: CLINIC | Age: 42
End: 2022-03-30

## 2022-03-30 ENCOUNTER — LAB REQUISITION (OUTPATIENT)
Dept: LAB | Facility: CLINIC | Age: 42
End: 2022-03-30
Payer: OTHER GOVERNMENT

## 2022-03-30 DIAGNOSIS — E44.0 MODERATE PROTEIN-CALORIE MALNUTRITION (H): ICD-10-CM

## 2022-03-30 LAB
ALBUMIN SERPL-MCNC: 3.1 G/DL (ref 3.4–5)
ALP SERPL-CCNC: 91 U/L (ref 40–150)
ALT SERPL W P-5'-P-CCNC: 70 U/L (ref 0–50)
ANION GAP SERPL CALCULATED.3IONS-SCNC: 5 MMOL/L (ref 3–14)
AST SERPL W P-5'-P-CCNC: 27 U/L (ref 0–45)
BASOPHILS # BLD AUTO: 0 10E3/UL (ref 0–0.2)
BASOPHILS NFR BLD AUTO: 0 %
BILIRUB DIRECT SERPL-MCNC: 0.2 MG/DL (ref 0–0.2)
BILIRUB SERPL-MCNC: 0.4 MG/DL (ref 0.2–1.3)
BUN SERPL-MCNC: 20 MG/DL (ref 7–30)
CALCIUM SERPL-MCNC: 8.6 MG/DL (ref 8.5–10.1)
CHLORIDE BLD-SCNC: 113 MMOL/L (ref 94–109)
CO2 SERPL-SCNC: 23 MMOL/L (ref 20–32)
CREAT SERPL-MCNC: 0.5 MG/DL (ref 0.52–1.04)
EOSINOPHIL # BLD AUTO: 0.1 10E3/UL (ref 0–0.7)
EOSINOPHIL NFR BLD AUTO: 2 %
ERYTHROCYTE [DISTWIDTH] IN BLOOD BY AUTOMATED COUNT: 12.3 % (ref 10–15)
FASTING STATUS PATIENT QL REPORTED: NORMAL
GFR SERPL CREATININE-BSD FRML MDRD: >90 ML/MIN/1.73M2
GLUCOSE BLD-MCNC: 92 MG/DL (ref 70–99)
HCT VFR BLD AUTO: 32.7 % (ref 35–47)
HGB BLD-MCNC: 10.5 G/DL (ref 11.7–15.7)
HOLD SPECIMEN: NORMAL
IMM GRANULOCYTES # BLD: 0 10E3/UL
IMM GRANULOCYTES NFR BLD: 0 %
LYMPHOCYTES # BLD AUTO: 1.9 10E3/UL (ref 0.8–5.3)
LYMPHOCYTES NFR BLD AUTO: 27 %
MAGNESIUM SERPL-MCNC: 2.1 MG/DL (ref 1.6–2.3)
MCH RBC QN AUTO: 30.1 PG (ref 26.5–33)
MCHC RBC AUTO-ENTMCNC: 32.1 G/DL (ref 31.5–36.5)
MCV RBC AUTO: 94 FL (ref 78–100)
MONOCYTES # BLD AUTO: 0.6 10E3/UL (ref 0–1.3)
MONOCYTES NFR BLD AUTO: 8 %
NEUTROPHILS # BLD AUTO: 4.4 10E3/UL (ref 1.6–8.3)
NEUTROPHILS NFR BLD AUTO: 63 %
NRBC # BLD AUTO: 0 10E3/UL
NRBC BLD AUTO-RTO: 0 /100
PHOSPHATE SERPL-MCNC: 3.2 MG/DL (ref 2.5–4.5)
PLATELET # BLD AUTO: 233 10E3/UL (ref 150–450)
POTASSIUM BLD-SCNC: 4.1 MMOL/L (ref 3.4–5.3)
PROT SERPL-MCNC: 6.5 G/DL (ref 6.8–8.8)
RBC # BLD AUTO: 3.49 10E6/UL (ref 3.8–5.2)
SODIUM SERPL-SCNC: 141 MMOL/L (ref 133–144)
TRIGL SERPL-MCNC: 37 MG/DL
WBC # BLD AUTO: 7 10E3/UL (ref 4–11)

## 2022-03-30 PROCEDURE — 83735 ASSAY OF MAGNESIUM: CPT | Performed by: COLON & RECTAL SURGERY

## 2022-03-30 PROCEDURE — 85025 COMPLETE CBC W/AUTO DIFF WBC: CPT | Performed by: COLON & RECTAL SURGERY

## 2022-03-30 PROCEDURE — 80053 COMPREHEN METABOLIC PANEL: CPT | Performed by: COLON & RECTAL SURGERY

## 2022-03-30 PROCEDURE — 84100 ASSAY OF PHOSPHORUS: CPT | Performed by: COLON & RECTAL SURGERY

## 2022-03-30 PROCEDURE — 84478 ASSAY OF TRIGLYCERIDES: CPT | Performed by: COLON & RECTAL SURGERY

## 2022-03-30 PROCEDURE — 82248 BILIRUBIN DIRECT: CPT | Performed by: COLON & RECTAL SURGERY

## 2022-03-31 NOTE — PROGRESS NOTES
This is a recent snapshot of the patient's [facility] medical record.  For current drug dose and complete information and questions, call [phone] or In Basket pool, [facility] home infusion ([id number])  CSN Number:  [id number]

## 2022-04-04 NOTE — DISCHARGE SUMMARY
[facility]  Hospitalist Discharge Summary      Date of Admission:  3/14/2022  Date of Discharge:  3/19/2022  2:52 PM  Discharging Provider: Dany Elam MD  Discharge Service: Hospitalist Service, GOLD TEAM 11    Discharge Diagnoses   Partial SBO  Crohn's disease s/p partial resection  Chronic Protein Calorie Malnutrition on TPN  Hypokalemia  Hypomagnesemia    Follow-ups Needed After Discharge   Follow-up Appointments     Adult [facility] Follow-up and recommended labs and tests      Follow up with primary care provider, Nakita Odonnell, within 7 days   for hospital follow- up.  No follow up labs or test are needed.      Appointments on [place] and/or [facility] (with [facility] or [facility]   provider or service). Call [phone] if you haven't heard regarding   these appointments within 7 days of discharge.             Unresulted Labs Ordered in the Past 30 Days of this Admission     No orders found from 2/12/2022 to 3/15/2022.      These results will be followed up by N/A    Discharge Disposition   Discharged to home  Condition at discharge: Stable    Hospital Course    Alok Nino is a 41 year old female admitted on 3/14/2022. She has a PMH of crohn's followed by [facility] s/p partial resection and most recently colonoscopy with stricture dilation on 3/7, discharged 3/11. Now readmitted from [place] for worsening pain and nausea consistent with SBO    #SBO, improving  #Crohn's disease s/p partial resection  Follows with [facility]. On budesonide and stelara. CT scan showing multiple loops without transition point. On 3/17 pt felt much better; some residual abdominal pain, no nausea or vomiting, passing gas, having bowel movements. Given high risk of future resections plan was to pursue conservative management with a clear liquid diet and start TPN.  This was started, there were no signs of refeeding syndrome.  It was felt she was safe to discharge home on TPN  with close follow up with GI and colorectal surgery.       #Malnutrition    #Hypokalemia  #Hypomagnesemia  Electrolyte abnormalities likely secondary to malnutrition and poor oral intake. Per CRS, plan is to continue TPN and small amounts of clear liquids at discharge, will reevaluate at follow-up in CRS clinic in 2-3 weeks.      Consultations This Hospital Stay   COLORECTAL SURGERY ADULT IP CONSULT  GI LUMINAL ADULT IP CONSULT  INTERVENTIONAL RADIOLOGY ADULT/PEDS IP CONSULT  PHARMACY/NUTRITION TO START AND MANAGE TPN  CARE MANAGEMENT / SOCIAL WORK IP CONSULT  PHARMACY IP CONSULT  PHARMACY IP CONSULT    Code Status   Prior    Time Spent on this Encounter   I, Dany Elam MD, personally saw the patient today and spent greater than 30 minutes discharging this patient.       Dany Elam MD  [facility] [address] [address]  Phone: [phone]  ______________________________________________________________________    Physical Exam   Vital Signs:                     Afebrile and stable  Weight: 100 lbs 4.8 oz  General Appearance: NAD  Respiratory: CTA b/l  Cardiovascular: RRR S1/S2, no m,r,g  GI: soft, NT, ND, +BS  Skin: no rashes  Other: No edema        Primary Care Physician   Nakita Odonnell    Discharge Orders      Home Infusion Referral      Reason for your hospital stay    You were admitted with symptoms of a small bowel obstruction secondary to your Crohn's disease.  You were evaluated by colorectal surgery and treated conservatively with bowel rest, fluids, and time.  Your symptoms improved.  To allow things to continue to heal, you were kept on a clear liquid diet and started on TPN which will continue until your next appointment with your colorectal surgeon.     Activity    Your activity upon discharge: activity as tolerated     When to contact your care team    Call your primary doctor if you have any of the following: fevers, chills,  worsening abdominal pain, nausea and vomiting that does not improve, signs of another bowel obstruction, diarrhea, chest pain, difficulty breathing or other concerning symptom.     Adult [facility] Follow-up and recommended labs and tests    Follow up with primary care provider, Nakita Odonnell, within 7 days for hospital follow- up.  No follow up labs or test are needed.      Appointments on [place] and/or [facility] (with [facility] or [facility] provider or service). Call [phone] if you haven't heard regarding these appointments within 7 days of discharge.     Diet    Follow this diet upon discharge: Orders Placed This Encounter      Snacks/Supplements Adult: Ensure Clear; Between Meals      Clear Liquid Diet       Significant Results and Procedures   Most Recent 3 CBC's:Recent Labs   Lab Test 03/30/22  1020 03/23/22  0850 03/21/22  1925   WBC 7.0 6.9 5.6   HGB 10.5* 11.7 10.4*   MCV 94 94 102*    272 259     Most Recent 3 BMP's:Recent Labs   Lab Test 03/30/22  1020 03/23/22  0850 03/19/22  0609    137 142   POTASSIUM 4.1 4.0 3.9   CHLORIDE 113* 107 107   CO2 23 28 30   BUN 20 15 12   CR 0.50* 0.50* 0.43*   ANIONGAP 5 2* 5   BLACK 8.6 8.8 8.4*   GLC 92 74 108*     Most Recent 2 LFT's:Recent Labs   Lab Test 03/30/22  1020 03/23/22  0850   AST 27 67*   ALT 70* 89*   ALKPHOS 91 122   BILITOTAL 0.4 0.4  0.4   ,   Results for orders placed or performed during the hospital encounter of 03/14/22   IR PICC Placement > 5 Yrs of Age    Narrative    PROCEDURES 3/16/2022 11:48 AM:  1. Ultrasound guided left medial brachial venotomy.  2. Left arm PICC placement.    CLINICAL HISTORY: dual lumen PICC. PICC placement requested for TPN  and hydration. Crohns disease. Previous right PICC placement was  challenging due to right subclavian stenosis. Patient requests  sedation for the procedure.    COMPARISONS: PICC placement 11/10/2021    ATTENDING RADIOLOGIST: GALINA Aden MD    I, MARTIN ADEN MD, attest that I was present  in the procedure room for  the entire procedure.    MEDICATIONS: The patient was placed on continuous monitoring. Vital  signs and sedation were monitored by the Interventional Radiology  nurse under the Attending Physician's supervision. 2 mg Versed and 100  mcg Fentanyl IV. The patient remained stable throughout the procedure.    ATTENDING FACE-TO-FACE SEDATION TIME: 25 minutes.    FLUOROSCOPY TIME: 28.7 seconds    DOSE: 2.09 mGy.    PROCEDURE: The patient understood the limitations, alternatives, and  risks of the procedure and requested the procedure be performed. Both  written and verbal consent were obtained.    Limited left arm ultrasound performed. Left basilic vein disappears in  the upper arm. Left brachial and axillary veins appear patent and  fully compressible.    The left arm was prepped and draped in the usual sterile fashion. 1%  lidocaine without epinephrine was used for local anesthesia.    Left medial brachial venotomy was made with a micropuncture needle  under ultrasound guidance. Ultrasound image documenting left medial  brachial venous patency and needle venotomy was saved in the patient's  record.    Needle exchanged over guidewire for the 5 Spanish peel-away sheath.  Catheter length measured with the guidewire.    5 Spanish double lumen BD Bard PowerPICC cut on the 31 cm marker and  advanced over guidewire through the peel-away sheath to the high right  atrium. Guidewire removed. 1 cm catheter out of the skin.    Fluoroscopic image documenting PICC placement and position was saved  in the patient's record.    Both lumens aspirated and flushed adequately. PICC secured with  sutures per patient request. Sterile dressing applied. Each lumen  heparin locked.    No immediate complication.      Impression    IMPRESSION:  1. 5 Spanish 31 cm double lumen BD Bard PowerPICC placed via left  medial brachial vein. 1 cm out at the skin. Tip in the high right  atrium. Both lumens heparin locked and ready for  immediate use.    2. Post procedural care and observation in the patient's inpatient  care unit.    MARTIN ADEN MD         SYSTEM ID:  [id number]       Discharge Medications   Discharge Medication List as of 3/19/2022  2:13 PM      START taking these medications    Details   lipids (INTRALIPID) 20 % infusion Inject 250 mLs into the vein every 24 hours, Disp-250 mL, R-0, Historical      parenteral nutrition - PTA/DISCHARGE ORDER The TPN formula will print on the After Visit Summary Report., Disp-1 each, R-0, Local Print         CONTINUE these medications which have NOT CHANGED    Details   acetaminophen (TYLENOL) 325 MG tablet Take 650 mg by mouth every 6 hours as needed for mild pain, Historical      budesonide (ENTOCORT EC) 3 MG EC capsule Take 9 mg by mouth every morning, Historical      calcitRIOL (ROCALTROL) 0.25 MCG capsule Take 0.25 mcg by mouth three times a week, Historical      magnesium oxide (MAG-OX) 400 (240 Mg) MG tablet Take 400 mg by mouth every evening , Historical      ustekinumab (STELARA) 90 MG/ML Inject 90 mg Subcutaneous every 28 days , Historical      vitamin D (ERGOCALCIFEROL) 98269 UNIT capsule Take 50,000 Units by mouth five times a week , Historical      vitamin E (TOCOPHEROL) 400 units (180 mg) capsule Take 400 Units by mouth every evening , Historical      doxylamine (UNISOM) 25 MG TABS tablet Take 50 mg by mouth At Bedtime , Historical           Allergies   Allergies   Allergen Reactions     Carafate Hives     Morphine Hcl Other (See Comments)     SPASMS OF SPHINCTER OF ODDI  (BILIARY TRACT)     Codeine Nausea and Vomiting

## 2022-04-06 ENCOUNTER — LAB REQUISITION (OUTPATIENT)
Dept: LAB | Facility: CLINIC | Age: 42
End: 2022-04-06
Payer: OTHER GOVERNMENT

## 2022-04-06 ENCOUNTER — HOME INFUSION (PRE-WILLOW HOME INFUSION) (OUTPATIENT)
Dept: PHARMACY | Facility: CLINIC | Age: 42
End: 2022-04-06

## 2022-04-06 DIAGNOSIS — E44.0 MODERATE PROTEIN-CALORIE MALNUTRITION (H): ICD-10-CM

## 2022-04-06 LAB
ALBUMIN SERPL-MCNC: 3.6 G/DL (ref 3.4–5)
ALP SERPL-CCNC: 101 U/L (ref 40–150)
ALT SERPL W P-5'-P-CCNC: 67 U/L (ref 0–50)
ANION GAP SERPL CALCULATED.3IONS-SCNC: 9 MMOL/L (ref 3–14)
AST SERPL W P-5'-P-CCNC: 31 U/L (ref 0–45)
BASOPHILS # BLD AUTO: 0 10E3/UL (ref 0–0.2)
BASOPHILS NFR BLD AUTO: 0 %
BILIRUB DIRECT SERPL-MCNC: 0.1 MG/DL (ref 0–0.2)
BILIRUB SERPL-MCNC: 0.3 MG/DL (ref 0.2–1.3)
BILIRUB SERPL-MCNC: 0.3 MG/DL (ref 0.2–1.3)
BUN SERPL-MCNC: 22 MG/DL (ref 7–30)
CALCIUM SERPL-MCNC: 8.8 MG/DL (ref 8.5–10.1)
CHLORIDE BLD-SCNC: 113 MMOL/L (ref 94–109)
CO2 SERPL-SCNC: 19 MMOL/L (ref 20–32)
CREAT SERPL-MCNC: 0.54 MG/DL (ref 0.52–1.04)
EOSINOPHIL # BLD AUTO: 0.2 10E3/UL (ref 0–0.7)
EOSINOPHIL NFR BLD AUTO: 3 %
ERYTHROCYTE [DISTWIDTH] IN BLOOD BY AUTOMATED COUNT: 13.2 % (ref 10–15)
FASTING STATUS PATIENT QL REPORTED: NORMAL
GFR SERPL CREATININE-BSD FRML MDRD: >90 ML/MIN/1.73M2
GLUCOSE BLD-MCNC: 101 MG/DL (ref 70–99)
HCT VFR BLD AUTO: 35.7 % (ref 35–47)
HGB BLD-MCNC: 11.5 G/DL (ref 11.7–15.7)
HOLD SPECIMEN: NORMAL
IMM GRANULOCYTES # BLD: 0 10E3/UL
IMM GRANULOCYTES NFR BLD: 0 %
LYMPHOCYTES # BLD AUTO: 1.8 10E3/UL (ref 0.8–5.3)
LYMPHOCYTES NFR BLD AUTO: 29 %
MAGNESIUM SERPL-MCNC: 2 MG/DL (ref 1.6–2.3)
MCH RBC QN AUTO: 30 PG (ref 26.5–33)
MCHC RBC AUTO-ENTMCNC: 32.2 G/DL (ref 31.5–36.5)
MCV RBC AUTO: 93 FL (ref 78–100)
MONOCYTES # BLD AUTO: 0.5 10E3/UL (ref 0–1.3)
MONOCYTES NFR BLD AUTO: 8 %
NEUTROPHILS # BLD AUTO: 3.7 10E3/UL (ref 1.6–8.3)
NEUTROPHILS NFR BLD AUTO: 60 %
NRBC # BLD AUTO: 0 10E3/UL
NRBC BLD AUTO-RTO: 0 /100
PHOSPHATE SERPL-MCNC: 4.6 MG/DL (ref 2.5–4.5)
PLATELET # BLD AUTO: 272 10E3/UL (ref 150–450)
POTASSIUM BLD-SCNC: 3.7 MMOL/L (ref 3.4–5.3)
PROT SERPL-MCNC: 7.7 G/DL (ref 6.8–8.8)
RBC # BLD AUTO: 3.83 10E6/UL (ref 3.8–5.2)
SODIUM SERPL-SCNC: 141 MMOL/L (ref 133–144)
TRIGL SERPL-MCNC: 44 MG/DL
WBC # BLD AUTO: 6.1 10E3/UL (ref 4–11)

## 2022-04-06 PROCEDURE — 85025 COMPLETE CBC W/AUTO DIFF WBC: CPT | Performed by: COLON & RECTAL SURGERY

## 2022-04-06 PROCEDURE — 84478 ASSAY OF TRIGLYCERIDES: CPT | Performed by: COLON & RECTAL SURGERY

## 2022-04-06 PROCEDURE — 84100 ASSAY OF PHOSPHORUS: CPT | Performed by: COLON & RECTAL SURGERY

## 2022-04-06 PROCEDURE — 80053 COMPREHEN METABOLIC PANEL: CPT | Performed by: COLON & RECTAL SURGERY

## 2022-04-06 PROCEDURE — 83735 ASSAY OF MAGNESIUM: CPT | Performed by: COLON & RECTAL SURGERY

## 2022-04-06 PROCEDURE — 82248 BILIRUBIN DIRECT: CPT | Performed by: COLON & RECTAL SURGERY

## 2022-04-07 ENCOUNTER — HOME INFUSION (PRE-WILLOW HOME INFUSION) (OUTPATIENT)
Dept: PHARMACY | Facility: CLINIC | Age: 42
End: 2022-04-07
Payer: OTHER GOVERNMENT

## 2022-04-11 NOTE — PROGRESS NOTES
This is a recent snapshot of the patient's [facility] medical record.  For current drug dose and complete information and questions, call [phone] or In Basket pool, fv home infusion ([id number])  CSN Number:  [id number]

## 2022-04-11 NOTE — PROGRESS NOTES
This is a recent snapshot of the patient's [facility] medical record.  For current drug dose and complete information and questions, call [phone] or In Basket pool, fv home infusion ([id number])  CSN Number:  [id number]

## 2022-04-12 NOTE — PROGRESS NOTES
This is a recent snapshot of the patient's [facility] medical record.  For current drug dose and complete information and questions, call [phone] or In Basket pool, fv home infusion ([phone])  CSN Number:  [id number]

## 2022-04-13 ENCOUNTER — LAB REQUISITION (OUTPATIENT)
Dept: LAB | Facility: CLINIC | Age: 42
End: 2022-04-13
Payer: OTHER GOVERNMENT

## 2022-04-13 ENCOUNTER — HOME INFUSION (PRE-WILLOW HOME INFUSION) (OUTPATIENT)
Dept: PHARMACY | Facility: CLINIC | Age: 42
End: 2022-04-13

## 2022-04-13 DIAGNOSIS — E44.0 MODERATE PROTEIN-CALORIE MALNUTRITION (H): ICD-10-CM

## 2022-04-13 DIAGNOSIS — K56.609 UNSPECIFIED INTESTINAL OBSTRUCTION, UNSPECIFIED AS TO PARTIAL VERSUS COMPLETE OBSTRUCTION (H): ICD-10-CM

## 2022-04-13 LAB
ALBUMIN SERPL-MCNC: 3.4 G/DL (ref 3.4–5)
ALP SERPL-CCNC: 104 U/L (ref 40–150)
ALT SERPL W P-5'-P-CCNC: 84 U/L (ref 0–50)
ANION GAP SERPL CALCULATED.3IONS-SCNC: 9 MMOL/L (ref 3–14)
AST SERPL W P-5'-P-CCNC: 35 U/L (ref 0–45)
BASOPHILS # BLD AUTO: 0 10E3/UL (ref 0–0.2)
BASOPHILS NFR BLD AUTO: 0 %
BILIRUB DIRECT SERPL-MCNC: 0.1 MG/DL (ref 0–0.2)
BILIRUB SERPL-MCNC: 0.4 MG/DL (ref 0.2–1.3)
BILIRUB SERPL-MCNC: 0.4 MG/DL (ref 0.2–1.3)
BUN SERPL-MCNC: 21 MG/DL (ref 7–30)
CALCIUM SERPL-MCNC: 9 MG/DL (ref 8.5–10.1)
CHLORIDE BLD-SCNC: 109 MMOL/L (ref 94–109)
CO2 SERPL-SCNC: 21 MMOL/L (ref 20–32)
CREAT SERPL-MCNC: 0.52 MG/DL (ref 0.52–1.04)
EOSINOPHIL # BLD AUTO: 0.2 10E3/UL (ref 0–0.7)
EOSINOPHIL NFR BLD AUTO: 3 %
ERYTHROCYTE [DISTWIDTH] IN BLOOD BY AUTOMATED COUNT: 13.2 % (ref 10–15)
FASTING STATUS PATIENT QL REPORTED: NORMAL
GFR SERPL CREATININE-BSD FRML MDRD: >90 ML/MIN/1.73M2
GLUCOSE BLD-MCNC: 93 MG/DL (ref 70–99)
HCT VFR BLD AUTO: 36.5 % (ref 35–47)
HGB BLD-MCNC: 11.9 G/DL (ref 11.7–15.7)
IMM GRANULOCYTES # BLD: 0 10E3/UL
IMM GRANULOCYTES NFR BLD: 1 %
LYMPHOCYTES # BLD AUTO: 1.6 10E3/UL (ref 0.8–5.3)
LYMPHOCYTES NFR BLD AUTO: 25 %
MAGNESIUM SERPL-MCNC: 1.9 MG/DL (ref 1.6–2.3)
MCH RBC QN AUTO: 30.1 PG (ref 26.5–33)
MCHC RBC AUTO-ENTMCNC: 32.6 G/DL (ref 31.5–36.5)
MCV RBC AUTO: 92 FL (ref 78–100)
MONOCYTES # BLD AUTO: 0.6 10E3/UL (ref 0–1.3)
MONOCYTES NFR BLD AUTO: 9 %
NEUTROPHILS # BLD AUTO: 3.9 10E3/UL (ref 1.6–8.3)
NEUTROPHILS NFR BLD AUTO: 62 %
NRBC # BLD AUTO: 0 10E3/UL
NRBC BLD AUTO-RTO: 0 /100
PHOSPHATE SERPL-MCNC: 4.4 MG/DL (ref 2.5–4.5)
PLATELET # BLD AUTO: 277 10E3/UL (ref 150–450)
POTASSIUM BLD-SCNC: 3.8 MMOL/L (ref 3.4–5.3)
PROT SERPL-MCNC: 7.5 G/DL (ref 6.8–8.8)
RBC # BLD AUTO: 3.96 10E6/UL (ref 3.8–5.2)
SODIUM SERPL-SCNC: 139 MMOL/L (ref 133–144)
TRIGL SERPL-MCNC: 45 MG/DL
WBC # BLD AUTO: 6.3 10E3/UL (ref 4–11)

## 2022-04-13 PROCEDURE — 80053 COMPREHEN METABOLIC PANEL: CPT | Performed by: COLON & RECTAL SURGERY

## 2022-04-13 PROCEDURE — 84100 ASSAY OF PHOSPHORUS: CPT | Performed by: COLON & RECTAL SURGERY

## 2022-04-13 PROCEDURE — 83735 ASSAY OF MAGNESIUM: CPT | Performed by: COLON & RECTAL SURGERY

## 2022-04-13 PROCEDURE — 82248 BILIRUBIN DIRECT: CPT | Performed by: COLON & RECTAL SURGERY

## 2022-04-13 PROCEDURE — 84478 ASSAY OF TRIGLYCERIDES: CPT | Performed by: COLON & RECTAL SURGERY

## 2022-04-13 PROCEDURE — 85025 COMPLETE CBC W/AUTO DIFF WBC: CPT | Performed by: COLON & RECTAL SURGERY

## 2022-04-20 ENCOUNTER — LAB REQUISITION (OUTPATIENT)
Dept: LAB | Facility: CLINIC | Age: 42
End: 2022-04-20
Payer: OTHER GOVERNMENT

## 2022-04-20 ENCOUNTER — HOME INFUSION (PRE-WILLOW HOME INFUSION) (OUTPATIENT)
Dept: PHARMACY | Facility: CLINIC | Age: 42
End: 2022-04-20

## 2022-04-20 DIAGNOSIS — E44.0 MODERATE PROTEIN-CALORIE MALNUTRITION (H): ICD-10-CM

## 2022-04-20 LAB
ALBUMIN SERPL-MCNC: 3.3 G/DL (ref 3.4–5)
ALP SERPL-CCNC: 103 U/L (ref 40–150)
ALT SERPL W P-5'-P-CCNC: 99 U/L (ref 0–50)
ANION GAP SERPL CALCULATED.3IONS-SCNC: 10 MMOL/L (ref 3–14)
AST SERPL W P-5'-P-CCNC: 46 U/L (ref 0–45)
BASOPHILS # BLD AUTO: 0 10E3/UL (ref 0–0.2)
BASOPHILS NFR BLD AUTO: 0 %
BILIRUB DIRECT SERPL-MCNC: 0.1 MG/DL (ref 0–0.2)
BILIRUB SERPL-MCNC: 0.3 MG/DL (ref 0.2–1.3)
BUN SERPL-MCNC: 20 MG/DL (ref 7–30)
CALCIUM SERPL-MCNC: 9.1 MG/DL (ref 8.5–10.1)
CHLORIDE BLD-SCNC: 111 MMOL/L (ref 94–109)
CO2 SERPL-SCNC: 23 MMOL/L (ref 20–32)
CREAT SERPL-MCNC: 0.54 MG/DL (ref 0.52–1.04)
EOSINOPHIL # BLD AUTO: 0.2 10E3/UL (ref 0–0.7)
EOSINOPHIL NFR BLD AUTO: 3 %
ERYTHROCYTE [DISTWIDTH] IN BLOOD BY AUTOMATED COUNT: 12.9 % (ref 10–15)
FASTING STATUS PATIENT QL REPORTED: NORMAL
GFR SERPL CREATININE-BSD FRML MDRD: >90 ML/MIN/1.73M2
GLUCOSE BLD-MCNC: 107 MG/DL (ref 70–99)
HCT VFR BLD AUTO: 35.8 % (ref 35–47)
HGB BLD-MCNC: 11.9 G/DL (ref 11.7–15.7)
HOLD SPECIMEN: NORMAL
IMM GRANULOCYTES # BLD: 0 10E3/UL
IMM GRANULOCYTES NFR BLD: 0 %
LYMPHOCYTES # BLD AUTO: 1.7 10E3/UL (ref 0.8–5.3)
LYMPHOCYTES NFR BLD AUTO: 27 %
MAGNESIUM SERPL-MCNC: 1.9 MG/DL (ref 1.6–2.3)
MCH RBC QN AUTO: 30.4 PG (ref 26.5–33)
MCHC RBC AUTO-ENTMCNC: 33.2 G/DL (ref 31.5–36.5)
MCV RBC AUTO: 91 FL (ref 78–100)
MONOCYTES # BLD AUTO: 0.5 10E3/UL (ref 0–1.3)
MONOCYTES NFR BLD AUTO: 8 %
NEUTROPHILS # BLD AUTO: 3.8 10E3/UL (ref 1.6–8.3)
NEUTROPHILS NFR BLD AUTO: 62 %
NRBC # BLD AUTO: 0 10E3/UL
NRBC BLD AUTO-RTO: 0 /100
PHOSPHATE SERPL-MCNC: 3.6 MG/DL (ref 2.5–4.5)
PLATELET # BLD AUTO: 276 10E3/UL (ref 150–450)
POTASSIUM BLD-SCNC: 3.7 MMOL/L (ref 3.4–5.3)
PROT SERPL-MCNC: 7.9 G/DL (ref 6.8–8.8)
RBC # BLD AUTO: 3.92 10E6/UL (ref 3.8–5.2)
SODIUM SERPL-SCNC: 144 MMOL/L (ref 133–144)
TRIGL SERPL-MCNC: 40 MG/DL
WBC # BLD AUTO: 6.1 10E3/UL (ref 4–11)

## 2022-04-20 PROCEDURE — 84478 ASSAY OF TRIGLYCERIDES: CPT | Performed by: COLON & RECTAL SURGERY

## 2022-04-20 PROCEDURE — 84100 ASSAY OF PHOSPHORUS: CPT | Performed by: COLON & RECTAL SURGERY

## 2022-04-20 PROCEDURE — 82248 BILIRUBIN DIRECT: CPT | Performed by: COLON & RECTAL SURGERY

## 2022-04-20 PROCEDURE — 83735 ASSAY OF MAGNESIUM: CPT | Performed by: COLON & RECTAL SURGERY

## 2022-04-20 PROCEDURE — 80053 COMPREHEN METABOLIC PANEL: CPT | Performed by: COLON & RECTAL SURGERY

## 2022-04-20 PROCEDURE — 85025 COMPLETE CBC W/AUTO DIFF WBC: CPT | Performed by: COLON & RECTAL SURGERY

## 2022-04-21 ENCOUNTER — HOME INFUSION (PRE-WILLOW HOME INFUSION) (OUTPATIENT)
Dept: PHARMACY | Facility: CLINIC | Age: 42
End: 2022-04-21
Payer: OTHER GOVERNMENT

## 2022-04-22 NOTE — PROGRESS NOTES
This is a recent snapshot of the patient's [facility] medical record.  For current drug dose and complete information and questions, call [phone] or In Basket pool, fv home infusion ([id number])  CSN Number:  [id number]

## 2022-04-22 NOTE — PROGRESS NOTES
This is a recent snapshot of the patient's [facility] medical record.  For current drug dose and complete information and questions, call [phone] or In Basket pool, fv home infusion ([id number])  CSN Number:  [id number]

## 2022-04-27 ENCOUNTER — LAB REQUISITION (OUTPATIENT)
Dept: LAB | Facility: CLINIC | Age: 42
End: 2022-04-27
Payer: OTHER GOVERNMENT

## 2022-04-27 ENCOUNTER — HOME INFUSION (PRE-WILLOW HOME INFUSION) (OUTPATIENT)
Dept: PHARMACY | Facility: CLINIC | Age: 42
End: 2022-04-27

## 2022-04-27 DIAGNOSIS — E44.0 MODERATE PROTEIN-CALORIE MALNUTRITION (H): ICD-10-CM

## 2022-04-27 LAB
ALBUMIN SERPL-MCNC: 3.3 G/DL (ref 3.4–5)
ALP SERPL-CCNC: 95 U/L (ref 40–150)
ALT SERPL W P-5'-P-CCNC: 72 U/L (ref 0–50)
ANION GAP SERPL CALCULATED.3IONS-SCNC: 7 MMOL/L (ref 3–14)
AST SERPL W P-5'-P-CCNC: 30 U/L (ref 0–45)
BASOPHILS # BLD AUTO: 0 10E3/UL (ref 0–0.2)
BASOPHILS NFR BLD AUTO: 0 %
BILIRUB DIRECT SERPL-MCNC: <0.1 MG/DL (ref 0–0.2)
BILIRUB SERPL-MCNC: 0.2 MG/DL (ref 0.2–1.3)
BILIRUB SERPL-MCNC: 0.4 MG/DL (ref 0.2–1.3)
BUN SERPL-MCNC: 22 MG/DL (ref 7–30)
CALCIUM SERPL-MCNC: 8.8 MG/DL (ref 8.5–10.1)
CHLORIDE BLD-SCNC: 104 MMOL/L (ref 94–109)
CO2 SERPL-SCNC: 28 MMOL/L (ref 20–32)
CREAT SERPL-MCNC: 0.55 MG/DL (ref 0.52–1.04)
EOSINOPHIL # BLD AUTO: 0.2 10E3/UL (ref 0–0.7)
EOSINOPHIL NFR BLD AUTO: 3 %
ERYTHROCYTE [DISTWIDTH] IN BLOOD BY AUTOMATED COUNT: 12.6 % (ref 10–15)
GFR SERPL CREATININE-BSD FRML MDRD: >90 ML/MIN/1.73M2
GLUCOSE BLD-MCNC: 103 MG/DL (ref 70–99)
HCT VFR BLD AUTO: 34.4 % (ref 35–47)
HGB BLD-MCNC: 11.3 G/DL (ref 11.7–15.7)
IMM GRANULOCYTES # BLD: 0 10E3/UL
IMM GRANULOCYTES NFR BLD: 0 %
LYMPHOCYTES # BLD AUTO: 1.9 10E3/UL (ref 0.8–5.3)
LYMPHOCYTES NFR BLD AUTO: 29 %
MAGNESIUM SERPL-MCNC: 2 MG/DL (ref 1.6–2.3)
MCH RBC QN AUTO: 30.4 PG (ref 26.5–33)
MCHC RBC AUTO-ENTMCNC: 32.8 G/DL (ref 31.5–36.5)
MCV RBC AUTO: 93 FL (ref 78–100)
MONOCYTES # BLD AUTO: 0.5 10E3/UL (ref 0–1.3)
MONOCYTES NFR BLD AUTO: 8 %
NEUTROPHILS # BLD AUTO: 3.9 10E3/UL (ref 1.6–8.3)
NEUTROPHILS NFR BLD AUTO: 60 %
NRBC # BLD AUTO: 0 10E3/UL
NRBC BLD AUTO-RTO: 0 /100
PHOSPHATE SERPL-MCNC: 4.1 MG/DL (ref 2.5–4.5)
PLATELET # BLD AUTO: 248 10E3/UL (ref 150–450)
POTASSIUM BLD-SCNC: 3.9 MMOL/L (ref 3.4–5.3)
PROT SERPL-MCNC: 6.7 G/DL (ref 6.8–8.8)
RBC # BLD AUTO: 3.72 10E6/UL (ref 3.8–5.2)
SODIUM SERPL-SCNC: 139 MMOL/L (ref 133–144)
TRIGL SERPL-MCNC: 55 MG/DL
WBC # BLD AUTO: 6.6 10E3/UL (ref 4–11)

## 2022-04-27 PROCEDURE — 85025 COMPLETE CBC W/AUTO DIFF WBC: CPT | Performed by: COLON & RECTAL SURGERY

## 2022-04-27 PROCEDURE — 82248 BILIRUBIN DIRECT: CPT | Performed by: COLON & RECTAL SURGERY

## 2022-04-27 PROCEDURE — 36415 COLL VENOUS BLD VENIPUNCTURE: CPT | Performed by: COLON & RECTAL SURGERY

## 2022-04-27 PROCEDURE — 84100 ASSAY OF PHOSPHORUS: CPT | Performed by: COLON & RECTAL SURGERY

## 2022-04-27 PROCEDURE — 80053 COMPREHEN METABOLIC PANEL: CPT | Performed by: COLON & RECTAL SURGERY

## 2022-04-27 PROCEDURE — 83735 ASSAY OF MAGNESIUM: CPT | Performed by: COLON & RECTAL SURGERY

## 2022-04-27 PROCEDURE — 84478 ASSAY OF TRIGLYCERIDES: CPT | Performed by: COLON & RECTAL SURGERY

## 2022-04-28 ENCOUNTER — HOME INFUSION (PRE-WILLOW HOME INFUSION) (OUTPATIENT)
Dept: PHARMACY | Facility: CLINIC | Age: 42
End: 2022-04-28
Payer: OTHER GOVERNMENT

## 2022-05-04 ENCOUNTER — HOME INFUSION (PRE-WILLOW HOME INFUSION) (OUTPATIENT)
Dept: PHARMACY | Facility: CLINIC | Age: 42
End: 2022-05-04

## 2022-05-04 ENCOUNTER — LAB REQUISITION (OUTPATIENT)
Dept: LAB | Facility: CLINIC | Age: 42
End: 2022-05-04
Payer: OTHER GOVERNMENT

## 2022-05-04 DIAGNOSIS — E44.0 MODERATE PROTEIN-CALORIE MALNUTRITION (H): ICD-10-CM

## 2022-05-04 LAB
ALBUMIN SERPL-MCNC: 3.4 G/DL (ref 3.4–5)
ALP SERPL-CCNC: 102 U/L (ref 40–150)
ALT SERPL W P-5'-P-CCNC: 107 U/L (ref 0–50)
ANION GAP SERPL CALCULATED.3IONS-SCNC: 5 MMOL/L (ref 3–14)
AST SERPL W P-5'-P-CCNC: 48 U/L (ref 0–45)
BASOPHILS # BLD AUTO: 0 10E3/UL (ref 0–0.2)
BASOPHILS NFR BLD AUTO: 0 %
BILIRUB DIRECT SERPL-MCNC: 0.1 MG/DL (ref 0–0.2)
BILIRUB SERPL-MCNC: 0.4 MG/DL (ref 0.2–1.3)
BUN SERPL-MCNC: 21 MG/DL (ref 7–30)
CALCIUM SERPL-MCNC: 9 MG/DL (ref 8.5–10.1)
CHLORIDE BLD-SCNC: 113 MMOL/L (ref 94–109)
CO2 SERPL-SCNC: 21 MMOL/L (ref 20–32)
CREAT SERPL-MCNC: 0.53 MG/DL (ref 0.52–1.04)
EOSINOPHIL # BLD AUTO: 0.2 10E3/UL (ref 0–0.7)
EOSINOPHIL NFR BLD AUTO: 3 %
ERYTHROCYTE [DISTWIDTH] IN BLOOD BY AUTOMATED COUNT: 12.5 % (ref 10–15)
FASTING STATUS PATIENT QL REPORTED: NORMAL
GFR SERPL CREATININE-BSD FRML MDRD: >90 ML/MIN/1.73M2
GLUCOSE BLD-MCNC: 76 MG/DL (ref 70–99)
HCT VFR BLD AUTO: 35.1 % (ref 35–47)
HGB BLD-MCNC: 11.7 G/DL (ref 11.7–15.7)
HOLD SPECIMEN: NORMAL
IMM GRANULOCYTES # BLD: 0 10E3/UL
IMM GRANULOCYTES NFR BLD: 0 %
LYMPHOCYTES # BLD AUTO: 1.8 10E3/UL (ref 0.8–5.3)
LYMPHOCYTES NFR BLD AUTO: 25 %
MAGNESIUM SERPL-MCNC: 2.3 MG/DL (ref 1.6–2.3)
MCH RBC QN AUTO: 30.5 PG (ref 26.5–33)
MCHC RBC AUTO-ENTMCNC: 33.3 G/DL (ref 31.5–36.5)
MCV RBC AUTO: 91 FL (ref 78–100)
MONOCYTES # BLD AUTO: 0.6 10E3/UL (ref 0–1.3)
MONOCYTES NFR BLD AUTO: 8 %
NEUTROPHILS # BLD AUTO: 4.6 10E3/UL (ref 1.6–8.3)
NEUTROPHILS NFR BLD AUTO: 64 %
NRBC # BLD AUTO: 0 10E3/UL
NRBC BLD AUTO-RTO: 0 /100
PHOSPHATE SERPL-MCNC: 3.7 MG/DL (ref 2.5–4.5)
PLATELET # BLD AUTO: 254 10E3/UL (ref 150–450)
POTASSIUM BLD-SCNC: 4.2 MMOL/L (ref 3.4–5.3)
PROT SERPL-MCNC: 7.2 G/DL (ref 6.8–8.8)
RBC # BLD AUTO: 3.84 10E6/UL (ref 3.8–5.2)
SODIUM SERPL-SCNC: 139 MMOL/L (ref 133–144)
TRIGL SERPL-MCNC: 49 MG/DL
WBC # BLD AUTO: 7.2 10E3/UL (ref 4–11)

## 2022-05-04 PROCEDURE — 82248 BILIRUBIN DIRECT: CPT | Performed by: COLON & RECTAL SURGERY

## 2022-05-04 PROCEDURE — 83735 ASSAY OF MAGNESIUM: CPT | Performed by: COLON & RECTAL SURGERY

## 2022-05-04 PROCEDURE — 84100 ASSAY OF PHOSPHORUS: CPT | Performed by: COLON & RECTAL SURGERY

## 2022-05-04 PROCEDURE — 80053 COMPREHEN METABOLIC PANEL: CPT | Performed by: COLON & RECTAL SURGERY

## 2022-05-04 PROCEDURE — 85025 COMPLETE CBC W/AUTO DIFF WBC: CPT | Performed by: COLON & RECTAL SURGERY

## 2022-05-04 PROCEDURE — 84478 ASSAY OF TRIGLYCERIDES: CPT | Performed by: COLON & RECTAL SURGERY

## 2022-05-06 NOTE — PROGRESS NOTES
This is a recent snapshot of the patient's [facility] medical record.  For current drug dose and complete information and questions, call [phone] or In Basket pool, fv home infusion ([id number])  CSN Number:  [id number]

## 2022-05-11 ENCOUNTER — LAB REQUISITION (OUTPATIENT)
Dept: LAB | Facility: CLINIC | Age: 42
End: 2022-05-11
Payer: OTHER GOVERNMENT

## 2022-05-11 ENCOUNTER — HOME INFUSION (PRE-WILLOW HOME INFUSION) (OUTPATIENT)
Dept: PHARMACY | Facility: CLINIC | Age: 42
End: 2022-05-11

## 2022-05-11 DIAGNOSIS — E44.0 MODERATE PROTEIN-CALORIE MALNUTRITION (H): ICD-10-CM

## 2022-05-11 DIAGNOSIS — K56.609 UNSPECIFIED INTESTINAL OBSTRUCTION, UNSPECIFIED AS TO PARTIAL VERSUS COMPLETE OBSTRUCTION (H): ICD-10-CM

## 2022-05-11 LAB
ALBUMIN SERPL-MCNC: 3.3 G/DL (ref 3.4–5)
ALP SERPL-CCNC: 135 U/L (ref 40–150)
ALT SERPL W P-5'-P-CCNC: 133 U/L (ref 0–50)
ANION GAP SERPL CALCULATED.3IONS-SCNC: 7 MMOL/L (ref 3–14)
AST SERPL W P-5'-P-CCNC: 84 U/L (ref 0–45)
BASOPHILS # BLD AUTO: 0 10E3/UL (ref 0–0.2)
BASOPHILS NFR BLD AUTO: 0 %
BILIRUB DIRECT SERPL-MCNC: <0.1 MG/DL (ref 0–0.2)
BILIRUB SERPL-MCNC: 0.3 MG/DL (ref 0.2–1.3)
BILIRUB SERPL-MCNC: 0.3 MG/DL (ref 0.2–1.3)
BUN SERPL-MCNC: 12 MG/DL (ref 7–30)
CALCIUM SERPL-MCNC: 9 MG/DL (ref 8.5–10.1)
CHLORIDE BLD-SCNC: 109 MMOL/L (ref 94–109)
CO2 SERPL-SCNC: 24 MMOL/L (ref 20–32)
CREAT SERPL-MCNC: 0.56 MG/DL (ref 0.52–1.04)
EOSINOPHIL # BLD AUTO: 0.3 10E3/UL (ref 0–0.7)
EOSINOPHIL NFR BLD AUTO: 5 %
ERYTHROCYTE [DISTWIDTH] IN BLOOD BY AUTOMATED COUNT: 12.1 % (ref 10–15)
FASTING STATUS PATIENT QL REPORTED: NORMAL
GFR SERPL CREATININE-BSD FRML MDRD: >90 ML/MIN/1.73M2
GLUCOSE BLD-MCNC: 83 MG/DL (ref 70–99)
HCT VFR BLD AUTO: 33.8 % (ref 35–47)
HGB BLD-MCNC: 11 G/DL (ref 11.7–15.7)
HOLD SPECIMEN: NORMAL
IMM GRANULOCYTES # BLD: 0 10E3/UL
IMM GRANULOCYTES NFR BLD: 0 %
LYMPHOCYTES # BLD AUTO: 1.5 10E3/UL (ref 0.8–5.3)
LYMPHOCYTES NFR BLD AUTO: 26 %
MAGNESIUM SERPL-MCNC: 1.7 MG/DL (ref 1.6–2.3)
MCH RBC QN AUTO: 30.1 PG (ref 26.5–33)
MCHC RBC AUTO-ENTMCNC: 32.5 G/DL (ref 31.5–36.5)
MCV RBC AUTO: 92 FL (ref 78–100)
MONOCYTES # BLD AUTO: 0.4 10E3/UL (ref 0–1.3)
MONOCYTES NFR BLD AUTO: 6 %
NEUTROPHILS # BLD AUTO: 3.7 10E3/UL (ref 1.6–8.3)
NEUTROPHILS NFR BLD AUTO: 63 %
NRBC # BLD AUTO: 0 10E3/UL
NRBC BLD AUTO-RTO: 0 /100
PHOSPHATE SERPL-MCNC: 4.1 MG/DL (ref 2.5–4.5)
PLATELET # BLD AUTO: 253 10E3/UL (ref 150–450)
POTASSIUM BLD-SCNC: 3.5 MMOL/L (ref 3.4–5.3)
PROT SERPL-MCNC: 7.1 G/DL (ref 6.8–8.8)
RBC # BLD AUTO: 3.66 10E6/UL (ref 3.8–5.2)
SODIUM SERPL-SCNC: 140 MMOL/L (ref 133–144)
TRIGL SERPL-MCNC: 100 MG/DL
WBC # BLD AUTO: 5.9 10E3/UL (ref 4–11)

## 2022-05-11 PROCEDURE — 84478 ASSAY OF TRIGLYCERIDES: CPT | Performed by: COLON & RECTAL SURGERY

## 2022-05-11 PROCEDURE — 83735 ASSAY OF MAGNESIUM: CPT | Performed by: COLON & RECTAL SURGERY

## 2022-05-11 PROCEDURE — 82248 BILIRUBIN DIRECT: CPT | Performed by: COLON & RECTAL SURGERY

## 2022-05-11 PROCEDURE — 80053 COMPREHEN METABOLIC PANEL: CPT | Performed by: COLON & RECTAL SURGERY

## 2022-05-11 PROCEDURE — 85025 COMPLETE CBC W/AUTO DIFF WBC: CPT | Performed by: COLON & RECTAL SURGERY

## 2022-05-11 PROCEDURE — 84100 ASSAY OF PHOSPHORUS: CPT | Performed by: COLON & RECTAL SURGERY

## 2022-05-12 ENCOUNTER — HOME INFUSION (PRE-WILLOW HOME INFUSION) (OUTPATIENT)
Dept: PHARMACY | Facility: CLINIC | Age: 42
End: 2022-05-12

## 2022-05-16 NOTE — PROGRESS NOTES
Colon and Rectal Surgery Clinic Note    RE: Alok Nino  : 1980  AURELIO: 24    Alok Nino is a very pleasant 42 year old female with long-standing Crohn's disease here for follow up of SBO.    HPI:    8 prior surgeries including stricturoplasties, multiple bowel resection surgeries. Most recent surgery was on 21 for ANTHONY and distal ileal resection with ileoileal anastomosis.     Colonoscopy with me on 3/7/22 with dilation of anastomotic stricture. She was admitted at [facility] 3/9-3/11 with CT showing diffusely dilated bowel and no clear transition point.    She was admitted here at [facility] 3/13 with SBO most likely 2/2 ineffective peristalsis causing dilation. It was recommended that she continue on bowel rest with clear liquid diet and TPN until seen by me in follow up.    MRE 3/3/22:  IMPRESSION:   1.  No MR signs of active inflammatory bowel disease. No bowel obstruction.  2.  Ileocecal resection with anastomosis mid right abdomen.  3.  The middle third of the small intestine is comprised of two consecutive surgically altered, chronically dilated segments of small bowel that appear reservoir-like and have ineffective peristalsis.  4.  At present, the tortuous, redundant, large caliber colon is decompressed, but during episodes of small bowel obstructions, the right, transverse and descending colon become significantly distended.     CT AP 3/13/22:  IMPRESSION:   1.  Recurrent marked fluid distention of multiple loops of bowel throughout the abdomen and pelvis without definite transition point. The overall configuration is similar to 2022 MR enterography as well as CT abdomen 2022.    I met with her two months ago and she was making gradual progress. We discussed having her slowly liberalize her diet as it seemed that her symptoms were related to motility. She was on TPN at that time.    Interval History: Alok has been doing well. She is eating soft foods but had been  "expanding the variety and amount. She feels like she gets hungry more often. She has gained about 10 pounds in the past two months. She continues on TPN. She feels a little bloated all the time but no pain. She was having a lot of diarrhea and had a lot of grumbling in her abdomen but this stopped when she started Augmentin for a sinus infection. She just finished the Augmentin. No nausea or vomiting. She follows with Dr. Bruno Tolbert of [facility].    Physical examination:  Examination was chaperoned by Nahomy Gandhi NP     Vitals: /73 (BP Location: Right arm, Patient Position: Sitting, Cuff Size: Adult Regular)   Pulse 93   Ht 5' 2\"   Wt 111 lb 11.2 oz   SpO2 98%   BMI 20.43 kg/m    BMI= Body mass index is 20.43 kg/m .  Abdomen: soft, not tender, mildly distended, incisions have healed well.      Laboratory data:    Recent Labs   Lab Test 03/23/22  0850 03/18/22  0537 03/17/22  0554   WBC 6.9   < >  --    HGB 11.7   < >  --       < >  --    CR 0.50*   < > 0.48*   ALBUMIN 3.2*  --  2.4*   BILITOTAL 0.4  0.4  --  0.3   ALKPHOS 122  --  94   ALT 89*  --  54*   AST 67*  --  23   INR  --   --  1.24*    < > = values in this interval not displayed.       Assessment/plan:  Alok seems to be making progress but is still a little distended on exam. Would like to repeat MRE. I think it would be okay to try stopping the TPN for now since she is eating but will keep the PICC line in for now. Will have her continue on a soft diet for now until we see what the MRE shows.  If she has the MRE before I retire in the next few weeks, I will follow up with her for that. Will otherwise have her establish care with Dr. Centeno for further follow up. For details of past medical history, surgical history, family history, medications, allergies, and review of systems, please see details below.    Medical history:  Past Medical History:   Diagnosis Date     C. difficile enteritis      Chronic pain      Crohn " disease (H)      Melanoma (H)      PONV (postoperative nausea and vomiting)        Surgical history:  Past Surgical History:   Procedure Laterality Date     ABDOMEN SURGERY      3 for crohns dx     APPENDECTOMY       APPENDECTOMY       CHOLECYSTECTOMY       CHOLECYSTECTOMY       COLONOSCOPY       ENDOSCOPIC ULTRASOUND UPPER GASTROINTESTINAL TRACT (GI) N/A 11/13/2020    Procedure: ENDOSCOPIC ULTRASOUND, ESOPHAGOSCOPY / UPPER GASTROINTESTINAL TRACT with BIOPSY;  Surgeon: Cydney Garcia MD;  Location:  OR     GYN SURGERY       H STATISTIC PICC LINE INSERTION >5YR, FAILED Right 11/10/2021    LUE unsuccessful attempt from another hospital, IR deferral     IR PICC PLACEMENT > 5 YRS OF AGE  11/11/2021     IR PICC PLACEMENT > 5 YRS OF AGE  3/16/2022     OTHER SURGICAL HISTORY      strictoplasty x3     OTHER SURGICAL HISTORY      adhesion removal x1     OTHER SURGICAL HISTORY      small bowel resections     PICC  05/10/2019          RESECTION ILEOCECAL  12/06/2013    Procedure: RESECTION ILEOCECAL;  Laparotomy, Extensive Lysis of Adhesions, Stricture Plasty X2  Anesthesia general with block;  Surgeon: Pete Cartagena MD;  Location: [facility] OR     RESECTION ILEOCOLIC N/A 11/17/2021    Procedure: Exploratory laparotomy, illeal resection, extensive lysis of adhesions (2 hr);  Surgeon: Pete Cartagena MD;  Location: [facility] OR       Family history:  Family History   Problem Relation Age of Onset     No Known Problems Mother      Clotting Disorder Father      No Known Problems Brother      No Known Problems Son      Anesthesia Reaction No family hx of      Colon Cancer No family hx of      Crohn's Disease No family hx of      Ulcerative Colitis No family hx of      Colon Polyps No family hx of        Medications:  Current Outpatient Medications   Medication Sig Dispense Refill     acetaminophen (TYLENOL) 325 MG tablet Take 650 mg by mouth every 6 hours as needed for mild pain       calcitRIOL (ROCALTROL) 0.25 MCG capsule Take  "0.25 mcg by mouth three times a week       doxylamine (UNISOM) 25 MG TABS tablet Take 50 mg by mouth At Bedtime        lipids (INTRALIPID) 20 % infusion Inject 250 mLs into the vein every 24 hours 250 mL 0     magnesium oxide (MAG-OX) 400 (240 Mg) MG tablet Take 400 mg by mouth every evening        parenteral nutrition - PTA/DISCHARGE ORDER The TPN formula will print on the After Visit Summary Report. 1 each 0     ustekinumab (STELARA) 90 MG/ML Inject 90 mg Subcutaneous every 28 days        vitamin D (ERGOCALCIFEROL) 56670 UNIT capsule Take 50,000 Units by mouth five times a week        vitamin E (TOCOPHEROL) 400 units (180 mg) capsule Take 400 Units by mouth every evening        budesonide (ENTOCORT EC) 3 MG EC capsule Take 9 mg by mouth every morning (Patient not taking: Reported on 5/17/2022)         Allergies:  The patientis allergic to carafate, morphine hcl, and codeine.    Social history:  Social History     Tobacco Use     Smoking status: Never Smoker     Smokeless tobacco: Never Used   Substance Use Topics     Alcohol use: Yes     Comment: occasional/ 1 drink per 1-3 month     Marital status: .    Review of Systems:  Nursing Notes:   Drea Samuel  5/17/2022  2:01 PM  Signed  Chief Complaint   Patient presents with     Follow Up     Crohn's disease       Vitals:    05/17/22 1357   BP: 113/73   BP Location: Right arm   Patient Position: Sitting   Cuff Size: Adult Regular   Pulse: 93   SpO2: 98%   Weight: 111 lb 11.2 oz   Height: 5' 2\"       Body mass index is 20.43 kg/m .    Drea Samuel CMA         20 minutes spent on the date of encounter (excluding time performing procedures) performing chart review, history and exam, documentation and further activities as noted above.    Pete Cartagena MD   Professor and Chief  Division of Colon and Rectal Surgery  [facility]      Referring Provider:  No referring provider defined for this encounter.     Primary Care " Provider:  Nakita Odonnell    This note was created using speech recognition software and may contain unintended word substitutions.

## 2022-05-17 ENCOUNTER — OFFICE VISIT (OUTPATIENT)
Dept: SURGERY | Facility: CLINIC | Age: 42
End: 2022-05-17
Payer: OTHER GOVERNMENT

## 2022-05-17 ENCOUNTER — HOME INFUSION (PRE-WILLOW HOME INFUSION) (OUTPATIENT)
Dept: PHARMACY | Facility: CLINIC | Age: 42
End: 2022-05-17

## 2022-05-17 VITALS
HEIGHT: 62 IN | DIASTOLIC BLOOD PRESSURE: 73 MMHG | WEIGHT: 111.7 LBS | BODY MASS INDEX: 20.56 KG/M2 | SYSTOLIC BLOOD PRESSURE: 113 MMHG | OXYGEN SATURATION: 98 % | HEART RATE: 93 BPM

## 2022-05-17 DIAGNOSIS — K50.012 CROHN'S DISEASE OF SMALL INTESTINE WITH INTESTINAL OBSTRUCTION (H): Primary | ICD-10-CM

## 2022-05-17 PROCEDURE — 99213 OFFICE O/P EST LOW 20 MIN: CPT | Performed by: COLON & RECTAL SURGERY

## 2022-05-17 ASSESSMENT — PAIN SCALES - GENERAL: PAINLEVEL: NO PAIN (0)

## 2022-05-17 NOTE — NURSING NOTE
"Chief Complaint   Patient presents with     Follow Up     Crohn's disease       Vitals:    05/17/22 1357   BP: 113/73   BP Location: Right arm   Patient Position: Sitting   Cuff Size: Adult Regular   Pulse: 93   SpO2: 98%   Weight: 111 lb 11.2 oz   Height: 5' 2\"       Body mass index is 20.43 kg/m .    Drea Samuel CMA    "

## 2022-05-17 NOTE — LETTER
2022       RE: Alok Nino  [id number]  [facility] [address]     Dear Colleague,    Thank you for referring your patient, Alok Nino, to the [facility] COLON AND RECTAL SURGERY CLINIC [place] at [facility]. Please see a copy of my visit note below.    Colon and Rectal Surgery Clinic Note    RE: Alok Nino  : 1980  AURELIO: 22    Alok Nino is a very pleasant 42 year old female with long-standing Crohn's disease here for follow up of SBO.    HPI:    8 prior surgeries including stricturoplasties, multiple bowel resection surgeries. Most recent surgery was on 21 for ANTHONY and distal ileal resection with ileoileal anastomosis.     Colonoscopy with me on 3/7/22 with dilation of anastomotic stricture. She was admitted at [facility] 3/9-3/11 with CT showing diffusely dilated bowel and no clear transition point.    She was admitted here at [facility] 3/13 with SBO most likely 2/2 ineffective peristalsis causing dilation. It was recommended that she continue on bowel rest with clear liquid diet and TPN until seen by me in follow up.    MRE 3/3/22:  IMPRESSION:   1.  No MR signs of active inflammatory bowel disease. No bowel obstruction.  2.  Ileocecal resection with anastomosis mid right abdomen.  3.  The middle third of the small intestine is comprised of two consecutive surgically altered, chronically dilated segments of small bowel that appear reservoir-like and have ineffective peristalsis.  4.  At present, the tortuous, redundant, large caliber colon is decompressed, but during episodes of small bowel obstructions, the right, transverse and descending colon become significantly distended.     CT AP 3/13/22:  IMPRESSION:   1.  Recurrent marked fluid distention of multiple loops of bowel throughout the abdomen and pelvis without definite transition point. The overall configuration is similar to 2022 MR enterography as  "well as CT abdomen 02/09/2022.    I met with her two months ago and she was making gradual progress. We discussed having her slowly liberalize her diet as it seemed that her symptoms were related to motility. She was on TPN at that time.    Interval History: Alok has been doing well. She is eating soft foods but had been expanding the variety and amount. She feels like she gets hungry more often. She has gained about 10 pounds in the past two months. She continues on TPN. She feels a little bloated all the time but no pain. She was having a lot of diarrhea and had a lot of grumbling in her abdomen but this stopped when she started Augmentin for a sinus infection. She just finished the Augmentin. No nausea or vomiting. She follows with Dr. Bruno Tolbert of [facility].    Physical examination:  Examination was chaperoned by Nahomy Gandhi NP     Vitals: /73 (BP Location: Right arm, Patient Position: Sitting, Cuff Size: Adult Regular)   Pulse 93   Ht 5' 2\"   Wt 111 lb 11.2 oz   SpO2 98%   BMI 20.43 kg/m    BMI= Body mass index is 20.43 kg/m .  Abdomen: soft, not tender, mildly distended, incisions have healed well.      Laboratory data:    Recent Labs   Lab Test 03/23/22  0850 03/18/22  0537 03/17/22  0554   WBC 6.9   < >  --    HGB 11.7   < >  --       < >  --    CR 0.50*   < > 0.48*   ALBUMIN 3.2*  --  2.4*   BILITOTAL 0.4  0.4  --  0.3   ALKPHOS 122  --  94   ALT 89*  --  54*   AST 67*  --  23   INR  --   --  1.24*    < > = values in this interval not displayed.       Assessment/plan:  Alok seems to be making progress but is still a little distended on exam. Would like to repeat MRE. I think it would be okay to try stopping the TPN for now since she is eating but will keep the PICC line in for now. Will have her continue on a soft diet for now until we see what the MRE shows.  If she has the MRE before I retire in the next few weeks, I will follow up with her for that. Will otherwise have " her establish care with Dr. Centeno for further follow up. For details of past medical history, surgical history, family history, medications, allergies, and review of systems, please see details below.    Medical history:  Past Medical History:   Diagnosis Date     C. difficile enteritis      Chronic pain      Crohn disease (H)      Melanoma (H)      PONV (postoperative nausea and vomiting)        Surgical history:  Past Surgical History:   Procedure Laterality Date     ABDOMEN SURGERY      3 for crohns dx     APPENDECTOMY       APPENDECTOMY       CHOLECYSTECTOMY       CHOLECYSTECTOMY       COLONOSCOPY       ENDOSCOPIC ULTRASOUND UPPER GASTROINTESTINAL TRACT (GI) N/A 11/13/2020    Procedure: ENDOSCOPIC ULTRASOUND, ESOPHAGOSCOPY / UPPER GASTROINTESTINAL TRACT with BIOPSY;  Surgeon: Cydney Garcia MD;  Location: [facility] OR     GYN SURGERY       H STATISTIC PICC LINE INSERTION >5YR, FAILED Right 11/10/2021    LUE unsuccessful attempt from another hospital, IR deferral     IR PICC PLACEMENT > 5 YRS OF AGE  11/11/2021     IR PICC PLACEMENT > 5 YRS OF AGE  3/16/2022     OTHER SURGICAL HISTORY      strictoplasty x3     OTHER SURGICAL HISTORY      adhesion removal x1     OTHER SURGICAL HISTORY      small bowel resections     PICC  05/10/2019          RESECTION ILEOCECAL  12/06/2013    Procedure: RESECTION ILEOCECAL;  Laparotomy, Extensive Lysis of Adhesions, Stricture Plasty X2  Anesthesia general with block;  Surgeon: Pete Cartagena MD;  Location: [facility] OR     RESECTION ILEOCOLIC N/A 11/17/2021    Procedure: Exploratory laparotomy, illeal resection, extensive lysis of adhesions (2 hr);  Surgeon: Pete Cartagena MD;  Location: [facility] OR       Family history:  Family History   Problem Relation Age of Onset     No Known Problems Mother      Clotting Disorder Father      No Known Problems Brother      No Known Problems Son      Anesthesia Reaction No family hx of      Colon Cancer No family hx of      Crohn's Disease No  "family hx of      Ulcerative Colitis No family hx of      Colon Polyps No family hx of        Medications:  Current Outpatient Medications   Medication Sig Dispense Refill     acetaminophen (TYLENOL) 325 MG tablet Take 650 mg by mouth every 6 hours as needed for mild pain       calcitRIOL (ROCALTROL) 0.25 MCG capsule Take 0.25 mcg by mouth three times a week       doxylamine (UNISOM) 25 MG TABS tablet Take 50 mg by mouth At Bedtime        lipids (INTRALIPID) 20 % infusion Inject 250 mLs into the vein every 24 hours 250 mL 0     magnesium oxide (MAG-OX) 400 (240 Mg) MG tablet Take 400 mg by mouth every evening        parenteral nutrition - PTA/DISCHARGE ORDER The TPN formula will print on the After Visit Summary Report. 1 each 0     ustekinumab (STELARA) 90 MG/ML Inject 90 mg Subcutaneous every 28 days        vitamin D (ERGOCALCIFEROL) 52512 UNIT capsule Take 50,000 Units by mouth five times a week        vitamin E (TOCOPHEROL) 400 units (180 mg) capsule Take 400 Units by mouth every evening        budesonide (ENTOCORT EC) 3 MG EC capsule Take 9 mg by mouth every morning (Patient not taking: Reported on 5/17/2022)         Allergies:  The patientis allergic to carafate, morphine hcl, and codeine.    Social history:  Social History     Tobacco Use     Smoking status: Never Smoker     Smokeless tobacco: Never Used   Substance Use Topics     Alcohol use: Yes     Comment: occasional/ 1 drink per 1-3 month     Marital status: .    Review of Systems:  Nursing Notes:   Drea Samuel  5/17/2022  2:01 PM  Signed  Chief Complaint   Patient presents with     Follow Up     Crohn's disease       Vitals:    05/17/22 1357   BP: 113/73   BP Location: Right arm   Patient Position: Sitting   Cuff Size: Adult Regular   Pulse: 93   SpO2: 98%   Weight: 111 lb 11.2 oz   Height: 5' 2\"       Body mass index is 20.43 kg/m .    Drea Samuel CMA         20 minutes spent on the date of encounter (excluding time performing procedures) " performing chart review, history and exam, documentation and further activities as noted above.    Pete Cartagena MD   Professor and Chief  Division of Colon and Rectal Surgery  [facility]      Referring Provider:  No referring provider defined for this encounter.     Primary Care Provider:  Nakita Odonnell    This note was created using speech recognition software and may contain unintended word substitutions.

## 2022-05-18 ENCOUNTER — LAB REQUISITION (OUTPATIENT)
Dept: LAB | Facility: CLINIC | Age: 42
End: 2022-05-18
Payer: OTHER GOVERNMENT

## 2022-05-18 DIAGNOSIS — K56.609 UNSPECIFIED INTESTINAL OBSTRUCTION, UNSPECIFIED AS TO PARTIAL VERSUS COMPLETE OBSTRUCTION (H): ICD-10-CM

## 2022-05-18 DIAGNOSIS — E44.0 MODERATE PROTEIN-CALORIE MALNUTRITION (H): ICD-10-CM

## 2022-05-18 LAB
ALBUMIN SERPL-MCNC: 3.3 G/DL (ref 3.4–5)
ALP SERPL-CCNC: 149 U/L (ref 40–150)
ALT SERPL W P-5'-P-CCNC: 112 U/L (ref 0–50)
ANION GAP SERPL CALCULATED.3IONS-SCNC: 10 MMOL/L (ref 3–14)
AST SERPL W P-5'-P-CCNC: 49 U/L (ref 0–45)
BASOPHILS # BLD AUTO: 0 10E3/UL (ref 0–0.2)
BASOPHILS NFR BLD AUTO: 0 %
BILIRUB DIRECT SERPL-MCNC: 0.1 MG/DL (ref 0–0.2)
BILIRUB SERPL-MCNC: 0.3 MG/DL (ref 0.2–1.3)
BILIRUB SERPL-MCNC: 0.3 MG/DL (ref 0.2–1.3)
BUN SERPL-MCNC: 13 MG/DL (ref 7–30)
CALCIUM SERPL-MCNC: 8.9 MG/DL (ref 8.5–10.1)
CHLORIDE BLD-SCNC: 108 MMOL/L (ref 94–109)
CO2 SERPL-SCNC: 24 MMOL/L (ref 20–32)
CREAT SERPL-MCNC: 0.57 MG/DL (ref 0.52–1.04)
EOSINOPHIL # BLD AUTO: 0.3 10E3/UL (ref 0–0.7)
EOSINOPHIL NFR BLD AUTO: 3 %
ERYTHROCYTE [DISTWIDTH] IN BLOOD BY AUTOMATED COUNT: 12.3 % (ref 10–15)
FASTING STATUS PATIENT QL REPORTED: NORMAL
GFR SERPL CREATININE-BSD FRML MDRD: >90 ML/MIN/1.73M2
GLUCOSE BLD-MCNC: 104 MG/DL (ref 70–99)
HCT VFR BLD AUTO: 37.2 % (ref 35–47)
HGB BLD-MCNC: 12.2 G/DL (ref 11.7–15.7)
HOLD SPECIMEN: NORMAL
IMM GRANULOCYTES # BLD: 0 10E3/UL
IMM GRANULOCYTES NFR BLD: 0 %
LYMPHOCYTES # BLD AUTO: 2.1 10E3/UL (ref 0.8–5.3)
LYMPHOCYTES NFR BLD AUTO: 24 %
MAGNESIUM SERPL-MCNC: 1.9 MG/DL (ref 1.6–2.3)
MCH RBC QN AUTO: 29.8 PG (ref 26.5–33)
MCHC RBC AUTO-ENTMCNC: 32.8 G/DL (ref 31.5–36.5)
MCV RBC AUTO: 91 FL (ref 78–100)
MONOCYTES # BLD AUTO: 0.6 10E3/UL (ref 0–1.3)
MONOCYTES NFR BLD AUTO: 6 %
NEUTROPHILS # BLD AUTO: 6 10E3/UL (ref 1.6–8.3)
NEUTROPHILS NFR BLD AUTO: 67 %
NRBC # BLD AUTO: 0 10E3/UL
NRBC BLD AUTO-RTO: 0 /100
PHOSPHATE SERPL-MCNC: 3.4 MG/DL (ref 2.5–4.5)
PLATELET # BLD AUTO: 341 10E3/UL (ref 150–450)
POTASSIUM BLD-SCNC: 3.3 MMOL/L (ref 3.4–5.3)
PROT SERPL-MCNC: 7.2 G/DL (ref 6.8–8.8)
RBC # BLD AUTO: 4.1 10E6/UL (ref 3.8–5.2)
SODIUM SERPL-SCNC: 142 MMOL/L (ref 133–144)
TRIGL SERPL-MCNC: 125 MG/DL
WBC # BLD AUTO: 9 10E3/UL (ref 4–11)

## 2022-05-18 PROCEDURE — 84100 ASSAY OF PHOSPHORUS: CPT | Performed by: COLON & RECTAL SURGERY

## 2022-05-18 PROCEDURE — 82248 BILIRUBIN DIRECT: CPT | Performed by: COLON & RECTAL SURGERY

## 2022-05-18 PROCEDURE — 80053 COMPREHEN METABOLIC PANEL: CPT | Performed by: COLON & RECTAL SURGERY

## 2022-05-18 PROCEDURE — 85025 COMPLETE CBC W/AUTO DIFF WBC: CPT | Performed by: COLON & RECTAL SURGERY

## 2022-05-18 PROCEDURE — 83735 ASSAY OF MAGNESIUM: CPT | Performed by: COLON & RECTAL SURGERY

## 2022-05-18 PROCEDURE — 84478 ASSAY OF TRIGLYCERIDES: CPT | Performed by: COLON & RECTAL SURGERY

## 2022-05-18 NOTE — PROGRESS NOTES
This is a recent snapshot of the patient's [facility] medical record.  For current drug dose and complete information and questions, call [phone] or In Basket pool, [facility] ([id number])  CSN Number:  [id number]

## 2022-05-19 ENCOUNTER — HOME INFUSION (PRE-WILLOW HOME INFUSION) (OUTPATIENT)
Dept: PHARMACY | Facility: CLINIC | Age: 42
End: 2022-05-19

## 2022-05-25 ENCOUNTER — LAB REQUISITION (OUTPATIENT)
Dept: LAB | Facility: CLINIC | Age: 42
End: 2022-05-25
Payer: OTHER GOVERNMENT

## 2022-05-25 ENCOUNTER — HOME INFUSION (PRE-WILLOW HOME INFUSION) (OUTPATIENT)
Dept: PHARMACY | Facility: CLINIC | Age: 42
End: 2022-05-25

## 2022-05-25 ENCOUNTER — HOSPITAL ENCOUNTER (OUTPATIENT)
Dept: MRI IMAGING | Facility: CLINIC | Age: 42
Discharge: HOME | End: 2022-05-25
Attending: NURSE PRACTITIONER | Admitting: NURSE PRACTITIONER
Payer: OTHER GOVERNMENT

## 2022-05-25 DIAGNOSIS — E44.0 MODERATE PROTEIN-CALORIE MALNUTRITION (H): ICD-10-CM

## 2022-05-25 DIAGNOSIS — K50.012 CROHN'S DISEASE OF SMALL INTESTINE WITH INTESTINAL OBSTRUCTION (H): ICD-10-CM

## 2022-05-25 LAB
ALBUMIN SERPL-MCNC: 3.3 G/DL (ref 3.4–5)
ALP SERPL-CCNC: 109 U/L (ref 40–150)
ALT SERPL W P-5'-P-CCNC: 61 U/L (ref 0–50)
ANION GAP SERPL CALCULATED.3IONS-SCNC: 9 MMOL/L (ref 3–14)
AST SERPL W P-5'-P-CCNC: 24 U/L (ref 0–45)
BILIRUB SERPL-MCNC: 0.3 MG/DL (ref 0.2–1.3)
BUN SERPL-MCNC: 12 MG/DL (ref 7–30)
CALCIUM SERPL-MCNC: 8.8 MG/DL (ref 8.5–10.1)
CHLORIDE BLD-SCNC: 113 MMOL/L (ref 94–109)
CO2 SERPL-SCNC: 23 MMOL/L (ref 20–32)
CREAT SERPL-MCNC: 0.54 MG/DL (ref 0.52–1.04)
GFR SERPL CREATININE-BSD FRML MDRD: >90 ML/MIN/1.73M2
GLUCOSE BLD-MCNC: 93 MG/DL (ref 70–99)
POTASSIUM BLD-SCNC: 4 MMOL/L (ref 3.4–5.3)
PROT SERPL-MCNC: 7.1 G/DL (ref 6.8–8.8)
SODIUM SERPL-SCNC: 145 MMOL/L (ref 133–144)

## 2022-05-25 PROCEDURE — A9585 GADOBUTROL INJECTION: HCPCS | Performed by: NURSE PRACTITIONER

## 2022-05-25 PROCEDURE — 80053 COMPREHEN METABOLIC PANEL: CPT | Performed by: COLON & RECTAL SURGERY

## 2022-05-25 PROCEDURE — 250N000011 HC RX IP 250 OP 636: Performed by: NURSE PRACTITIONER

## 2022-05-25 PROCEDURE — 255N000002 HC RX 255 OP 636: Performed by: NURSE PRACTITIONER

## 2022-05-25 PROCEDURE — 74183 MRI ABD W/O CNTR FLWD CNTR: CPT

## 2022-05-25 RX ORDER — GADOBUTROL 604.72 MG/ML
5 INJECTION INTRAVENOUS ONCE
Status: COMPLETED | OUTPATIENT
Start: 2022-05-25 | End: 2022-05-25

## 2022-05-25 RX ADMIN — GADOBUTROL 5 ML: 604.72 INJECTION INTRAVENOUS at 08:37

## 2022-05-25 RX ADMIN — GLUCAGON HYDROCHLORIDE 0.5 MG: KIT at 08:10

## 2022-05-25 RX ADMIN — GLUCAGON HYDROCHLORIDE 0.5 MG: KIT at 08:43

## 2022-05-26 ENCOUNTER — HOME INFUSION (PRE-WILLOW HOME INFUSION) (OUTPATIENT)
Dept: PHARMACY | Facility: CLINIC | Age: 42
End: 2022-05-26
Payer: OTHER GOVERNMENT

## 2022-05-26 NOTE — PROGRESS NOTES
This is a recent snapshot of the patient's [facility] medical record.  For current drug dose and complete information and questions, call [phone] or In Basket pool, [facility] home infusion ([id number])  CSN Number:  [id number]

## 2022-05-27 ENCOUNTER — HOME INFUSION (PRE-WILLOW HOME INFUSION) (OUTPATIENT)
Dept: PHARMACY | Facility: CLINIC | Age: 42
End: 2022-05-27

## 2022-05-31 NOTE — PROGRESS NOTES
This is a recent snapshot of the patient's [facility] medical record.  For current drug dose and complete information and questions, call [phone] or In Basket pool, [facility] home infusion ([id number])  CSN Number:  [id number]

## 2022-06-01 ENCOUNTER — HOME INFUSION (PRE-WILLOW HOME INFUSION) (OUTPATIENT)
Dept: PHARMACY | Facility: CLINIC | Age: 42
End: 2022-06-01
Payer: OTHER GOVERNMENT

## 2022-06-02 ENCOUNTER — TELEPHONE (OUTPATIENT)
Dept: SURGERY | Facility: CLINIC | Age: 42
End: 2022-06-02
Payer: OTHER GOVERNMENT

## 2022-06-02 ENCOUNTER — HOME INFUSION (PRE-WILLOW HOME INFUSION) (OUTPATIENT)
Dept: PHARMACY | Facility: CLINIC | Age: 42
End: 2022-06-02
Payer: OTHER GOVERNMENT

## 2022-06-02 NOTE — TELEPHONE ENCOUNTER
[facility] Call Center    Phone Message    May a detailed message be left on voicemail: yes     Reason for Call: Other: Susan with [facility] is calling in asking for a call back. She states that they are wondering about the plans for the patient's picc line. The Pt feels she is doing well and is ready for the line to be pulled. Please call back as soon as possible to discuss.     Action Taken: Message routed to:  [facility] ([facility] Colon/Rectal    Travel Screening: Not Applicable

## 2022-06-02 NOTE — PROGRESS NOTES
This is a recent snapshot of the patient's [facility] medical record.  For current drug dose and complete information and questions, call [phone] or In Basket pool, [facility] home infusion ([id number])  CSN Number:  [id number]

## 2022-06-03 ENCOUNTER — HOME INFUSION (PRE-WILLOW HOME INFUSION) (OUTPATIENT)
Dept: PHARMACY | Facility: CLINIC | Age: 42
End: 2022-06-03
Payer: OTHER GOVERNMENT

## 2022-06-06 ENCOUNTER — HOME INFUSION (PRE-WILLOW HOME INFUSION) (OUTPATIENT)
Dept: PHARMACY | Facility: CLINIC | Age: 42
End: 2022-06-06
Payer: OTHER GOVERNMENT

## 2022-06-06 NOTE — PROGRESS NOTES
This is a recent snapshot of the patient's [facility] medical record.  For current drug dose and complete information and questions, call [phone] or In Basket pool, [facility] home infusion ([id number])  CSN Number:  [id number]

## 2022-06-07 NOTE — PROGRESS NOTES
This is a recent snapshot of the patient's [facility] medical record.  For current drug dose and complete information and questions, call [phone] or In Basket pool, [facility] home infusion ([id number])  CSN Number:  [id number]

## 2022-06-10 NOTE — PROGRESS NOTES
This is a recent snapshot of the patient's [facility] medical record.  For current drug dose and complete information and questions, call [phone] or In Basket pool, [facility] home infusion ([id number])  CSN Number:  [id number]

## 2022-06-28 NOTE — PROGRESS NOTES
This is a recent snapshot of the patient's [facility] medical record.  For current drug dose and complete information and questions, call [phone] or In Basket pool, [facility] home infusion ([id number])  CSN Number:  [id number]

## 2022-06-29 NOTE — PROGRESS NOTES
"Colon and Rectal Surgery Follow-Up  Video Note     Referring provider:  No referring provider defined for this encounter.     RE: Alok Nino  : 1980  AURELIO: 2022    Alok Nino is a 42 year old female who is being evaluated via a billable video visit.      The patient has been notified of following:     \"This video visit will be conducted via a call between you and your physician/provider. We have found that certain health care needs can be provided without the need for an in-person physical exam.  This service lets us provide the care you need with a video conversation.  If a prescription is necessary we can send it directly to your pharmacy.  If lab work is needed we can place an order for that and you can then stop by our lab to have the test done at a later time.    Video visits are billed at different rates depending on your insurance coverage.  Please reach out to your insurance provider with any questions.    If during the course of the call the physician/provider feels a video visit is not appropriate, you will not be charged for this service.\"    Patient has given verbal consent for Video visit? Yes    Video Start Time: 6:30PM     Alok Nino is a very pleasant 42 year old female with long-standing Crohn's disease here for follow up of SBO.     HPI:    8 prior surgeries including stricturoplasties, multiple bowel resection surgeries. Most recent surgery was on 21 for ANTHONY and distal ileal resection with ileoileal anastomosis.     Colonoscopy with Dr. Cartagena on 3/7/22 with dilation of anastomotic stricture. She was admitted at [facility] 3/9-3/11 with CT showing diffusely dilated bowel and no clear transition point.    She was admitted here at [facility] 3/13 with SBO most likely 2/2 ineffective peristalsis causing dilation. It was recommended that she continue on bowel rest with clear liquid diet and TPN    MRE 3/3/22:  IMPRESSION:   1.  No MR signs of active inflammatory bowel " disease. No bowel obstruction.  2.  Ileocecal resection with anastomosis mid right abdomen.  3.  The middle third of the small intestine is comprised of two consecutive surgically altered, chronically dilated segments of small bowel that appear reservoir-like and have ineffective peristalsis.  4.  At present, the tortuous, redundant, large caliber colon is decompressed, but during episodes of small bowel obstructions, the right, transverse and descending colon become significantly distended.     CT AP 3/13/22:  IMPRESSION:   1.  Recurrent marked fluid distention of multiple loops of bowel throughout the abdomen and pelvis without definite transition point. The overall configuration is similar to 03/03/2022 MR enterography as well as CT abdomen 02/09/2022.    Dr. Cartagena last saw her on 5/17/22 and was still slightly distended so he recommended MRE. She was eating so recommended weaning off her TPN.  MRE 5/25/22: 1.  Postop ileal ascending colostomy and postop changes involving several loops of small intestine.  2.  Nothing for active Crohn's disease. 3.  Stable patulous distention of several loops of small intestine near the postoperative change with no agatha obstruction. Suspect this represents the patient's baseline based on prior imaging. 4.  No fistulas. No abscesses.    She follows with Dr. Bruno Tolbert of [facility].    She has been seen at [place] and has currently a working diagnosis of bacterial overgrowth as a result of burned out Crohn's disease and stricturoplasties. She is due for an upcoming motility study and colonoscopy. Her PIC has been out since the end of [facility] and weight is stable at 106 to 108.       Assessment/Plan: Crohn's disease with multiple resections and stricturoplasties. Diltation of anastomotic stricture in March. Some signs of bacterial overgrowth and response to antibiotics. Will get motility studies and colonoscopy. We will try to get that arranged here.    25 minutes  spent on the date of encounter (excluding time performing procedures) performing chart review, history and exam, documentation and further activities as noted above.    Video-Visit Details    Type of service:  Video Visit    Video End Time (time video stopped): 6:55PM    Originating Location (pt. Location): Home    Distant Location (provider location):  [facility] COLON AND RECTAL SURGERY CLINIC [place]     Mode of Communication:  Video Conference via Doximity     Medical history:  Past Medical History:   Diagnosis Date     C. difficile enteritis      Chronic pain      Crohn disease (H)      Melanoma (H)      PONV (postoperative nausea and vomiting)        Surgical history:  Past Surgical History:   Procedure Laterality Date     ABDOMEN SURGERY      3 for crohns dx     APPENDECTOMY       APPENDECTOMY       CHOLECYSTECTOMY       CHOLECYSTECTOMY       COLONOSCOPY       ENDOSCOPIC ULTRASOUND UPPER GASTROINTESTINAL TRACT (GI) N/A 11/13/2020    Procedure: ENDOSCOPIC ULTRASOUND, ESOPHAGOSCOPY / UPPER GASTROINTESTINAL TRACT with BIOPSY;  Surgeon: Cydney Garcia MD;  Location: [facility] OR     GYN SURGERY       H STATISTIC PICC LINE INSERTION >5YR, FAILED Right 11/10/2021    LUE unsuccessful attempt from another hospital, IR deferral     IR PICC PLACEMENT > 5 YRS OF AGE  11/11/2021     IR PICC PLACEMENT > 5 YRS OF AGE  3/16/2022     OTHER SURGICAL HISTORY      strictoplasty x3     OTHER SURGICAL HISTORY      adhesion removal x1     OTHER SURGICAL HISTORY      small bowel resections     PICC  05/10/2019          RESECTION ILEOCECAL  12/06/2013    Procedure: RESECTION ILEOCECAL;  Laparotomy, Extensive Lysis of Adhesions, Stricture Plasty X2  Anesthesia general with block;  Surgeon: Pete Cartagena MD;  Location: [facility] OR     RESECTION ILEOCOLIC N/A 11/17/2021    Procedure: Exploratory laparotomy, illeal resection, extensive lysis of adhesions (2 hr);  Surgeon: Pete Cartagena MD;  Location: [facility] OR       Problem  "list:    Patient Active Problem List    Diagnosis Date Noted     SBO (small bowel obstruction) (H) 03/14/2022     Priority: Medium     Hypokalemia 03/09/2022     Priority: Medium     Intestinal obstruction, unspecified cause, unspecified whether partial or complete (H) 03/09/2022     Priority: Medium     Hypomagnesemia 03/09/2022     Priority: Medium     Acute Crohn's disease, with intestinal obstruction (H) 11/09/2021     Priority: Medium     Crohn disease (H) 12/06/2013     Priority: Medium     Inflammatory bowel disease (Crohn's disease) (H) 09/10/2013     Priority: Medium       Medications:  Current Outpatient Medications   Medication Sig Dispense Refill     acetaminophen (TYLENOL) 325 MG tablet Take 650 mg by mouth every 6 hours as needed for mild pain       calcitRIOL (ROCALTROL) 0.25 MCG capsule Take 0.25 mcg by mouth three times a week       doxylamine (UNISOM) 25 MG TABS tablet Take 50 mg by mouth At Bedtime        lipids (INTRALIPID) 20 % infusion Inject 250 mLs into the vein every 24 hours 250 mL 0     magnesium oxide (MAG-OX) 400 (240 Mg) MG tablet Take 400 mg by mouth every evening        parenteral nutrition - PTA/DISCHARGE ORDER The TPN formula will print on the After Visit Summary Report. 1 each 0     ustekinumab (STELARA) 90 MG/ML Inject 90 mg Subcutaneous every 28 days        vitamin D (ERGOCALCIFEROL) 23654 UNIT capsule Take 50,000 Units by mouth five times a week        vitamin E (TOCOPHEROL) 400 units (180 mg) capsule Take 400 Units by mouth every evening        budesonide (ENTOCORT EC) 3 MG EC capsule Take 9 mg by mouth every morning (Patient not taking: Reported on 5/17/2022)       polyethylene glycol (MIRALAX) 17 g packet Take 238 g by mouth See Admin Instructions Start at 4 pm night prior to colonoscopy. Refer to \"Getting Ready for Colonoscopy\" instructions. 14 packet 0       Allergies:  Allergies   Allergen Reactions     Carafate Hives     Morphine Hcl Other (See Comments)     SPASMS OF " SPHINCTER OF ODDI  (BILIARY TRACT)     Codeine Nausea and Vomiting       Family history:  Family History   Problem Relation Age of Onset     No Known Problems Mother      Clotting Disorder Father      No Known Problems Brother      No Known Problems Son      Anesthesia Reaction No family hx of      Colon Cancer No family hx of      Crohn's Disease No family hx of      Ulcerative Colitis No family hx of      Colon Polyps No family hx of        Social history:  Social History     Tobacco Use     Smoking status: Never Smoker     Smokeless tobacco: Never Used   Substance Use Topics     Alcohol use: Yes     Comment: occasional/ 1 drink per 1-3 month       There are no exam notes on file for this visit.

## 2022-07-06 ENCOUNTER — VIRTUAL VISIT (OUTPATIENT)
Dept: SURGERY | Facility: CLINIC | Age: 42
End: 2022-07-06
Payer: OTHER GOVERNMENT

## 2022-07-06 ENCOUNTER — PRE VISIT (OUTPATIENT)
Dept: SURGERY | Facility: CLINIC | Age: 42
End: 2022-07-06
Payer: OTHER GOVERNMENT

## 2022-07-06 VITALS — BODY MASS INDEX: 19.69 KG/M2 | HEIGHT: 62 IN | WEIGHT: 107 LBS

## 2022-07-06 DIAGNOSIS — K50.012 CROHN'S DISEASE OF SMALL INTESTINE WITH INTESTINAL OBSTRUCTION (H): Primary | ICD-10-CM

## 2022-07-06 DIAGNOSIS — Z12.11 ENCOUNTER FOR SCREENING COLONOSCOPY: ICD-10-CM

## 2022-07-06 DIAGNOSIS — K63.8219 SMALL INTESTINAL BACTERIAL OVERGROWTH: ICD-10-CM

## 2022-07-06 PROCEDURE — 99213 OFFICE O/P EST LOW 20 MIN: CPT | Mod: 95 | Performed by: COLON & RECTAL SURGERY

## 2022-07-06 RX ORDER — POLYETHYLENE GLYCOL 3350 17 G/17G
238 POWDER, FOR SOLUTION ORAL SEE ADMIN INSTRUCTIONS
Qty: 14 PACKET | Refills: 0 | Status: SHIPPED | OUTPATIENT
Start: 2022-07-06 | End: 2023-08-22

## 2022-07-06 ASSESSMENT — PAIN SCALES - GENERAL: PAINLEVEL: NO PAIN (0)

## 2022-07-06 NOTE — LETTER
2022       RE: Alok Nino  [address]  [facility] [address]     Dear Colleague,    Thank you for referring your patient, Alok Nino, to the [facility] RECTAL [facility]ERY [facility] [place] at [facility]. Please see a copy of my visit note below.    Colon and Rectal Surgery Follow-Up  Video Note     Referring provider:  No referring provider defined for this encounter.     RE: Alok Nino  : 1980  AURELIO: 2022    Alok Nino is a very pleasant 42 year old female with long-standing Crohn's disease here for follow up of SBO.     HPI:    8 prior surgeries including stricturoplasties, multiple bowel resection surgeries. Most recent surgery was on 21 for ANTHONY and distal ileal resection with ileoileal anastomosis.     Colonoscopy with Dr. Cartagena on 3/7/22 with dilation of anastomotic stricture. She was admitted at [facility] 3/9-3/11 with CT showing diffusely dilated bowel and no clear transition point.    She was admitted here at [facility] 3/13 with SBO most likely 2/2 ineffective peristalsis causing dilation. It was recommended that she continue on bowel rest with clear liquid diet and TPN    MRE 3/3/22:  IMPRESSION:   1.  No MR signs of active inflammatory bowel disease. No bowel obstruction.  2.  Ileocecal resection with anastomosis mid right abdomen.  3.  The middle third of the small intestine is comprised of two consecutive surgically altered, chronically dilated segments of small bowel that appear reservoir-like and have ineffective peristalsis.  4.  At present, the tortuous, redundant, large caliber colon is decompressed, but during episodes of small bowel obstructions, the right, transverse and descending colon become significantly distended.     CT AP 3/13/22:  IMPRESSION:   1.  Recurrent marked fluid distention of multiple loops of bowel throughout the abdomen and pelvis without definite transition point. The  overall configuration is similar to 03/03/2022 MR enterography as well as CT abdomen 02/09/2022.    Dr. Cartagena last saw her on 5/17/22 and was still slightly distended so he recommended MRE. She was eating so recommended weaning off her TPN.  MRE 5/25/22: 1.  Postop ileal ascending colostomy and postop changes involving several loops of small intestine.  2.  Nothing for active Crohn's disease. 3.  Stable patulous distention of several loops of small intestine near the postoperative change with no agatha obstruction. Suspect this represents the patient's baseline based on prior imaging. 4.  No fistulas. No abscesses.    She follows with Dr. Bruno Tolbert of [facility].    She has been seen at [place] and has currently a working diagnosis of bacterial overgrowth as a result of burned out Crohn's disease and stricturoplasties. She is due for an upcoming motility study and colonoscopy. Her PIC has been out since the end of [place] and weight is stable at 106 to 108.       Assessment/Plan: Crohn's disease with multiple resections and stricturoplasties. Diltation of anastomotic stricture in March. Some signs of bacterial overgrowth and response to antibiotics. Will get motility studies and colonoscopy. We will try to get that arranged here.    25 minutes spent on the date of encounter (excluding time performing procedures) performing chart review, history and exam, documentation and further activities as noted above.    Video-Visit Details    Type of service:  Video Visit    Video End Time (time video stopped): 6:55PM    Originating Location (pt. Location): Home    Distant Location (provider location):  [facility] RE[facility]ERY [facility]IN[facility] [place]     Mode of Communication:  Video Conference via Douguo     Medical history:  Past Medical History:   Diagnosis Date     C. difficile enteritis      Chronic pain      Crohn disease (H)      Melanoma (H)      PONV (postoperative nausea and vomiting)         Surgical history:  Past Surgical History:   Procedure Laterality Date     ABDOMEN SURGERY      3 for crohns dx     APPENDECTOMY       APPENDECTOMY       CHOLECYSTECTOMY       CHOLECYSTECTOMY       COLONOSCOPY       ENDOSCOPIC ULTRASOUND UPPER GASTROINTESTINAL TRACT (GI) N/A 11/13/2020    Procedure: ENDOSCOPIC ULTRASOUND, ESOPHAGOSCOPY / UPPER GASTROINTESTINAL TRACT with BIOPSY;  Surgeon: Cydney Garcia MD;  Location:  OR     GYN SURGERY       H STATISTIC PICC LINE INSERTION >5YR, FAILED Right 11/10/2021    LUE unsuccessful attempt from another hospital, IR deferral     IR PICC PLACEMENT > 5 YRS OF AGE  11/11/2021     IR PICC PLACEMENT > 5 YRS OF AGE  3/16/2022     OTHER SURGICAL HISTORY      strictoplasty x3     OTHER SURGICAL HISTORY      adhesion removal x1     OTHER SURGICAL HISTORY      small bowel resections     PICC  05/10/2019          RESECTION ILEOCECAL  12/06/2013    Procedure: RESECTION ILEOCECAL;  Laparotomy, Extensive Lysis of Adhesions, Stricture Plasty X2  Anesthesia general with block;  Surgeon: Pete Cartagena MD;  Location: [facility] OR     RESECTION ILEOCOLIC N/A 11/17/2021    Procedure: Exploratory laparotomy, illeal resection, extensive lysis of adhesions (2 hr);  Surgeon: Pete Cartagena MD;  Location: [facility] OR       Problem list:    Patient Active Problem List    Diagnosis Date Noted     SBO (small bowel obstruction) (H) 03/14/2022     Priority: Medium     Hypokalemia 03/09/2022     Priority: Medium     Intestinal obstruction, unspecified cause, unspecified whether partial or complete (H) 03/09/2022     Priority: Medium     Hypomagnesemia 03/09/2022     Priority: Medium     Acute Crohn's disease, with intestinal obstruction (H) 11/09/2021     Priority: Medium     Crohn disease (H) 12/06/2013     Priority: Medium     Inflammatory bowel disease (Crohn's disease) (H) 09/10/2013     Priority: Medium       Medications:  Current Outpatient Medications   Medication Sig Dispense Refill      "acetaminophen (TYLENOL) 325 MG tablet Take 650 mg by mouth every 6 hours as needed for mild pain       calcitRIOL (ROCALTROL) 0.25 MCG capsule Take 0.25 mcg by mouth three times a week       doxylamine (UNISOM) 25 MG TABS tablet Take 50 mg by mouth At Bedtime        lipids (INTRALIPID) 20 % infusion Inject 250 mLs into the vein every 24 hours 250 mL 0     magnesium oxide (MAG-OX) 400 (240 Mg) MG tablet Take 400 mg by mouth every evening        parenteral nutrition - PTA/DISCHARGE ORDER The TPN formula will print on the After Visit Summary Report. 1 each 0     ustekinumab (STELARA) 90 MG/ML Inject 90 mg Subcutaneous every 28 days        vitamin D (ERGOCALCIFEROL) 35225 UNIT capsule Take 50,000 Units by mouth five times a week        vitamin E (TOCOPHEROL) 400 units (180 mg) capsule Take 400 Units by mouth every evening        budesonide (ENTOCORT EC) 3 MG EC capsule Take 9 mg by mouth every morning (Patient not taking: Reported on 5/17/2022)       polyethylene glycol (MIRALAX) 17 g packet Take 238 g by mouth See Admin Instructions Start at 4 pm night prior to colonoscopy. Refer to \"Getting Ready for Colonoscopy\" instructions. 14 packet 0       Allergies:  Allergies   Allergen Reactions     Carafate Hives     Morphine Hcl Other (See Comments)     SPASMS OF SPHINCTER OF ODDI  (BILIARY TRACT)     Codeine Nausea and Vomiting       Family history:  Family History   Problem Relation Age of Onset     No Known Problems Mother      Clotting Disorder Father      No Known Problems Brother      No Known Problems Son      Anesthesia Reaction No family hx of      Colon Cancer No family hx of      Crohn's Disease No family hx of      Ulcerative Colitis No family hx of      Colon Polyps No family hx of        Social history:  Social History     Tobacco Use     Smoking status: Never Smoker     Smokeless tobacco: Never Used   Substance Use Topics     Alcohol use: Yes     Comment: occasional/ 1 drink per 1-3 month       There are no " exam notes on file for this visit.         Sincerely,    Frieda Centeno MD

## 2022-09-04 ENCOUNTER — HEALTH MAINTENANCE LETTER (OUTPATIENT)
Age: 42
End: 2022-09-04

## 2022-09-13 ENCOUNTER — TELEPHONE (OUTPATIENT)
Dept: SURGERY | Facility: CLINIC | Age: 42
End: 2022-09-13

## 2022-09-13 NOTE — TELEPHONE ENCOUNTER
Spoke with patient via phone, confirmed that she did have her Colonoscopy at [facility] as-planned on August 8th, 2022.     I passed on some medical concerns to Kylee RN, via in basket, and requested that she call patient in regards to her concerns.     No further action at this time.    Ashly Salazar  Debra-op Coordinator  [place]  Direct Phone: [phone]

## 2022-11-08 NOTE — PROGRESS NOTES
This is a recent snapshot of the patient's [facility] medical record.  For current drug dose and complete information and questions, call [phone] or In Basket pool, fv home infusion ([id number])  CSN Number:  [id number]

## 2022-11-10 NOTE — PROGRESS NOTES
This is a recent snapshot of the patient's [facility] medical record.  For current drug dose and complete information and questions, call [phone] or In Basket pool, fv home infusion ([id number])  CSN Number:  [id number]

## 2022-11-14 NOTE — PROGRESS NOTES
This is a recent snapshot of the patient's [facility] medical record.  For current drug dose and complete information and questions, call [phone] or In Basket pool, fv home infusion ([id number])  CSN Number:  [id number]

## 2022-11-23 NOTE — PROGRESS NOTES
This is a recent snapshot of the patient's [facility] medical record.  For current drug dose and complete information and questions, call [phone] or In Basket pool, fv home infusion ([id number])  CSN Number:  [id number]

## 2022-11-30 NOTE — PROGRESS NOTES
This is a recent snapshot of the patient's [facility] medical record.  For current drug dose and complete information and questions, call [phone] or In Basket pool, fv home infusion ([id number])  CSN Number:  [id number]

## 2022-12-28 NOTE — PROGRESS NOTES
This is a recent snapshot of the patient's [facility] medical record.  For current drug dose and complete information and questions, call [phone] or In Basket pool, fv home infusion ([id number])  CSN Number:  [id number]

## 2023-01-05 NOTE — PROGRESS NOTES
This is a recent snapshot of the patient's [facility] medical record.  For current drug dose and complete information and questions, call [phone] or In Basket pool, fv home infusion ([id number])  CSN Number:  [id number]

## 2023-01-27 LAB — AST SERPL-CCNC: 25 IU/L (ref 0–40)

## 2023-02-10 ENCOUNTER — TRANSFERRED RECORDS (OUTPATIENT)
Dept: HEALTH INFORMATION MANAGEMENT | Facility: CLINIC | Age: 43
End: 2023-02-10

## 2023-02-10 LAB
ALT SERPL-CCNC: 48 U/L (ref 6–29)
AST SERPL-CCNC: 27 U/L (ref 10–30)
CREATININE (EXTERNAL): 0.57 MG/DL (ref 0.5–0.99)
GFR ESTIMATED (EXTERNAL): 118 ML/MIN/1.73M2
GLUCOSE (EXTERNAL): 83 MG/DL (ref 65–99)
POTASSIUM (EXTERNAL): 3.7 MMOL/L (ref 3.5–5.3)
TSH SERPL-ACNC: 3.08 MIU/L (ref 0.4–4.5)

## 2023-02-14 ENCOUNTER — TRANSCRIBE ORDERS (OUTPATIENT)
Dept: OTHER | Age: 43
End: 2023-02-14

## 2023-02-14 DIAGNOSIS — R47.89 WORD FINDING DIFFICULTY: ICD-10-CM

## 2023-02-14 DIAGNOSIS — R41.89 BRAIN FOG: Primary | ICD-10-CM

## 2023-04-18 ENCOUNTER — TRANSFERRED RECORDS (OUTPATIENT)
Dept: HEALTH INFORMATION MANAGEMENT | Facility: CLINIC | Age: 43
End: 2023-04-18

## 2023-04-29 ENCOUNTER — HEALTH MAINTENANCE LETTER (OUTPATIENT)
Age: 43
End: 2023-04-29

## 2023-05-16 NOTE — PROGRESS NOTES
This is a recent snapshot of the patient's [facility] medical record.  For current drug dose and complete information and questions, call [phone] or In Basket pool, fv home infusion ([id number])  CSN Number:  [id number]

## 2023-06-02 NOTE — PROGRESS NOTES
This is a recent snapshot of the patient's [facility] medical record.  For current drug dose and complete information and questions, call [phone] or In Basket pool, fv home infusion ([id number])  CSN Number:  [id number]

## 2023-07-11 ENCOUNTER — HOSPITAL ENCOUNTER (OUTPATIENT)
Dept: MRI IMAGING | Facility: CLINIC | Age: 43
Discharge: HOME | End: 2023-07-11
Attending: INTERNAL MEDICINE | Admitting: INTERNAL MEDICINE
Payer: OTHER GOVERNMENT

## 2023-07-11 DIAGNOSIS — K50.90 CROHN'S DISEASE (H): ICD-10-CM

## 2023-07-11 PROCEDURE — 255N000002 HC RX 255 OP 636: Mod: JZ | Performed by: INTERNAL MEDICINE

## 2023-07-11 PROCEDURE — A9585 GADOBUTROL INJECTION: HCPCS | Mod: JZ | Performed by: INTERNAL MEDICINE

## 2023-07-11 PROCEDURE — 74183 MRI ABD W/O CNTR FLWD CNTR: CPT

## 2023-07-11 PROCEDURE — 250N000011 HC RX IP 250 OP 636: Performed by: INTERNAL MEDICINE

## 2023-07-11 RX ORDER — GADOBUTROL 604.72 MG/ML
5 INJECTION INTRAVENOUS ONCE
Status: COMPLETED | OUTPATIENT
Start: 2023-07-11 | End: 2023-07-11

## 2023-07-11 RX ADMIN — GLUCAGON 0.5 MG: 1 INJECTION, POWDER, LYOPHILIZED, FOR SOLUTION INTRAMUSCULAR; INTRAVENOUS at 08:47

## 2023-07-11 RX ADMIN — GADOBUTROL 5 ML: 604.72 INJECTION INTRAVENOUS at 09:36

## 2023-07-11 RX ADMIN — GLUCAGON 0.5 MG: 1 INJECTION, POWDER, LYOPHILIZED, FOR SOLUTION INTRAMUSCULAR; INTRAVENOUS at 09:14

## 2023-07-11 NOTE — PROGRESS NOTES
Patient is not diabetic. Does not have insulinoma or pheochromocytoma. Glucagon given x2 per protocol. Patient tolerated procedure without complication.     Chelsey Dsouza RN

## 2023-08-19 PROBLEM — K56.609 INTESTINAL OBSTRUCTION, UNSPECIFIED CAUSE, UNSPECIFIED WHETHER PARTIAL OR COMPLETE (H): Status: RESOLVED | Noted: 2022-03-09 | Resolved: 2023-08-19

## 2023-08-19 PROBLEM — E87.6 HYPOKALEMIA: Status: RESOLVED | Noted: 2022-03-09 | Resolved: 2023-08-19

## 2023-08-19 PROBLEM — E83.42 HYPOMAGNESEMIA: Status: RESOLVED | Noted: 2022-03-09 | Resolved: 2023-08-19

## 2023-08-19 PROBLEM — K50.912: Status: RESOLVED | Noted: 2021-11-09 | Resolved: 2023-08-19

## 2023-08-19 PROBLEM — E55.9 VITAMIN D DEFICIENCY: Status: ACTIVE | Noted: 2021-01-04

## 2023-08-19 PROBLEM — E56.0 VITAMIN E DEFICIENCY: Status: ACTIVE | Noted: 2021-01-04

## 2023-08-19 PROBLEM — K56.609 SBO (SMALL BOWEL OBSTRUCTION) (H): Status: RESOLVED | Noted: 2022-03-14 | Resolved: 2023-08-19

## 2023-08-19 PROBLEM — E53.8 COBALAMIN DEFICIENCY: Status: ACTIVE | Noted: 2022-06-22

## 2023-08-19 NOTE — PROGRESS NOTES
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  She scored 30/30 on MoCA.  Although she has known vitamin E and D deficiency she was not on any supplements.  I have recommended:    1.  Check copper, folate, homocystine, MMA, B12, vitamin E and vitamin D  2.  MRI brain with and without contrast  3.  Start vitamin D 50,000 units twice a week for 24 doses followed by over-the-counter 1000 units daily  4.  Follow-up after above tests    Thank you again for this referral, please feel free to contact me if you have any questions.    Yousif Hawley MD  [facility]  (Formerly, [facility]     REASON FOR CONSULTATION Brain Fog        HISTORY OF PRESENT ILLNESS     We have been requested by Nakita Odonnell to evaluate Alok Nino who is a 43 year old  female for \"Brain fog.\"    Patient is a 43-year-old female with history of Crohn's disease, vitamin D, vitamin E deficiency, history of Lyme's disease who was referred for evaluation of progressive confusion, word finding difficulty and a brain fog.  According to patient she was diagnosed with Crohn's disease and in the past was treated for Lyme's disease by a naturopath.  She started having intermittent episodes during which she was soft tissue toward.  There is no history of loss of consciousness or any seizures.  She has not done anything that puts her or other people at risk.  " There is no history of focal weakness or any balance issues.  She also denies numbness tingling.  Due to her Crohn's disease she is deficient in vitamin EE and in the past she was low in vitamin B12 but for unclear reason she is not on any supplements.     PROBLEM LIST   Patient Active Problem List   Diagnosis Code    Inflammatory bowel disease (Crohn's disease) (H) K50.90    Cobalamin deficiency E53.8    Vitamin D deficiency E55.9    Vitamin E deficiency E56.0         PAST MEDICAL & SURGICAL HISTORY     Past Medical History:   Patient  has a past medical history of C. difficile enteritis, Chronic pain, Crohn disease (H), Melanoma (H), PONV (postoperative nausea and vomiting), and SBO (small bowel obstruction) (H) (03/14/2022).    Surgical History:  She  has a past surgical history that includes Abdomen surgery; Cholecystectomy; appendectomy; GYN surgery; Resection ileocecal (12/06/2013); colonoscopy; Endoscopic ultrasound upper gastrointestinal tract (GI) (N/A, 11/13/2020); Cholecystectomy; appendectomy; other surgical history; other surgical history; other surgical history; Picc (05/10/2019); h statistic picc line insertion >5yr, failed (Right, 11/10/2021); IR PICC Placement > 5 Yrs of Age (11/11/2021); Resection Ileocolic (N/A, 11/17/2021); and IR PICC Placement > 5 Yrs of Age (3/16/2022).     SOCIAL HISTORY     Reviewed, and she  reports that she has never smoked. She has never used smokeless tobacco. She reports current alcohol use. She reports that she does not use drugs.     FAMILY HISTORY     Reviewed, and family history includes Clotting Disorder in her father; No Known Problems in her brother, mother, and son.     ALLERGIES     Allergies   Allergen Reactions    Morphine Hcl Other (See Comments)     SPASMS OF SPHINCTER OF ODDI  (BILIARY TRACT)    Sucralfate Hives    Codeine Nausea and Vomiting         REVIEW OF SYSTEMS     A 12 point review of system was performed and was negative except as outlined in the  "history of present illness.     HOME MEDICATIONS     Current Outpatient Rx   Medication Sig Dispense Refill    ustekinumab (STELARA) 90 MG/ML Inject 90 mg Subcutaneous every 28 days       [START ON 8/24/2023] vitamin D2 (ERGOCALCIFEROL) 92176 units (1250 mcg) capsule Take 1 capsule (50,000 Units) by mouth twice a week for 24 doses 24 capsule 0         PHYSICAL EXAM     Vital signs  Ht 1.575 m (5' 2\")   Wt 49.9 kg (110 lb)   BMI 20.12 kg/m      Weight:   110 lbs 0 oz      8/22/2023    12:23 PM   MOCA   Visuospatial/Executive  5   Naming 3   Attention - Digits 2   Attention - Letters 1   Attention - Subtraction 3   Language - Repeat 2   Language - Fluency  1   Abstraction 2   Delayed Recall 5   Orientation 6   Education 0   MOCA Score 30   Administered by:  Teresa Buckner CMA       Patient is alert and oriented x4 in no acute distress. Vital signs were reviewed and are documented in electronic medical record. Neck was supple, no carotid bruits, thyromegaly, JVD, or lymphadenopathy was noted.   NEUROLOGY EXAM:   Patient s speech was normal with no aphasia or dysarthria. Mentation, and affect were also normal.    Funduscopic exam was normal, with normal cup to disc ratio. Cranial nerves II -XII were intact.    Patient had normal mass, tone and motor strength was 5/5 in all extremities without pronator drift.    Sensation was intact to light touch, pinprick, and vibratory sensation.    Reflexes were 1+ symmetrical with downgoing toes.    No dysmetria noted on FNF or HKS. Romberg was negative.   Gait testing was normal. Able to walk on toes/heels. Tandem walk normal.     PERTINENT DIAGNOSTIC STUDIES     Following studies were reviewed:     No recent imaging studies to review     PERTINENT LABS  Following labs were reviewed:  No visits with results within 3 Month(s) from this visit.   Latest known visit with results is:   Lab Requisition on 05/25/2022   Component Date Value Ref Range Status    Sodium 05/25/2022 145 (H) "  133 - 144 mmol/L Final    Potassium 05/25/2022 4.0  3.4 - 5.3 mmol/L Final    Chloride 05/25/2022 113 (H)  94 - 109 mmol/L Final    Carbon Dioxide (CO2) 05/25/2022 23  20 - 32 mmol/L Final    Anion Gap 05/25/2022 9  3 - 14 mmol/L Final    Urea Nitrogen 05/25/2022 12  7 - 30 mg/dL Final    Creatinine 05/25/2022 0.54  0.52 - 1.04 mg/dL Final    Calcium 05/25/2022 8.8  8.5 - 10.1 mg/dL Final    Glucose 05/25/2022 93  70 - 99 mg/dL Final    Alkaline Phosphatase 05/25/2022 109  40 - 150 U/L Final    AST 05/25/2022 24  0 - 45 U/L Final    ALT 05/25/2022 61 (H)  0 - 50 U/L Final    Protein Total 05/25/2022 7.1  6.8 - 8.8 g/dL Final    Albumin 05/25/2022 3.3 (L)  3.4 - 5.0 g/dL Final    Bilirubin Total 05/25/2022 0.3  0.2 - 1.3 mg/dL Final    GFR Estimate 05/25/2022 >90  >60 mL/min/1.73m2 Final        Total time spent for face to face visit, reviewing labs/imaging studies, counseling and coordination of care was: 1 Hour spent on the date of the encounter doing chart review, review of outside records, review of test results, interpretation of tests, patient visit, and documentation     This note was dictated using voice recognition software.  Any grammatical or context distortions are unintentional and inherent to the software.    Orders Placed This Encounter   Procedures    MR Brain w/o & w Contrast    Vitamin E    Vitamin B12    Methylmalonic Acid    Folate    Homocysteine    Copper level    Vitamin D Deficiency      New Prescriptions    VITAMIN D2 (ERGOCALCIFEROL) 25856 UNITS (1250 MCG) CAPSULE    Take 1 capsule (50,000 Units) by mouth twice a week for 24 doses      Modified Medications    No medications on file

## 2023-08-22 ENCOUNTER — LAB (OUTPATIENT)
Dept: LAB | Facility: HOSPITAL | Age: 43
End: 2023-08-22
Payer: OTHER GOVERNMENT

## 2023-08-22 ENCOUNTER — TRANSFERRED RECORDS (OUTPATIENT)
Dept: HEALTH INFORMATION MANAGEMENT | Facility: CLINIC | Age: 43
End: 2023-08-22

## 2023-08-22 ENCOUNTER — OFFICE VISIT (OUTPATIENT)
Dept: NEUROLOGY | Facility: CLINIC | Age: 43
End: 2023-08-22
Payer: OTHER GOVERNMENT

## 2023-08-22 VITALS — HEIGHT: 62 IN | WEIGHT: 110 LBS | BODY MASS INDEX: 20.24 KG/M2

## 2023-08-22 DIAGNOSIS — G63 COPPER DEFICIENCY MYELONEUROPATHY (H): ICD-10-CM

## 2023-08-22 DIAGNOSIS — R41.89 BRAIN FOG: ICD-10-CM

## 2023-08-22 DIAGNOSIS — R47.89 WORD FINDING DIFFICULTY: ICD-10-CM

## 2023-08-22 DIAGNOSIS — E61.0 COPPER DEFICIENCY MYELONEUROPATHY (H): ICD-10-CM

## 2023-08-22 DIAGNOSIS — R41.0 CONFUSION: Primary | ICD-10-CM

## 2023-08-22 DIAGNOSIS — G99.2 COPPER DEFICIENCY MYELONEUROPATHY (H): ICD-10-CM

## 2023-08-22 DIAGNOSIS — E55.9 VITAMIN D DEFICIENCY: ICD-10-CM

## 2023-08-22 DIAGNOSIS — R41.0 CONFUSION: ICD-10-CM

## 2023-08-22 LAB
FOLATE SERPL-MCNC: 34.6 NG/ML (ref 4.6–34.8)
VIT B12 SERPL-MCNC: 556 PG/ML (ref 232–1245)

## 2023-08-22 PROCEDURE — 82784 ASSAY IGA/IGD/IGG/IGM EACH: CPT

## 2023-08-22 PROCEDURE — 86258 DGP ANTIBODY EACH IG CLASS: CPT | Mod: 59

## 2023-08-22 PROCEDURE — 99205 OFFICE O/P NEW HI 60 MIN: CPT | Performed by: PSYCHIATRY & NEUROLOGY

## 2023-08-22 PROCEDURE — 82306 VITAMIN D 25 HYDROXY: CPT

## 2023-08-22 PROCEDURE — 83921 ORGANIC ACID SINGLE QUANT: CPT

## 2023-08-22 PROCEDURE — 86364 TISS TRNSGLTMNASE EA IG CLAS: CPT | Mod: 59

## 2023-08-22 PROCEDURE — 82746 ASSAY OF FOLIC ACID SERUM: CPT

## 2023-08-22 PROCEDURE — 36415 COLL VENOUS BLD VENIPUNCTURE: CPT

## 2023-08-22 PROCEDURE — 83090 ASSAY OF HOMOCYSTEINE: CPT

## 2023-08-22 PROCEDURE — 84446 ASSAY OF VITAMIN E: CPT

## 2023-08-22 PROCEDURE — 82525 ASSAY OF COPPER: CPT

## 2023-08-22 PROCEDURE — 82607 VITAMIN B-12: CPT

## 2023-08-22 RX ORDER — ERGOCALCIFEROL 1.25 MG/1
50000 CAPSULE, LIQUID FILLED ORAL
Qty: 24 CAPSULE | Refills: 0 | Status: SHIPPED | OUTPATIENT
Start: 2023-08-24 | End: 2023-11-14

## 2023-08-22 ASSESSMENT — MONTREAL COGNITIVE ASSESSMENT (MOCA)
4. NAME EACH OF THE THREE ANIMALS SHOWN: 3
13. ORIENTATION SUBSCORE: 6
6. READ LIST OF DIGITS [FORWARD/BACKWARD]: 2
7. [VIGILENCE] TAP WHEN HEARING DESIGNATED LETTER: 1
12. MEMORY INDEX SCORE: 5
VISUOSPATIAL/EXECUTIVE SUBSCORE: 5
8. SERIAL SUBTRACTION OF 7S: 3
11. FOR EACH PAIR OF WORDS, WHAT CATEGORY DO THEY BELONG TO (OUT OF 2): 2
WHAT LEVEL OF EDUCATION WAS ATTAINED: 0
9. REPEAT EACH SENTENCE: 2
WHAT IS THE TOTAL SCORE (OUT OF 30): 30
10. [FLUENCY] NAME WORDS STARTING WITH DESIGNATED LETTER: 1

## 2023-08-22 NOTE — NURSING NOTE
Chief Complaint   Patient presents with    Brain Fog     Patient reports brain fog and word finding difficulty for about 1 year now. JOHANN Buckner CMA on 8/22/2023 at 12:30 PM

## 2023-08-22 NOTE — NURSING NOTE
SAM COGNITIVE ASSESSMENT (MOCA)  Version 7.1 Original Version  VISUOSPATIAL/EXECUTIVE               COPY CUBE      [  1  ]                                [   1 ] DRAW CLOCK (Ten past eleven)  (3 points)    [   1 ]                    [   1 ]               [  1  ]       Contour            Numbers     Hands POINTS                 5  / 5   NAMING    [  1 ]                                                                        [ 1   ]                                             [  1  ]  Lielli Espinoza                                Camel               3      / 3   MEMORY Read list of words, subject must repeat them. Do 2 trials, even if 1st trial is successful. Do a recall after 5 minutes  FACE VELVET Amish JELANI RED No Points    1st x x x x x     2nd x x x x x    ATTENTION Read list of digits (1 digit/sec) Subject has to repeat in the forward order       [  1  ]   2  1  8  5  4                                [   1 ] 7 4 2                       2   /2   Read list of letters. The subject must tap with his hand at each letter A. No points if > 2 errors.  [   1 ] F B A C M N A A J K L B A F A K D E A A A J A M O F A A B            1  /1   Serial 7 subtraction starting at 100          [  1  ] 93         [  1  ] 86          [ 1   ] 79          [  1  ] 72         [    ] 65   4 or 5 correct subtractions: 3 points,  2 or 3 correct: 2 points,  1correct: 1 point,   0 correct: 0 points           3 /3   LANGUAGE Repeat: I only know that Osmar is the one to help today. [  1   ]                                      The cat always hid under the couch when dogs were in the room. [ 1  ]         2      /2   Fluency: Name maximum number of words in one minute that begin with the letter F                                                                                                                    [  1  ] _11__ (N > 11 words)          1 /1   ABSTRACTION  Similarity between e.g. banana-orange=fruit                                                                   [  1  ] train-bicycle                      [ 1   ] watch-ruler          2    /2   DELAYED  RECALL Has to recall words  WITH NO CUE FACE  [  x  ] VELVET  [  x  ] Latter-day  [  x  ]  JELANI  [  x  ] RED  [  x  ] Points for UNCUED recall only       5     /5           OPTIONAL Category cue           Multiple choice cue          ORIENTATION  [  1  ] Date     [  1  ] Month       [   1 ] Year      [  1  ] Day      [  1  ] Place        [  1  ] City      6 /6   TOTAL  Normal > 26/30 Add 1 point if < 12 years education      30 /30

## 2023-08-22 NOTE — LETTER
"    2023         RE: Alok Nino  [address]  [facility] [address]        Dear Colleague,    Thank you for referring your patient, Alok Nino, to the [facility] [place]. Please see a copy of my visit note below.    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  She scored 30/30 on MoCA.  Although she has known vitamin E and D deficiency she was not on any supplements.  I have recommended:    1.  Check copper, folate, homocystine, MMA, B12, vitamin E and vitamin D  2.  MRI brain with and without contrast  3.  Start vitamin D 50,000 units twice a week for 24 doses followed by over-the-counter 1000 units daily  4.  Follow-up after above tests    Thank you again for this referral, please feel free to contact me if you have any questions.    Yousif Hawley MD  [facility]  (Formerly, [facility]     REASON FOR CONSULTATION Brain Fog        HISTORY OF PRESENT ILLNESS     We have been requested by Nakita Odonnell to evaluate Alok Nino who is a 43 year old  female for \"Brain fog.\"    Patient is a 43-year-old female with history of Crohn's disease, vitamin D, vitamin E deficiency, history of Lyme's disease who was referred for evaluation of progressive confusion, word finding difficulty and a brain fog.  According to patient she was diagnosed with Crohn's disease and in the past " was treated for Lyme's disease by a naturopath.  She started having intermittent episodes during which she was soft tissue toward.  There is no history of loss of consciousness or any seizures.  She has not done anything that puts her or other people at risk.  There is no history of focal weakness or any balance issues.  She also denies numbness tingling.  Due to her Crohn's disease she is deficient in vitamin EE and in the past she was low in vitamin B12 but for unclear reason she is not on any supplements.     PROBLEM LIST   Patient Active Problem List   Diagnosis Code     Inflammatory bowel disease (Crohn's disease) (H) K50.90     Cobalamin deficiency E53.8     Vitamin D deficiency E55.9     Vitamin E deficiency E56.0         PAST MEDICAL & SURGICAL HISTORY     Past Medical History:   Patient  has a past medical history of C. difficile enteritis, Chronic pain, Crohn disease (H), Melanoma (H), PONV (postoperative nausea and vomiting), and SBO (small bowel obstruction) (H) (03/14/2022).    Surgical History:  She  has a past surgical history that includes Abdomen surgery; Cholecystectomy; appendectomy; GYN surgery; Resection ileocecal (12/06/2013); colonoscopy; Endoscopic ultrasound upper gastrointestinal tract (GI) (N/A, 11/13/2020); Cholecystectomy; appendectomy; other surgical history; other surgical history; other surgical history; Picc (05/10/2019); h statistic picc line insertion >5yr, failed (Right, 11/10/2021); IR PICC Placement > 5 Yrs of Age (11/11/2021); Resection Ileocolic (N/A, 11/17/2021); and IR PICC Placement > 5 Yrs of Age (3/16/2022).     SOCIAL HISTORY     Reviewed, and she  reports that she has never smoked. She has never used smokeless tobacco. She reports current alcohol use. She reports that she does not use drugs.     FAMILY HISTORY     Reviewed, and family history includes Clotting Disorder in her father; No Known Problems in her brother, mother, and son.     ALLERGIES     Allergies  "  Allergen Reactions     Morphine Hcl Other (See Comments)     SPASMS OF SPHINCTER OF ODDI  (BILIARY TRACT)     Sucralfate Hives     Codeine Nausea and Vomiting         REVIEW OF SYSTEMS     A 12 point review of system was performed and was negative except as outlined in the history of present illness.     HOME MEDICATIONS     Current Outpatient Rx   Medication Sig Dispense Refill     ustekinumab (STELARA) 90 MG/ML Inject 90 mg Subcutaneous every 28 days        [START ON 8/24/2023] vitamin D2 (ERGOCALCIFEROL) 75871 units (1250 mcg) capsule Take 1 capsule (50,000 Units) by mouth twice a week for 24 doses 24 capsule 0         PHYSICAL EXAM     Vital signs  Ht 1.575 m (5' 2\")   Wt 49.9 kg (110 lb)   BMI 20.12 kg/m      Weight:   110 lbs 0 oz      8/22/2023    12:23 PM   MOCA   Visuospatial/Executive  5   Naming 3   Attention - Digits 2   Attention - Letters 1   Attention - Subtraction 3   Language - Repeat 2   Language - Fluency  1   Abstraction 2   Delayed Recall 5   Orientation 6   Education 0   MOCA Score 30   Administered by:  Teresa Buckner CMA       Patient is alert and oriented x4 in no acute distress. Vital signs were reviewed and are documented in electronic medical record. Neck was supple, no carotid bruits, thyromegaly, JVD, or lymphadenopathy was noted.   NEUROLOGY EXAM:    Patient s speech was normal with no aphasia or dysarthria. Mentation, and affect were also normal.     Funduscopic exam was normal, with normal cup to disc ratio. Cranial nerves II -XII were intact.     Patient had normal mass, tone and motor strength was 5/5 in all extremities without pronator drift.     Sensation was intact to light touch, pinprick, and vibratory sensation.     Reflexes were 1+ symmetrical with downgoing toes.     No dysmetria noted on FNF or HKS. Romberg was negative.    Gait testing was normal. Able to walk on toes/heels. Tandem walk normal.     PERTINENT DIAGNOSTIC STUDIES     Following studies were reviewed: "     No recent imaging studies to review     PERTINENT LABS  Following labs were reviewed:  No visits with results within 3 Month(s) from this visit.   Latest known visit with results is:   Lab Requisition on 05/25/2022   Component Date Value Ref Range Status     Sodium 05/25/2022 145 (H)  133 - 144 mmol/L Final     Potassium 05/25/2022 4.0  3.4 - 5.3 mmol/L Final     Chloride 05/25/2022 113 (H)  94 - 109 mmol/L Final     Carbon Dioxide (CO2) 05/25/2022 23  20 - 32 mmol/L Final     Anion Gap 05/25/2022 9  3 - 14 mmol/L Final     Urea Nitrogen 05/25/2022 12  7 - 30 mg/dL Final     Creatinine 05/25/2022 0.54  0.52 - 1.04 mg/dL Final     Calcium 05/25/2022 8.8  8.5 - 10.1 mg/dL Final     Glucose 05/25/2022 93  70 - 99 mg/dL Final     Alkaline Phosphatase 05/25/2022 109  40 - 150 U/L Final     AST 05/25/2022 24  0 - 45 U/L Final     ALT 05/25/2022 61 (H)  0 - 50 U/L Final     Protein Total 05/25/2022 7.1  6.8 - 8.8 g/dL Final     Albumin 05/25/2022 3.3 (L)  3.4 - 5.0 g/dL Final     Bilirubin Total 05/25/2022 0.3  0.2 - 1.3 mg/dL Final     GFR Estimate 05/25/2022 >90  >60 mL/min/1.73m2 Final        Total time spent for face to face visit, reviewing labs/imaging studies, counseling and coordination of care was: 1 Hour spent on the date of the encounter doing chart review, review of outside records, review of test results, interpretation of tests, patient visit, and documentation     This note was dictated using voice recognition software.  Any grammatical or context distortions are unintentional and inherent to the software.    Orders Placed This Encounter   Procedures     MR Brain w/o & w Contrast     Vitamin E     Vitamin B12     Methylmalonic Acid     Folate     Homocysteine     Copper level     Vitamin D Deficiency      New Prescriptions    VITAMIN D2 (ERGOCALCIFEROL) 70660 UNITS (1250 MCG) CAPSULE    Take 1 capsule (50,000 Units) by mouth twice a week for 24 doses      Modified Medications    No medications on file                 Again, thank you for allowing me to participate in the care of your patient.        Sincerely,        Yousif Hawley MD

## 2023-08-23 DIAGNOSIS — E53.8 VITAMIN B12 DEFICIENCY (NON ANEMIC): Primary | ICD-10-CM

## 2023-08-23 LAB
DEPRECATED CALCIDIOL+CALCIFEROL SERPL-MC: 11 UG/L (ref 20–75)
HCYS SERPL-SCNC: 7 UMOL/L (ref 4–12)
METHYLMALONATE SERPL-SCNC: 0.59 UMOL/L (ref 0–0.4)

## 2023-08-23 RX ORDER — LANOLIN ALCOHOL/MO/W.PET/CERES
1000 CREAM (GRAM) TOPICAL DAILY
Qty: 30 TABLET | Refills: 6 | Status: SHIPPED | OUTPATIENT
Start: 2023-08-23

## 2023-08-23 NOTE — RESULT ENCOUNTER NOTE
Dear Alok,     Your recent test results show an elevated methylmalonic acid level suggesting B12 deficiency.  I would recommend starting vitamin B12 1000 mcg daily.  I have sent a prescription to your pharmacy  Please continue with your current plan of care and let us know if you have any questions or concerns.    Yousif Hawley MD

## 2023-08-24 DIAGNOSIS — G63 COPPER DEFICIENCY MYELONEUROPATHY (H): Primary | ICD-10-CM

## 2023-08-24 DIAGNOSIS — E53.8 VITAMIN B12 DEFICIENCY (NON ANEMIC): ICD-10-CM

## 2023-08-24 DIAGNOSIS — E61.0 COPPER DEFICIENCY MYELONEUROPATHY (H): Primary | ICD-10-CM

## 2023-08-24 DIAGNOSIS — E55.9 VITAMIN D DEFICIENCY: ICD-10-CM

## 2023-08-24 DIAGNOSIS — G99.2 COPPER DEFICIENCY MYELONEUROPATHY (H): Primary | ICD-10-CM

## 2023-08-24 LAB
COPPER SERPL-MCNC: 32.4 UG/DL
IGA SERPL-MCNC: 422 MG/DL (ref 84–499)
TTG IGA SER-ACNC: 0.8 U/ML
TTG IGG SER-ACNC: <0.6 U/ML

## 2023-08-24 NOTE — RESULT ENCOUNTER NOTE
Dear Alok,     Copper level is significantly low.  This can be due to multiple reasons including history of GI surgery, excessive zinc intake & inflammatory bowel disease.  Most common symptoms due to copper deficiency are progressive gait difficulty and sensory neuropathy.  I would recommend:    1.  MRI cervical and thoracic spine as spinal cord can be involved in copper and B12 deficiency  2.  EMG bilateral lower extremities as patients with copper and B12 deficiency can develop peripheral neuropathy  3.  If you are using zinc supplements, please discontinue as it can be a cause for copper deficiency.    4.  Check serum zinc level and if elevated will order 24-hour urinary zinc excretion  5.  Continue treatment for B12 and vitamin D deficiency as already instructed  6.  Antibodies for celiac disease, ceruloplasmin.  I have placed an order and you can go to the lab to get the labs drawn  7.  Start copper supplement 8 mg daily for 1 week then 6 mg daily for 1 week then 4 mg daily for 1 week and then 2 mg daily thereafter.  Copper is available over-the-counter.  Please take the amount recommended  8.  Recheck copper level, vitamin B12, methylmalonic acid level and vitamin D level after 3 months    Yousif Hawley MD

## 2023-08-25 LAB
A-TOCOPHEROL VIT E SERPL-MCNC: 1.5 MG/L
BETA+GAMMA TOCOPHEROL SERPL-MCNC: 0.2 MG/L
GLIADIN IGA SER-ACNC: 1.5 U/ML
GLIADIN IGG SER-ACNC: <0.6 U/ML

## 2023-08-25 NOTE — RESULT ENCOUNTER NOTE
Dear Alok,     Your recent test results show that you are vitamin E level is also low.  I would recommend start taking vitamin E 1000 units twice daily.  We will need to repeat your vitamin E level after 3 months  Please continue with your current plan of care and let us know if you have any questions or concerns.    Yousif Hawley MD Pt stated he had called the pain clinic but they don't have a covering provider today and the other for tomorrow is double booked. He stated he was told he needed an appt in order for any medication to be sent in. He is out of medication now and just needs 2 days worth to hold him over to his appt with pain clinic on Thursday. He apologized that he is reaching out but does not know what else to do.

## 2023-08-31 ENCOUNTER — LAB (OUTPATIENT)
Dept: LAB | Facility: HOSPITAL | Age: 43
End: 2023-08-31
Payer: OTHER GOVERNMENT

## 2023-08-31 DIAGNOSIS — G63 COPPER DEFICIENCY MYELONEUROPATHY (H): ICD-10-CM

## 2023-08-31 DIAGNOSIS — G99.2 COPPER DEFICIENCY MYELONEUROPATHY (H): ICD-10-CM

## 2023-08-31 DIAGNOSIS — E53.8 VITAMIN B12 DEFICIENCY (NON ANEMIC): ICD-10-CM

## 2023-08-31 DIAGNOSIS — E55.9 VITAMIN D DEFICIENCY: ICD-10-CM

## 2023-08-31 DIAGNOSIS — E61.0 COPPER DEFICIENCY MYELONEUROPATHY (H): ICD-10-CM

## 2023-08-31 LAB
CERULOPLASMIN SERPL-MCNC: 8 MG/DL (ref 20–60)
DEPRECATED CALCIDIOL+CALCIFEROL SERPL-MC: 12 UG/L (ref 20–75)
VIT B12 SERPL-MCNC: 740 PG/ML (ref 232–1245)

## 2023-08-31 PROCEDURE — 86231 EMA EACH IG CLASS: CPT

## 2023-08-31 PROCEDURE — 36415 COLL VENOUS BLD VENIPUNCTURE: CPT

## 2023-08-31 PROCEDURE — 82525 ASSAY OF COPPER: CPT

## 2023-08-31 PROCEDURE — 82390 ASSAY OF CERULOPLASMIN: CPT

## 2023-08-31 PROCEDURE — 83921 ORGANIC ACID SINGLE QUANT: CPT

## 2023-08-31 PROCEDURE — 82607 VITAMIN B-12: CPT

## 2023-08-31 PROCEDURE — 84630 ASSAY OF ZINC: CPT

## 2023-08-31 PROCEDURE — 82306 VITAMIN D 25 HYDROXY: CPT

## 2023-09-01 ENCOUNTER — OFFICE VISIT (OUTPATIENT)
Dept: NEUROLOGY | Facility: CLINIC | Age: 43
End: 2023-09-01
Attending: PSYCHIATRY & NEUROLOGY
Payer: OTHER GOVERNMENT

## 2023-09-01 DIAGNOSIS — G99.2 COPPER DEFICIENCY MYELONEUROPATHY (H): ICD-10-CM

## 2023-09-01 DIAGNOSIS — G63 COPPER DEFICIENCY MYELONEUROPATHY (H): ICD-10-CM

## 2023-09-01 DIAGNOSIS — E61.0 COPPER DEFICIENCY MYELONEUROPATHY (H): ICD-10-CM

## 2023-09-01 PROCEDURE — 95910 NRV CNDJ TEST 7-8 STUDIES: CPT | Performed by: PHYSICAL MEDICINE & REHABILITATION

## 2023-09-01 PROCEDURE — 95885 MUSC TST DONE W/NERV TST LIM: CPT | Performed by: PHYSICAL MEDICINE & REHABILITATION

## 2023-09-01 NOTE — PROGRESS NOTES
[facility]  Electrodiagnostic Laboratory                 Department of Neurology                                                                                                         Test Date:  2023    Patient: Alok Nino : 1980 Physician: Ayala Meng MD   Sex: Female AGE: 43 year Ref Phys: Yousif Hawley MD   ID#: [id number]   Technician: Wally Caicedo     History and Examination:  Alok Nino is a 42 yo female who presents for evaluation of polyneuropathy in the setting of newly diagnosis of copper deficiency.     Techniques:  Motor conduction studies were done with surface recording electrodes. Sensory conduction studies were performed with surface electrodes, unless indicated otherwise by (n), designating the use of subdermal recording electrodes. Temperature was monitored and recorded throughout the study. Upper extremities were maintained at a temperature of 32 degrees Centigrade or higher.  EMG was done with a concentric needle electrode.     Results:  All nerve conduction studies (as indicated in the following tables) were within normal limits.      All examined muscles (as indicated in the following table) showed no evidence of electrical instability.        Interpretation:  Normal study    --There is no electrodiagnostic evidence of a diffuse sensory or motor peripheral neuropathy.      ___________________________  Ayala Meng MD        Nerve Conduction Studies  Motor Sites      Latency Amplitude Neg. Amp Diff Segment Distance Velocity Neg. Dur Neg Area Diff Temperature Comment   Site (ms) Norm (mV) Norm %  cm m/s Norm ms %  C    Left Fibular (EDB) Motor   Ankle 4.3  < 6.0 3.3  > 2.5  Ankle-EDB 8   5.7  31.9    Right Fibular (EDB) Motor   Ankle 3.3  < 6.0 4.3  > 2.5  Ankle-EDB 8   5.8  32.6    Bel Fib Head 8.9 - 3.8 - -11.6 Bel Fib Head-Ankle 26 46  > 38 5.9 -13.8 32.7    Pop Fossa 10.4 - 4.4 - 15.8 Pop Fossa-Bel Fib Head 7.5 50  > 38 5.8  11.7 32.7    Right Tibial (AHB) Motor   Ankle 4.3  < 6.5 27.2  > 5.0  Ankle-AHB 8   3.0  32.5    Knee 10.7 - 16.5 - -39.3 Knee-Ankle 34.5 54  > 38 4.7 -9.0 32.4      Sensory Sites      Onset Lat Peak Lat Amp (O-P) Amp (P-P) Segment Distance Velocity Temperature Comment   Site ms ms  V Norm  V  cm m/s Norm  C    Right Radial Sensory   Forearm-Wrist 1.50 2.1 43  > 15 48 Forearm-Wrist 10 67 - 25.5    Left Superficial Fibular Sensory   14 cm-Ankle 2.5 3.2 18  > 3 23 14 cm-Ankle 12.5 50  > 38 31.8    Right Superficial Fibular Sensory   14 cm-Ankle 2.6 3.3 17  > 3 23 14 cm-Ankle 12.5 48  > 38 32    Left Sural Sensory   Calf-Lat Mall 2.9 3.6 27  > 5 26 Calf-Lat Mall 14 48  > 38 31.8    Right Sural Sensory   Calf-Lat Mall 2.8 3.4 32  > 5 23 Calf-Lat Mall 14 50  > 38 32.2        Electromyography     Side Muscle Ins Act Fibs/PSW Fasc HF Amp Dur Poly Recrt Int Pat   Right Tib Anterior Nml None Nml 0 Nml Nml 0 Nml Nml   Right Gastroc Nml None Nml 0 Nml Nml 0 Nml Nml   Right Vastus Lat Nml None Nml 0 Nml Nml 0 Nml Nml   Left Tib Anterior Nml None Nml 0 Nml Nml 0 Nml Nml   Left Gastroc Nml None Nml 0 Nml Nml 0 Nml Nml   Left Vastus Lat Nml None Nml 0 Nml Nml 0 Nml Nml         NCS Waveforms:    Motor           Sensory

## 2023-09-01 NOTE — LETTER
2023       RE: Alok Nino  [address]  [facility] [address]         Dear Colleague,    Thank you for referring your patient, Alok Nino, to the [facility] [place] at [facility]. Please see a copy of my visit note below.                            [facility]  Electrodiagnostic Laboratory                 Department of Neurology                                                                                                         Test Date:  2023    Patient: Alok Nino : 1980 Physician: Ayala Meng MD   Sex: Female AGE: 43 year Ref Phys: Yousif Hawley MD   ID#: [id number]   Technician: Wally Caicedo     History and Examination:  Alok Nino is a 44 yo female who presents for evaluation of polyneuropathy in the setting of newly diagnosis of copper deficiency.     Techniques:  Motor conduction studies were done with surface recording electrodes. Sensory conduction studies were performed with surface electrodes, unless indicated otherwise by (n), designating the use of subdermal recording electrodes. Temperature was monitored and recorded throughout the study. Upper extremities were maintained at a temperature of 32 degrees Centigrade or higher.  EMG was done with a concentric needle electrode.     Results:  All nerve conduction studies (as indicated in the following tables) were within normal limits.      All examined muscles (as indicated in the following table) showed no evidence of electrical instability.        Interpretation:  Normal study    --There is no electrodiagnostic evidence of a diffuse sensory or motor peripheral neuropathy.      ___________________________  Ayala Meng MD        Nerve Conduction Studies  Motor Sites      Latency Amplitude Neg. Amp Diff Segment Distance Velocity Neg. Dur Neg Area Diff Temperature Comment   Site (ms) Norm (mV) Norm %  cm m/s Norm ms %  C    Left  Fibular (EDB) Motor   Ankle 4.3  < 6.0 3.3  > 2.5  Ankle-EDB 8   5.7  31.9    Right Fibular (EDB) Motor   Ankle 3.3  < 6.0 4.3  > 2.5  Ankle-EDB 8   5.8  32.6    Bel Fib Head 8.9 - 3.8 - -11.6 Bel Fib Head-Ankle 26 46  > 38 5.9 -13.8 32.7    Pop Fossa 10.4 - 4.4 - 15.8 Pop Fossa-Bel Fib Head 7.5 50  > 38 5.8 11.7 32.7    Right Tibial (AHB) Motor   Ankle 4.3  < 6.5 27.2  > 5.0  Ankle-AHB 8   3.0  32.5    Knee 10.7 - 16.5 - -39.3 Knee-Ankle 34.5 54  > 38 4.7 -9.0 32.4      Sensory Sites      Onset Lat Peak Lat Amp (O-P) Amp (P-P) Segment Distance Velocity Temperature Comment   Site ms ms  V Norm  V  cm m/s Norm  C    Right Radial Sensory   Forearm-Wrist 1.50 2.1 43  > 15 48 Forearm-Wrist 10 67 - 25.5    Left Superficial Fibular Sensory   14 cm-Ankle 2.5 3.2 18  > 3 23 14 cm-Ankle 12.5 50  > 38 31.8    Right Superficial Fibular Sensory   14 cm-Ankle 2.6 3.3 17  > 3 23 14 cm-Ankle 12.5 48  > 38 32    Left Sural Sensory   Calf-Lat Mall 2.9 3.6 27  > 5 26 Calf-Lat Mall 14 48  > 38 31.8    Right Sural Sensory   Calf-Lat Mall 2.8 3.4 32  > 5 23 Calf-Lat Mall 14 50  > 38 32.2        Electromyography     Side Muscle Ins Act Fibs/PSW Fasc HF Amp Dur Poly Recrt Int Pat   Right Tib Anterior Nml None Nml 0 Nml Nml 0 Nml Nml   Right Gastroc Nml None Nml 0 Nml Nml 0 Nml Nml   Right Vastus Lat Nml None Nml 0 Nml Nml 0 Nml Nml   Left Tib Anterior Nml None Nml 0 Nml Nml 0 Nml Nml   Left Gastroc Nml None Nml 0 Nml Nml 0 Nml Nml   Left Vastus Lat Nml None Nml 0 Nml Nml 0 Nml Nml         NCS Waveforms:    Motor           Sensory                    Again, thank you for allowing me to participate in the care of your patient.      Sincerely,    Ayala Meng MD

## 2023-09-02 LAB
ENDOMYSIUM IGA TITR SER IF: NORMAL {TITER}
ZINC SERPL-MCNC: 83.4 UG/DL

## 2023-09-03 LAB — COPPER SERPL-MCNC: 26.9 UG/DL

## 2023-09-05 ENCOUNTER — HOSPITAL ENCOUNTER (OUTPATIENT)
Dept: MRI IMAGING | Facility: HOSPITAL | Age: 43
Discharge: HOME | End: 2023-09-05
Attending: PSYCHIATRY & NEUROLOGY | Admitting: PSYCHIATRY & NEUROLOGY
Payer: OTHER GOVERNMENT

## 2023-09-05 DIAGNOSIS — R41.89 BRAIN FOG: ICD-10-CM

## 2023-09-05 DIAGNOSIS — R41.0 CONFUSION: ICD-10-CM

## 2023-09-05 DIAGNOSIS — R47.89 WORD FINDING DIFFICULTY: ICD-10-CM

## 2023-09-05 PROCEDURE — A9585 GADOBUTROL INJECTION: HCPCS | Mod: JZ | Performed by: PSYCHIATRY & NEUROLOGY

## 2023-09-05 PROCEDURE — 255N000002 HC RX 255 OP 636: Mod: JZ | Performed by: PSYCHIATRY & NEUROLOGY

## 2023-09-05 PROCEDURE — 70553 MRI BRAIN STEM W/O & W/DYE: CPT

## 2023-09-05 RX ORDER — GADOBUTROL 604.72 MG/ML
0.1 INJECTION INTRAVENOUS ONCE
Status: COMPLETED | OUTPATIENT
Start: 2023-09-05 | End: 2023-09-05

## 2023-09-05 RX ADMIN — GADOBUTROL 5 ML: 604.72 INJECTION INTRAVENOUS at 14:23

## 2023-09-06 LAB — METHYLMALONATE SERPL-SCNC: 0.27 UMOL/L (ref 0–0.4)

## 2023-09-11 ENCOUNTER — HOSPITAL ENCOUNTER (OUTPATIENT)
Dept: MRI IMAGING | Facility: HOSPITAL | Age: 43
Discharge: HOME | End: 2023-09-11
Attending: PSYCHIATRY & NEUROLOGY
Payer: OTHER GOVERNMENT

## 2023-09-11 DIAGNOSIS — G63 COPPER DEFICIENCY MYELONEUROPATHY (H): ICD-10-CM

## 2023-09-11 DIAGNOSIS — G99.2 COPPER DEFICIENCY MYELONEUROPATHY (H): ICD-10-CM

## 2023-09-11 DIAGNOSIS — E61.0 COPPER DEFICIENCY MYELONEUROPATHY (H): ICD-10-CM

## 2023-09-11 PROCEDURE — 72156 MRI NECK SPINE W/O & W/DYE: CPT

## 2023-09-11 PROCEDURE — 255N000002 HC RX 255 OP 636: Mod: JZ | Performed by: PSYCHIATRY & NEUROLOGY

## 2023-09-11 PROCEDURE — 72157 MRI CHEST SPINE W/O & W/DYE: CPT

## 2023-09-11 PROCEDURE — A9585 GADOBUTROL INJECTION: HCPCS | Mod: JZ | Performed by: PSYCHIATRY & NEUROLOGY

## 2023-09-11 RX ORDER — GADOBUTROL 604.72 MG/ML
0.1 INJECTION INTRAVENOUS ONCE
Status: COMPLETED | OUTPATIENT
Start: 2023-09-11 | End: 2023-09-11

## 2023-09-11 RX ADMIN — GADOBUTROL 5 ML: 604.72 INJECTION INTRAVENOUS at 10:05

## 2023-11-20 NOTE — PROGRESS NOTES
NEUROLOGY FOLLOW UP VISIT  NOTE       [facility] NEUROLOG[facility] [place]  [address] [place] [address]  Tel: [phone]  Fax: [phone]  [email or web address]     Alok Nino,  1980, MRN [id number]  PCP: Nakita Odonnell  Date: 2023      ASSESSMENT & PLAN     Visit Diagnosis  Vitamin B12 deficiency (non anemic)  Vitamin D deficiency  Vitamin E deficiency  Copper deficiency myeloneuropathy (H)     Vitamin B12, D, E and copper deficiency  43-year-old female with Crohn's disease, vitamin D, vitamin E, B12 and copper deficiency who was evaluated for mental fog.  Lab work suggests vitamin deficiencies likely due to her underlying Crohn's disease with poor absorption.  She recently found out however her ferritin level is low and is scheduled for iron infusion.  I have recommended continuing with the supplements and repeat vitamin level after 1 month.  Regular follow-up will be in 6 months    Thank you again for this referral, please feel free to contact me if you have any questions.    Yousif Hawley MD  [facility]  (Former[facility], [facility]     HISTORY OF PRESENT ILLNESS     Patient is a 43-year-old with history of Crohn's disease, vitamin D, vitamin E and possible B12 deficiency who was last seen on 2023 for brain fog, confusion and word finding difficulty.  She scored 30/30 on MoCA and although had a known vitamin deficiency was not on any supplements she was started on vitamin D and also had repeat alpha-tocopherol level and was started on supplements.  Copper level was also significantly low likely due to her Crohn's disease.  MRI of cervical and thoracic spine was ordered as spinal cord can be involved in copper deficiency and that she was instructed to discontinue zinc supplements as it can exacerbate copper deficiency.  MRI brain and cervical, thoracic spine was unremarkable zinc level was normal.  IgA, gliadin  antibody and TTG antibody were negative. she also had a EMG of lower extremity that was normal.    Patient reports her mental fog and confusion did improve somewhat after she started taking supplements.  She was recently seen by primary care physician and was noted to have low ferritin level and is scheduled for iron infusion.  She does admit that she is anxious and at time her children noticed that she substitutes words.     PROBLEM LIST   Patient Active Problem List   Diagnosis Code    Inflammatory bowel disease (Crohn's disease) (H) K50.90    Vitamin B12 deficiency (non anemic) E53.8    Vitamin D deficiency E55.9    Vitamin E deficiency E56.0    Copper deficiency myeloneuropathy (H) E61.0, G63, G99.2    Crohn's colitis (H) K50.10         PAST MEDICAL & SURGICAL HISTORY     Past Medical History:   Patient  has a past medical history of C. difficile enteritis, Chronic pain, Crohn disease (H), Melanoma (H), PONV (postoperative nausea and vomiting), and SBO (small bowel obstruction) (H) (03/14/2022).    Surgical History:  She  has a past surgical history that includes Abdomen surgery; Cholecystectomy; appendectomy; GYN surgery; Resection ileocecal (12/06/2013); colonoscopy; Endoscopic ultrasound upper gastrointestinal tract (GI) (N/A, 11/13/2020); Cholecystectomy; appendectomy; other surgical history; other surgical history; other surgical history; Picc (05/10/2019); h statistic picc line insertion >5yr, failed (Right, 11/10/2021); IR PICC Placement > 5 Yrs of Age (11/11/2021); Resection Ileocolic (N/A, 11/17/2021); and IR PICC Placement > 5 Yrs of Age (3/16/2022).     SOCIAL HISTORY     Reviewed, and she  reports that she has never smoked. She has never used smokeless tobacco. She reports current alcohol use. She reports that she does not use drugs.     FAMILY HISTORY     Reviewed, and family history includes Clotting Disorder in her father; No Known Problems in her brother, mother, and son.     ALLERGIES  "    Allergies   Allergen Reactions    Morphine Hcl Other (See Comments)     SPASMS OF SPHINCTER OF ODDI  (BILIARY TRACT)    Sucralfate Hives    Codeine Nausea and Vomiting         REVIEW OF SYSTEMS     A 12 point review of system was performed and was negative except as outlined in the history of present illness.     HOME MEDICATIONS     Current Outpatient Rx   Medication Sig Dispense Refill    azithromycin (ZITHROMAX) 250 MG tablet       Cholecalciferol (D 92957) 250 MCG (18466 UT) CAPS Take 250 mcg by mouth      cyanocobalamin (VITAMIN B-12) 1000 MCG tablet Take 1 tablet (1,000 mcg) by mouth daily 30 tablet 6    escitalopram (LEXAPRO) 5 MG tablet Take 5 mg by mouth daily      ustekinumab (STELARA) 90 MG/ML Inject 90 mg Subcutaneous every 28 days            PHYSICAL EXAM     Vital signs  /69   Pulse 85   Ht 1.575 m (5' 2\")   Wt 49.9 kg (110 lb)   BMI 20.12 kg/m      Weight:   110 lbs 0 oz    Patient is alert and oriented x4 in no acute distress. Vital signs were reviewed and are documented in electronic medical record. Neck was supple, no carotid bruits, thyromegaly, JVD, or lymphadenopathy was noted.   NEUROLOGY EXAM:   Patient s speech was normal with no aphasia or dysarthria. Mentation, and affect were also normal.    Funduscopic exam was normal, with normal cup to disc ratio. Cranial nerves II -XII were intact.    Patient had normal mass, tone and motor strength was 5/5 in all extremities without pronator drift.    Sensation was intact to light touch, pinprick, and vibratory sensation.    Reflexes were 1+ symmetrical with downgoing toes.    No dysmetria noted on FNF or HKS. Romberg was negative.   Gait testing was normal. Able to walk on toes/heels. Tandem walk normal.      PERTINENT DIAGNOSTIC STUDIES     Following studies were reviewed:     MRI CERVICAL, THORACIC SPINE 9/11/2023  CERVICAL SPINE MRI:  1.  C5 vertebral body hemangioma, otherwise unremarkable cervical spine MRI.     THORACIC SPINE " MRI:  1.  Unremarkable thoracic spine MRI.    MRI BRAIN 9/3/2023  1. No acute intracranial process or abnormal enhancement.    EMG 9/1/2023  There is no electrodiagnostic evidence of a diffuse sensory or motor peripheral neuropathy.     PERTINENT LABS  Following labs were reviewed:  Lab on 08/31/2023   Component Date Value Ref Range Status    Zinc, Serum/Plasma 08/31/2023 83.4  60.0 - 120.0 ug/dL Final    Ceruloplasmin 08/31/2023 8 (L)  20 - 60 mg/dL Final    Endomysial Antibody IgA by IFA 08/31/2023 <1:10  <1:10 Final    Copper 08/31/2023 26.9 (L)  80.0 - 155.0 ug/dL Final    Vitamin D, Total (25-Hydroxy) 08/31/2023 12 (L)  20 - 75 ug/L Final    Vitamin B12 08/31/2023 740  232 - 1,245 pg/mL Final    Methylmalonic Acid 08/31/2023 0.27  0.00 - 0.40 umol/L Final   Lab on 08/22/2023   Component Date Value Ref Range Status    Vitamin E 08/22/2023 1.5 (L)  5.5 - 18.0 mg/L Final    Vitamin E Gamma 08/22/2023 0.2  0.0 - 6.0 mg/L Final    Vitamin B12 08/22/2023 556  232 - 1,245 pg/mL Final    Methylmalonic Acid 08/22/2023 0.59 (H)  0.00 - 0.40 umol/L Final    Folic Acid 08/22/2023 34.6  4.6 - 34.8 ng/mL Final    Homocysteine 08/22/2023 7.0  4.0 - 12.0 umol/L Final    Copper 08/22/2023 32.4 (L)  80.0 - 155.0 ug/dL Final    Vitamin D, Total (25-Hydroxy) 08/22/2023 11 (L)  20 - 75 ug/L Final    Immunoglobulin A 08/22/2023 422  84 - 499 mg/dL Final    Deamidated Gliadin Antibody IgA 08/22/2023 1.5  <7.0 U/mL Final    Deamidated Gliadin Antibody IgG 08/22/2023 <0.6  <7.0 U/mL Final    Tissue Transglutaminase Antibody I* 08/22/2023 0.8  <7.0 U/mL Final    Tissue Transglutaminase Antibody I* 08/22/2023 <0.6  <7.0 U/mL Final         Total time spent for face to face visit, reviewing labs/imaging studies, counseling and coordination of care was: 45 Minutes spent on the date of the encounter doing chart review, review of outside records, review of test results, interpretation of tests, patient visit, and documentation     This note  was dictated using voice recognition software.  Any grammatical or context distortions are unintentional and inherent to the software.    Orders Placed This Encounter   Procedures    Vitamin B12    Methylmalonic Acid    Vitamin E    Copper level    Vitamin D Deficiency      New Prescriptions    No medications on file     Modified Medications    No medications on file

## 2023-11-21 ENCOUNTER — OFFICE VISIT (OUTPATIENT)
Dept: NEUROLOGY | Facility: CLINIC | Age: 43
End: 2023-11-21
Payer: OTHER GOVERNMENT

## 2023-11-21 VITALS
HEIGHT: 62 IN | HEART RATE: 85 BPM | BODY MASS INDEX: 20.24 KG/M2 | SYSTOLIC BLOOD PRESSURE: 110 MMHG | DIASTOLIC BLOOD PRESSURE: 69 MMHG | WEIGHT: 110 LBS

## 2023-11-21 DIAGNOSIS — G63 COPPER DEFICIENCY MYELONEUROPATHY (H): ICD-10-CM

## 2023-11-21 DIAGNOSIS — E53.8 VITAMIN B12 DEFICIENCY (NON ANEMIC): Primary | ICD-10-CM

## 2023-11-21 DIAGNOSIS — G99.2 COPPER DEFICIENCY MYELONEUROPATHY (H): ICD-10-CM

## 2023-11-21 DIAGNOSIS — E61.0 COPPER DEFICIENCY MYELONEUROPATHY (H): ICD-10-CM

## 2023-11-21 DIAGNOSIS — E56.0 VITAMIN E DEFICIENCY: ICD-10-CM

## 2023-11-21 DIAGNOSIS — E55.9 VITAMIN D DEFICIENCY: ICD-10-CM

## 2023-11-21 PROCEDURE — 99214 OFFICE O/P EST MOD 30 MIN: CPT | Performed by: PSYCHIATRY & NEUROLOGY

## 2023-11-21 RX ORDER — CEPHRADINE 500 MG
250 CAPSULE ORAL SEE ADMIN INSTRUCTIONS
COMMUNITY

## 2023-11-21 RX ORDER — AZITHROMYCIN 250 MG/1
TABLET, FILM COATED ORAL
COMMUNITY
Start: 2022-09-13 | End: 2024-05-13

## 2023-11-21 RX ORDER — ESCITALOPRAM OXALATE 10 MG/1
10 TABLET ORAL AT BEDTIME
COMMUNITY

## 2023-11-21 NOTE — LETTER
2023         RE: Alok Nino  [address]  [facility] [address]        Dear Colleague,    Thank you for referring your patient, Alok Nino, to the [facility] [place]. Please see a copy of my visit note below.    NEUROLOGY FOLLOW UP VISIT  NOTE       [facility] NEUROLOG[facility] [place]  [address] [place] [address]  Tel: [phone]  Fax: [phone]  [email or web address]     Alok Nino,  1980, MRN [id number]  PCP: Nakita Odonnell  Date: 2023      ASSESSMENT & PLAN     Visit Diagnosis  Vitamin B12 deficiency (non anemic)  Vitamin D deficiency  Vitamin E deficiency  Copper deficiency myeloneuropathy (H)     Vitamin B12, D, E and copper deficiency  43-year-old female with Crohn's disease, vitamin D, vitamin E, B12 and copper deficiency who was evaluated for mental fog.  Lab work suggests vitamin deficiencies likely due to her underlying Crohn's disease with poor absorption.  She recently found out however her ferritin level is low and is scheduled for iron infusion.  I have recommended continuing with the supplements and repeat vitamin level after 1 month.  Regular follow-up will be in 6 months    Thank you again for this referral, please feel free to contact me if you have any questions.    Yousif Hawley MD  [facility]  (Former[facility], [facility]     HISTORY OF PRESENT ILLNESS     Patient is a 43-year-old with history of Crohn's disease, vitamin D, vitamin E and possible B12 deficiency who was last seen on 2023 for brain fog, confusion and word finding difficulty.  She scored 30/30 on MoCA and although had a known vitamin deficiency was not on any supplements she was started on vitamin D and also had repeat alpha-tocopherol level and was started on supplements.  Copper level was also significantly low likely due to her Crohn's disease.  MRI of cervical and thoracic  spine was ordered as spinal cord can be involved in copper deficiency and that she was instructed to discontinue zinc supplements as it can exacerbate copper deficiency.  MRI brain and cervical, thoracic spine was unremarkable zinc level was normal.  IgA, gliadin antibody and TTG antibody were negative. she also had a EMG of lower extremity that was normal.    Patient reports her mental fog and confusion did improve somewhat after she started taking supplements.  She was recently seen by primary care physician and was noted to have low ferritin level and is scheduled for iron infusion.  She does admit that she is anxious and at time her children noticed that she substitutes words.     PROBLEM LIST   Patient Active Problem List   Diagnosis Code     Inflammatory bowel disease (Crohn's disease) (H) K50.90     Vitamin B12 deficiency (non anemic) E53.8     Vitamin D deficiency E55.9     Vitamin E deficiency E56.0     Copper deficiency myeloneuropathy (H) E61.0, G63, G99.2     Crohn's colitis (H) K50.10         PAST MEDICAL & SURGICAL HISTORY     Past Medical History:   Patient  has a past medical history of C. difficile enteritis, Chronic pain, Crohn disease (H), Melanoma (H), PONV (postoperative nausea and vomiting), and SBO (small bowel obstruction) (H) (03/14/2022).    Surgical History:  She  has a past surgical history that includes Abdomen surgery; Cholecystectomy; appendectomy; GYN surgery; Resection ileocecal (12/06/2013); colonoscopy; Endoscopic ultrasound upper gastrointestinal tract (GI) (N/A, 11/13/2020); Cholecystectomy; appendectomy; other surgical history; other surgical history; other surgical history; Picc (05/10/2019); h statistic picc line insertion >5yr, failed (Right, 11/10/2021); IR PICC Placement > 5 Yrs of Age (11/11/2021); Resection Ileocolic (N/A, 11/17/2021); and IR PICC Placement > 5 Yrs of Age (3/16/2022).     SOCIAL HISTORY     Reviewed, and she  reports that she has never smoked. She has  "never used smokeless tobacco. She reports current alcohol use. She reports that she does not use drugs.     FAMILY HISTORY     Reviewed, and family history includes Clotting Disorder in her father; No Known Problems in her brother, mother, and son.     ALLERGIES     Allergies   Allergen Reactions     Morphine Hcl Other (See Comments)     SPASMS OF SPHINCTER OF ODDI  (BILIARY TRACT)     Sucralfate Hives     Codeine Nausea and Vomiting         REVIEW OF SYSTEMS     A 12 point review of system was performed and was negative except as outlined in the history of present illness.     HOME MEDICATIONS     Current Outpatient Rx   Medication Sig Dispense Refill     azithromycin (ZITHROMAX) 250 MG tablet        Cholecalciferol (D 10816) 250 MCG (68550 UT) CAPS Take 250 mcg by mouth       cyanocobalamin (VITAMIN B-12) 1000 MCG tablet Take 1 tablet (1,000 mcg) by mouth daily 30 tablet 6     escitalopram (LEXAPRO) 5 MG tablet Take 5 mg by mouth daily       ustekinumab (STELARA) 90 MG/ML Inject 90 mg Subcutaneous every 28 days            PHYSICAL EXAM     Vital signs  /69   Pulse 85   Ht 1.575 m (5' 2\")   Wt 49.9 kg (110 lb)   BMI 20.12 kg/m      Weight:   110 lbs 0 oz    Patient is alert and oriented x4 in no acute distress. Vital signs were reviewed and are documented in electronic medical record. Neck was supple, no carotid bruits, thyromegaly, JVD, or lymphadenopathy was noted.   NEUROLOGY EXAM:    Patient s speech was normal with no aphasia or dysarthria. Mentation, and affect were also normal.     Funduscopic exam was normal, with normal cup to disc ratio. Cranial nerves II -XII were intact.     Patient had normal mass, tone and motor strength was 5/5 in all extremities without pronator drift.     Sensation was intact to light touch, pinprick, and vibratory sensation.     Reflexes were 1+ symmetrical with downgoing toes.     No dysmetria noted on FNF or HKS. Romberg was negative.    Gait testing was normal. Able " to walk on toes/heels. Tandem walk normal.      PERTINENT DIAGNOSTIC STUDIES     Following studies were reviewed:     MRI CERVICAL, THORACIC SPINE 9/11/2023  CERVICAL SPINE MRI:  1.  C5 vertebral body hemangioma, otherwise unremarkable cervical spine MRI.     THORACIC SPINE MRI:  1.  Unremarkable thoracic spine MRI.    MRI BRAIN 9/3/2023  1. No acute intracranial process or abnormal enhancement.    EMG 9/1/2023  There is no electrodiagnostic evidence of a diffuse sensory or motor peripheral neuropathy.     PERTINENT LABS  Following labs were reviewed:  Lab on 08/31/2023   Component Date Value Ref Range Status     Zinc, Serum/Plasma 08/31/2023 83.4  60.0 - 120.0 ug/dL Final     Ceruloplasmin 08/31/2023 8 (L)  20 - 60 mg/dL Final     Endomysial Antibody IgA by IFA 08/31/2023 <1:10  <1:10 Final     Copper 08/31/2023 26.9 (L)  80.0 - 155.0 ug/dL Final     Vitamin D, Total (25-Hydroxy) 08/31/2023 12 (L)  20 - 75 ug/L Final     Vitamin B12 08/31/2023 740  232 - 1,245 pg/mL Final     Methylmalonic Acid 08/31/2023 0.27  0.00 - 0.40 umol/L Final   Lab on 08/22/2023   Component Date Value Ref Range Status     Vitamin E 08/22/2023 1.5 (L)  5.5 - 18.0 mg/L Final     Vitamin E Gamma 08/22/2023 0.2  0.0 - 6.0 mg/L Final     Vitamin B12 08/22/2023 556  232 - 1,245 pg/mL Final     Methylmalonic Acid 08/22/2023 0.59 (H)  0.00 - 0.40 umol/L Final     Folic Acid 08/22/2023 34.6  4.6 - 34.8 ng/mL Final     Homocysteine 08/22/2023 7.0  4.0 - 12.0 umol/L Final     Copper 08/22/2023 32.4 (L)  80.0 - 155.0 ug/dL Final     Vitamin D, Total (25-Hydroxy) 08/22/2023 11 (L)  20 - 75 ug/L Final     Immunoglobulin A 08/22/2023 422  84 - 499 mg/dL Final     Deamidated Gliadin Antibody IgA 08/22/2023 1.5  <7.0 U/mL Final     Deamidated Gliadin Antibody IgG 08/22/2023 <0.6  <7.0 U/mL Final     Tissue Transglutaminase Antibody I* 08/22/2023 0.8  <7.0 U/mL Final     Tissue Transglutaminase Antibody I* 08/22/2023 <0.6  <7.0 U/mL Final         Total  time spent for face to face visit, reviewing labs/imaging studies, counseling and coordination of care was: 45 Minutes spent on the date of the encounter doing chart review, review of outside records, review of test results, interpretation of tests, patient visit, and documentation     This note was dictated using voice recognition software.  Any grammatical or context distortions are unintentional and inherent to the software.    Orders Placed This Encounter   Procedures     Vitamin B12     Methylmalonic Acid     Vitamin E     Copper level     Vitamin D Deficiency      New Prescriptions    No medications on file     Modified Medications    No medications on file                 Again, thank you for allowing me to participate in the care of your patient.        Sincerely,        Yousif Hawley MD

## 2023-11-21 NOTE — NURSING NOTE
Chief Complaint   Patient presents with    meloneuropathy     Follow up     Nany Alves on 11/21/2023 at 11:30 AM

## 2024-02-02 NOTE — PROGRESS NOTES
This is a recent snapshot of the patient's [facility] medical record.  For current drug dose and complete information and questions, call [phone] or In Basket pool, [facility] home infusion ([id number])  CSN Number:  [id number]

## 2024-02-18 ENCOUNTER — HEALTH MAINTENANCE LETTER (OUTPATIENT)
Age: 44
End: 2024-02-18

## 2024-02-21 ENCOUNTER — HOSPITAL ENCOUNTER (EMERGENCY)
Facility: HOSPITAL | Age: 44
Discharge: HOME | End: 2024-02-21
Attending: EMERGENCY MEDICINE | Admitting: EMERGENCY MEDICINE
Payer: COMMERCIAL

## 2024-02-21 ENCOUNTER — APPOINTMENT (OUTPATIENT)
Dept: CT IMAGING | Facility: HOSPITAL | Age: 44
End: 2024-02-21
Attending: EMERGENCY MEDICINE
Payer: COMMERCIAL

## 2024-02-21 VITALS
OXYGEN SATURATION: 98 % | BODY MASS INDEX: 20.24 KG/M2 | HEIGHT: 62 IN | HEART RATE: 61 BPM | DIASTOLIC BLOOD PRESSURE: 72 MMHG | RESPIRATION RATE: 15 BRPM | WEIGHT: 110 LBS | SYSTOLIC BLOOD PRESSURE: 113 MMHG | TEMPERATURE: 98.5 F

## 2024-02-21 DIAGNOSIS — M54.6 ACUTE THORACIC BACK PAIN, UNSPECIFIED BACK PAIN LATERALITY: ICD-10-CM

## 2024-02-21 DIAGNOSIS — N83.202 LEFT OVARIAN CYST: ICD-10-CM

## 2024-02-21 DIAGNOSIS — R68.84 JAW PAIN: ICD-10-CM

## 2024-02-21 DIAGNOSIS — E83.42 HYPOMAGNESEMIA: ICD-10-CM

## 2024-02-21 LAB
ALBUMIN SERPL BCG-MCNC: 3.6 G/DL (ref 3.5–5.2)
ALP SERPL-CCNC: 92 U/L (ref 40–150)
ALT SERPL W P-5'-P-CCNC: 50 U/L (ref 0–50)
ANION GAP SERPL CALCULATED.3IONS-SCNC: 6 MMOL/L (ref 7–15)
AST SERPL W P-5'-P-CCNC: 23 U/L (ref 0–45)
ATRIAL RATE - MUSE: 58 BPM
BASOPHILS # BLD AUTO: 0 10E3/UL (ref 0–0.2)
BASOPHILS NFR BLD AUTO: 0 %
BILIRUB DIRECT SERPL-MCNC: <0.2 MG/DL (ref 0–0.3)
BILIRUB SERPL-MCNC: 0.2 MG/DL
BUN SERPL-MCNC: 16.1 MG/DL (ref 6–20)
CALCIUM SERPL-MCNC: 9 MG/DL (ref 8.6–10)
CHLORIDE SERPL-SCNC: 105 MMOL/L (ref 98–107)
CREAT SERPL-MCNC: 0.66 MG/DL (ref 0.51–0.95)
DEPRECATED HCO3 PLAS-SCNC: 28 MMOL/L (ref 22–29)
DIASTOLIC BLOOD PRESSURE - MUSE: NORMAL MMHG
EGFRCR SERPLBLD CKD-EPI 2021: >90 ML/MIN/1.73M2
EOSINOPHIL # BLD AUTO: 0.2 10E3/UL (ref 0–0.7)
EOSINOPHIL NFR BLD AUTO: 2 %
ERYTHROCYTE [DISTWIDTH] IN BLOOD BY AUTOMATED COUNT: 14.5 % (ref 10–15)
GLUCOSE SERPL-MCNC: 103 MG/DL (ref 70–99)
HCG SERPL QL: NEGATIVE
HCT VFR BLD AUTO: 35.3 % (ref 35–47)
HGB BLD-MCNC: 11.4 G/DL (ref 11.7–15.7)
HOLD SPECIMEN: NORMAL
HOLD SPECIMEN: NORMAL
IMM GRANULOCYTES # BLD: 0 10E3/UL
IMM GRANULOCYTES NFR BLD: 0 %
INTERPRETATION ECG - MUSE: NORMAL
LIPASE SERPL-CCNC: 59 U/L (ref 13–60)
LYMPHOCYTES # BLD AUTO: 2.2 10E3/UL (ref 0.8–5.3)
LYMPHOCYTES NFR BLD AUTO: 30 %
MAGNESIUM SERPL-MCNC: 1.5 MG/DL (ref 1.7–2.3)
MCH RBC QN AUTO: 29.6 PG (ref 26.5–33)
MCHC RBC AUTO-ENTMCNC: 32.3 G/DL (ref 31.5–36.5)
MCV RBC AUTO: 92 FL (ref 78–100)
MONOCYTES # BLD AUTO: 0.5 10E3/UL (ref 0–1.3)
MONOCYTES NFR BLD AUTO: 7 %
NEUTROPHILS # BLD AUTO: 4.6 10E3/UL (ref 1.6–8.3)
NEUTROPHILS NFR BLD AUTO: 61 %
NRBC # BLD AUTO: 0 10E3/UL
NRBC BLD AUTO-RTO: 0 /100
P AXIS - MUSE: 13 DEGREES
PLATELET # BLD AUTO: 249 10E3/UL (ref 150–450)
POTASSIUM SERPL-SCNC: 3.7 MMOL/L (ref 3.4–5.3)
PR INTERVAL - MUSE: 156 MS
PROT SERPL-MCNC: 6.1 G/DL (ref 6.4–8.3)
QRS DURATION - MUSE: 86 MS
QT - MUSE: 398 MS
QTC - MUSE: 390 MS
R AXIS - MUSE: 54 DEGREES
RBC # BLD AUTO: 3.85 10E6/UL (ref 3.8–5.2)
SODIUM SERPL-SCNC: 139 MMOL/L (ref 135–145)
SYSTOLIC BLOOD PRESSURE - MUSE: NORMAL MMHG
T AXIS - MUSE: 51 DEGREES
TROPONIN T SERPL HS-MCNC: <6 NG/L
VENTRICULAR RATE- MUSE: 58 BPM
WBC # BLD AUTO: 7.5 10E3/UL (ref 4–11)

## 2024-02-21 PROCEDURE — 250N000009 HC RX 250: Performed by: EMERGENCY MEDICINE

## 2024-02-21 PROCEDURE — 96365 THER/PROPH/DIAG IV INF INIT: CPT | Mod: 59

## 2024-02-21 PROCEDURE — 82248 BILIRUBIN DIRECT: CPT | Performed by: EMERGENCY MEDICINE

## 2024-02-21 PROCEDURE — 85025 COMPLETE CBC W/AUTO DIFF WBC: CPT | Performed by: STUDENT IN AN ORGANIZED HEALTH CARE EDUCATION/TRAINING PROGRAM

## 2024-02-21 PROCEDURE — 250N000013 HC RX MED GY IP 250 OP 250 PS 637: Performed by: EMERGENCY MEDICINE

## 2024-02-21 PROCEDURE — 84484 ASSAY OF TROPONIN QUANT: CPT | Performed by: STUDENT IN AN ORGANIZED HEALTH CARE EDUCATION/TRAINING PROGRAM

## 2024-02-21 PROCEDURE — 99285 EMERGENCY DEPT VISIT HI MDM: CPT | Mod: 25

## 2024-02-21 PROCEDURE — 258N000003 HC RX IP 258 OP 636: Performed by: EMERGENCY MEDICINE

## 2024-02-21 PROCEDURE — 83690 ASSAY OF LIPASE: CPT | Performed by: EMERGENCY MEDICINE

## 2024-02-21 PROCEDURE — 93005 ELECTROCARDIOGRAM TRACING: CPT | Performed by: EMERGENCY MEDICINE

## 2024-02-21 PROCEDURE — 96375 TX/PRO/DX INJ NEW DRUG ADDON: CPT

## 2024-02-21 PROCEDURE — 84703 CHORIONIC GONADOTROPIN ASSAY: CPT | Performed by: EMERGENCY MEDICINE

## 2024-02-21 PROCEDURE — 250N000011 HC RX IP 250 OP 636: Performed by: EMERGENCY MEDICINE

## 2024-02-21 PROCEDURE — 93005 ELECTROCARDIOGRAM TRACING: CPT | Performed by: STUDENT IN AN ORGANIZED HEALTH CARE EDUCATION/TRAINING PROGRAM

## 2024-02-21 PROCEDURE — 96361 HYDRATE IV INFUSION ADD-ON: CPT

## 2024-02-21 PROCEDURE — 80048 BASIC METABOLIC PNL TOTAL CA: CPT | Performed by: STUDENT IN AN ORGANIZED HEALTH CARE EDUCATION/TRAINING PROGRAM

## 2024-02-21 PROCEDURE — 83735 ASSAY OF MAGNESIUM: CPT | Performed by: EMERGENCY MEDICINE

## 2024-02-21 PROCEDURE — 74174 CTA ABD&PLVS W/CONTRAST: CPT

## 2024-02-21 PROCEDURE — 36415 COLL VENOUS BLD VENIPUNCTURE: CPT | Performed by: STUDENT IN AN ORGANIZED HEALTH CARE EDUCATION/TRAINING PROGRAM

## 2024-02-21 RX ORDER — LIDOCAINE HYDROCHLORIDE 20 MG/ML
15 SOLUTION OROPHARYNGEAL ONCE
Status: COMPLETED | OUTPATIENT
Start: 2024-02-21 | End: 2024-02-21

## 2024-02-21 RX ORDER — FENTANYL CITRATE 50 UG/ML
75 INJECTION, SOLUTION INTRAMUSCULAR; INTRAVENOUS ONCE
Status: COMPLETED | OUTPATIENT
Start: 2024-02-21 | End: 2024-02-21

## 2024-02-21 RX ORDER — ASPIRIN 81 MG/1
324 TABLET, CHEWABLE ORAL ONCE
Status: DISCONTINUED | OUTPATIENT
Start: 2024-02-21 | End: 2024-02-21

## 2024-02-21 RX ORDER — ONDANSETRON 2 MG/ML
4 INJECTION INTRAMUSCULAR; INTRAVENOUS ONCE
Status: COMPLETED | OUTPATIENT
Start: 2024-02-21 | End: 2024-02-21

## 2024-02-21 RX ORDER — IOPAMIDOL 755 MG/ML
80 INJECTION, SOLUTION INTRAVASCULAR ONCE
Status: COMPLETED | OUTPATIENT
Start: 2024-02-21 | End: 2024-02-21

## 2024-02-21 RX ORDER — MAGNESIUM SULFATE HEPTAHYDRATE 40 MG/ML
2 INJECTION, SOLUTION INTRAVENOUS ONCE
Status: COMPLETED | OUTPATIENT
Start: 2024-02-21 | End: 2024-02-21

## 2024-02-21 RX ORDER — MAGNESIUM HYDROXIDE/ALUMINUM HYDROXICE/SIMETHICONE 120; 1200; 1200 MG/30ML; MG/30ML; MG/30ML
15 SUSPENSION ORAL ONCE
Status: COMPLETED | OUTPATIENT
Start: 2024-02-21 | End: 2024-02-21

## 2024-02-21 RX ADMIN — MAGNESIUM SULFATE HEPTAHYDRATE 2 G: 40 INJECTION, SOLUTION INTRAVENOUS at 02:03

## 2024-02-21 RX ADMIN — ONDANSETRON 4 MG: 2 INJECTION INTRAMUSCULAR; INTRAVENOUS at 01:13

## 2024-02-21 RX ADMIN — ALUMINUM HYDROXIDE, MAGNESIUM HYDROXIDE, AND DIMETHICONE 15 ML: 200; 20; 200 SUSPENSION ORAL at 01:11

## 2024-02-21 RX ADMIN — SODIUM CHLORIDE 500 ML: 9 INJECTION, SOLUTION INTRAVENOUS at 01:11

## 2024-02-21 RX ADMIN — LIDOCAINE HYDROCHLORIDE 15 ML: 20 SOLUTION ORAL; TOPICAL at 01:11

## 2024-02-21 RX ADMIN — FENTANYL CITRATE 75 MCG: 50 INJECTION, SOLUTION INTRAMUSCULAR; INTRAVENOUS at 01:15

## 2024-02-21 RX ADMIN — IOPAMIDOL 80 ML: 755 INJECTION, SOLUTION INTRAVENOUS at 01:44

## 2024-02-21 ASSESSMENT — ENCOUNTER SYMPTOMS
COUGH: 0
DIARRHEA: 1
ABDOMINAL PAIN: 0
BACK PAIN: 1
DYSURIA: 0
SHORTNESS OF BREATH: 0
VOMITING: 0
FEVER: 0
NAUSEA: 0

## 2024-02-21 ASSESSMENT — ACTIVITIES OF DAILY LIVING (ADL)
ADLS_ACUITY_SCORE: 37
ADLS_ACUITY_SCORE: 37

## 2024-02-21 NOTE — ED TRIAGE NOTES
"Patient arrives ambulatory to triage from home.   Reports jaw and back pain that has been ongoing since approximately 1900 yesterday evening. States this has happened before but \"not this bad\".     EKG performed in triage.         "

## 2024-02-21 NOTE — ED PROVIDER NOTES
EMERGENCY DEPARTMENT ENCOUNTER      NAME: Alok Nino  AGE: 43 year old female  YOB: 1980  MRN: [id number]  EVALUATION DATE & TIME: 2/21/2024 12:37 AM    PCP: Nakita Odonnell    ED PROVIDER: Ema Suh M.D.        Chief Complaint   Patient presents with    Jaw Pain         FINAL IMPRESSION:    1. Jaw pain    2. Acute thoracic back pain, unspecified back pain laterality    3. Hypomagnesemia    4. Left ovarian cyst            MEDICAL DECISION MAKING:    shine Nino is a 43 year old female with history of Crohn's disease, cholecystectomy, low magnesium/potassium/calcium levels, going diarrhea, who presents to the ER with complaints of jaw pain, chest pain, back pain.     She has had the symptoms before but this episode is little more severe.  She has been seeing GI for this.  Laboratories and imaging reassuring.  I do not think this represents bowel obstruction, aortic catastrophe, ACS, etc.  Nothing to suggest acute biliary or liver etiology.  She has had her gallbladder removed.  No indication for emergent MRCP or further radiology imaging or lab work at this time.  I do feel she is appropriate for continued outpatient workup.  She agrees with this plan and all of her questions have been answered.      ED COURSE:  12:55 AM  I met with the patient to gather history and perform my exam. ED course and treatment discussed.    4:07 AM  Updated patient on all results.  CT scan does show some bowel dilatation but has stool throughout.  Nothing to suggest obstruction and this is similar to prior CT scans.  They suspect this is just postsurgical changes.  She does not appear toxic or septic.  Really does not have any abdominal discomfort.  Incidental ovarian cyst also seen without signs for torsion.  Nothing to suggest ACS, aortic catastrophe, PE, etc.  It sounds like patient has had the symptoms before and is following with GI for this.  They have considered doing an MRCP.  At this time her  LFTs look good and she is not having any abdominal pain.  I do not think she requires an emergent MRCP at this time.  Patient agrees with that.  Plan at this time is discharge home to continue following with her usual providers.  She understands what to watch for and when to return to the ER and all of her questions have been answered.    I do not think that this represents ACS, PE, ruptured AAA, aortic dissection, bowel obstruction, bowel ischemia, cholecystitis, pancreatitis, appendicitis, diverticulitis, kidney stone, pyelonephritis, incarcerated or strangulated hernia, ovarian torsion, PID, ectopic pregnancy, tubo-ovarian abscess, viscus perforation, perforated GI ulcer, or other such etiologies at this time.    At the conclusion of the encounter I discussed the results of all of the tests and the disposition. Their questions were answered. The patient (and any family present) acknowledged understanding and were agreeable with the care plan.      CONSULTANTS:  none      MEDICATIONS GIVEN IN THE EMERGENCY:  Medications   alum & mag hydroxide-simethicone (MAALOX) suspension 15 mL (15 mLs Oral $Given 2/21/24 0111)   lidocaine (viscous) (XYLOCAINE) 2 % solution 15 mL (15 mLs Mouth/Throat $Given 2/21/24 0111)   sodium chloride 0.9% BOLUS 500 mL (0 mLs Intravenous Stopped 2/21/24 0204)   ondansetron (ZOFRAN) injection 4 mg (4 mg Intravenous $Given 2/21/24 0113)   fentaNYL (PF) (SUBLIMAZE) injection 75 mcg (75 mcg Intravenous $Given 2/21/24 0115)   iopamidol (ISOVUE-370) solution 80 mL (80 mLs Intravenous $Given 2/21/24 0144)   magnesium sulfate 2 g in 50 mL sterile water intermittent infusion (0 g Intravenous Stopped 2/21/24 0328)           NEW PRESCRIPTIONS STARTED AT TODAY'S ER VISIT     Medication List      There are no discharge medications for this visit.             CONDITION:  stable        DISPOSITION:  D.c home    "      =================================================================  =================================================================  TRIAGE ASSESSMENT:  Patient arrives ambulatory to triage from home.   Reports jaw and back pain that has been ongoing since approximately 1900 yesterday evening. States this has happened before but \"not this bad\".     EKG performed in triage.         ED Triage Vitals [02/21/24 0035]   Enc Vitals Group      /76      Pulse 72      Resp 16      Temp       Temp src       SpO2 98 %      Weight 49.9 kg (110 lb)      Height 1.575 m (5' 2\")          ================================================================  ================================================================    HPI    Patient information was obtained from: patient and     Use of Intrepreter: N/A      Alok Nino is a 43 year old female with history of Crohn's disease, cholecystectomy, low magnesium/potassium/calcium levels, going diarrhea, who presents to the ER with complaints of jaw pain, chest pain, back pain.    She has had similar pain in the past.  She does follow with GI.  She has had her gallbladder out and states it does not feel like her usual liver/gallbladder type pain.  She did try some heartburn type medicine without relief.  Patient orts pain is in her back but then radiates into the chest area and into her jaw.    Denies fevers, cough, shortness of breath, abdominal pain.  She does report some diarrhea without vomiting.  States it is uncommon for her to have diarrhea.    She states that she had \"stomach flu\" about a week ago.  Sometimes she feels this way when her electrolytes are off.    She was seen by GI who states her LFTs were elevated at 150 range.      CHART REVIEW:  Looked through her last ER visits for the last 1 year.  She is mainly seen for sore throats but has been seen for diarrhea.  Nothing with similar symptoms to today.      REVIEW OF SYSTEMS  Review of Systems "   Constitutional:  Negative for fever.   HENT:          +bilateral jaw pain   Respiratory:  Negative for cough and shortness of breath.    Cardiovascular:  Positive for chest pain.   Gastrointestinal:  Positive for diarrhea. Negative for abdominal pain, nausea and vomiting.   Genitourinary:  Negative for dysuria.   Musculoskeletal:  Positive for back pain.   All other systems reviewed and are negative.    PAST MEDICAL HISTORY:  Past Medical History:   Diagnosis Date    C. difficile enteritis     Chronic pain     Crohn disease (H)     Melanoma (H)     PONV (postoperative nausea and vomiting)     SBO (small bowel obstruction) (H) 03/14/2022         PAST SURGICAL HISTORY:  Past Surgical History:   Procedure Laterality Date    ABDOMEN SURGERY      3 for crohns dx    APPENDECTOMY      APPENDECTOMY      CHOLECYSTECTOMY      CHOLECYSTECTOMY      COLONOSCOPY      ENDOSCOPIC ULTRASOUND UPPER GASTROINTESTINAL TRACT (GI) N/A 11/13/2020    Procedure: ENDOSCOPIC ULTRASOUND, ESOPHAGOSCOPY / UPPER GASTROINTESTINAL TRACT with BIOPSY;  Surgeon: Cydney Garcia MD;  Location: [facility] OR    GYN SURGERY      H STATISTIC PICC LINE INSERTION >5YR, FAILED Right 11/10/2021    LUE unsuccessful attempt from another hospital, IR deferral    IR PICC PLACEMENT > 5 YRS OF AGE  11/11/2021    IR PICC PLACEMENT > 5 YRS OF AGE  3/16/2022    OTHER SURGICAL HISTORY      strictoplasty x3    OTHER SURGICAL HISTORY      adhesion removal x1    OTHER SURGICAL HISTORY      small bowel resections    PICC  05/10/2019         RESECTION ILEOCECAL  12/06/2013    Procedure: RESECTION ILEOCECAL;  Laparotomy, Extensive Lysis of Adhesions, Stricture Plasty X2  Anesthesia general with block;  Surgeon: Pete Cartagena MD;  Location: [facility] OR    RESECTION ILEOCOLIC N/A 11/17/2021    Procedure: Exploratory laparotomy, illeal resection, extensive lysis of adhesions (2 hr);  Surgeon: Pete Cartagena MD;  Location: [facility] OR         CURRENT MEDICATIONS:    Prior to Admission  "medications    Medication Sig Start Date End Date Taking? Authorizing Provider   azithromycin (ZITHROMAX) 250 MG tablet  9/13/22   Reported, Patient   Cholecalciferol (D 75222) 250 MCG (24637 UT) CAPS Take 250 mcg by mouth 6/23/22   Reported, Patient   cyanocobalamin (VITAMIN B-12) 1000 MCG tablet Take 1 tablet (1,000 mcg) by mouth daily 8/23/23   Yousif Hawley MD   escitalopram (LEXAPRO) 5 MG tablet Take 5 mg by mouth daily    Reported, Patient   ustekinumab (STELARA) 90 MG/ML Inject 90 mg Subcutaneous every 28 days  12/21/21   Reported, Patient         ALLERGIES:  Allergies   Allergen Reactions    Morphine Hcl Other (See Comments)     SPASMS OF SPHINCTER OF ODDI  (BILIARY TRACT)    Sucralfate Hives    Codeine Nausea and Vomiting         FAMILY HISTORY:  Family History   Problem Relation Age of Onset    No Known Problems Mother     Clotting Disorder Father     No Known Problems Brother     No Known Problems Son     Anesthesia Reaction No family hx of     Colon Cancer No family hx of     Crohn's Disease No family hx of     Ulcerative Colitis No family hx of     Colon Polyps No family hx of          SOCIAL HISTORY:  Social History     Socioeconomic History    Marital status:    Tobacco Use    Smoking status: Never    Smokeless tobacco: Never   Substance and Sexual Activity    Alcohol use: Yes     Comment: occasional/ 1 drink per 1-3 month    Drug use: No         VITALS:  Patient Vitals for the past 24 hrs:   BP Temp Temp src Pulse Resp SpO2 Height Weight   02/21/24 0300 108/63 -- -- 59 15 98 % -- --   02/21/24 0250 114/65 -- -- 56 15 98 % -- --   02/21/24 0230 111/64 -- -- 56 16 98 % -- --   02/21/24 0220 120/69 -- -- 53 13 98 % -- --   02/21/24 0200 109/68 -- -- 58 13 99 % -- --   02/21/24 0120 121/75 -- -- 68 19 99 % -- --   02/21/24 0100 123/72 98.5  F (36.9  C) Oral 62 28 100 % -- --   02/21/24 0035 136/76 -- -- 72 16 98 % 1.575 m (5' 2\") 49.9 kg (110 lb)       Wt Readings from Last 3 Encounters: "   02/21/24 49.9 kg (110 lb)   11/21/23 49.9 kg (110 lb)   08/22/23 49.9 kg (110 lb)       Estimated Creatinine Clearance: 88.4 mL/min (based on SCr of 0.66 mg/dL).    PHYSICAL EXAM    Constitutional:  Well developed, Well nourished, NAD  HENT:  Normocephalic, Atraumatic, Bilateral external ears normal, Nose normal. Neck- Supple, No stridor.   Eyes:  PERRL, EOMI, Conjunctiva normal, No discharge.  Respiratory:  Normal breath sounds, No respiratory distress, No wheezing, Speaks full sentences easily. No cough.   Cardiovascular:  Normal heart rate, Regular rhythm, No rubs, No gallops. Chest wall nontender.   GI:  No excessive obesity.  Bowel sounds normal, Soft, No tenderness, No masses, No flank tenderness. No rebound or guarding.   : deferred  Musculoskeletal: No cyanosis, No clubbing. Good range of motion in all major joints. No tenderness to palpation or major deformities noted. No tenderness of the CTLS spine.   Integument:  Warm, Dry, No erythema, No rash.  No petechiae.   Neurologic:  Alert & oriented x 3  Psychiatric:  Affect normal, Cooperative         LAB:  All pertinent labs reviewed and interpreted.  Recent Results (from the past 24 hour(s))   Basic metabolic panel    Collection Time: 02/21/24 12:55 AM   Result Value Ref Range    Sodium 139 135 - 145 mmol/L    Potassium 3.7 3.4 - 5.3 mmol/L    Chloride 105 98 - 107 mmol/L    Carbon Dioxide (CO2) 28 22 - 29 mmol/L    Anion Gap 6 (L) 7 - 15 mmol/L    Urea Nitrogen 16.1 6.0 - 20.0 mg/dL    Creatinine 0.66 0.51 - 0.95 mg/dL    GFR Estimate >90 >60 mL/min/1.73m2    Calcium 9.0 8.6 - 10.0 mg/dL    Glucose 103 (H) 70 - 99 mg/dL   Troponin T, High Sensitivity    Collection Time: 02/21/24 12:55 AM   Result Value Ref Range    Troponin T, High Sensitivity <6 <=14 ng/L   CBC with platelets and differential    Collection Time: 02/21/24 12:55 AM   Result Value Ref Range    WBC Count 7.5 4.0 - 11.0 10e3/uL    RBC Count 3.85 3.80 - 5.20 10e6/uL    Hemoglobin 11.4 (L)  11.7 - 15.7 g/dL    Hematocrit 35.3 35.0 - 47.0 %    MCV 92 78 - 100 fL    MCH 29.6 26.5 - 33.0 pg    MCHC 32.3 31.5 - 36.5 g/dL    RDW 14.5 10.0 - 15.0 %    Platelet Count 249 150 - 450 10e3/uL    % Neutrophils 61 %    % Lymphocytes 30 %    % Monocytes 7 %    % Eosinophils 2 %    % Basophils 0 %    % Immature Granulocytes 0 %    NRBCs per 100 WBC 0 <1 /100    Absolute Neutrophils 4.6 1.6 - 8.3 10e3/uL    Absolute Lymphocytes 2.2 0.8 - 5.3 10e3/uL    Absolute Monocytes 0.5 0.0 - 1.3 10e3/uL    Absolute Eosinophils 0.2 0.0 - 0.7 10e3/uL    Absolute Basophils 0.0 0.0 - 0.2 10e3/uL    Absolute Immature Granulocytes 0.0 <=0.4 10e3/uL    Absolute NRBCs 0.0 10e3/uL   Extra Blue Top Tube    Collection Time: 02/21/24 12:55 AM   Result Value Ref Range    Hold Specimen JIC    Extra Red Top Tube    Collection Time: 02/21/24 12:55 AM   Result Value Ref Range    Hold Specimen JIC    HCG QUALitative pregnancy (blood)    Collection Time: 02/21/24 12:55 AM   Result Value Ref Range    hCG Serum Qualitative Negative Negative   Hepatic function panel    Collection Time: 02/21/24 12:55 AM   Result Value Ref Range    Protein Total 6.1 (L) 6.4 - 8.3 g/dL    Albumin 3.6 3.5 - 5.2 g/dL    Bilirubin Total 0.2 <=1.2 mg/dL    Alkaline Phosphatase 92 40 - 150 U/L    AST 23 0 - 45 U/L    ALT 50 0 - 50 U/L    Bilirubin Direct <0.20 0.00 - 0.30 mg/dL   Lipase    Collection Time: 02/21/24 12:55 AM   Result Value Ref Range    Lipase 59 13 - 60 U/L   Magnesium    Collection Time: 02/21/24 12:55 AM   Result Value Ref Range    Magnesium 1.5 (L) 1.7 - 2.3 mg/dL       Lab Results   Component Value Date    ABORH A POS 11/16/2021           RADIOLOGY:  Reviewed all pertinent imaging. Please see official radiology report.    CTA Chest Abdomen Pelvis w Contrast   Final Result   IMPRESSION:    1. Prominent distention of fluid-filled small bowel in the upper left hemiabdomen, in the region of prior bowel surgery. Similar findings have been present on prior  studies and this is therefore most likely a chronic appearance related to the prior    surgery. Recommend correlation with the patient's symptoms.   2. 3.5 cm left adnexal cyst, most likely a functional ovarian cyst. There is a small amount of fluid in the pelvis centered about the cyst that could relate to recent rupture of the cyst.   3. No other acute abnormality identified in the chest, abdomen or pelvis.   4. The aorta is normal in caliber without dissection.             EKG:    Indication: jaw pain    Performed at: 00:34am  Impression: Sinus bradycardia at 58 bpm.  Flipped T waves noted lead aVR and V1-V4.  No ST elevations appreciated.  OH interval 156 ms, QRS 86 ms, QTc 390 ms.  Nonspecific ST changes compared to July 31, 2019.    I have independently reviewed and interpreted the EKG(s) documented above.        PROCEDURES:  none    Medical Decision Making  Obtained supplemental history:Supplemental history obtained?: Documented in chart and Family Member/Significant Other  Reviewed external records: External records reviewed?: Documented in chart and Outpatient Record: see  HPI  Care impacted by chronic illness:Other: Crohn's disease  Care significantly affected by social determinants of health:Access to Medical Care  Did you consider but not order tests?: Work up considered but not performed and documented in chart, if applicable  Did you interpret images independently?: Independent interpretation of ECG and images noted in documentation, when applicable.  Consultation discussion with other provider:Did you involve another provider (consultant, , pharmacy, etc.)?: No  Discharge. No recommendations on prescription strength medication(s). I considered admission, but ultimately discharged patient but imaging and laboratories reassuring.  Patient indicates that this is not a new thing for her and follows with GI for this..      Ema Suh M.D. FACEP  Emergency Medicine and Medical Toxicology  Formerly  [facility] EMERGENCY DEPARTMENT  [facility] [facility] [address]  [phone]  Dept: [phone]           Ema Suh MD  02/21/24 7401

## 2024-02-21 NOTE — DISCHARGE INSTRUCTIONS
At this time all of your tests look good.  CT scan looks stable without anything concerning at the moment.    I encourage you to continue to work with Dr. Tolbert and your primary care with regards to your symptoms.    Return the emergency department if you develop worsening pain, vomiting, fevers, difficulty breathing, or any other concerns.    Thank you for choosing [facility] Emergency Department.  It has been my pleasure caring for you today.     ~Dr. Angeli MD

## 2024-04-05 NOTE — PROGRESS NOTES
Discussed smaller portions, healthy snacks, and regular exercise.     [facility]    Medicine Progress Note - Hospitalist Service    Date of Admission:  3/9/2022    Assessment & Plan          40 y/o female with hx of Crohn's dz,  multiple prior abdominal surgeries and multiple prior small bowel obstructions, who follows [facility] and colorectal surgery presents with abdominal pain and found to have bowel obstruction. She recently underwent a colonoscopy with stricture dilation at the site of the ileocolonic anastomosis on 3/7/22        1.Bowel obstruction  -History of Crohn's disease, multiple prior abdominal surgeries, recurrent bowel obstructions.  -Had colonoscopy and dilatation of stricture by colorectal 2 days PTA  -Interval improvement of symptoms afterwards but started to have abdominal pain and nausea PTA  -Abdominal x-ray with distended bowel loops suggestive of distal colonic obstruction.  -started clears 3/9 and had some stool aleady  -she was advanced to LFD diet and is doing well  -ok home today     2.Hypokalemia  -replaced     3.hypomag  -replaced, good today     4.Crohn's disease  -She had MR enterography on 3/3 which did not show any evidence of flares  -On budesonide and Stelara  -GI consult following  -Colonoscopy with stent placement at [facility] in 2 months     5.Elevated liver enzymes-,  alk phos 105.  Bili normal.  Had previous cholecystectomy.    -needs to have LFT's followed in clinic  -at day of discharge AST 69  ALT-122     6.Moderate malnutrition  -now advancing diet  -check phos-ok  -replace lytes  -albumin 2.7                   Diet: Advance Diet as Tolerated: Regular Diet Adult; Low Fiber  Snacks/Supplements Adult: Gelatein Plus; Between Meals  Snacks/Supplements Adult: Ensure Enlive; With Meals    DVT Prophylaxis: Heparin SQ  Hamilton Catheter: Not present  Central Lines: None  Cardiac Monitoring: ACTIVE order. Indication: Electrolyte Imbalance (24 hours)- Magnesium <1.3 mg/ml; Potassium < =2.8 or > 5.5 mg/ml  Code Status: Full  Code      Disposition Plan   Expected Discharge: 03/11/2022     Anticipated discharge location:  Awaiting care coordination huddle  Delays:   home today        The patient's care was discussed with the Care Coordinator/ and Patient.    Marcelina Sandoval MD  Hospitalist Service  [facility]  Securely message with the Vocera Web Console (learn more here)  Text page via [employer] Paging/Directory         Clinically Significant Risk Factors Present on Admission             # Moderate Malnutrition: based on nutrition assessment     ______________________________________________________________________    Interval History   She feels better  Having gas and stool  Not much pain  Wants to go home  Tolerating diet    Data reviewed today: I reviewed all medications, new labs and imaging results over the last 24 hours. I personally reviewed no images or EKG's today.    Physical Exam   Vital Signs: Temp: 97.4  F (36.3  C) Temp src: Oral BP: 110/66 Pulse: 64   Resp: 16 SpO2: 99 % O2 Device: None (Room air)    Weight: 103 lbs 0 oz  Constitutional: awake, alert, cooperative and no apparent distress  Respiratory: No increased work of breathing, good air exchange, clear to auscultation bilaterally, no crackles or wheezing  Cardiovascular: Normal apical impulse, regular rate and rhythm, normal S1 and S2, no S3 or S4, and no murmur noted  GI: normal bowel sounds, soft, non-distended and non-tender  Skin: no bruising or bleeding  Musculoskeletal: no lower extremity pitting edema present  Neurologic: Mental Status Exam:  Level of Alertness:   awake  Neuropsychiatric: General: normal, calm and normal eye contact  Affect: normal and pleasant    Data   Recent Labs   Lab 03/11/22  0618 03/10/22  0606 03/10/22  0034 03/09/22  0747 03/09/22  0117   WBC 5.3 4.4  --   --  9.8   HGB 10.6* 10.2*  --   --  11.9   MCV 96 96  --   --  92    194  --   --  267    142  --   --  142   POTASSIUM 3.5 3.7   3.7 3.3*   < > 2.6*   CHLORIDE 109* 110*  --   --  107   CO2 26 26  --   --  21*   BUN 5* 4*  --   --  9   CR 0.57* 0.56*  --   --  0.64   ANIONGAP 6 6  --   --  14   BLACK 7.6* 7.5*  --   --  8.8   GLC 84 91  --   --  104   ALBUMIN 2.7* 2.7*  --   --  3.7   PROTTOTAL 5.2* 4.8*  --   --  6.5   BILITOTAL 0.4 0.4  --   --  0.7   ALKPHOS 93 86  --   --  105   * 88*  --   --  160*   AST 69* 28  --   --  77*   LIPASE  --   --   --   --  44    < > = values in this interval not displayed.     No results found for this or any previous visit (from the past 24 hour(s)).

## 2024-05-13 PROBLEM — R79.89 LOW VITAMIN D LEVEL: Status: ACTIVE | Noted: 2024-05-13

## 2024-07-06 ENCOUNTER — HEALTH MAINTENANCE LETTER (OUTPATIENT)
Age: 44
End: 2024-07-06

## 2024-08-27 ENCOUNTER — APPOINTMENT (OUTPATIENT)
Dept: CT IMAGING | Facility: HOSPITAL | Age: 44
DRG: 386 | End: 2024-08-27
Attending: EMERGENCY MEDICINE
Payer: COMMERCIAL

## 2024-08-27 ENCOUNTER — HOSPITAL ENCOUNTER (INPATIENT)
Facility: HOSPITAL | Age: 44
LOS: 1 days | Discharge: HOME | DRG: 386 | End: 2024-08-29
Attending: EMERGENCY MEDICINE | Admitting: INTERNAL MEDICINE
Payer: COMMERCIAL

## 2024-08-27 DIAGNOSIS — R10.11 RUQ ABDOMINAL PAIN: ICD-10-CM

## 2024-08-27 DIAGNOSIS — R10.11 RIGHT UPPER QUADRANT ABDOMINAL PAIN: Primary | ICD-10-CM

## 2024-08-27 DIAGNOSIS — Z87.19 HISTORY OF CROHN'S DISEASE: ICD-10-CM

## 2024-08-27 DIAGNOSIS — R11.0 NAUSEA: ICD-10-CM

## 2024-08-27 LAB
ALBUMIN SERPL BCG-MCNC: 4.2 G/DL (ref 3.5–5.2)
ALP SERPL-CCNC: 133 U/L (ref 40–150)
ALT SERPL W P-5'-P-CCNC: 152 U/L (ref 0–50)
ANION GAP SERPL CALCULATED.3IONS-SCNC: 17 MMOL/L (ref 7–15)
AST SERPL W P-5'-P-CCNC: 103 U/L (ref 0–45)
BILIRUB SERPL-MCNC: 0.4 MG/DL
BUN SERPL-MCNC: 14.7 MG/DL (ref 6–20)
CALCIUM SERPL-MCNC: 8.7 MG/DL (ref 8.8–10.4)
CHLORIDE SERPL-SCNC: 104 MMOL/L (ref 98–107)
CREAT SERPL-MCNC: 0.61 MG/DL (ref 0.51–0.95)
CRP SERPL-MCNC: <3 MG/L
EGFRCR SERPLBLD CKD-EPI 2021: >90 ML/MIN/1.73M2
ERYTHROCYTE [DISTWIDTH] IN BLOOD BY AUTOMATED COUNT: 12.7 % (ref 10–15)
GLUCOSE SERPL-MCNC: 109 MG/DL (ref 70–99)
HCG SERPL QL: NEGATIVE
HCO3 SERPL-SCNC: 16 MMOL/L (ref 22–29)
HCT VFR BLD AUTO: 35.7 % (ref 35–47)
HGB BLD-MCNC: 11.8 G/DL (ref 11.7–15.7)
LIPASE SERPL-CCNC: 32 U/L (ref 13–60)
MAGNESIUM SERPL-MCNC: 1.6 MG/DL (ref 1.7–2.3)
MCH RBC QN AUTO: 31.8 PG (ref 26.5–33)
MCHC RBC AUTO-ENTMCNC: 33.1 G/DL (ref 31.5–36.5)
MCV RBC AUTO: 96 FL (ref 78–100)
PLATELET # BLD AUTO: 302 10E3/UL (ref 150–450)
POTASSIUM SERPL-SCNC: 4.1 MMOL/L (ref 3.4–5.3)
PROT SERPL-MCNC: 6.8 G/DL (ref 6.4–8.3)
RBC # BLD AUTO: 3.71 10E6/UL (ref 3.8–5.2)
SODIUM SERPL-SCNC: 137 MMOL/L (ref 135–145)
TROPONIN T SERPL HS-MCNC: <6 NG/L
WBC # BLD AUTO: 7.3 10E3/UL (ref 4–11)

## 2024-08-27 PROCEDURE — 86140 C-REACTIVE PROTEIN: CPT | Performed by: EMERGENCY MEDICINE

## 2024-08-27 PROCEDURE — 74177 CT ABD & PELVIS W/CONTRAST: CPT

## 2024-08-27 PROCEDURE — 99285 EMERGENCY DEPT VISIT HI MDM: CPT | Mod: 25

## 2024-08-27 PROCEDURE — 84484 ASSAY OF TROPONIN QUANT: CPT | Performed by: EMERGENCY MEDICINE

## 2024-08-27 PROCEDURE — 83690 ASSAY OF LIPASE: CPT | Performed by: EMERGENCY MEDICINE

## 2024-08-27 PROCEDURE — 96375 TX/PRO/DX INJ NEW DRUG ADDON: CPT

## 2024-08-27 PROCEDURE — 96361 HYDRATE IV INFUSION ADD-ON: CPT

## 2024-08-27 PROCEDURE — 83735 ASSAY OF MAGNESIUM: CPT | Performed by: EMERGENCY MEDICINE

## 2024-08-27 PROCEDURE — 250N000011 HC RX IP 250 OP 636: Performed by: EMERGENCY MEDICINE

## 2024-08-27 PROCEDURE — 258N000003 HC RX IP 258 OP 636: Performed by: EMERGENCY MEDICINE

## 2024-08-27 PROCEDURE — 36415 COLL VENOUS BLD VENIPUNCTURE: CPT | Performed by: EMERGENCY MEDICINE

## 2024-08-27 PROCEDURE — 85027 COMPLETE CBC AUTOMATED: CPT | Performed by: EMERGENCY MEDICINE

## 2024-08-27 PROCEDURE — 96374 THER/PROPH/DIAG INJ IV PUSH: CPT | Mod: 59

## 2024-08-27 PROCEDURE — 82040 ASSAY OF SERUM ALBUMIN: CPT | Performed by: EMERGENCY MEDICINE

## 2024-08-27 PROCEDURE — 93005 ELECTROCARDIOGRAM TRACING: CPT | Performed by: EMERGENCY MEDICINE

## 2024-08-27 PROCEDURE — 84703 CHORIONIC GONADOTROPIN ASSAY: CPT | Performed by: EMERGENCY MEDICINE

## 2024-08-27 PROCEDURE — 82010 KETONE BODYS QUAN: CPT | Performed by: STUDENT IN AN ORGANIZED HEALTH CARE EDUCATION/TRAINING PROGRAM

## 2024-08-27 PROCEDURE — 80061 LIPID PANEL: CPT | Performed by: INTERNAL MEDICINE

## 2024-08-27 RX ORDER — ONDANSETRON 2 MG/ML
4 INJECTION INTRAMUSCULAR; INTRAVENOUS ONCE
Status: COMPLETED | OUTPATIENT
Start: 2024-08-27 | End: 2024-08-27

## 2024-08-27 RX ADMIN — SODIUM CHLORIDE 1000 ML: 9 INJECTION, SOLUTION INTRAVENOUS at 22:52

## 2024-08-27 RX ADMIN — HYDROMORPHONE HYDROCHLORIDE 0.5 MG: 1 INJECTION, SOLUTION INTRAMUSCULAR; INTRAVENOUS; SUBCUTANEOUS at 22:58

## 2024-08-27 RX ADMIN — ONDANSETRON 4 MG: 2 INJECTION INTRAMUSCULAR; INTRAVENOUS at 23:00

## 2024-08-27 ASSESSMENT — ACTIVITIES OF DAILY LIVING (ADL): ADLS_ACUITY_SCORE: 37

## 2024-08-27 ASSESSMENT — COLUMBIA-SUICIDE SEVERITY RATING SCALE - C-SSRS
1. IN THE PAST MONTH, HAVE YOU WISHED YOU WERE DEAD OR WISHED YOU COULD GO TO SLEEP AND NOT WAKE UP?: NO
2. HAVE YOU ACTUALLY HAD ANY THOUGHTS OF KILLING YOURSELF IN THE PAST MONTH?: NO
6. HAVE YOU EVER DONE ANYTHING, STARTED TO DO ANYTHING, OR PREPARED TO DO ANYTHING TO END YOUR LIFE?: NO

## 2024-08-28 ENCOUNTER — APPOINTMENT (OUTPATIENT)
Dept: ULTRASOUND IMAGING | Facility: HOSPITAL | Age: 44
DRG: 386 | End: 2024-08-28
Attending: EMERGENCY MEDICINE
Payer: COMMERCIAL

## 2024-08-28 ENCOUNTER — APPOINTMENT (OUTPATIENT)
Dept: MRI IMAGING | Facility: HOSPITAL | Age: 44
DRG: 386 | End: 2024-08-28
Attending: INTERNAL MEDICINE
Payer: COMMERCIAL

## 2024-08-28 PROBLEM — R10.11 RUQ ABDOMINAL PAIN: Status: ACTIVE | Noted: 2024-08-28

## 2024-08-28 PROBLEM — R10.9 ABDOMINAL PAIN: Status: ACTIVE | Noted: 2024-08-28

## 2024-08-28 PROBLEM — R11.0 NAUSEA: Status: ACTIVE | Noted: 2024-08-28

## 2024-08-28 PROBLEM — Z87.19 HISTORY OF CROHN'S DISEASE: Status: ACTIVE | Noted: 2024-08-28

## 2024-08-28 LAB
ADV 40+41 DNA STL QL NAA+NON-PROBE: NEGATIVE
ALBUMIN SERPL BCG-MCNC: 3.3 G/DL (ref 3.5–5.2)
ALBUMIN UR-MCNC: 10 MG/DL
ALP SERPL-CCNC: 119 U/L (ref 40–150)
ALT SERPL W P-5'-P-CCNC: 102 U/L (ref 0–50)
ANION GAP SERPL CALCULATED.3IONS-SCNC: 5 MMOL/L (ref 7–15)
APPEARANCE UR: CLEAR
AST SERPL W P-5'-P-CCNC: 43 U/L (ref 0–45)
ASTRO TYP 1-8 RNA STL QL NAA+NON-PROBE: NEGATIVE
B-OH-BUTYR SERPL-SCNC: 0.52 MMOL/L
BASOPHILS # BLD AUTO: 0 10E3/UL (ref 0–0.2)
BASOPHILS NFR BLD AUTO: 0 %
BILIRUB SERPL-MCNC: 0.3 MG/DL
BILIRUB UR QL STRIP: NEGATIVE
BUN SERPL-MCNC: 11 MG/DL (ref 6–20)
C CAYETANENSIS DNA STL QL NAA+NON-PROBE: NEGATIVE
C DIFF TOX B STL QL: NEGATIVE
CALCIUM SERPL-MCNC: 7.2 MG/DL (ref 8.8–10.4)
CAMPYLOBACTER DNA SPEC NAA+PROBE: NEGATIVE
CHLORIDE SERPL-SCNC: 111 MMOL/L (ref 98–107)
CHOLEST SERPL-MCNC: 74 MG/DL
COLOR UR AUTO: YELLOW
CREAT SERPL-MCNC: 0.72 MG/DL (ref 0.51–0.95)
CRYPTOSP DNA STL QL NAA+NON-PROBE: NEGATIVE
E COLI O157 DNA STL QL NAA+NON-PROBE: NORMAL
E HISTOLYT DNA STL QL NAA+NON-PROBE: NEGATIVE
EAEC ASTA GENE ISLT QL NAA+PROBE: NEGATIVE
EC STX1+STX2 GENES STL QL NAA+NON-PROBE: NEGATIVE
EGFRCR SERPLBLD CKD-EPI 2021: >90 ML/MIN/1.73M2
EOSINOPHIL # BLD AUTO: 0.1 10E3/UL (ref 0–0.7)
EOSINOPHIL NFR BLD AUTO: 2 %
EPEC EAE GENE STL QL NAA+NON-PROBE: NEGATIVE
ERYTHROCYTE [DISTWIDTH] IN BLOOD BY AUTOMATED COUNT: 13.1 % (ref 10–15)
ETEC LTA+ST1A+ST1B TOX ST NAA+NON-PROBE: NEGATIVE
G LAMBLIA DNA STL QL NAA+NON-PROBE: NEGATIVE
GLUCOSE SERPL-MCNC: 86 MG/DL (ref 70–99)
GLUCOSE UR STRIP-MCNC: NEGATIVE MG/DL
HAV IGM SERPL QL IA: NONREACTIVE
HBV CORE IGM SERPL QL IA: NONREACTIVE
HBV SURFACE AG SERPL QL IA: NONREACTIVE
HCO3 SERPL-SCNC: 21 MMOL/L (ref 22–29)
HCT VFR BLD AUTO: 33.9 % (ref 35–47)
HCV AB SERPL QL IA: NONREACTIVE
HDLC SERPL-MCNC: 28 MG/DL
HGB BLD-MCNC: 10.7 G/DL (ref 11.7–15.7)
HGB UR QL STRIP: NEGATIVE
HYALINE CASTS: 1 /LPF
IMM GRANULOCYTES # BLD: 0 10E3/UL
IMM GRANULOCYTES NFR BLD: 0 %
KETONES UR STRIP-MCNC: 40 MG/DL
LDLC SERPL CALC-MCNC: 36 MG/DL
LEUKOCYTE ESTERASE UR QL STRIP: NEGATIVE
LYMPHOCYTES # BLD AUTO: 0.9 10E3/UL (ref 0.8–5.3)
LYMPHOCYTES NFR BLD AUTO: 16 %
MAGNESIUM SERPL-MCNC: 2.2 MG/DL (ref 1.7–2.3)
MCH RBC QN AUTO: 31.4 PG (ref 26.5–33)
MCHC RBC AUTO-ENTMCNC: 31.6 G/DL (ref 31.5–36.5)
MCV RBC AUTO: 99 FL (ref 78–100)
MONOCYTES # BLD AUTO: 0.4 10E3/UL (ref 0–1.3)
MONOCYTES NFR BLD AUTO: 7 %
MUCOUS THREADS #/AREA URNS LPF: PRESENT /LPF
NEUTROPHILS # BLD AUTO: 4.4 10E3/UL (ref 1.6–8.3)
NEUTROPHILS NFR BLD AUTO: 75 %
NITRATE UR QL: NEGATIVE
NONHDLC SERPL-MCNC: 46 MG/DL
NOROVIRUS GI+II RNA STL QL NAA+NON-PROBE: NEGATIVE
NRBC # BLD AUTO: 0 10E3/UL
NRBC BLD AUTO-RTO: 0 /100
P SHIGELLOIDES DNA STL QL NAA+NON-PROBE: NEGATIVE
PH UR STRIP: 5.5 [PH] (ref 5–7)
PLATELET # BLD AUTO: 249 10E3/UL (ref 150–450)
POTASSIUM SERPL-SCNC: 4.4 MMOL/L (ref 3.4–5.3)
PROT SERPL-MCNC: 5 G/DL (ref 6.4–8.3)
RBC # BLD AUTO: 3.41 10E6/UL (ref 3.8–5.2)
RBC URINE: <1 /HPF
RVA RNA STL QL NAA+NON-PROBE: NEGATIVE
SALMONELLA SP RPOD STL QL NAA+PROBE: NEGATIVE
SAPO I+II+IV+V RNA STL QL NAA+NON-PROBE: NEGATIVE
SARS-COV-2 RNA RESP QL NAA+PROBE: NEGATIVE
SHIGELLA SP+EIEC IPAH ST NAA+NON-PROBE: NEGATIVE
SODIUM SERPL-SCNC: 137 MMOL/L (ref 135–145)
SP GR UR STRIP: 1.02 (ref 1–1.03)
SQUAMOUS EPITHELIAL: 2 /HPF
TRIGL SERPL-MCNC: 50 MG/DL
UROBILINOGEN UR STRIP-MCNC: <2 MG/DL
V CHOLERAE DNA SPEC QL NAA+PROBE: NEGATIVE
VIBRIO DNA SPEC NAA+PROBE: NEGATIVE
WBC # BLD AUTO: 5.8 10E3/UL (ref 4–11)
WBC URINE: 1 /HPF
Y ENTEROCOL DNA STL QL NAA+PROBE: NEGATIVE

## 2024-08-28 PROCEDURE — 120N000001 HC R&B MED SURG/OB

## 2024-08-28 PROCEDURE — 87635 SARS-COV-2 COVID-19 AMP PRB: CPT | Performed by: PHYSICIAN ASSISTANT

## 2024-08-28 PROCEDURE — 96376 TX/PRO/DX INJ SAME DRUG ADON: CPT

## 2024-08-28 PROCEDURE — 74181 MRI ABDOMEN W/O CONTRAST: CPT

## 2024-08-28 PROCEDURE — G0378 HOSPITAL OBSERVATION PER HR: HCPCS

## 2024-08-28 PROCEDURE — 83993 ASSAY FOR CALPROTECTIN FECAL: CPT | Performed by: PHYSICIAN ASSISTANT

## 2024-08-28 PROCEDURE — 36415 COLL VENOUS BLD VENIPUNCTURE: CPT | Performed by: INTERNAL MEDICINE

## 2024-08-28 PROCEDURE — 80074 ACUTE HEPATITIS PANEL: CPT | Performed by: INTERNAL MEDICINE

## 2024-08-28 PROCEDURE — 96361 HYDRATE IV INFUSION ADD-ON: CPT

## 2024-08-28 PROCEDURE — 96365 THER/PROPH/DIAG IV INF INIT: CPT

## 2024-08-28 PROCEDURE — 82040 ASSAY OF SERUM ALBUMIN: CPT | Performed by: INTERNAL MEDICINE

## 2024-08-28 PROCEDURE — 250N000011 HC RX IP 250 OP 636: Performed by: STUDENT IN AN ORGANIZED HEALTH CARE EDUCATION/TRAINING PROGRAM

## 2024-08-28 PROCEDURE — 76705 ECHO EXAM OF ABDOMEN: CPT

## 2024-08-28 PROCEDURE — 83735 ASSAY OF MAGNESIUM: CPT | Performed by: STUDENT IN AN ORGANIZED HEALTH CARE EDUCATION/TRAINING PROGRAM

## 2024-08-28 PROCEDURE — 96375 TX/PRO/DX INJ NEW DRUG ADDON: CPT

## 2024-08-28 PROCEDURE — 99222 1ST HOSP IP/OBS MODERATE 55: CPT | Performed by: CLINICAL NURSE SPECIALIST

## 2024-08-28 PROCEDURE — 250N000011 HC RX IP 250 OP 636: Performed by: EMERGENCY MEDICINE

## 2024-08-28 PROCEDURE — 84155 ASSAY OF PROTEIN SERUM: CPT | Performed by: INTERNAL MEDICINE

## 2024-08-28 PROCEDURE — 258N000003 HC RX IP 258 OP 636: Performed by: STUDENT IN AN ORGANIZED HEALTH CARE EDUCATION/TRAINING PROGRAM

## 2024-08-28 PROCEDURE — 250N000011 HC RX IP 250 OP 636: Performed by: INTERNAL MEDICINE

## 2024-08-28 PROCEDURE — 258N000003 HC RX IP 258 OP 636: Performed by: EMERGENCY MEDICINE

## 2024-08-28 PROCEDURE — 87493 C DIFF AMPLIFIED PROBE: CPT | Performed by: PHYSICIAN ASSISTANT

## 2024-08-28 PROCEDURE — 87507 IADNA-DNA/RNA PROBE TQ 12-25: CPT | Performed by: PHYSICIAN ASSISTANT

## 2024-08-28 PROCEDURE — 99223 1ST HOSP IP/OBS HIGH 75: CPT | Performed by: INTERNAL MEDICINE

## 2024-08-28 PROCEDURE — 81001 URINALYSIS AUTO W/SCOPE: CPT | Performed by: EMERGENCY MEDICINE

## 2024-08-28 PROCEDURE — 258N000003 HC RX IP 258 OP 636: Performed by: INTERNAL MEDICINE

## 2024-08-28 PROCEDURE — 85025 COMPLETE CBC W/AUTO DIFF WBC: CPT | Performed by: INTERNAL MEDICINE

## 2024-08-28 PROCEDURE — 250N000013 HC RX MED GY IP 250 OP 250 PS 637: Performed by: INTERNAL MEDICINE

## 2024-08-28 PROCEDURE — 99418 PROLNG IP/OBS E/M EA 15 MIN: CPT | Performed by: INTERNAL MEDICINE

## 2024-08-28 PROCEDURE — 250N000009 HC RX 250: Performed by: INTERNAL MEDICINE

## 2024-08-28 PROCEDURE — 250N000011 HC RX IP 250 OP 636: Performed by: CLINICAL NURSE SPECIALIST

## 2024-08-28 PROCEDURE — 99207 PR NO BILLABLE SERVICE THIS VISIT: CPT | Performed by: STUDENT IN AN ORGANIZED HEALTH CARE EDUCATION/TRAINING PROGRAM

## 2024-08-28 RX ORDER — CYCLOBENZAPRINE HCL 10 MG
10 TABLET ORAL
Status: DISCONTINUED | OUTPATIENT
Start: 2024-08-28 | End: 2024-08-29 | Stop reason: HOSPADM

## 2024-08-28 RX ORDER — CYCLOBENZAPRINE HCL 10 MG
10 TABLET ORAL
COMMUNITY
End: 2024-10-03

## 2024-08-28 RX ORDER — AMOXICILLIN 250 MG
1 CAPSULE ORAL 2 TIMES DAILY PRN
Status: DISCONTINUED | OUTPATIENT
Start: 2024-08-28 | End: 2024-08-29 | Stop reason: HOSPADM

## 2024-08-28 RX ORDER — HYDROMORPHONE HCL IN WATER/PF 6 MG/30 ML
0.5 PATIENT CONTROLLED ANALGESIA SYRINGE INTRAVENOUS ONCE
Status: COMPLETED | OUTPATIENT
Start: 2024-08-28 | End: 2024-08-28

## 2024-08-28 RX ORDER — SODIUM CHLORIDE, SODIUM LACTATE, POTASSIUM CHLORIDE, CALCIUM CHLORIDE 600; 310; 30; 20 MG/100ML; MG/100ML; MG/100ML; MG/100ML
INJECTION, SOLUTION INTRAVENOUS CONTINUOUS
Status: DISCONTINUED | OUTPATIENT
Start: 2024-08-28 | End: 2024-08-29

## 2024-08-28 RX ORDER — ONDANSETRON 4 MG/1
4 TABLET, ORALLY DISINTEGRATING ORAL EVERY 6 HOURS PRN
Status: DISCONTINUED | OUTPATIENT
Start: 2024-08-28 | End: 2024-08-29 | Stop reason: HOSPADM

## 2024-08-28 RX ORDER — ONDANSETRON 2 MG/ML
4 INJECTION INTRAMUSCULAR; INTRAVENOUS EVERY 6 HOURS PRN
Status: DISCONTINUED | OUTPATIENT
Start: 2024-08-28 | End: 2024-08-29 | Stop reason: HOSPADM

## 2024-08-28 RX ORDER — NALOXONE HYDROCHLORIDE 0.4 MG/ML
0.4 INJECTION, SOLUTION INTRAMUSCULAR; INTRAVENOUS; SUBCUTANEOUS
Status: DISCONTINUED | OUTPATIENT
Start: 2024-08-28 | End: 2024-08-29 | Stop reason: HOSPADM

## 2024-08-28 RX ORDER — AMOXICILLIN 250 MG
2 CAPSULE ORAL 2 TIMES DAILY PRN
Status: DISCONTINUED | OUTPATIENT
Start: 2024-08-28 | End: 2024-08-29 | Stop reason: HOSPADM

## 2024-08-28 RX ORDER — ESCITALOPRAM OXALATE 10 MG/1
10 TABLET ORAL AT BEDTIME
Status: DISCONTINUED | OUTPATIENT
Start: 2024-08-28 | End: 2024-08-29 | Stop reason: HOSPADM

## 2024-08-28 RX ORDER — NALOXONE HYDROCHLORIDE 0.4 MG/ML
0.2 INJECTION, SOLUTION INTRAMUSCULAR; INTRAVENOUS; SUBCUTANEOUS
Status: DISCONTINUED | OUTPATIENT
Start: 2024-08-28 | End: 2024-08-29 | Stop reason: HOSPADM

## 2024-08-28 RX ORDER — MAGNESIUM SULFATE HEPTAHYDRATE 40 MG/ML
2 INJECTION, SOLUTION INTRAVENOUS ONCE
Status: COMPLETED | OUTPATIENT
Start: 2024-08-28 | End: 2024-08-28

## 2024-08-28 RX ORDER — IOPAMIDOL 755 MG/ML
56 INJECTION, SOLUTION INTRAVASCULAR ONCE
Status: COMPLETED | OUTPATIENT
Start: 2024-08-28 | End: 2024-08-28

## 2024-08-28 RX ORDER — HYDROMORPHONE HYDROCHLORIDE 1 MG/ML
0.3 INJECTION, SOLUTION INTRAMUSCULAR; INTRAVENOUS; SUBCUTANEOUS
Status: DISCONTINUED | OUTPATIENT
Start: 2024-08-28 | End: 2024-08-28

## 2024-08-28 RX ORDER — MAGNESIUM OXIDE 400 MG/1
400 TABLET ORAL DAILY
COMMUNITY

## 2024-08-28 RX ORDER — HYDROMORPHONE HYDROCHLORIDE 1 MG/ML
0.5 INJECTION, SOLUTION INTRAMUSCULAR; INTRAVENOUS; SUBCUTANEOUS
Status: DISCONTINUED | OUTPATIENT
Start: 2024-08-28 | End: 2024-08-29

## 2024-08-28 RX ORDER — ACETAMINOPHEN 325 MG/1
650 TABLET ORAL EVERY 4 HOURS PRN
Status: DISCONTINUED | OUTPATIENT
Start: 2024-08-28 | End: 2024-08-29 | Stop reason: HOSPADM

## 2024-08-28 RX ORDER — OXYCODONE HYDROCHLORIDE 5 MG/1
5 TABLET ORAL EVERY 6 HOURS PRN
Status: DISCONTINUED | OUTPATIENT
Start: 2024-08-28 | End: 2024-08-29 | Stop reason: HOSPADM

## 2024-08-28 RX ORDER — ACETAMINOPHEN 650 MG/1
650 SUPPOSITORY RECTAL EVERY 4 HOURS PRN
Status: DISCONTINUED | OUTPATIENT
Start: 2024-08-28 | End: 2024-08-29 | Stop reason: HOSPADM

## 2024-08-28 RX ORDER — SODIUM CHLORIDE 9 MG/ML
INJECTION, SOLUTION INTRAVENOUS CONTINUOUS
Status: DISCONTINUED | OUTPATIENT
Start: 2024-08-28 | End: 2024-08-28

## 2024-08-28 RX ADMIN — FAMOTIDINE 20 MG: 10 INJECTION, SOLUTION INTRAVENOUS at 01:18

## 2024-08-28 RX ADMIN — SODIUM CHLORIDE: 9 INJECTION, SOLUTION INTRAVENOUS at 03:59

## 2024-08-28 RX ADMIN — HYDROMORPHONE HYDROCHLORIDE 0.5 MG: 0.2 INJECTION, SOLUTION INTRAMUSCULAR; INTRAVENOUS; SUBCUTANEOUS at 03:12

## 2024-08-28 RX ADMIN — HYDROMORPHONE HYDROCHLORIDE 0.3 MG: 1 INJECTION, SOLUTION INTRAMUSCULAR; INTRAVENOUS; SUBCUTANEOUS at 13:10

## 2024-08-28 RX ADMIN — HYDROMORPHONE HYDROCHLORIDE 0.3 MG: 1 INJECTION, SOLUTION INTRAMUSCULAR; INTRAVENOUS; SUBCUTANEOUS at 10:25

## 2024-08-28 RX ADMIN — HYDROMORPHONE HYDROCHLORIDE 0.5 MG: 1 INJECTION, SOLUTION INTRAMUSCULAR; INTRAVENOUS; SUBCUTANEOUS at 20:44

## 2024-08-28 RX ADMIN — HYDROMORPHONE HYDROCHLORIDE 0.5 MG: 0.2 INJECTION, SOLUTION INTRAMUSCULAR; INTRAVENOUS; SUBCUTANEOUS at 06:58

## 2024-08-28 RX ADMIN — IOPAMIDOL 56 ML: 755 INJECTION, SOLUTION INTRAVENOUS at 00:10

## 2024-08-28 RX ADMIN — SODIUM CHLORIDE 1000 ML: 9 INJECTION, SOLUTION INTRAVENOUS at 01:16

## 2024-08-28 RX ADMIN — SODIUM CHLORIDE, POTASSIUM CHLORIDE, SODIUM LACTATE AND CALCIUM CHLORIDE: 600; 310; 30; 20 INJECTION, SOLUTION INTRAVENOUS at 21:57

## 2024-08-28 RX ADMIN — HYDROMORPHONE HYDROCHLORIDE 0.5 MG: 1 INJECTION, SOLUTION INTRAMUSCULAR; INTRAVENOUS; SUBCUTANEOUS at 16:29

## 2024-08-28 RX ADMIN — SODIUM CHLORIDE, POTASSIUM CHLORIDE, SODIUM LACTATE AND CALCIUM CHLORIDE: 600; 310; 30; 20 INJECTION, SOLUTION INTRAVENOUS at 08:40

## 2024-08-28 RX ADMIN — SODIUM CHLORIDE 1000 ML: 9 INJECTION, SOLUTION INTRAVENOUS at 03:12

## 2024-08-28 RX ADMIN — HYDROMORPHONE HYDROCHLORIDE 0.5 MG: 1 INJECTION, SOLUTION INTRAMUSCULAR; INTRAVENOUS; SUBCUTANEOUS at 23:40

## 2024-08-28 RX ADMIN — MAGNESIUM SULFATE HEPTAHYDRATE 2 G: 40 INJECTION, SOLUTION INTRAVENOUS at 04:00

## 2024-08-28 RX ADMIN — ESCITALOPRAM OXALATE 10 MG: 10 TABLET ORAL at 21:55

## 2024-08-28 RX ADMIN — PANTOPRAZOLE SODIUM 40 MG: 40 INJECTION, POWDER, FOR SOLUTION INTRAVENOUS at 07:44

## 2024-08-28 RX ADMIN — HYDROMORPHONE HYDROCHLORIDE 0.5 MG: 1 INJECTION, SOLUTION INTRAMUSCULAR; INTRAVENOUS; SUBCUTANEOUS at 01:16

## 2024-08-28 RX ADMIN — OXYCODONE HYDROCHLORIDE 5 MG: 5 TABLET ORAL at 04:03

## 2024-08-28 ASSESSMENT — ACTIVITIES OF DAILY LIVING (ADL)
ADLS_ACUITY_SCORE: 37
ADLS_ACUITY_SCORE: 20
ADLS_ACUITY_SCORE: 37
ADLS_ACUITY_SCORE: 20
ADLS_ACUITY_SCORE: 37
ADLS_ACUITY_SCORE: 37
ADLS_ACUITY_SCORE: 20
ADLS_ACUITY_SCORE: 37
ADLS_ACUITY_SCORE: 20
ADLS_ACUITY_SCORE: 37
ADLS_ACUITY_SCORE: 20
ADLS_ACUITY_SCORE: 20
ADLS_ACUITY_SCORE: 37
ADLS_ACUITY_SCORE: 20
ADLS_ACUITY_SCORE: 20
ADLS_ACUITY_SCORE: 37

## 2024-08-28 NOTE — ED NOTES
Bed: JNED-26  Expected date: 8/28/24  Expected time: 2:48 AM  Means of arrival: Other  Comments:   11

## 2024-08-28 NOTE — H&P
[facility]    History and Physical - Hospitalist Service       Date of Admission:  8/27/2024    Assessment & Plan      44 year old female with a past medical history of chron's disease, chronic pain syndrome, multiple abdominal surgeries who presented to the emergency room for the evaluation of abdominal pain.    Acute on chronic abdominal pain  - Etiology is unclear, patient does not feel it is like Crohn's flare  - CT abdomen shows Multiple bowel anastomoses again seen in the abdomen without evidence of bowel obstruction. Focal areas of mild bowel wall thickening adjacent to a couple small bowel anastomoses in the mid and left abdomen, suspicious for Crohn's disease involvement. Moderate hepatic steatosis, 2 mm nonobstructing left renal stone and small uterine fibroids.  - Unremarkable CRP and lipase  - Continue with oxycodone as needed  - GI consult given her history of Crohn's, appreciate input    Elevated LFTs  - History of cholecystectomy  - Abdominal ultrasound shows hepatic steatosis  - Continue to monitor LFTs  - GI is consulted as above    Dehydration  - Probably secondary to diarrhea  - Gap of 17  - Continue IV fluid hydration    Hypomagnesemia  - Replace per protocol     Observation Goals: -diagnostic tests and consults completed and resulted, -vital signs normal or at patient baseline, -tolerating oral intake to maintain hydration, -adequate pain control on oral analgesics, GI consult, Nurse to notify provider when observation goals have been met and patient is ready for discharge.  Diet: Clear Liquid Diet    DVT Prophylaxis: Pneumatic Compression Devices  Hamiltno Catheter: Not present  Lines: None     Cardiac Monitoring: None  Code Status: Full Code      Clinically Significant Risk Factors Present on Admission            # Hypomagnesemia: Lowest Mg = 1.6 mg/dL in last 2 days, will replace as needed                                Disposition Plan     Medically Ready for Discharge:  "Anticipated Tomorrow           Nikolai Roblero MD  Hospitalist Service  [facility]  Securely message with [employer] (more info)  Text page via [employer] Paging/Directory     ______________________________________________________________________    Chief Complaint   Abdominal pain    History is obtained from the patient    History of Present Illness   Alok Nino is a 44 year old female with a past medical history of chron's disease, chronic pain syndrome, multiple abdominal surgeries who presented to the emergency room for the evaluation of abdominal pain.  Basically the patient began to feel upper right abdominal pain which radiates to her upper left back and upper left abdomen. Pain is constant and \"takes her breath away\". Patient mentions that pain is provoked by breathing. She mentions that the pain is similar to her past liver pain but worse. Patient indicates pain is not like previous Crohn flare ups. Patient has taken Flexerol which slightly alleviated back pain. She has nausea, bloating, and diarrhea. Patient mentions that her diarrhea is baseline due to bloating. Patient denies vomiting, fever, hematuria, and dysuria.   Patient mentions that recently she has had an intermittent chest pain which radiates to her neck and jaw. She mentions that this pain is unrelated to her current pain. She visited her PCP yesterday for that pain and was recommended further imaging.     Patient recently travelled to [place] and [place] (3 weeks ago) and her  was sick with COVID-19 last week. Patient has a history of Crohn's Disease, cholecystectomy, and over ten abdominal surgeries. She has high liver enzymes at baseline. Patient manages Crohn's Disease with medications. Patient denies history of UTI and kidney stones. She denies birth control and smoking.       Past Medical History    Past Medical History:   Diagnosis Date    C. difficile enteritis     Chronic pain     Crohn disease (H)     " Melanoma (H)     PONV (postoperative nausea and vomiting)     SBO (small bowel obstruction) (H) 03/14/2022       Past Surgical History   Past Surgical History:   Procedure Laterality Date    ABDOMEN SURGERY      3 for crohns dx    APPENDECTOMY      APPENDECTOMY      CHOLECYSTECTOMY      CHOLECYSTECTOMY      COLONOSCOPY      ENDOSCOPIC ULTRASOUND UPPER GASTROINTESTINAL TRACT (GI) N/A 11/13/2020    Procedure: ENDOSCOPIC ULTRASOUND, ESOPHAGOSCOPY / UPPER GASTROINTESTINAL TRACT with BIOPSY;  Surgeon: Cydney Garcia MD;  Location: [facility] OR    GYN SURGERY      H STATISTIC PICC LINE INSERTION >5YR, FAILED Right 11/10/2021    LUE unsuccessful attempt from another hospital, IR deferral    IR PICC PLACEMENT > 5 YRS OF AGE  11/11/2021    IR PICC PLACEMENT > 5 YRS OF AGE  3/16/2022    OTHER SURGICAL HISTORY      strictoplasty x3    OTHER SURGICAL HISTORY      adhesion removal x1    OTHER SURGICAL HISTORY      small bowel resections    PICC  05/10/2019         RESECTION ILEOCECAL  12/06/2013    Procedure: RESECTION ILEOCECAL;  Laparotomy, Extensive Lysis of Adhesions, Stricture Plasty X2  Anesthesia general with block;  Surgeon: Pete Cartagena MD;  Location: [facility] OR    RESECTION ILEOCOLIC N/A 11/17/2021    Procedure: Exploratory laparotomy, illeal resection, extensive lysis of adhesions (2 hr);  Surgeon: Pete Cartagena MD;  Location: [facility] OR       Prior to Admission Medications   Prior to Admission Medications   Prescriptions Last Dose Informant Patient Reported? Taking?   Cholecalciferol (D 61448) 250 MCG (79870 UT) CAPS 8/27/2024 at AM  Yes Yes   Sig: Take 250 mcg by mouth See Admin Instructions. Take 1 capsule (10,000 units) by mouth daily 5 days a week. Take on Mon, Tue, Wed, Thur, & Friday.   cyanocobalamin (VITAMIN B-12) 1000 MCG tablet 8/27/2024 at AM  No Yes   Sig: Take 1 tablet (1,000 mcg) by mouth daily   cyclobenzaprine (FLEXERIL) 10 MG tablet 8/27/2024 at HS  Yes Yes   Sig: Take 10 mg by mouth nightly as needed  for muscle spasms.   escitalopram (LEXAPRO) 10 MG tablet 8/26/2024 at HS  Yes Yes   Sig: Take 10 mg by mouth at bedtime.   magnesium oxide (MAG-OX) 400 MG tablet 8/27/2024 at AM  Yes Yes   Sig: Take 400 mg by mouth daily.   ustekinumab (STELARA) 90 MG/ML Past Month at due this Thursday 8/29/24  Yes Yes   Sig: Inject 90 mg Subcutaneous every 28 days       Facility-Administered Medications: None        Review of Systems    The 10 point Review of Systems is negative other than noted in the HPI or here.     Social History   I have reviewed this patient's social history and updated it with pertinent information if needed.  Social History     Tobacco Use    Smoking status: Never    Smokeless tobacco: Never   Substance Use Topics    Alcohol use: Yes     Comment: occasional/ 1 drink per 1-3 month    Drug use: No         Family History   I have reviewed this patient's family history and updated it with pertinent information if needed.  Family History   Problem Relation Age of Onset    No Known Problems Mother     Clotting Disorder Father     No Known Problems Brother     No Known Problems Son     Anesthesia Reaction No family hx of     Colon Cancer No family hx of     Crohn's Disease No family hx of     Ulcerative Colitis No family hx of     Colon Polyps No family hx of          Allergies   Allergies   Allergen Reactions    Morphine Hcl Other (See Comments)     SPASMS OF SPHINCTER OF ODDI  (BILIARY TRACT)    Sucralfate Hives    Codeine Nausea and Vomiting        Physical Exam   Vital Signs: Temp: 98.4  F (36.9  C) Temp src: Oral BP: 97/60 Pulse: 61   Resp: 18 SpO2: 97 % O2 Device: None (Room air)    Weight: 114 lbs 0 oz    General Appearance: Sleeping comfortable in bed in no acute respiratory distress  Respiratory: Normal breath sounds, no wheeze  Cardiovascular: Normal heart sound, no murmur  GI: Soft, scar of previous surgeries, mild tenderness in the right upper quadrant  Skin: Dry, warm, no rash.  Other: Grossly intact,  no focal deficit.    Medical Decision Making       75 MINUTES SPENT BY ME on the date of service doing chart review, history, exam, documentation & further activities per the note.      Data     I have personally reviewed the following data over the past 24 hrs:    7.3  \   11.8   / 302     137 104 14.7 /  109 (H)   4.1 16 (L) 0.61 \     ALT: 152 (H) AST: 103 (H) AP: 133 TBILI: 0.4   ALB: 4.2 TOT PROTEIN: 6.8 LIPASE: 32     Trop: <6 BNP: N/A     Procal: N/A CRP: <3.00 Lactic Acid: N/A         Imaging results reviewed over the past 24 hrs:   Recent Results (from the past 24 hour(s))   CT Abdomen Pelvis w Contrast    Narrative    EXAM: CT ABDOMEN PELVIS W CONTRAST  LOCATION: [facility]  DATE: 8/28/2024    INDICATION: Right upper quadrant and right flank pain, history of Crohn's disease with multiple resections surgery.  COMPARISON: 2/21/2024  TECHNIQUE: CT scan of the abdomen and pelvis was performed following injection of IV contrast. Multiplanar reformats were obtained. Dose reduction techniques were used.  CONTRAST: isovue 370 56 ml    FINDINGS:   LOWER CHEST: Normal.    HEPATOBILIARY: Stable moderate hepatic steatosis. Gallbladder is surgically absent. Mild intrahepatic and extrahepatic biliary ductal dilatation is stable, most compatible with postcholecystectomy reservoir effect.    PANCREAS: Normal.    SPLEEN: Normal.    ADRENAL GLANDS: Normal.    KIDNEYS/BLADDER: Normal. A 2 mm nonobstructing stone is seen in the inferior pole of the left kidney.    BOWEL: Right lower quadrant ileocolic anastomosis in multiple small bowel anastomosis again seen throughout the abdomen. Mild wall thickening is seen in a small bowel loop in the mid abdomen adjacent to a small bowel anastomosis on series 3 image 103,   indeterminate. Another area of wall thickening also seen in the left abdomen adjacent to a small bowel anastomosis on series 3 image 114. No bowel obstruction. No free fluid or free air.  Moderate amount of stool seen throughout the colon.    LYMPH NODES: Multiple mildly prominent mesenteric lymph nodes redemonstrated, compatible with reactive nature.    VASCULATURE: Normal.    PELVIC ORGANS: Multiple small uterine fibroids again seen, unchanged. No abnormal adnexal masses. Multiple follicles seen in the left ovary.    MUSCULOSKELETAL: Normal.        Impression    IMPRESSION:     1.  Multiple bowel anastomoses again seen in the abdomen without evidence of bowel obstruction.    2.  Focal areas of mild bowel wall thickening adjacent to a couple small bowel anastomoses in the mid and left abdomen, suspicious for Crohn's disease involvement.    3.  Moderate hepatic steatosis, 2 mm nonobstructing left renal stone and small uterine fibroids.   US Abdomen Limited    Narrative    EXAM: US ABDOMEN LIMITED  LOCATION: [facility] [place]  DATE: 8/28/2024    INDICATION: RUQ pain, elevated LFTs.  COMPARISON: CT abdomen and pelvis with IV contrast 8/27/2024.  TECHNIQUE: Limited abdominal ultrasound.    FINDINGS:    GALLBLADDER: Surgically absent.    BILE DUCTS: Mild postoperative biliary ectasia. The common duct measures 14.2 mm.    LIVER: Echogenic hepatic parenchyma without discrete lesion. Smooth hepatic contour. Patent portal vein with normal directional flow.    RIGHT KIDNEY: No hydronephrosis. Right kidney measures 10.8 cm [id number].    PANCREAS: Partially visualized due to overlying bowel gas, grossly normal where seen. Portosplenic confluence is normal.    No ascites.      Impression    IMPRESSION:  1.  Cholecystectomy. Postoperative biliary ectasia.    2.  Echogenic hepatic parenchyma without discrete lesion. Remainder normal.

## 2024-08-28 NOTE — MEDICATION SCRIBE - ADMISSION MEDICATION HISTORY
Medication Scribe Admission Medication History    Admission medication history is complete. The information provided in this note is only as accurate as the sources available at the time of the update.    Information Source(s): Patient and CareEverywhere/SureScripts via in-person    Pertinent Information: Patient manages her own medications.   Flexeril 10mg  -HS PRN, first time trying was 8/27/24 HS.  Cholecalciferol 10,000units (250mcg) caps.  -patient takes daily on 5 days of the week, takes on Mon, Tues, Wed, Thurs, & Friday. Does not take on Sat nor Sun.  Stelara 90mg [id number]  -pt says she is due for her next dose this Thursday 8/29/24.     Changes made to PTA medication list:  Added: Cyclobenzaprine 10mg, Magnesium Oxide 400mg  Deleted: None  Changed:   Vit D3 to [id number] 5 days a week  Escitalopram 10mg HS    Allergies reviewed with patient and updates made in EHR: yes    Medication History Completed By: Glen De La Torre 8/28/2024 3:26 AM    PTA Med List   Medication Sig Note Last Dose    Cholecalciferol (D 24487) 250 MCG (72691 UT) CAPS Take 250 mcg by mouth See Admin Instructions. Take 1 capsule (10,000 units) by mouth daily 5 days a week. Take on Mon, Tue, Wed, Thur, & Friday. 8/28/2024: Mon, Tues, Wed, Thur, & Friday ONLY. 8/27/2024 at AM    cyanocobalamin (VITAMIN B-12) 1000 MCG tablet Take 1 tablet (1,000 mcg) by mouth daily  8/27/2024 at AM    cyclobenzaprine (FLEXERIL) 10 MG tablet Take 10 mg by mouth nightly as needed for muscle spasms.  8/27/2024 at HS    escitalopram (LEXAPRO) 10 MG tablet Take 10 mg by mouth at bedtime.  8/26/2024 at HS    magnesium oxide (MAG-OX) 400 MG tablet Take 400 mg by mouth daily.  8/27/2024 at AM    ustekinumab (STELARA) 90 MG/ML Inject 90 mg Subcutaneous every 28 days   Past Month at due this Thursday 8/29/24

## 2024-08-28 NOTE — ED PROVIDER NOTES
EMERGENCY DEPARTMENT ENCOUNTER      NAME: Alok Nino  AGE: 44 year old female  YOB: 1980  MRN: [id number]  EVALUATION DATE & TIME: No admission date for patient encounter.    ED PROVIDER: Lynda Boogie MD    Chief Complaint   Patient presents with    Abdominal Pain       FINAL IMPRESSION  1. RUQ abdominal pain    2. Nausea    3. History of Crohn's disease        MEDICAL DECISION MAKING   Alok Nino is a 44 year old female who presents for evaluation of abdominal pain and nausea. Records reviewed.  Patient has a history of Crohn's disease, myeloneuropathy, vitamin deficiency, and multiple abdominal surgeries.  She follows closely with gastroenterology and is on Stelara.  She also follows with neurology for myeloneuropathy and vitamin deficiencies.  I reviewed outside clinic notes.  Today, patient presents with complaints of right upper quadrant abdominal and flank pain.  She reports that this began at work and has been progressively worsening throughout the day.  She does not feel her current symptoms are definitely consistent with Crohn's flare, states that she does not typically experience any pain but rather increasing diarrhea which she has not noticed.  She endorses associated nausea without vomiting.  She also denies constipation, bloody stools, fever, urinary symptoms.  She was seen relatively recently for chest and upper back pain but neither of these are bothering her today.  She reports increased in right upper quadrant pain with deep inspiration but otherwise denies dyspnea or cough.  Vitals on arrival stable and reassuring.    Considered broad differential include not limited to hepatobiliary disease, gastritis, gastroenteritis, Crohn's flare, obstruction, ileus, pyelonephritis, ureterolithiasis, appendicitis, peptic ulcer disease, GERD, muscular strain/sprain.  Patient is PERC negative so I have low suspicion for PE.  Low suspicion for atypical ACS/ischemia or vascular process.   Discussed options for workup and management with the patient.  We have agreed on plan to start with labs, CT abdomen/pelvis, and management of symptoms with IV fluids and IV analgesic/antiemetic.  Did explain to the patient that if CT scan is unremarkable, may also require an ultrasound of the right upper quadrant.    ED Course as of 08/28/24 0459   Wed Aug 28, 2024   0025 Comprehensive metabolic panel(!)  CMP notable for mild anion gap acidosis with AG of 17, bicarb 16.  Glucose 109.  AST and ALT elevated at 103 and 152, respectively.  Patient does have chronic elevation of LFTs of unclear etiology but levels today have ~doubled.  No elevated bilirubin.  No evidence of acute kidney injury.   0026 CBC (+ platelets, no diff)(!)  CBC reassuring. No evidence of leukocytosis to suggest systemic infectious/inflammatory process. No acute anemia. PLTs wnl.    0026 Lipase: 32  Lipase within normal limits, symptoms less likely related to acute pancreatitis.     0026 Troponin T, High Sensitivity: <6  High sensitivity troponin below age adjusted cutoff. ACS less likely in the setting of ECG without acute ischemic changes and I do not feel delta troponin necessary given timing of symptoms.     0026 CRP Inflammation: <3.00  Within normal limits, reassuring.    0036 UA with Microscopic reflex to Culture(!)  UA without evidence of infection. No hematuria to suggest nephrolithiasis/ureterolithiasis.    0037 CT Abdomen Pelvis w Contrast  CT revealed focal areas of mild bowel wall thickening, likely related to underlying Crohn's.  Moderate hepatic steatosis with 2 mm nonobstructing left renal stone and small uterine fibroids, but no evidence of obstruction, perforation, or acute process.  Does have moderate amount of stool throughout the colon.   0245 US Abdomen Limited  Ultrasound with echogenic hepatic parenchyma but no clear pathology to explain symptoms.     I rechecked the patient after she returned from CT scan and reviewed  results of labs and imaging.  She continued to complain of a deep pain in her right upper quadrant.  Given change in LFTs, we did agree on plan to proceed with a right upper quadrant ultrasound and additional fluids and IV analgesic.    Ultrasound of right upper quadrant, ultimately did not explain symptoms.  Updated the patient and her mother with these results.  Unfortunately, she continued to complain of abdominal discomfort, though did have some improvement in nausea.  We discussed potential etiology of her symptoms as well as options for ongoing workup and management.  Given unclear etiology and severity of pain, patient did not feel comfortable going home.  She has required multiple doses of IV analgesic here so we have agreed on plan for admission to observation and likely GI consultation.  Discussed the case with hospitalist who agreed to facilitate management.        Considerations in Medical Decision Making  History:  Supplemental history from: Documented in chart and Family Member/Significant Other  External Record(s) reviewed: Documented in chart and Inpatient Record: 07/23/2024 at [facility] ED    Work Up:  Chart documentation includes differential considered and any EKGs or imaging independently interpreted by provider, where specified.  In additional to work up documented, I considered the following work up: Documented in chart, if applicable.    External consultation:  Discussion of management with another provider: Hospitalist    Complicating factors:  Care impacted by chronic illness: Other: Crohn's Disease  Care affected by social determinants of health: Access to Medical Care    Disposition considerations: Admit.      ED COURSE  10:09 PM I met with the patient and conducted the initial exam and interview.  12:42 PM I revisited and updated patient.  2:37 AM I revisited and updated patient.  3:34 AM I consulted with Hospitalist, Dr. Roblero    MEDICATIONS GIVEN IN THE ED  sodium chloride 0.9%  "BOLUS 1,000 mL (0 mLs Intravenous Stopped 8/27/24 2342)   ondansetron (ZOFRAN) injection 4 mg (4 mg Intravenous $Given 8/27/24 2300)   HYDROmorphone (DILAUDID) injection 0.5 mg (0.5 mg Intravenous $Given 8/27/24 2258)   iopamidol (ISOVUE-370) solution 56 mL (56 mLs Intravenous $Given 8/28/24 0010)   famotidine (PEPCID) injection 20 mg (20 mg Intravenous $Given 8/28/24 0118)   HYDROmorphone (DILAUDID) injection 0.5 mg (0.5 mg Intravenous $Given 8/28/24 0116)   sodium chloride 0.9% BOLUS 1,000 mL (0 mLs Intravenous Stopped 8/28/24 0233)   HYDROmorphone (DILAUDID) injection 0.5 mg (0.5 mg Intravenous $Given 8/28/24 0312)   sodium chloride 0.9% BOLUS 1,000 mL (0 mLs Intravenous Stopped 8/28/24 0449)   magnesium sulfate 2 g in 50 mL sterile water intermittent infusion (0 g Intravenous Stopped 8/28/24 0450)       NEW PRESCRIPTIONS STARTED AT TODAY'S VISIT  New Prescriptions    No medications on file          =================================================================    HPI:    Patient information was obtained from: patient and family    Use of : N/A      Alok Nino is a 44 year old female who presents with abdominal pain and nausea.    Per chart review, on 07/23/2024 at [facility] ED, patient was seen for upper back and chest pain which radiated to the jaw. Patient had a chest x-ray which was negative for infiltrate or effusion. The exact etiology was unable to be determined. Pain improved with fentanyl use. Patient was recommended to follow up with her PCP and utilize over-the-counter analgesics.    After work, patient began to feel upper right abdominal pain which radiates to her upper left back and upper left abdomen. Pain is constant and \"takes her breath away\". Patient mentions that pain is provoked by breathing. She mentions that the pain is similar to her past liver pain but worse. Patient indicates pain is not like previous Crohn flare ups. Patient has taken Flexerol which slightly " "alleviated back pain. She has nausea, bloating, and diarrhea. Patient mentions that her diarrhea is baseline due to bloating. Patient denies vomiting, fever, hematuria, and dysuria.     Patient mentions that recently she has had an intermittent chest pain which radiates to her neck and jaw. She mentions that this pain is unrelated to her current pain. She visited her PCP yesterday for that pain and was recommended further imaging.    Patient recently travelled to [place] and [place] (3 weeks ago) and her  was sick with COVID-19 last week. Patient has a history of Crohn's Disease, cholecystectomy, and over ten abdominal surgeries. She has high liver enzymes at baseline. Patient manages Crohn's Disease with medications. Patient denies history of UTI and kidney stones. She denies birth control and smoking.      RELEVANT HISTORY, MEDICATIONS, & ALLERGIES   Past medical history, surgical history, family history, medications, and allergies reviewed and pertinent noted in HPI.    REVIEW OF SYSTEMS:  A complete review of systems was performed with pertinent positives and negatives noted in the HPI. All other systems negative.     PHYSICAL EXAM:    Vitals: /59   Pulse 77   Temp 98.4  F (36.9  C) (Oral)   Resp 18   Ht 1.575 m (5' 2\")   Wt 51.7 kg (114 lb)   LMP 08/22/2024 (Approximate)   SpO2 95%   BMI 20.85 kg/m     General: Alert and interactive, comfortable appearing.  HENT: Atraumatic. Full AROM of neck. MMM.  Cardiovascular: Regular rate and rhythm.   Chest/Pulmonary: Normal work of breathing. Speaking in complete sentences. Lungs CTAB. No chest wall tenderness or deformities.  Abdomen: Soft, nondistended. TTP epigastric and RUQ without guarding or rebound. Mild TTP R mid-thoracic paraspinal muscles.   Extremities: Normal AROM of all major joints.  Skin: Warm and dry. Normal skin color.   Neuro: Speech clear. CNs grossly intact. Moves all extremities spontaneously.   Psych: Normal affect/mood, " cooperative, memory appropriate.      LAB  Labs Ordered and Resulted from Time of ED Arrival to Time of ED Departure   COMPREHENSIVE METABOLIC PANEL - Abnormal       Result Value    Sodium 137      Potassium 4.1      Carbon Dioxide (CO2) 16 (*)     Anion Gap 17 (*)     Urea Nitrogen 14.7      Creatinine 0.61      GFR Estimate >90      Calcium 8.7 (*)     Chloride 104      Glucose 109 (*)     Alkaline Phosphatase 133       (*)      (*)     Protein Total 6.8      Albumin 4.2      Bilirubin Total 0.4     CBC WITH PLATELETS - Abnormal    WBC Count 7.3      RBC Count 3.71 (*)     Hemoglobin 11.8      Hematocrit 35.7      MCV 96      MCH 31.8      MCHC 33.1      RDW 12.7      Platelet Count 302     MAGNESIUM - Abnormal    Magnesium 1.6 (*)    ROUTINE UA WITH MICROSCOPIC REFLEX TO CULTURE - Abnormal    Color Urine Yellow      Appearance Urine Clear      Glucose Urine Negative      Bilirubin Urine Negative      Ketones Urine 40 (*)     Specific Gravity Urine 1.025      Blood Urine Negative      pH Urine 5.5      Protein Albumin Urine 10 (*)     Urobilinogen Urine <2.0      Nitrite Urine Negative      Leukocyte Esterase Urine Negative      Mucus Urine Present (*)     RBC Urine <1      WBC Urine 1      Squamous Epithelials Urine 2 (*)     Hyaline Casts Urine 1     LIPID REFLEX TO DIRECT LDL PANEL - Abnormal    Cholesterol 74      Triglycerides 50      Direct Measure HDL 28 (*)     LDL Cholesterol Calculated 36      Non HDL Cholesterol 46     LIPASE - Normal    Lipase 32     CRP INFLAMMATION - Normal    CRP Inflammation <3.00     TROPONIN T, HIGH SENSITIVITY - Normal    Troponin T, High Sensitivity <6     HCG QUALITATIVE PREGNANCY - Normal    hCG Serum Qualitative Negative     COMPREHENSIVE METABOLIC PANEL       RADIOLOGY  US Abdomen Limited   Final Result   IMPRESSION:   1.  Cholecystectomy. Postoperative biliary ectasia.      2.  Echogenic hepatic parenchyma without discrete lesion. Remainder normal.             CT Abdomen Pelvis w Contrast   Final Result   IMPRESSION:       1.  Multiple bowel anastomoses again seen in the abdomen without evidence of bowel obstruction.      2.  Focal areas of mild bowel wall thickening adjacent to a couple small bowel anastomoses in the mid and left abdomen, suspicious for Crohn's disease involvement.      3.  Moderate hepatic steatosis, 2 mm nonobstructing left renal stone and small uterine fibroids.          EKG  Performed at: 22:30:11  Impression: NSR. No acute ischemic changes. Normal intervals.   Rate: 62 bpm  Rhythm: Sinus  QRS Interval: 80 ms  QTc Interval: 426 ms  Comparison: No prior ECGs    All laboratory and imaging results and EKG's were personally reviewed and interpreted by myself prior to disposition decision.       I, Rosalinda De Jesus, am serving as a scribe to document services personally performed by Dr. Lynda Boogie based on my observation and the provider's statements to me. I, Lynda Boogie MD attest that Rosalinda De Jesus is acting in a scribe capacity, has observed my performance of the services and has documented them in accordance with my direction.    Lynda Boogie M.D.  Emergency Medicine  [facility] EMERGENCY DEPARTMENT  [facility] [facility] [address]  [phone]  Dept: [phone]       Lynda Boogie MD  08/28/24 9905

## 2024-08-28 NOTE — PROGRESS NOTES
[facility]    Medicine Progress Note - Hospitalist Service    Date of Admission:  8/27/2024    Assessment & Plan   4 year old female with a past medical history of chron's disease, chronic pain syndrome, multiple abdominal surgeries who presented to the emergency room for the evaluation of abdominal pain.     Acute on chronic abdominal pain  - Etiology is unclear, patient does not feel it is like Crohn's flare however CT abdomen shows focal areas of mild bowel wall thickening adjacent to a couple small bowel anastomoses in the mid and left abdomen, suspicious for Crohn's disease involvement. Moderate hepatic steatosis, 2 mm nonobstructing left renal stone and small uterine fibroids. Also noting multiple bowel anastomoses again seen in the abdomen without evidence of bowel obstruction.   - Unremarkable CRP and lipase  - continue current pain regimen and will consult pain management   - GI consult given her history of Crohn's, appreciate input  - continue IV PPI pending GI recs   - NPO pending GI recs      Elevated LFTs- improving   - History of cholecystectomy  - Abdominal ultrasound shows hepatic steatosis  - Continue to monitor LFTs  - GI consulted as noted above      Anion Gap Acidosis- resolved   Hyperchloremic metabolic acidosis   - ketones in urine suggest ketosis (likely due to starvation and dehydration) as cause of anion gap, now improved   - continue IVF but will switch to LR     Hypomagnesemia  - Replace per protocol    Anxiety  -PTA lexapro           Observation Goals: -diagnostic tests and consults completed and resulted, -vital signs normal or at patient baseline, -tolerating oral intake to maintain hydration, -adequate pain control on oral analgesics, GI consult, Nurse to notify provider when observation goals have been met and patient is ready for discharge.  Diet: NPO for Medical/Clinical Reasons Except for: Meds    DVT Prophylaxis: SCDs  Hamilton Catheter: Not present  Lines:  None     Cardiac Monitoring: None  Code Status: Full Code      Clinically Significant Risk Factors Present on Admission          # Hypocalcemia: Lowest Ca = 7.2 mg/dL in last 2 days, will monitor and replace as appropriate   # Hypomagnesemia: Lowest Mg = 1.6 mg/dL in last 2 days, will replace as needed   # Hypoalbuminemia: Lowest albumin = 3.3 g/dL at 8/28/2024  7:53 AM, will monitor as appropriate        # Anemia: based on hgb <11                        Disposition Plan     Medically Ready for Discharge: Anticipated Tomorrow             Wesly Ernst DO  Hospitalist Service  [facility]  Securely message with [employer] (more info)  Text page via [facility] Paging/Directory   ______________________________________________________________________    Interval History   Feeling discomfort this morning, overall felling a bit better. States blood in stool, bright red episodes over last few weeks but none over last 5 days. Also notes increased loose BM frequency. No fever, chills.     Physical Exam   Vital Signs: Temp: 97.8  F (36.6  C) Temp src: Oral BP: 108/61 Pulse: 63   Resp: 16 SpO2: 100 % O2 Device: None (Room air)    Weight: 114 lbs 0 oz    General Appearance: Nontoxic, mild distress due to pain, alert  Respiratory: CTAB, unlabored breathing  Cardiovascular: RRR  GI: no masses, pain with palpation RU and epigastric area without rebound or guarding, hyperactive BS  Skin: no rash or swollen joints  Other: Alert and oriented     Medical Decision Making       60 MINUTES SPENT BY ME on the date of service doing chart review, history, exam, documentation & further activities per the note.      Data     I have personally reviewed the following data over the past 24 hrs:    5.8  \   10.7 (L)   / 249     137 111 (H) 11.0 /  86   4.4 21 (L) 0.72 \     ALT: 102 (H) AST: 43 AP: 119 TBILI: 0.3   ALB: 3.3 (L) TOT PROTEIN: 5.0 (L) LIPASE: 32     Trop: <6 BNP: N/A     Procal: N/A CRP: <3.00 Lactic Acid: N/A          Imaging results reviewed over the past 24 hrs:   Recent Results (from the past 24 hour(s))   CT Abdomen Pelvis w Contrast    Narrative    EXAM: CT ABDOMEN PELVIS W CONTRAST  LOCATION: [facility]  DATE: 8/28/2024    INDICATION: Right upper quadrant and right flank pain, history of Crohn's disease with multiple resections surgery.  COMPARISON: 2/21/2024  TECHNIQUE: CT scan of the abdomen and pelvis was performed following injection of IV contrast. Multiplanar reformats were obtained. Dose reduction techniques were used.  CONTRAST: isovue 370 56 ml    FINDINGS:   LOWER CHEST: Normal.    HEPATOBILIARY: Stable moderate hepatic steatosis. Gallbladder is surgically absent. Mild intrahepatic and extrahepatic biliary ductal dilatation is stable, most compatible with postcholecystectomy reservoir effect.    PANCREAS: Normal.    SPLEEN: Normal.    ADRENAL GLANDS: Normal.    KIDNEYS/BLADDER: Normal. A 2 mm nonobstructing stone is seen in the inferior pole of the left kidney.    BOWEL: Right lower quadrant ileocolic anastomosis in multiple small bowel anastomosis again seen throughout the abdomen. Mild wall thickening is seen in a small bowel loop in the mid abdomen adjacent to a small bowel anastomosis on series 3 image 103,   indeterminate. Another area of wall thickening also seen in the left abdomen adjacent to a small bowel anastomosis on series 3 image 114. No bowel obstruction. No free fluid or free air. Moderate amount of stool seen throughout the colon.    LYMPH NODES: Multiple mildly prominent mesenteric lymph nodes redemonstrated, compatible with reactive nature.    VASCULATURE: Normal.    PELVIC ORGANS: Multiple small uterine fibroids again seen, unchanged. No abnormal adnexal masses. Multiple follicles seen in the left ovary.    MUSCULOSKELETAL: Normal.        Impression    IMPRESSION:     1.  Multiple bowel anastomoses again seen in the abdomen without evidence of bowel obstruction.    2.   Focal areas of mild bowel wall thickening adjacent to a couple small bowel anastomoses in the mid and left abdomen, suspicious for Crohn's disease involvement.    3.  Moderate hepatic steatosis, 2 mm nonobstructing left renal stone and small uterine fibroids.   US Abdomen Limited    Narrative    EXAM: US ABDOMEN LIMITED  LOCATION: [facility]  DATE: 8/28/2024    INDICATION: RUQ pain, elevated LFTs.  COMPARISON: CT abdomen and pelvis with IV contrast 8/27/2024.  TECHNIQUE: Limited abdominal ultrasound.    FINDINGS:    GALLBLADDER: Surgically absent.    BILE DUCTS: Mild postoperative biliary ectasia. The common duct measures 14.2 mm.    LIVER: Echogenic hepatic parenchyma without discrete lesion. Smooth hepatic contour. Patent portal vein with normal directional flow.    RIGHT KIDNEY: No hydronephrosis. Right kidney measures 10.8 cm uddi-nd-oerm.    PANCREAS: Partially visualized due to overlying bowel gas, grossly normal where seen. Portosplenic confluence is normal.    No ascites.      Impression    IMPRESSION:  1.  Cholecystectomy. Postoperative biliary ectasia.    2.  Echogenic hepatic parenchyma without discrete lesion. Remainder normal.

## 2024-08-28 NOTE — CONSULTS
GI CONSULT NOTE      Name: Alok Nino  Medical Record #: [id number]  YOB: 1980  Date of Admission: 8/27/2024  Date/Time: 8/28/2024/9:59 AM     CHIEF COMPLAINT: RUQ abdominal pain     HISTORY OF PRESENT ILLNESS: We were asked to see Alok Nino by Dr Ernst for evaluation of right upper abdominal pain. Alok Nino is a 44 year old year old female with history of stricturing small bowel Crohn's disease initially diagnosed in 1997 currently on Stelara 90mg every 4 weeks who is status post TI resection and 9 additional surgeries for stricture related small bowel obstructions (3 small bowel resections with most recent surgery 2021 by Dr Cartagena), short-bowel syndrome, SIBO, C. difficile October 2019, recurrent norovirus, melanoma 2015, basal cell carcinoma, iron deficiency anemia for which she has been followed by hematology, vitamin D deficiency, low vitamin B12, osteoporosis, tendency towards electrolyte abnormalities, chronic LFT abnormalities with elevated AST and ALT dating back to July 2019 (low ceruloplasmin, low copper, normal urine copper 6/2024), remote cholecystectomy, hepatic steatosis who presented with fairly acute onset right upper abdominal pain.     The patient was last seen by Dr. Tolbert in clinic June 12, 2024.  At that point, she described doing fairly well with passing anywhere from 3-10 stools per day.  She was not having abdominal pain but did report occasional abdominal bloating.  She has had intermittent rectal bleeding of unclear etiology along with iron deficiency anemia.  Most recent colonoscopy March 1, 2024 showed a normal neoterminal ileum and colon.  There were superficial ulceration and erythema on the anastomosis as well as a tortuous colon.  Upper endoscopy March 1, 2024 included negative esophageal, gastric, and duodenal biopsies.  Based on her report of intermittent rectal bleeding, she was referred to the [facility] for more thorough perianal exam and has  not been seen yet.  As a whole, the patient believes her Crohn symptoms have been fairly well-controlled.  Has continued on Stelara 90 mg every 4 weeks and will be due for Stelara this Thursday/8/29/24.  The patient has not typically had elevations of CRP.  Fecal calprotectin January 11, 2024 of 51, June 20, 2023 20, April 18, 2023 156.    Previous treatments for Crohn's disease include: Remicade, Humira, azathioprine, and cholestyramine.  The patient was started on Stelara October 5, 2021.  Dosing was escalated to monthly March 2022.    In addition, the patient has had a long history of elevated AST and ALT dating back to 2019. When last checked at [facility], AST 47 and ALT 64 ( and  this admission).     Patient reports developing fairly sudden onset right upper quadrant abdominal pain 8/27/24.  This was initially intermittent but progressively worsened and is now constant.  Pain first occurred 4 hours or more after eating.  She had associated nausea but no vomiting.  She has not had fever, fatigue, joint aches, or skin rashes.  Has perhaps been a slight increase in the frequency of her stools.  She estimates passing 10 loose stools per day.  Last saw blood per rectum a few days ago.  When she does note blood, it is typically in the toilet bowl accompanied by loose stool.  She has not had any rectal pain.    She does report that her  had COVID last week.  She also reports her 7-year-old had gastritis with symptoms over a 2-week time course.  He last vomited 4 days ago and seems to be improving.    In the ER, laboratory evaluation revealed mildly low magnesium of 1.6, normal CRP at less than 3.  CBC with white count 7.3, hemoglobin 11.8 and platelets of 302,000.  Lipase is not elevated.  LFTs were mildly abnormal including  and  otherwise unremarkable.  On repeat AST has normalized and ALT is down to 102.  CT abdomen and pelvis showed multiple anastomosis but no definite obstruction.   There were focal areas of mild bowel wall thickening adjacent to a couple small bowel anastomosis.  Also noted was moderate hepatic steatosis and a 2 mm nonobstructing left renal stone.    Abdominal ultrasound shows a common bile duct of 14.2 with echogenic hepatic parenchyma.  Comparison, CT abdomen and pelvis showed the common bile duct to be 8 mm in February 2022.  She also had an MRCP November 2019 with bile duct of 8 mm.  MRCP has not ever shown evidence of PSC.      REVIEW OF SYSTEMS (ROS): Complete review of systems negative other than listed in HPI.    PAST MEDICAL HISTORY:  Past Medical History:   Diagnosis Date    C. 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        MEDICATIONS PRIOR TO ADMISSION:   Medications Prior to Admission   Medication Sig Dispense Refill Last Dose    Cholecalciferol (D 68900) 250 MCG (05697 UT) CAPS Take 250 mcg by mouth See Admin Instructions. Take 1 capsule (10,000 units) by mouth daily 5 days a week. Take on Mon, Tue, Wed, Thur, & Friday.   8/27/2024 at AM    cyanocobalamin (VITAMIN B-12) 1000 MCG tablet Take 1 tablet (1,000 mcg) by mouth daily 30 tablet 6 8/27/2024 at AM    cyclobenzaprine (FLEXERIL) 10 MG tablet Take 10 mg by mouth nightly as needed for muscle spasms.   8/27/2024 at HS    escitalopram (LEXAPRO) 10 MG tablet Take 10 mg by mouth at bedtime.   8/26/2024 at HS    magnesium oxide (MAG-OX) 400 MG tablet Take 400 mg by mouth  "daily.   8/27/2024 at AM    ustekinumab (STELARA) 90 MG/ML Inject 90 mg Subcutaneous every 28 days    Past Month at due this Thursday 8/29/24          ALLERGIES: Morphine hcl, Sucralfate, and Codeine    PHYSICAL EXAM:    /61 (BP Location: Left arm)   Pulse 63   Temp 97.8  F (36.6  C) (Oral)   Resp 16   Ht 1.575 m (5' 2\")   Wt 51.7 kg (114 lb)   LMP 08/22/2024 (Approximate)   SpO2 100%   BMI 20.85 kg/m      GENERAL: Pleasant, mild distress due to discomfort and feeling tired (did not sleep well last night)  NECK: Supple without adenopathy  EYES: No scleral icterus  LUNGS: Clear to auscultation bilaterally  HEART: S1S2, no lower extremity edema  ABDOMEN: Non-distended. Positive bowel sounds. Soft, right upper abdominal tenderness with deep palpation. No guarding or rebound  MUSKULOSKELETAL:  Warm and well perfused, moves all extremities well  SKIN: No jaundice  NEUROLOGIC: Alert and oriented  PSYCHIATRIC: Normal affect    LAB DATA:  CMP Results:   Recent Labs   Lab Test 08/28/24  0753 08/27/24  2229 02/21/24  0055    137 139   POTASSIUM 4.4 4.1 3.7   CHLORIDE 111* 104 105   CO2 21* 16* 28   ANIONGAP 5* 17* 6*   GLC 86 109* 103*   BUN 11.0 14.7 16.1   CR 0.72 0.61 0.66   BILITOTAL 0.3 0.4 0.2   ALKPHOS 119 133 92   * 152* 50   AST 43 103* 23      CBC  Recent Labs   Lab 08/28/24  0843 08/27/24  2223   WBC 5.8 7.3   RBC 3.41* 3.71*   HGB 10.7* 11.8   HCT 33.9* 35.7   MCV 99 96   MCH 31.4 31.8   MCHC 31.6 33.1   RDW 13.1 12.7    302     INRNo lab results found in last 7 days.   Lipase   Date Value Ref Range Status   08/27/2024 32 13 - 60 U/L Final   02/21/2024 59 13 - 60 U/L Final   03/09/2022 44 0 - 52 U/L Final   06/26/2019 21 0 - 52 U/L Final       IMAGING:  EXAM: US ABDOMEN LIMITED  LOCATION: [facility]  DATE: 8/28/2024     INDICATION: RUQ pain, elevated LFTs.  COMPARISON: CT abdomen and pelvis with IV contrast 8/27/2024.  TECHNIQUE: Limited abdominal " ultrasound.     FINDINGS:     GALLBLADDER: Surgically absent.     BILE DUCTS: Mild postoperative biliary ectasia. The common duct measures 14.2 mm.     LIVER: Echogenic hepatic parenchyma without discrete lesion. Smooth hepatic contour. Patent portal vein with normal directional flow.     RIGHT KIDNEY: No hydronephrosis. Right kidney measures 10.8 cm [id number].     PANCREAS: Partially visualized due to overlying bowel gas, grossly normal where seen. Portosplenic confluence is normal.     No ascites.                                                                      IMPRESSION:  1.  Cholecystectomy. Postoperative biliary ectasia.     2.  Echogenic hepatic parenchyma without discrete lesion. Remainder normal.     EXAM: CT ABDOMEN PELVIS W CONTRAST  LOCATION: [facility]  DATE: 8/28/2024     INDICATION: Right upper quadrant and right flank pain, history of Crohn's disease with multiple resections surgery.  COMPARISON: 2/21/2024  TECHNIQUE: CT scan of the abdomen and pelvis was performed following injection of IV contrast. Multiplanar reformats were obtained. Dose reduction techniques were used.  CONTRAST: isovue 370 56 ml     FINDINGS:   LOWER CHEST: Normal.     HEPATOBILIARY: Stable moderate hepatic steatosis. Gallbladder is surgically absent. Mild intrahepatic and extrahepatic biliary ductal dilatation is stable, most compatible with postcholecystectomy reservoir effect.     PANCREAS: Normal.     SPLEEN: Normal.     ADRENAL GLANDS: Normal.     KIDNEYS/BLADDER: Normal. A 2 mm nonobstructing stone is seen in the inferior pole of the left kidney.     BOWEL: Right lower quadrant ileocolic anastomosis in multiple small bowel anastomosis again seen throughout the abdomen. Mild wall thickening is seen in a small bowel loop in the mid abdomen adjacent to a small bowel anastomosis on series 3 image 103,   indeterminate. Another area of wall thickening also seen in the left abdomen adjacent to a  small bowel anastomosis on series 3 image 114. No bowel obstruction. No free fluid or free air. Moderate amount of stool seen throughout the colon.     LYMPH NODES: Multiple mildly prominent mesenteric lymph nodes redemonstrated, compatible with reactive nature.     VASCULATURE: Normal.     PELVIC ORGANS: Multiple small uterine fibroids again seen, unchanged. No abnormal adnexal masses. Multiple follicles seen in the left ovary.     MUSCULOSKELETAL: Normal.                                                                         IMPRESSION:      1.  Multiple bowel anastomoses again seen in the abdomen without evidence of bowel obstruction.     2.  Focal areas of mild bowel wall thickening adjacent to a couple small bowel anastomoses in the mid and left abdomen, suspicious for Crohn's disease involvement.     3.  Moderate hepatic steatosis, 2 mm nonobstructing left renal stone and small uterine fibroids.       ASSESSMENT:  RUQ Abdominal pain-- This is a 44 year old year old female with history of stricturing small bowel Crohn's disease initially diagnosed in 1997 currently on Stelara 90mg every 4 weeks who is status post TI resection and 9 additional surgeries for stricture related small bowel obstructions (3 small bowel resections with most recent surgery 2021 by Dr Cartagena), short-bowel syndrome, SIBO, C. difficile October 2019, recurrent norovirus, melanoma 2015, basal cell carcinoma, iron deficiency anemia for which she has been followed by hematology, vitamin D deficiency, low vitamin B12, osteoporosis, tendency towards electrolyte abnormalities, chronic LFT abnormalities with elevated AST and ALT dating back to July 2019 (low ceruloplasmin, low copper, normal urine copper 6/2024), remote cholecystectomy, hepatic steatosis who presented with fairly acute onset right upper abdominal pain and associated nausea. AST and ALT are mildly elevated (chronic elevations). CRP is not elevated. CT abdomen and pelvis does not  show obstruction but shows possible mild bowel wall thickening adjacent to a couple small bowel anasotmoses.   On abdominal ultrasound, there is evidence of cholecystectomy with common bile duct dilated to 14.2 which raises concern for possible choledocholithiasis. Will further evaluate with MRCP.    Acute Crohn's flare less likely given the acute onset of symptoms, but will check fecal calprotectin. Hold off on starting steroids for now.     The patient's  recently had Covid-- will check for covid. Her child recently had gastroenteritis-- will check stool for enteric pathogens and c difficile.     PLAN:  MRCP.   Trend LFTs. Check acute hepatitis panel, as well as covid, c difficile, and stool for enteric pathogens.   Hold off on steroids for now given low suspicion for Crohn's flare. Will check calprotectin level.   Electrolyte correction per primary team.   Pain control and antiemetics as needed.     Outpatient follow up with [facility] as planned but will also need follow up with Dr Tolbert in IBD clinic after discharge.     Total time spent on this encounter=60minutes  Discussed with Dr. Rossi who will also visit with the patient.   ADDENDUM:  MRCP 8/28/24 IMPRESSION:  1.  No choledocholithiasis seen on this study.  2.  Common bile duct dilatation may be from history of cholecystectomy.  3.  Hepatic steatosis.  4.  Trace free fluid in the abdomen and pelvis is nonspecific.    Covid negative, acute hepatitis panel negative. Will repeat LFTs in the AM. ?passed cbd stone. Might need to consider outpatient EUS +/- ERCP and if eus negative, consideration for liver biopsy in light of chronically elevated AST and ALT.   DERECK Lundy PA-C  Thank you for the opportunity to participate in the care of this patient.   Please feel free to call me with any questions or concerns.  Phone number [phone].              GI Staff Addendum  DOS  "8/28/2024     Pt seen and discussed with Arianna YEAGER. Agree with evaluation, assessment and plan as outlined.    44 year old F with hx of Crohn's disease and cholecystectomy, presents with acute abdominal pain.  Please see above for Crohn's related history.  The patient reports her usual state of health until yesterday she had sudden onset right upper quadrant abdominal pain.  No radiation of pain.  Associated nausea no vomiting.  Denies fever or chills.   recently diagnosed with COVID and child with an acute illness.  States bowel movements have been more loose recently.  No alcohol.  No new medications or recent antibiotics.  History of intermittently elevated liver enzymes.  Upon initial evaluation liver enzymes noted to be elevated normal bilirubin ALT of 150 AST of 100 alk phos 130.  MRCP performed.  Stool studies obtained.  Denies melena or hematochezia.  Mildly light-colored stool.  Patient does report feeling mildly improved.  Pain better controlled.  No fever.    BP 96/62 (BP Location: Left arm)   Pulse 59   Temp 97.9  F (36.6  C) (Oral)   Resp 16   Ht 1.575 m (5' 2\")   Wt 53.8 kg (118 lb 9.7 oz)   LMP 08/22/2024 (Approximate)   SpO2 98%   BMI 21.69 kg/m    General: A&O, NAD, non-toxic appearing  Eyes: No icterus or conjunctivitis  Gastrointestinal: Soft, mild TTP in RUQ, no r/g    Acute hepatitis panel negative    Stool studies pending    MRCP  MRCP: Status post cholecystectomy. There is a low insertion of the cystic duct. The common bile duct is 11 mm in maximum diameter. No filling defects seen in the biliary system. Normal course and caliber of the pancreatic duct.  LIVER: Hepatic steatosis.  PANCREAS: Normal.  ADDITIONAL FINDINGS: No lymphadenopathy. Normal adrenal glands, kidneys, and spleen. Postsurgical changes in the bowel are again seen. There is trace free fluid in the abdomen and pelvis. There are trace pleural effusions.                            IMPRESSION:  1.  No " choledocholithiasis seen on this study.  2.  Common bile duct dilatation may be from history of cholecystectomy.  3.  Hepatic steatosis.  4.  Trace free fluid in the abdomen and pelvis is nonspecific.      Assessment/Plan:  Upper quadrant pain. - Associated elevated liver enzymes.  Past cholecystectomy.  MRCP with mild biliary duct dilation.  No filling defects.  There remains a possible patent patient could have passed a gallstone.  Other consideration for acute infectious etiology; viral or bacterial illness.  Less likely related to Crohn's disease or peptic ulcer disease. Acute on chronic liver enzyme elevation  Crohn's disease -acute diarrheal illness    -Clear liquid diet  -Repeat LFTs in a.m.  -Follow-up stool studies  -Analgesia and antiemetic therapy as needed  -Discussed EUS in the future.  Pending clinical status, EUS to evaluate any biliary cause of acute pain liver enzyme elevation.  If unremarkable EUS with liver biopsy to evaluate chronic fluctuating liver enzymes.    20 minutes of total time was spent providing patient care including patient evaluation, reviewing documentation/test results, and .                                                  Johnny Rossi MD  Thank you for the opportunity to participate in the care of this patient.   Please feel free to call me with any questions or concerns.  Phone number [phone].

## 2024-08-28 NOTE — CONSULTS
"[facility] ACUTE PAIN SERVICE    ([facility], [facility], [facility], Critical access hospital)  Pain Progress Note    Assessment/Plan:  Alok Nion is a 44 year old female who was admitted on 8/27/2024.  Pain Service is asked to see the patient for acute upon chronic abdominal pain.  Admitted for evaluation of right upper quadrant pain.  Patient has history of chron's disease. CT shows multiple bowel anastomoses, without evidence of bowel obstruction. Focal areas of mild bowel wall thickening adjacent to a couple small bowel anastomoses in the mid and left abdomen, suspicious for Crohn's disease involvement. Moderate hepatic steatosis, 2 mm nonobstructing left renal stone and small uterine fibroids.  CRP and lipase unremarkable.  Medical history significant for chron's disease, chronic pain syndrome, multiple abdominal surgeries.   GI Consulted:    PLAN:  MRCP.   Trend LFTs. Check acute hepatitis panel, as well as covid, c difficile, and stool for enteric pathogens.   Hold off on steroids for now given low suspicion for Crohn's flare. Will check calprotectin level.   Electrolyte correction per primary team.   Pain control and antiemetics as needed.      Outpatient follow up with [facility] as planned but will also need follow up with Dr Tolbert in IBD clinic after discharge.     [place] and [place]  checked and shows no opioid prescriptions over this past year.  Reviewed Pharmacy medication reconciliation:  flexeril, lexapro, magnesium oxide noted .     Since  2300 last  PM Alok has received:    4(0.5mg) IV dilaudid 1(0.3mg) IV and 1(5mg) oxycodone.        Describes pain as sharp, constant.  Currently rates it at \"5\" out of /10 after receiving IV dilaudid.    States bowel movements have been more liquid the past couple days.  This AM it was \"bile like\" frothy appearance    Normally does not have pain and has not been prescribed opioids.    Last hospitalization and episode of obstruction was around " "[id number].      MRCP scheduled for later this AM.    Will continue with pain plan as listed below while undergoing medical workup.        PLAN:  Acute upon chronic abdominal pain secondary to history of stricturing small bowel Chron's disease, currently on biologic agent.  Multimodal Medication Therapy:   Adjuvants: lexapro 10 mg every HS, tylenol 650 mg every four hours prn, flexeril 10 mg at bedtime prn  Opioids: oxycodone 5-10 mg every 4 hours prn, IV hydromorphone 0.5 mg every three hours prn   Non-medication interventions- Ice prn  Constipation Prophylaxis- chron's no scheduled bowel meds  Follow up /Discharge Recommendations - We recommend prescribing the following at the time of discharge:  TBD         <principal problem not specified>   Patient Active Problem List   Diagnosis    Inflammatory bowel disease (Crohn's disease) (H)    Vitamin B12 deficiency (non anemic)    Vitamin D deficiency    Vitamin E deficiency    Copper deficiency myeloneuropathy (H)    Crohn's colitis (H)    Low vitamin D level    Crohn's disease of small and large intestines (H)    Abdominal pain    Nausea    RUQ abdominal pain    History of Crohn's disease        History   Drug Use No         Tobacco Use      Smoking status: Never      Smokeless tobacco: Never        Current Facility-Administered Medications   Medication Dose Route Frequency Provider Last Rate Last Admin    escitalopram (LEXAPRO) tablet 10 mg  10 mg Oral At Bedtime Nikolai Roblero MD        pantoprazole (PROTONIX) IV push injection 40 mg  40 mg Intravenous Daily Nikolai Roblero MD   40 mg at 08/28/24 0744       Objective:  Vital signs in last 24 hours:  B/P: 108/61, T: 97.8, P: 63, R: 16   Blood pressure 108/61, pulse 63, temperature 97.8  F (36.6  C), temperature source Oral, resp. rate 16, height 1.575 m (5' 2\"), weight 51.7 kg (114 lb), last menstrual period 08/22/2024, SpO2 100%, not currently breastfeeding.      Weight:   Wt Readings from Last 2 Encounters: "   08/27/24 51.7 kg (114 lb)   02/21/24 49.9 kg (110 lb)         Intake/Output:    Intake/Output Summary (Last 24 hours) at 8/28/2024 1011  Last data filed at 8/28/2024 0450  Gross per 24 hour   Intake 3050 ml   Output --   Net 3050 ml        Review of Systems:   As per subjective, all others negative.    Physical Exam:     General Appearance:  Alert, cooperative, rests in bed   Head:  Normocephalic, without obvious abnormality, atraumatic   Eyes:  PERRL, conjunctiva/corneas clear, EOM's intact   ENT/Throat: Lips moist    Lymph/Neck: Supple, symmetrical, trachea midline   Lungs:   respirations unlabored   Abdomen:   Soft, tender, limited exam   Musculoskeletal: Extremities normal, atraumatic   Skin: Skin is intact    Neurologic: Alert and oriented X 3, Moves all 4 extremities         Imaging:  Personally Reviewed.    Results for orders placed or performed during the hospital encounter of 08/27/24   CT Abdomen Pelvis w Contrast    Impression    IMPRESSION:     1.  Multiple bowel anastomoses again seen in the abdomen without evidence of bowel obstruction.    2.  Focal areas of mild bowel wall thickening adjacent to a couple small bowel anastomoses in the mid and left abdomen, suspicious for Crohn's disease involvement.    3.  Moderate hepatic steatosis, 2 mm nonobstructing left renal stone and small uterine fibroids.   US Abdomen Limited    Impression    IMPRESSION:  1.  Cholecystectomy. Postoperative biliary ectasia.    2.  Echogenic hepatic parenchyma without discrete lesion. Remainder normal.            Lab Results:  Personally Reviewed.   Last Comprehensive Metabolic Panel:  Sodium   Date Value Ref Range Status   08/28/2024 137 135 - 145 mmol/L Final   12/14/2013 138 133 - 144 mmol/L Final     Potassium   Date Value Ref Range Status   08/28/2024 4.4 3.4 - 5.3 mmol/L Final   05/25/2022 4.0 3.4 - 5.3 mmol/L Final   12/14/2013 3.4 3.4 - 5.3 mmol/L Final     Chloride   Date Value Ref Range Status   08/28/2024 111 (H)  98 - 107 mmol/L Final   05/25/2022 113 (H) 94 - 109 mmol/L Final   12/14/2013 105 94 - 109 mmol/L Final     Carbon Dioxide   Date Value Ref Range Status   12/14/2013 27 20 - 32 mmol/L Final     Carbon Dioxide (CO2)   Date Value Ref Range Status   08/28/2024 21 (L) 22 - 29 mmol/L Final   05/25/2022 23 20 - 32 mmol/L Final     Anion Gap   Date Value Ref Range Status   08/28/2024 5 (L) 7 - 15 mmol/L Final   05/25/2022 9 3 - 14 mmol/L Final   12/14/2013 5 (L) 6 - 17 mmol/L Final     Glucose   Date Value Ref Range Status   08/28/2024 86 70 - 99 mg/dL Final   05/25/2022 93 70 - 99 mg/dL Final   12/14/2013 105 (H) 60 - 99 mg/dL Final     Urea Nitrogen   Date Value Ref Range Status   08/28/2024 11.0 6.0 - 20.0 mg/dL Final   05/25/2022 12 7 - 30 mg/dL Final   12/14/2013 3 (L) 5 - 24 mg/dL Final     Creatinine   Date Value Ref Range Status   08/28/2024 0.72 0.51 - 0.95 mg/dL Final   12/14/2013 0.46 (L) 0.52 - 1.04 mg/dL Final     GFR Estimate   Date Value Ref Range Status   08/28/2024 >90 >60 mL/min/1.73m2 Final     Comment:     eGFR calculated using 2021 CKD-EPI equation.   08/02/2019 >60 >60 mL/min/1.73m2 Final   12/14/2013 >90 >60 mL/min/1.7m2 Final     Calcium   Date Value Ref Range Status   08/28/2024 7.2 (L) 8.8 - 10.4 mg/dL Final     Comment:     Reference intervals for this test were updated on 7/16/2024 to reflect our healthy population more accurately. There may be differences in the flagging of prior results with similar values performed with this method. Those prior results can be interpreted in the context of the updated reference intervals.   12/14/2013 8.0 (L) 8.5 - 10.4 mg/dL Final        UA:   Amphetamines Urine   Date Value Ref Range Status   07/07/2019 Screen Negative Screen Negative Final     Barbiturates Urine   Date Value Ref Range Status   07/07/2019 Screen Negative Screen Negative Final     Cannabinoids Urine   Date Value Ref Range Status   07/07/2019 Screen Negative Screen Negative Final     Cocaine  Urine   Date Value Ref Range Status   07/07/2019 Screen Negative Screen Negative Final     Opiates Urine   Date Value Ref Range Status   07/07/2019 Screen Negative Screen Negative Final     PCP Urine   Date Value Ref Range Status   07/07/2019 Screen Negative Screen Negative Final              MANAGEMENT DISCUSSED with the following over the past 24 hours: RN   NOTE(S)/MEDICAL RECORDS REVIEWED over the past 24 hours: Hospital Medicine, GI, Nursing  Medical complexity over the past 24 hours:  - Parenteral (IV) CONTROLLED SUBSTANCES ordered  - Prescription DRUG MANAGEMENT performed      TACOS Mckeon, DNP CNS  Acute Inpatient Pain Team  M-F   Paging via [employer] or [employer]

## 2024-08-28 NOTE — ED NOTES
"Pt presents NAD. SKIN: NWD.   HEENT: normocephalic, PERRL, clear x 3.   CHEST: symmetrical rise, CEBBS, no CP or SOB.  ABD: Pt c/o RUQ abdominal pain described as \"dull\" and \"gnawing,\" which radiates to left upper back and LUQ, c/o nausea, denies vomiting and diarrhea.  EXT: BHATTI x 4  Rest of exam unremarkable.     "

## 2024-08-28 NOTE — ED NOTES
"[facility] ED Handoff Report    ED Chief Complaint: abdominal pain , nausea, vomiting    ED Diagnosis:  (R10.11) RUQ abdominal pain  Comment:   Plan:     (R11.0) Nausea  Comment:   Plan:     (Z87.19) History of Crohn's disease  Comment:   Plan:        PMH:    Past Medical History:   Diagnosis Date    C. difficile enteritis     Chronic pain     Crohn disease (H)     Melanoma (H)     PONV (postoperative nausea and vomiting)     SBO (small bowel obstruction) (H) 03/14/2022        Code Status:  Full Code     Falls Risk: No Band: Not applicable    Current Living Situation/Residence: lives with a significant other     Elimination Status: Continent: Yes     Activity Level: Independent    Patients Preferred Language:  English     Needed: No    Vital Signs:  /58   Pulse 61   Temp 98.4  F (36.9  C) (Oral)   Resp 18   Ht 1.575 m (5' 2\")   Wt 51.7 kg (114 lb)   LMP 08/22/2024 (Approximate)   SpO2 96%   BMI 20.85 kg/m       Cardiac Rhythm: Sinus Rhythm    Pain Score: 4/10    Is the Patient Confused:  No    Last Food or Drink: 8/27/24 at     Focused Assessment:  Patient c/o RUQ abdominal pain , endorses nausea, diarrhea, blood in stool. HX Crohn's    Tests Performed: Done: Labs and Imaging    Treatments Provided:      Family Dynamics/Concerns: No    Family Updated On Visitor Policy: Yes    Plan of Care Communicated to Family: Yes    Who Was Updated about Plan of Care: significan other    Belongings Checklist Done and Signed by Patient: Yes    Belongings Sent with Patient: yes    Medications sent with patient: none        Additional Information:GI at bedside    RN: Susan Allen RN   8/28/2024 8:11 AM       "

## 2024-08-29 VITALS
WEIGHT: 118.61 LBS | OXYGEN SATURATION: 96 % | HEART RATE: 64 BPM | SYSTOLIC BLOOD PRESSURE: 98 MMHG | DIASTOLIC BLOOD PRESSURE: 60 MMHG | TEMPERATURE: 98.1 F | HEIGHT: 62 IN | BODY MASS INDEX: 21.83 KG/M2 | RESPIRATION RATE: 16 BRPM

## 2024-08-29 LAB
ALBUMIN SERPL BCG-MCNC: 3.2 G/DL (ref 3.5–5.2)
ALP SERPL-CCNC: 119 U/L (ref 40–150)
ALT SERPL W P-5'-P-CCNC: 84 U/L (ref 0–50)
ANION GAP SERPL CALCULATED.3IONS-SCNC: 8 MMOL/L (ref 7–15)
AST SERPL W P-5'-P-CCNC: 33 U/L (ref 0–45)
BILIRUB SERPL-MCNC: 0.3 MG/DL
BUN SERPL-MCNC: 7.3 MG/DL (ref 6–20)
CALCIUM SERPL-MCNC: 8.3 MG/DL (ref 8.8–10.4)
CHLORIDE SERPL-SCNC: 108 MMOL/L (ref 98–107)
CREAT SERPL-MCNC: 0.76 MG/DL (ref 0.51–0.95)
CRP SERPL-MCNC: 4.6 MG/L
EGFRCR SERPLBLD CKD-EPI 2021: >90 ML/MIN/1.73M2
GLUCOSE SERPL-MCNC: 89 MG/DL (ref 70–99)
HCO3 SERPL-SCNC: 25 MMOL/L (ref 22–29)
HGB BLD-MCNC: 10.2 G/DL (ref 11.7–15.7)
MAGNESIUM SERPL-MCNC: 1.7 MG/DL (ref 1.7–2.3)
POTASSIUM SERPL-SCNC: 4.2 MMOL/L (ref 3.4–5.3)
PROT SERPL-MCNC: 5.2 G/DL (ref 6.4–8.3)
SODIUM SERPL-SCNC: 141 MMOL/L (ref 135–145)

## 2024-08-29 PROCEDURE — 36415 COLL VENOUS BLD VENIPUNCTURE: CPT | Performed by: STUDENT IN AN ORGANIZED HEALTH CARE EDUCATION/TRAINING PROGRAM

## 2024-08-29 PROCEDURE — 99239 HOSP IP/OBS DSCHRG MGMT >30: CPT | Performed by: FAMILY MEDICINE

## 2024-08-29 PROCEDURE — 250N000009 HC RX 250: Performed by: INTERNAL MEDICINE

## 2024-08-29 PROCEDURE — 85018 HEMOGLOBIN: CPT | Performed by: STUDENT IN AN ORGANIZED HEALTH CARE EDUCATION/TRAINING PROGRAM

## 2024-08-29 PROCEDURE — 86140 C-REACTIVE PROTEIN: CPT | Performed by: PHYSICIAN ASSISTANT

## 2024-08-29 PROCEDURE — 83735 ASSAY OF MAGNESIUM: CPT | Performed by: INTERNAL MEDICINE

## 2024-08-29 PROCEDURE — 80053 COMPREHEN METABOLIC PANEL: CPT | Performed by: STUDENT IN AN ORGANIZED HEALTH CARE EDUCATION/TRAINING PROGRAM

## 2024-08-29 PROCEDURE — 99232 SBSQ HOSP IP/OBS MODERATE 35: CPT | Performed by: PAIN MEDICINE

## 2024-08-29 PROCEDURE — 250N000011 HC RX IP 250 OP 636: Performed by: CLINICAL NURSE SPECIALIST

## 2024-08-29 PROCEDURE — 250N000013 HC RX MED GY IP 250 OP 250 PS 637: Performed by: INTERNAL MEDICINE

## 2024-08-29 PROCEDURE — 250N000011 HC RX IP 250 OP 636: Performed by: FAMILY MEDICINE

## 2024-08-29 RX ORDER — MAGNESIUM OXIDE 400 MG/1
400 TABLET ORAL EVERY 4 HOURS
Status: DISCONTINUED | OUTPATIENT
Start: 2024-08-29 | End: 2024-08-29

## 2024-08-29 RX ORDER — MAGNESIUM SULFATE HEPTAHYDRATE 40 MG/ML
2 INJECTION, SOLUTION INTRAVENOUS ONCE
Status: COMPLETED | OUTPATIENT
Start: 2024-08-29 | End: 2024-08-29

## 2024-08-29 RX ORDER — OXYCODONE HYDROCHLORIDE 5 MG/1
5 TABLET ORAL EVERY 6 HOURS PRN
Qty: 5 TABLET | Refills: 0 | Status: ON HOLD | OUTPATIENT
Start: 2024-08-29 | End: 2024-09-25

## 2024-08-29 RX ADMIN — MAGNESIUM SULFATE HEPTAHYDRATE 2 G: 40 INJECTION, SOLUTION INTRAVENOUS at 08:01

## 2024-08-29 RX ADMIN — PANTOPRAZOLE SODIUM 40 MG: 40 INJECTION, POWDER, FOR SOLUTION INTRAVENOUS at 08:01

## 2024-08-29 RX ADMIN — HYDROMORPHONE HYDROCHLORIDE 0.5 MG: 1 INJECTION, SOLUTION INTRAMUSCULAR; INTRAVENOUS; SUBCUTANEOUS at 08:12

## 2024-08-29 RX ADMIN — OXYCODONE HYDROCHLORIDE 5 MG: 5 TABLET ORAL at 13:01

## 2024-08-29 ASSESSMENT — ACTIVITIES OF DAILY LIVING (ADL)
ADLS_ACUITY_SCORE: 20

## 2024-08-29 NOTE — PROGRESS NOTES
"  GASTROENTEROLOGY PROGRESS NOTE     SUBJECTIVE   The patient primarily complains of thoracic spine discomfort and a dull ache in her right upper quadrant.  She is tolerating liquids and hopes to advance to a bland low fiber diet.     OBJECTIVE     Vitals Blood pressure 98/60, pulse 64, temperature 98.1  F (36.7  C), temperature source Oral, resp. rate 16, height 1.575 m (5' 2\"), weight 53.8 kg (118 lb 9.7 oz), last menstrual period 08/22/2024, SpO2 96%, not currently breastfeeding.          Physical Exam  General: awake, alert, responds appropriately   Cardiovascular: S1S2, no edema   Chest: lungs are clear    Abdomen: +bs, soft, mild right upper quadrant discomfort with deep palpation.   Neurologic: grossly intact        LABORATORY    ELECTROLYTE PANEL   Recent Labs   Lab 08/29/24  0525 08/28/24  0753 08/27/24  2229    137 137   POTASSIUM 4.2 4.4 4.1   CHLORIDE 108* 111* 104   CO2 25 21* 16*   GLC 89 86 109*   CR 0.76 0.72 0.61   BUN 7.3 11.0 14.7      HEMATOLOGY PANEL   Recent Labs   Lab 08/29/24  0525 08/28/24  0843 08/27/24  2223   HGB 10.2* 10.7* 11.8   MCV  --  99 96   WBC  --  5.8 7.3   PLT  --  249 302      LIVER AND PANCREAS PANEL   Recent Labs   Lab 08/29/24  0525 08/28/24  0753 08/27/24  2229 08/27/24  2223   AST 33 43 103*  --    ALT 84* 102* 152*  --    ALKPHOS 119 119 133  --    BILITOTAL 0.3 0.3 0.4  --    LIPASE  --   --   --  32     COVID-negative, acute hepatitis panel negative, stool for enteric pathogens and C. difficile also negative.  Calprotectin is pending.  CRP x 2 not elevated  IMAGING STUDIES    EXAM: US ABDOMEN LIMITED  LOCATION: [facility]  DATE: 8/28/2024     INDICATION: RUQ pain, elevated LFTs.  COMPARISON: CT abdomen and pelvis with IV contrast 8/27/2024.  TECHNIQUE: Limited abdominal ultrasound.     FINDINGS:     GALLBLADDER: Surgically absent.     BILE DUCTS: Mild postoperative biliary ectasia. The common duct measures 14.2 mm.     LIVER: Echogenic " hepatic parenchyma without discrete lesion. Smooth hepatic contour. Patent portal vein with normal directional flow.     RIGHT KIDNEY: No hydronephrosis. Right kidney measures 10.8 cm [id number].     PANCREAS: Partially visualized due to overlying bowel gas, grossly normal where seen. Portosplenic confluence is normal.     No ascites.                                                                      IMPRESSION:  1.  Cholecystectomy. Postoperative biliary ectasia.     2.  Echogenic hepatic parenchyma without discrete lesion. Remainder normal.     EXAM: CT ABDOMEN PELVIS W CONTRAST  LOCATION: [facility]  DATE: 8/28/2024     INDICATION: Right upper quadrant and right flank pain, history of Crohn's disease with multiple resections surgery.  COMPARISON: 2/21/2024  TECHNIQUE: CT scan of the abdomen and pelvis was performed following injection of IV contrast. Multiplanar reformats were obtained. Dose reduction techniques were used.  CONTRAST: isovue 370 56 ml     FINDINGS:   LOWER CHEST: Normal.     HEPATOBILIARY: Stable moderate hepatic steatosis. Gallbladder is surgically absent. Mild intrahepatic and extrahepatic biliary ductal dilatation is stable, most compatible with postcholecystectomy reservoir effect.     PANCREAS: Normal.     SPLEEN: Normal.     ADRENAL GLANDS: Normal.     KIDNEYS/BLADDER: Normal. A 2 mm nonobstructing stone is seen in the inferior pole of the left kidney.     BOWEL: Right lower quadrant ileocolic anastomosis in multiple small bowel anastomosis again seen throughout the abdomen. Mild wall thickening is seen in a small bowel loop in the mid abdomen adjacent to a small bowel anastomosis on series 3 image 103,   indeterminate. Another area of wall thickening also seen in the left abdomen adjacent to a small bowel anastomosis on series 3 image 114. No bowel obstruction. No free fluid or free air. Moderate amount of stool seen throughout the colon.     LYMPH NODES:  Multiple mildly prominent mesenteric lymph nodes redemonstrated, compatible with reactive nature.     VASCULATURE: Normal.     PELVIC ORGANS: Multiple small uterine fibroids again seen, unchanged. No abnormal adnexal masses. Multiple follicles seen in the left ovary.     MUSCULOSKELETAL: Normal.                                                                         IMPRESSION:      1.  Multiple bowel anastomoses again seen in the abdomen without evidence of bowel obstruction.     2.  Focal areas of mild bowel wall thickening adjacent to a couple small bowel anastomoses in the mid and left abdomen, suspicious for Crohn's disease involvement.     3.  Moderate hepatic steatosis, 2 mm nonobstructing left renal stone and small uterine fibroids.     MRCP   8/28/24 IMPRESSION:  1.  No choledocholithiasis seen on this study.  2.  Common bile duct dilatation may be from history of cholecystectomy.  3.  Hepatic steatosis.  4.  Trace free fluid in the abdomen and pelvis is nonspecific.     I have reviewed the current diagnostic and laboratory tests.              IMPRESSION   RUQ Abdominal pain-- This is a 44 year old year old female with history of stricturing small bowel Crohn's disease initially diagnosed in 1997 currently on Stelara 90mg every 4 weeks who is status post TI resection and 9 additional surgeries for stricture related small bowel obstructions (3 small bowel resections with most recent surgery 2021 by Dr Cartagena), short-bowel syndrome, SIBO, C. difficile October 2019, recurrent norovirus, melanoma 2015, basal cell carcinoma, iron deficiency anemia for which she has been followed by hematology, vitamin D deficiency, low vitamin B12, osteoporosis, tendency towards electrolyte abnormalities, chronic LFT abnormalities with elevated AST and ALT dating back to July 2019 (low ceruloplasmin, low copper, normal urine copper 6/2024), remote cholecystectomy, hepatic steatosis who presented with fairly acute onset right  upper abdominal pain and associated nausea.     AST and ALT are mildly elevated (chronic elevations). CRP X2 is not elevated. CT abdomen and pelvis does not show obstruction but shows possible mild bowel wall thickening adjacent to a couple small bowel anasotmoses.  MRCP did not show evidence of choledocholithiasis but does show hepatic steatosis.  On abdominal ultrasound, there is evidence of cholecystectomy with common bile duct dilated to 14.2 which raises concern for possible choledocholithiasis. However, MRCP did not show evidence of choledocholithiasis but does show hepatic steatosis.     Acute Crohn's flare less likely given the acute onset of symptoms, but will check fecal calprotectin. Hold off on starting steroids.  Acute hepatitis panel is negative as is testing for COVID, C. difficile, and enteric pathogens.         PLAN   Advance diet to soft bland/low fiber.  If tolerates diet advances, no objection to discharge to home    Fecal calprotectin is pending.  Exact etiology of chronic LFT abnormalities is not clear but could be related to hepatic steatosis or even possibly sludge.  Will plan for outpatient EUS +/- ERCP. if EUS is unremarkable, would recommend a liver biopsy given chronic intermittent LFT abnormalities and subsequent follow-up with one of our hepatologist.    She will follow-up at [facility] (previously referred by Dr. Tolbert) for evaluation of intermittent rectal bleeding and assistance with nutrition/chronic electrolyte disturbances.  She will also plan to follow-up with Dr. Tolbert in the next few months.  [facility] will contact the patient regarding scheduling of EUS.  No objection to discharge to home if she tolerates diet advances.    Total time spent on this encounter=35minutes        Arianna Flores PA-C  Thank you for the opportunity to participate in the care of this patient.   Please feel free to call me with any questions or concerns.  Phone number [phone].

## 2024-08-29 NOTE — DISCHARGE SUMMARY
[facility]  Hospitalist Discharge Summary      Date of Admission:  8/27/2024  Date of Discharge:  8/29/2024  2:01 PM  Discharging Provider: Amanda Pedersen MD  Discharge Service: Hospitalist Service    Discharge Diagnoses     Right upper quadrant pain; stable.  Advise continue outpatient testing with EUS, possible liver biopsy  Crohn's disease  LFTs, resolved  Mild anion gap metabolic acidosis    Clinically Significant Risk Factors          Follow-ups Needed After Discharge   Follow-up Appointments     Follow-up and recommended labs and tests       Follow up with primary care provider, Nakita Odonnell, within 7-14 days   for hospital follow- up.  The following labs/tests are recommended:   hepatic panel.    Follow up with [facility] as recommended        }    Unresulted Labs Ordered in the Past 30 Days of this Admission       Date and Time Order Name Status Description    8/28/2024  8:30 AM Calprotectin Feces In process         These results will be followed up by [facility]    Discharge Disposition   Discharged to home  Condition at discharge: Stable    Hospital Course     Female with known history of Crohn's colitis presented to the hospital for further workup and evaluation of right upper quadrant pain rating into the back and midepigastrium.  Pain worse with inspiration.  Pain different than previous Crohn's flares.  Patient has history of significant abdominal surgeries.  In the emergency room department CT scan showed multiple bowel anastomoses without obstruction and focal areas of mild bowel wall thickening adjacent to small bowel anastomoses with moderate hepatic steatosis as well as elevated LFTs and she was admitted.    Patient underwent abdominal imaging as well as GI consultation.  Was treated mainly with IV fluids as well as holding diet initially.  Anion gap resolved with fluids.      Hepatitis panel negative, C. difficile and enteric viral panel negative.  Had ultrasound of the abdomen as  well as MRCP showing no choledocholithiasis and common bile duct dilation felt secondary to cholecystectomy.  Liver testing trending down.  Patient tolerated management and diet and felt stable for home discharge.  She was still having pain and was seen by pain management team and felt stable for multimodal pain control including low-dose narcotics given upon discharge.  Discharged home in good condition.    Consultations This Hospital Stay   GASTROENTEROLOGY IP CONSULT  PAIN MANAGEMENT ADULT IP CONSULT    Code Status   Full Code    Time Spent on this Encounter   I, Amanda Pedersen MD, personally saw the patient today and spent greater than 30 minutes discharging this patient.       Amanda Pedersen MD  [facility] EXTENDED RECOVERY AND SHORT STAY  [address] [place] [id number]  Phone: [phone]  Fax: [phone]  ______________________________________________________________________    Physical Exam   Vital Signs: Temp: 98.1  F (36.7  C) Temp src: Oral BP: 98/60 Pulse: 64   Resp: 16 SpO2: 96 % O2 Device: None (Room air)    Weight: 118 lbs 9.72 oz  General Appearance: Pleasant female no apparent distress  Respiratory: Clear to auscultation bilaterally.  I cannot hear any crackles.  Cardiovascular: Regular rate and without murmurs of the gallops  GI: Tender mildly in the epigastrium and the right upper quadrant without rebound or guarding.  Skin: No significant lower extremity edema  Other: Neurologically grossly intact without focal deficits appreciated       Primary Care Physician   Nakita Odonnell    Discharge Orders      Reason for your hospital stay    RUQ Abdominal pain  Crohns disease     Follow-up and recommended labs and tests     Follow up with primary care provider, Nakita Odonnell, within 7-14 days for hospital follow- up.  The following labs/tests are recommended: hepatic panel.    Follow up with [facility] as recommended     Activity    Your activity upon discharge: activity  as tolerated     Diet    Follow this diet upon discharge: Current Diet:Orders Placed This Encounter      Regular Diet Adult       Significant Results and Procedures   Most Recent 3 CBC's:  Recent Labs   Lab Test 08/29/24  0525 08/28/24  0843 08/27/24  2223 02/21/24  0055   WBC  --  5.8 7.3 7.5   HGB 10.2* 10.7* 11.8 11.4*   MCV  --  99 96 92   PLT  --  249 302 249     Most Recent 3 BMP's:  Recent Labs   Lab Test 08/29/24  0525 08/28/24  0753 08/27/24  2229    137 137   POTASSIUM 4.2 4.4 4.1   CHLORIDE 108* 111* 104   CO2 25 21* 16*   BUN 7.3 11.0 14.7   CR 0.76 0.72 0.61   ANIONGAP 8 5* 17*   BLACK 8.3* 7.2* 8.7*   GLC 89 86 109*     Most Recent 2 LFT's:  Recent Labs   Lab Test 08/29/24  0525 08/28/24  0753   AST 33 43   ALT 84* 102*   ALKPHOS 119 119   BILITOTAL 0.3 0.3     Most Recent Cholesterol Panel:  Recent Labs   Lab Test 08/27/24 2229   CHOL 74   LDL 36   HDL 28*   TRIG 50     7-Day Micro Results       Collected Updated Procedure Result Status      08/28/2024 1335 08/28/2024 2048 Enteric Bacteria and Virus Panel PCR [[id number]]    Stool from Per Rectum    Final result Component Value   Campylobacter species Negative   Salmonella species Negative   Vibrio species Negative   Vibrio cholerae Negative   Yersinia enterocolitica Negative   Enteropathogenic E. coli (EPEC) Negative   Shiga-like toxin-producing E. coli (STEC) Negative   Shigella/Enteroinvasive E. coli (EIEC) Negative   Cryptosporidium species Negative   Giardia lamblia Negative   Norovirus Gl/Gll Negative   Rotavirus A Negative   Plesiomonas shigelloides Negative   Enteroaggregative E. coli (EAEC) Negative   Enterotoxigenic E. coli (ETEC) Negative   E. coli O157 NA   Cyclospora cayetanensis Negative   Entamoeba histolytica Negative   Adenovirus F40/41 Negative   Astrovirus Negative   Sapovirus Negative            08/28/2024 1335 08/28/2024 1945 C. difficile Toxin B PCR with reflex to C. difficile Antigen and Toxins A/B EIA [[id number]]     Stool from Per Rectum    Final result Component Value   C Difficile Toxin B by PCR Negative   A negative result does not exclude actual disease due to C. difficile and may be due to improper collection, handling and storage of the specimen or the number of organisms in the specimen is below the detection limit of the assay.            08/28/2024 0904 08/28/2024 0943 Symptomatic COVID-19 Virus (Coronavirus) by PCR Nasopharyngeal [[id number]]    Swab from Nasopharyngeal    Final result Component Value   SARS CoV2 PCR Negative   NEGATIVE: SARS-CoV-2 (COVID-19) RNA not detected, presumed negative.                  Most Recent TSH and T4:  Recent Labs   Lab Test 07/31/19  1915   TSH 0.06*     Most Recent ESR & CRP:  Recent Labs   Lab Test 08/29/24  0525 08/27/24  2223 03/15/22  0521   SED  --   --  11   CRP  --   --  14.0*   CRPI 4.60   < >  --     < > = values in this interval not displayed.   ,   Results for orders placed or performed during the hospital encounter of 08/27/24   CT Abdomen Pelvis w Contrast    Narrative    EXAM: CT ABDOMEN PELVIS W CONTRAST  LOCATION: [facility]  DATE: 8/28/2024    INDICATION: Right upper quadrant and right flank pain, history of Crohn's disease with multiple resections surgery.  COMPARISON: 2/21/2024  TECHNIQUE: CT scan of the abdomen and pelvis was performed following injection of IV contrast. Multiplanar reformats were obtained. Dose reduction techniques were used.  CONTRAST: isovue 370 56 ml    FINDINGS:   LOWER CHEST: Normal.    HEPATOBILIARY: Stable moderate hepatic steatosis. Gallbladder is surgically absent. Mild intrahepatic and extrahepatic biliary ductal dilatation is stable, most compatible with postcholecystectomy reservoir effect.    PANCREAS: Normal.    SPLEEN: Normal.    ADRENAL GLANDS: Normal.    KIDNEYS/BLADDER: Normal. A 2 mm nonobstructing stone is seen in the inferior pole of the left kidney.    BOWEL: Right lower quadrant ileocolic  anastomosis in multiple small bowel anastomosis again seen throughout the abdomen. Mild wall thickening is seen in a small bowel loop in the mid abdomen adjacent to a small bowel anastomosis on series 3 image 103,   indeterminate. Another area of wall thickening also seen in the left abdomen adjacent to a small bowel anastomosis on series 3 image 114. No bowel obstruction. No free fluid or free air. Moderate amount of stool seen throughout the colon.    LYMPH NODES: Multiple mildly prominent mesenteric lymph nodes redemonstrated, compatible with reactive nature.    VASCULATURE: Normal.    PELVIC ORGANS: Multiple small uterine fibroids again seen, unchanged. No abnormal adnexal masses. Multiple follicles seen in the left ovary.    MUSCULOSKELETAL: Normal.        Impression    IMPRESSION:     1.  Multiple bowel anastomoses again seen in the abdomen without evidence of bowel obstruction.    2.  Focal areas of mild bowel wall thickening adjacent to a couple small bowel anastomoses in the mid and left abdomen, suspicious for Crohn's disease involvement.    3.  Moderate hepatic steatosis, 2 mm nonobstructing left renal stone and small uterine fibroids.   US Abdomen Limited    Narrative    EXAM: US ABDOMEN LIMITED  LOCATION: [facility]  DATE: 8/28/2024    INDICATION: RUQ pain, elevated LFTs.  COMPARISON: CT abdomen and pelvis with IV contrast 8/27/2024.  TECHNIQUE: Limited abdominal ultrasound.    FINDINGS:    GALLBLADDER: Surgically absent.    BILE DUCTS: Mild postoperative biliary ectasia. The common duct measures 14.2 mm.    LIVER: Echogenic hepatic parenchyma without discrete lesion. Smooth hepatic contour. Patent portal vein with normal directional flow.    RIGHT KIDNEY: No hydronephrosis. Right kidney measures 10.8 cm [id number].    PANCREAS: Partially visualized due to overlying bowel gas, grossly normal where seen. Portosplenic confluence is normal.    No ascites.      Impression     IMPRESSION:  1.  Cholecystectomy. Postoperative biliary ectasia.    2.  Echogenic hepatic parenchyma without discrete lesion. Remainder normal.       MR Abdomen MRCP without Contrast    Narrative    EXAM: MR ABDOMEN MRCP W/O CONTRAST  LOCATION: [facility]  DATE: 8/28/2024    INDICATION: Hx of cholecystectomy. Dilated CBD. Evaluate for choledocholithiasis  COMPARISON: Ultrasound 8/28/2024 at 0120 hours. CT 8/27/2024 at 2338 hours.  TECHNIQUE: Routine MR liver/pancreas protocol including axial and coronal MRCP sequences. 2D and 3D reconstruction performed by MR technologist including MIP reconstruction and slab cholangiograms. If performed with contrast, additional dynamic T1 post   IV contrast images.  CONTRAST: None     FINDINGS:   MRCP: Status post cholecystectomy. There is a low insertion of the cystic duct. The common bile duct is 11 mm in maximum diameter. No filling defects seen in the biliary system. Normal course and caliber of the pancreatic duct.    LIVER: Hepatic steatosis.    PANCREAS: Normal.    ADDITIONAL FINDINGS: No lymphadenopathy. Normal adrenal glands, kidneys, and spleen. Postsurgical changes in the bowel are again seen. There is trace free fluid in the abdomen and pelvis. There are trace pleural effusions.      Impression    IMPRESSION:  1.  No choledocholithiasis seen on this study.  2.  Common bile duct dilatation may be from history of cholecystectomy.  3.  Hepatic steatosis.  4.  Trace free fluid in the abdomen and pelvis is nonspecific.         Discharge Medications   Discharge Medication List as of 8/29/2024  1:08 PM        START taking these medications    Details   oxyCODONE (ROXICODONE) 5 MG tablet Take 1 tablet (5 mg) by mouth every 6 hours as needed for severe pain., Disp-5 tablet, R-0, E-Prescribe           CONTINUE these medications which have NOT CHANGED    Details   Cholecalciferol (D 93198) 250 MCG (07779 UT) CAPS Take 250 mcg by mouth See Admin  Instructions. Take 1 capsule (10,000 units) by mouth daily 5 days a week. Take on Mon, Tue, Wed, Thur, & Friday., Historical      cyanocobalamin (VITAMIN B-12) 1000 MCG tablet Take 1 tablet (1,000 mcg) by mouth daily, Disp-30 tablet, R-6, E-Prescribe      cyclobenzaprine (FLEXERIL) 10 MG tablet Take 10 mg by mouth nightly as needed for muscle spasms., Historical      escitalopram (LEXAPRO) 10 MG tablet Take 10 mg by mouth at bedtime., Historical      magnesium oxide (MAG-OX) 400 MG tablet Take 400 mg by mouth daily., Historical      ustekinumab (STELARA) 90 MG/ML Inject 90 mg Subcutaneous every 28 days , Historical           Allergies   Allergies   Allergen Reactions    Morphine Hcl Other (See Comments)     SPASMS OF SPHINCTER OF ODDI  (BILIARY TRACT)    Sucralfate Hives    Codeine Nausea and Vomiting

## 2024-08-29 NOTE — PROGRESS NOTES
[facility] ACUTE INPATIENT PAIN SERVICE    [facility], [facility], [facility], [facility], [place]   PAIN follow-up      Assessment/Plan:  Alok Nino is a 44 year old female who was admitted on 8/27/2024.  I was asked to see the patient for RUQ pain. Admitted for RUQ pain, not thought to be related to Crohn's flare, COVID neg, hepatitis negative (LFTs improved). History of Crohns.    shows no results. Describes pain as improved. Began Tuesday, RUQ, radiates to thoracic spine. In 24 hours utilized 2.1mg IV dilaudid.     PLAN:  Acute RUQ pain of unclear etiology. The patient is opioid naïve at baseline.   Multimodal Medication Therapy:   Adjuvants: PRN only tylenol and flexeril   Opioids: suggest stopping parental opioids for now  Agree with PRN oxycodone at discharge - although etiology of pain is unclear   Non-medication interventions- Ice   Constipation Prophylaxis-  no scheduled bowel meds  Follow up /Discharge Recommendations - We recommend prescribing the following at the time of discharge:   5 tablets of oxycodone          Subjective:     Met with Alok at the bedside. She is sitting up and eating eggs. 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  She states that the pain took her breath away.  She states that today the pain has resolved.  It was in the right upper quadrant and did radiate through to the back.  She denies nausea, denies any increase in diarrhea, denies vomiting.  She states she will be following up with GI in [place].  She wonders if perhaps she has sphincter of Oddi dysfunction.  She wonders this because of the intermittent nature of the pain.  We talked about follow-up plans.  We talked about oxycodone a few doses after discharge.         History   Drug Use No         Tobacco Use      Smoking status: Never      Smokeless tobacco: Never        Objective:  Vital signs in last 24 hours:  B/P: 99/63, T: 97.8,  "P: 60, R: 16   Blood pressure 99/63, pulse 60, temperature 97.8  F (36.6  C), temperature source Oral, resp. rate 16, height 1.575 m (5' 2\"), weight 53.8 kg (118 lb 9.7 oz), last menstrual period 08/22/2024, SpO2 96%, not currently breastfeeding.        Review of Systems:   As per subjective, all others negative.    Physical Exam    General: in no apparent distress and non-toxic    HEENT: Head normocephalic atraumatic, oral mucosa moist. Sclerae anicteric  CV: Regular rhythm, normal rate, no murmurs  Resp: Left-sided crackle  GI: Hypoactive  Skin: No rashes or lesions  Extremities: No peripheral edema  Psych: Normal affect, mood euthymic  Neuro: Grossly normal          Imaging:  Personally Reviewed.    Results for orders placed or performed during the hospital encounter of 08/27/24   CT Abdomen Pelvis w Contrast    Impression    IMPRESSION:     1.  Multiple bowel anastomoses again seen in the abdomen without evidence of bowel obstruction.    2.  Focal areas of mild bowel wall thickening adjacent to a couple small bowel anastomoses in the mid and left abdomen, suspicious for Crohn's disease involvement.    3.  Moderate hepatic steatosis, 2 mm nonobstructing left renal stone and small uterine fibroids.   US Abdomen Limited    Impression    IMPRESSION:  1.  Cholecystectomy. Postoperative biliary ectasia.    2.  Echogenic hepatic parenchyma without discrete lesion. Remainder normal.       MR Abdomen MRCP without Contrast    Impression    IMPRESSION:  1.  No choledocholithiasis seen on this study.  2.  Common bile duct dilatation may be from history of cholecystectomy.  3.  Hepatic steatosis.  4.  Trace free fluid in the abdomen and pelvis is nonspecific.          Lab Results:  Personally Reviewed.   Last Comprehensive Metabolic Panel:  Sodium   Date Value Ref Range Status   08/29/2024 141 135 - 145 mmol/L Final   12/14/2013 138 133 - 144 mmol/L Final     Potassium   Date Value Ref Range Status   08/29/2024 4.2 3.4 - 5.3 " mmol/L Final   05/25/2022 4.0 3.4 - 5.3 mmol/L Final   12/14/2013 3.4 3.4 - 5.3 mmol/L Final     Chloride   Date Value Ref Range Status   08/29/2024 108 (H) 98 - 107 mmol/L Final   05/25/2022 113 (H) 94 - 109 mmol/L Final   12/14/2013 105 94 - 109 mmol/L Final     Carbon Dioxide   Date Value Ref Range Status   12/14/2013 27 20 - 32 mmol/L Final     Carbon Dioxide (CO2)   Date Value Ref Range Status   08/29/2024 25 22 - 29 mmol/L Final   05/25/2022 23 20 - 32 mmol/L Final     Anion Gap   Date Value Ref Range Status   08/29/2024 8 7 - 15 mmol/L Final   05/25/2022 9 3 - 14 mmol/L Final   12/14/2013 5 (L) 6 - 17 mmol/L Final     Glucose   Date Value Ref Range Status   08/29/2024 89 70 - 99 mg/dL Final   05/25/2022 93 70 - 99 mg/dL Final   12/14/2013 105 (H) 60 - 99 mg/dL Final     Urea Nitrogen   Date Value Ref Range Status   08/29/2024 7.3 6.0 - 20.0 mg/dL Final   05/25/2022 12 7 - 30 mg/dL Final   12/14/2013 3 (L) 5 - 24 mg/dL Final     Creatinine   Date Value Ref Range Status   08/29/2024 0.76 0.51 - 0.95 mg/dL Final   12/14/2013 0.46 (L) 0.52 - 1.04 mg/dL Final     GFR Estimate   Date Value Ref Range Status   08/29/2024 >90 >60 mL/min/1.73m2 Final     Comment:     eGFR calculated using 2021 CKD-EPI equation.   08/02/2019 >60 >60 mL/min/1.73m2 Final   12/14/2013 >90 >60 mL/min/1.7m2 Final     Calcium   Date Value Ref Range Status   08/29/2024 8.3 (L) 8.8 - 10.4 mg/dL Final     Comment:     Reference intervals for this test were updated on 7/16/2024 to reflect our healthy population more accurately. There may be differences in the flagging of prior results with similar values performed with this method. Those prior results can be interpreted in the context of the updated reference intervals.   12/14/2013 8.0 (L) 8.5 - 10.4 mg/dL Final        UA:   Amphetamines Urine   Date Value Ref Range Status   07/07/2019 Screen Negative Screen Negative Final     Barbiturates Urine   Date Value Ref Range Status   07/07/2019 Screen  Negative Screen Negative Final     Cannabinoids Urine   Date Value Ref Range Status   07/07/2019 Screen Negative Screen Negative Final     Cocaine Urine   Date Value Ref Range Status   07/07/2019 Screen Negative Screen Negative Final     Opiates Urine   Date Value Ref Range Status   07/07/2019 Screen Negative Screen Negative Final     PCP Urine   Date Value Ref Range Status   07/07/2019 Screen Negative Screen Negative Final              Please see A&P for additional details of medical decision making.  MANAGEMENT DISCUSSED with the following over the past 24 hours: Discussed with patient and attending  NOTE(S)/MEDICAL RECORDS REVIEWED over the past 24 hours: Reviewed notes from medicine and nursing  Tests personally interpreted in the past 24 hours:  - CT showing some wall thickening  Tests REVIEWED in the past 24 hours:  - CrtCl  SUPPLEMENTAL HISTORY, in addition to the patient's history, over the past 24 hours obtained from:   - MD  Medical complexity over the past 24 hours:  -------------------------- MODERATE RISK FOR MORBIDITY --------------------------------------------------  - Prescription DRUG MANAGEMENT performed           Melania BALDERRAMA, CNS, CNP  Acute Care Pain Management  Team  Hours of pain coverage Mon-Fri 8-1600, afterhours please call the house officer   [facility] (LEONARDO, JAIMEs, SD, RH)   Page via Vocera text web console -Click for [employer]

## 2024-08-30 ENCOUNTER — PATIENT OUTREACH (OUTPATIENT)
Dept: CARE COORDINATION | Facility: CLINIC | Age: 44
End: 2024-08-30
Payer: COMMERCIAL

## 2024-08-30 LAB — CALPROTECTIN STL-MCNT: 79.7 MG/KG (ref 0–49.9)

## 2024-08-30 NOTE — PROGRESS NOTES
General acute hospital: Transitions of Care Outreach  Chief Complaint   Patient presents with    Clinic Care Coordination - Post Hospital       Most Recent Admission Date: 8/27/2024   Most Recent Admission Diagnosis: Nausea - R11.0  RUQ abdominal pain - R10.11  History of Crohn's disease - Z87.19     Most Recent Discharge Date: 8/29/2024   Most Recent Discharge Diagnosis: RUQ abdominal pain - R10.11  Nausea - R11.0  History of Crohn's disease - Z87.19  Right upper quadrant abdominal pain - R10.11     Transitions of Care Assessment    Discharge Assessment  How are you doing now that you are home?: Really good.  How are your symptoms? (Red Flag symptoms escalate to triage hotline per guidelines): Improved  Do you know how to contact your clinic care team if you have future questions or changes to your health status? : Yes  Does the patient have their discharge instructions? : Yes  Does the patient have questions regarding their discharge instructions? : No  Were you started on any new medications or were there changes to any of your previous medications? : Yes  Does the patient have all of their medications?: Yes  Do you have questions regarding any of your medications? : No  Do you have all of your needed medical supplies or equipment (DME)?  (i.e. oxygen tank, CPAP, cane, etc.): Yes    Post-op ([facility] CTA Only)  If the patient had a surgery or procedure, do they have any questions for a nurse?: No      Follow up Plan     Discharge Follow-Up  Discharge follow up appointment scheduled in alignment with recommended follow up timeframe or Transitions of Risk Category? (Low = within 30 days; Moderate= within 14 days; High= within 7 days): Yes  Discharge Follow Up Appointment Date:  (Patient spoke with her PCP after her discharge yesterday.  Her doctor doesn't feel an appt is necessary at this time.)    No future appointments.    Outpatient Plan as outlined on AVS reviewed with patient.    For any urgent concerns,  please contact our 24 hour nurse triage line: [phone] ([phone])       Heidy Madrid, [facility]  [phone]  [facility]

## 2024-09-01 LAB
ATRIAL RATE - MUSE: 62 BPM
DIASTOLIC BLOOD PRESSURE - MUSE: NORMAL MMHG
INTERPRETATION ECG - MUSE: NORMAL
P AXIS - MUSE: 40 DEGREES
PR INTERVAL - MUSE: 144 MS
QRS DURATION - MUSE: 80 MS
QT - MUSE: 420 MS
QTC - MUSE: 426 MS
R AXIS - MUSE: 58 DEGREES
SYSTOLIC BLOOD PRESSURE - MUSE: NORMAL MMHG
T AXIS - MUSE: 52 DEGREES
VENTRICULAR RATE- MUSE: 62 BPM

## 2024-09-19 ENCOUNTER — TRANSFERRED RECORDS (OUTPATIENT)
Dept: MULTI SPECIALTY CLINIC | Facility: CLINIC | Age: 44
End: 2024-09-19
Payer: COMMERCIAL

## 2024-09-19 LAB
ALT SERPL-CCNC: 153 U/L (ref 6–29)
AST SERPL-CCNC: 101 U/L (ref 10–30)
CREATININE (EXTERNAL): 0.56 MG/DL (ref 0.5–0.99)
GFR ESTIMATED (EXTERNAL): 117 ML/MIN/1.73M2
GLUCOSE (EXTERNAL): 77 MG/DL (ref 65–99)
POTASSIUM (EXTERNAL): 4.1 MMOL/L (ref 3.5–5.3)

## 2024-09-25 ENCOUNTER — ANESTHESIA EVENT (OUTPATIENT)
Dept: SURGERY | Facility: CLINIC | Age: 44
End: 2024-09-25
Payer: COMMERCIAL

## 2024-09-25 ENCOUNTER — HOSPITAL ENCOUNTER (OUTPATIENT)
Facility: CLINIC | Age: 44
Discharge: HOME | End: 2024-09-25
Attending: INTERNAL MEDICINE | Admitting: INTERNAL MEDICINE
Payer: COMMERCIAL

## 2024-09-25 ENCOUNTER — ANESTHESIA (OUTPATIENT)
Dept: SURGERY | Facility: CLINIC | Age: 44
End: 2024-09-25
Payer: COMMERCIAL

## 2024-09-25 ENCOUNTER — APPOINTMENT (OUTPATIENT)
Dept: GENERAL RADIOLOGY | Facility: CLINIC | Age: 44
End: 2024-09-25
Attending: INTERNAL MEDICINE
Payer: COMMERCIAL

## 2024-09-25 VITALS
OXYGEN SATURATION: 98 % | HEART RATE: 59 BPM | BODY MASS INDEX: 21.04 KG/M2 | WEIGHT: 114.3 LBS | HEIGHT: 62 IN | TEMPERATURE: 97.4 F | DIASTOLIC BLOOD PRESSURE: 59 MMHG | SYSTOLIC BLOOD PRESSURE: 96 MMHG | RESPIRATION RATE: 14 BRPM

## 2024-09-25 DIAGNOSIS — R79.89 ELEVATED LFTS: Primary | ICD-10-CM

## 2024-09-25 LAB
ERCP: NORMAL
HCG UR QL: NEGATIVE
UPPER EUS: NORMAL

## 2024-09-25 PROCEDURE — 255N000002 HC RX 255 OP 636: Performed by: INTERNAL MEDICINE

## 2024-09-25 PROCEDURE — C2617 STENT, NON-COR, TEM W/O DEL: HCPCS | Performed by: INTERNAL MEDICINE

## 2024-09-25 PROCEDURE — 43260 ERCP W/SPECIMEN COLLECTION: CPT | Performed by: NURSE ANESTHETIST, CERTIFIED REGISTERED

## 2024-09-25 PROCEDURE — 360N000083 HC SURGERY LEVEL 3 W/ FLUORO, PER MIN: Performed by: INTERNAL MEDICINE

## 2024-09-25 PROCEDURE — 43260 ERCP W/SPECIMEN COLLECTION: CPT | Performed by: ANESTHESIOLOGY

## 2024-09-25 PROCEDURE — 370N000017 HC ANESTHESIA TECHNICAL FEE, PER MIN: Performed by: INTERNAL MEDICINE

## 2024-09-25 PROCEDURE — 250N000009 HC RX 250: Performed by: INTERNAL MEDICINE

## 2024-09-25 PROCEDURE — 74330 X-RAY BILE/PANC ENDOSCOPY: CPT | Mod: TC

## 2024-09-25 PROCEDURE — 81025 URINE PREGNANCY TEST: CPT | Performed by: ANESTHESIOLOGY

## 2024-09-25 PROCEDURE — 250N000011 HC RX IP 250 OP 636: Performed by: NURSE ANESTHETIST, CERTIFIED REGISTERED

## 2024-09-25 PROCEDURE — 710N000009 HC RECOVERY PHASE 1, LEVEL 1, PER MIN: Performed by: INTERNAL MEDICINE

## 2024-09-25 PROCEDURE — 272N000001 HC OR GENERAL SUPPLY STERILE: Performed by: INTERNAL MEDICINE

## 2024-09-25 PROCEDURE — 250N000012 HC RX MED GY IP 250 OP 636 PS 637: Performed by: ANESTHESIOLOGY

## 2024-09-25 PROCEDURE — 258N000003 HC RX IP 258 OP 636: Performed by: NURSE ANESTHETIST, CERTIFIED REGISTERED

## 2024-09-25 PROCEDURE — 999N000141 HC STATISTIC PRE-PROCEDURE NURSING ASSESSMENT: Performed by: INTERNAL MEDICINE

## 2024-09-25 PROCEDURE — 710N000012 HC RECOVERY PHASE 2, PER MINUTE: Performed by: INTERNAL MEDICINE

## 2024-09-25 PROCEDURE — C1769 GUIDE WIRE: HCPCS | Performed by: INTERNAL MEDICINE

## 2024-09-25 DEVICE — COTTON-LEUNG BILIARY STENT
Type: IMPLANTABLE DEVICE | Status: FUNCTIONAL
Brand: COTTON-LEUNG

## 2024-09-25 RX ORDER — DEXAMETHASONE SODIUM PHOSPHATE 4 MG/ML
INJECTION, SOLUTION INTRA-ARTICULAR; INTRALESIONAL; INTRAMUSCULAR; INTRAVENOUS; SOFT TISSUE PRN
Status: DISCONTINUED | OUTPATIENT
Start: 2024-09-25 | End: 2024-09-25

## 2024-09-25 RX ORDER — FENTANYL CITRATE 50 UG/ML
INJECTION, SOLUTION INTRAMUSCULAR; INTRAVENOUS PRN
Status: DISCONTINUED | OUTPATIENT
Start: 2024-09-25 | End: 2024-09-25

## 2024-09-25 RX ORDER — SODIUM CHLORIDE, SODIUM LACTATE, POTASSIUM CHLORIDE, CALCIUM CHLORIDE 600; 310; 30; 20 MG/100ML; MG/100ML; MG/100ML; MG/100ML
INJECTION, SOLUTION INTRAVENOUS CONTINUOUS PRN
Status: DISCONTINUED | OUTPATIENT
Start: 2024-09-25 | End: 2024-09-25

## 2024-09-25 RX ORDER — ONDANSETRON 2 MG/ML
INJECTION INTRAMUSCULAR; INTRAVENOUS PRN
Status: DISCONTINUED | OUTPATIENT
Start: 2024-09-25 | End: 2024-09-25

## 2024-09-25 RX ORDER — NALOXONE HYDROCHLORIDE 0.4 MG/ML
0.4 INJECTION, SOLUTION INTRAMUSCULAR; INTRAVENOUS; SUBCUTANEOUS
Status: DISCONTINUED | OUTPATIENT
Start: 2024-09-25 | End: 2024-09-25 | Stop reason: HOSPADM

## 2024-09-25 RX ORDER — ONDANSETRON 2 MG/ML
4 INJECTION INTRAMUSCULAR; INTRAVENOUS EVERY 6 HOURS PRN
Status: DISCONTINUED | OUTPATIENT
Start: 2024-09-25 | End: 2024-09-25 | Stop reason: HOSPADM

## 2024-09-25 RX ORDER — NALOXONE HYDROCHLORIDE 0.4 MG/ML
0.2 INJECTION, SOLUTION INTRAMUSCULAR; INTRAVENOUS; SUBCUTANEOUS
Status: DISCONTINUED | OUTPATIENT
Start: 2024-09-25 | End: 2024-09-25 | Stop reason: HOSPADM

## 2024-09-25 RX ORDER — LIDOCAINE 40 MG/G
CREAM TOPICAL
Status: DISCONTINUED | OUTPATIENT
Start: 2024-09-25 | End: 2024-09-25 | Stop reason: HOSPADM

## 2024-09-25 RX ORDER — PROPOFOL 10 MG/ML
INJECTION, EMULSION INTRAVENOUS PRN
Status: DISCONTINUED | OUTPATIENT
Start: 2024-09-25 | End: 2024-09-25

## 2024-09-25 RX ORDER — ONDANSETRON 2 MG/ML
4 INJECTION INTRAMUSCULAR; INTRAVENOUS
Status: DISCONTINUED | OUTPATIENT
Start: 2024-09-25 | End: 2024-09-25 | Stop reason: HOSPADM

## 2024-09-25 RX ORDER — ONDANSETRON 4 MG/1
4 TABLET, ORALLY DISINTEGRATING ORAL EVERY 6 HOURS PRN
Status: DISCONTINUED | OUTPATIENT
Start: 2024-09-25 | End: 2024-09-25 | Stop reason: HOSPADM

## 2024-09-25 RX ORDER — MAGNESIUM HYDROXIDE 1200 MG/15ML
LIQUID ORAL PRN
Status: DISCONTINUED | OUTPATIENT
Start: 2024-09-25 | End: 2024-09-25 | Stop reason: HOSPADM

## 2024-09-25 RX ORDER — PROPOFOL 10 MG/ML
INJECTION, EMULSION INTRAVENOUS CONTINUOUS PRN
Status: DISCONTINUED | OUTPATIENT
Start: 2024-09-25 | End: 2024-09-25

## 2024-09-25 RX ORDER — APREPITANT 40 MG/1
40 CAPSULE ORAL ONCE
Status: COMPLETED | OUTPATIENT
Start: 2024-09-25 | End: 2024-09-25

## 2024-09-25 RX ORDER — FLUMAZENIL 0.1 MG/ML
0.2 INJECTION, SOLUTION INTRAVENOUS
Status: DISCONTINUED | OUTPATIENT
Start: 2024-09-25 | End: 2024-09-25 | Stop reason: HOSPADM

## 2024-09-25 RX ORDER — PROCHLORPERAZINE MALEATE 10 MG
10 TABLET ORAL EVERY 6 HOURS PRN
Status: DISCONTINUED | OUTPATIENT
Start: 2024-09-25 | End: 2024-09-25 | Stop reason: HOSPADM

## 2024-09-25 RX ADMIN — DEXAMETHASONE SODIUM PHOSPHATE 4 MG: 4 INJECTION, SOLUTION INTRA-ARTICULAR; INTRALESIONAL; INTRAMUSCULAR; INTRAVENOUS; SOFT TISSUE at 13:26

## 2024-09-25 RX ADMIN — FENTANYL CITRATE 50 MCG: 50 INJECTION INTRAMUSCULAR; INTRAVENOUS at 13:18

## 2024-09-25 RX ADMIN — PROPOFOL 40 MG: 10 INJECTION, EMULSION INTRAVENOUS at 13:20

## 2024-09-25 RX ADMIN — MIDAZOLAM 2 MG: 1 INJECTION INTRAMUSCULAR; INTRAVENOUS at 13:17

## 2024-09-25 RX ADMIN — PROPOFOL 150 MCG/KG/MIN: 10 INJECTION, EMULSION INTRAVENOUS at 13:20

## 2024-09-25 RX ADMIN — SODIUM CHLORIDE, POTASSIUM CHLORIDE, SODIUM LACTATE AND CALCIUM CHLORIDE: 600; 310; 30; 20 INJECTION, SOLUTION INTRAVENOUS at 13:11

## 2024-09-25 RX ADMIN — ONDANSETRON 4 MG: 2 INJECTION INTRAMUSCULAR; INTRAVENOUS at 13:26

## 2024-09-25 RX ADMIN — APREPITANT 40 MG: 40 CAPSULE ORAL at 12:48

## 2024-09-25 ASSESSMENT — ACTIVITIES OF DAILY LIVING (ADL)
ADLS_ACUITY_SCORE: 37

## 2024-09-25 ASSESSMENT — LIFESTYLE VARIABLES: TOBACCO_USE: 0

## 2024-09-25 ASSESSMENT — ENCOUNTER SYMPTOMS
DYSRHYTHMIAS: 0
SEIZURES: 0

## 2024-09-25 NOTE — H&P
GENERAL PRE-PROCEDURE:   Procedure:  EUS with possible ERCP - Elevated LFTs with a dilated CBD  Date/Time:  9/25/2024 12:57 PM    Verbal consent obtained?: Yes    Written consent obtained?: Yes    Risks and benefits: Risks, benefits and alternatives were discussed    Consent given by:  Patient  Patient states understanding of procedure being performed: Yes    Patient's understanding of procedure matches consent: Yes    Procedure consent matches procedure scheduled: Yes    Expected level of sedation:  Deep  Appropriately NPO:  Yes  ASA Class:  3  Mallampati  :  Grade 2- soft palate, base of uvula, tonsillar pillars, and portion of posterior pharyngeal wall visible  Lungs:  Lungs clear with good breath sounds bilaterally  Heart:  Normal heart sounds and rate  History & Physical reviewed:  History and physical reviewed and no updates needed  Statement of review:  I have reviewed the lab findings, diagnostic data, medications, and the plan for sedation

## 2024-09-25 NOTE — OR NURSING
VSS. A&O. Denies pain. No nausea. Tolerating clear liquids. DC instructions, meds and follow up reviewed with patient and mom. Questions answered, able to teachback. DC to home with family.

## 2024-09-25 NOTE — DISCHARGE INSTRUCTIONS
Same Day Surgery Discharge Instructions for  Sedation and General Anesthesia     It's not unusual to feel dizzy, light-headed or faint for up to 24 hours after surgery or while taking pain medication.  If you have these symptoms: sit for a few minutes before standing and have someone assist you when you get up to walk or use the bathroom.    You should rest and relax for the next 24 hours. We recommend you make arrangements to have an adult stay with you for at least 24 hours after your discharge.  Avoid hazardous and strenuous activity.    DO NOT DRIVE any vehicle or operate mechanical equipment for 24 hours following the end of your surgery.  Even though you may feel normal, your reactions may be affected by the medication you have received.    Do not drink alcoholic beverages for 24 hours following surgery.     Slowly progress to your regular diet as you feel able. It's not unusual to feel nauseated and/or vomit after receiving anesthesia.  If you develop these symptoms, drink clear liquids (apple juice, ginger ale, broth, 7-up, etc. ) until you feel better.  If your nausea and vomiting persists for 24 hours, please notify your surgeon.      All narcotic pain medications, along with inactivity and anesthesia, can cause constipation. Drinking plenty of liquids and increasing fiber intake will help.    For any questions of a medical nature, call your surgeon.    Do not make important decisions for 24 hours.    If you had general anesthesia, you may have a sore throat for a couple of days related to the breathing tube used during surgery.  You may use Cepacol lozenges to help with this discomfort.  If it worsens or if you develop a fever, contact your surgeon.     If you feel your pain is not well managed with the pain medications prescribed by your surgeon, please contact your surgeon's office to let them know so they can address your concerns.      If you have any questions please call Dr Rankin at [phone]  ([phone])

## 2024-09-25 NOTE — ANESTHESIA POSTPROCEDURE EVALUATION
Patient: Alok Nino    Procedure: Procedure(s):  Endoscopic retrograde CHOLANGIOPANCREATOGRAPHY, BILIARY STENT PLACEMENT,AND STONE EXTRACTION  and Endoscopic ultrasound upper       Anesthesia Type:  MAC    Note:  Disposition: Inpatient   Postop Pain Control: Uneventful            Sign Out: Well controlled pain   PONV: No   Neuro/Psych: Uneventful            Sign Out: Acceptable/Baseline neuro status   Airway/Respiratory: Uneventful            Sign Out: Acceptable/Baseline resp. status   CV/Hemodynamics: Uneventful            Sign Out: Acceptable CV status; No obvious hypovolemia; No obvious fluid overload   Other NRE: NONE   DID A NON-ROUTINE EVENT OCCUR? No           Last vitals:  Vitals Value Taken Time   BP 96/56 09/25/24 1500   Temp     Pulse 64 09/25/24 1508   Resp 6 09/25/24 1509   SpO2 96 % 09/25/24 1507   Vitals shown include unfiled device data.    Electronically Signed By: Temitope Washington MD  September 25, 2024  3:13 PM

## 2024-09-25 NOTE — ANESTHESIA PREPROCEDURE EVALUATION
Anesthesia Pre-Procedure Evaluation    Patient: Alok Nino   MRN: [id number] : 1980        Procedure : Procedure(s):  Endoscopic retrograde CHOLANGIOPANCREATOGRAPHY  and Endoscopic ultrasound upper          Past Medical History:   Diagnosis Date    C. difficile enteritis     Chronic pain     Crohn disease (H)     Melanoma (H)     PONV (postoperative nausea and vomiting)     SBO (small bowel obstruction) (H) 2022      Past Surgical History:   Procedure Laterality Date    ABDOMEN SURGERY      3 for crohns dx    APPENDECTOMY      APPENDECTOMY      CHOLECYSTECTOMY      CHOLECYSTECTOMY      COLONOSCOPY      ENDOSCOPIC ULTRASOUND UPPER GASTROINTESTINAL TRACT (GI) N/A 2020    Procedure: ENDOSCOPIC ULTRASOUND, ESOPHAGOSCOPY / UPPER GASTROINTESTINAL TRACT with BIOPSY;  Surgeon: Cydney Garcia MD;  Location: [facility] OR    GYN SURGERY      H STATISTIC PICC LINE INSERTION >5YR, FAILED Right 11/10/2021    LUE unsuccessful attempt from another hospital, IR deferral    IR PICC PLACEMENT > 5 YRS OF AGE  2021    IR PICC PLACEMENT > 5 YRS OF AGE  3/16/2022    OTHER SURGICAL HISTORY      strictoplasty x3    OTHER SURGICAL HISTORY      adhesion removal x1    OTHER SURGICAL HISTORY      small bowel resections    PICC  05/10/2019         RESECTION ILEOCECAL  2013    Procedure: RESECTION ILEOCECAL;  Laparotomy, Extensive Lysis of Adhesions, Stricture Plasty X2  Anesthesia general with block;  Surgeon: Pete Cartagena MD;  Location: [facility] OR    RESECTION ILEOCOLIC N/A 2021    Procedure: Exploratory laparotomy, illeal resection, extensive lysis of adhesions (2 hr);  Surgeon: Pete Cartagena MD;  Location: [facility] OR      Allergies   Allergen Reactions    Morphine Hcl Other (See Comments)     SPASMS OF SPHINCTER OF ODDI  (BILIARY TRACT)    Sucralfate Hives    Codeine Nausea and Vomiting      Social History     Tobacco Use    Smoking status: Never    Smokeless tobacco: Never   Substance Use Topics     Alcohol use: Yes     Comment: occasional/ 1 drink per 1-3 month      Wt Readings from Last 1 Encounters:   09/25/24 51.8 kg (114 lb 4.8 oz)        Prior to Admission medications    Medication Sig Start Date End Date Taking? Authorizing Provider   Cholecalciferol (D 74776) 250 MCG (91954 UT) CAPS Take 250 mcg by mouth See Admin Instructions. Take 1 capsule (10,000 units) by mouth daily 5 days a week. Take on Mon, Tue, Wed, Thur, & Friday.   Yes Reported, Patient   cyanocobalamin (VITAMIN B-12) 1000 MCG tablet Take 1 tablet (1,000 mcg) by mouth daily 8/23/23  Yes Yousif Hawley MD   cyclobenzaprine (FLEXERIL) 10 MG tablet Take 10 mg by mouth nightly as needed for muscle spasms.   Yes Reported, Patient   escitalopram (LEXAPRO) 10 MG tablet Take 10 mg by mouth at bedtime.   Yes Reported, Patient   magnesium oxide (MAG-OX) 400 MG tablet Take 400 mg by mouth daily.   Yes Reported, Patient   ustekinumab (STELARA) 90 MG/ML Inject 90 mg Subcutaneous every 28 days  12/21/21  Yes Reported, Patient     Anesthesia Evaluation   Pt has had prior anesthetic. Type: General.    History of anesthetic complications  - PONV.      ROS/MED HX  ENT/Pulmonary:    (-) tobacco use, asthma and sleep apnea   Neurologic:    (-) no seizures, no CVA and migraines   Cardiovascular:    (-) hypertension, CAD, BALLARD, arrhythmias, valvular problems/murmurs, dyslipidemia and murmur   METS/Exercise Tolerance:     Hematologic:     (+)      anemia,       (-) history of blood clots   Musculoskeletal:    (-) arthritis   GI/Hepatic: Comment: Chrons   (-) GERD and liver disease   Renal/Genitourinary: Comment: PCOS  Endometriosis   (-) renal disease and nephrolithiasis   Endo:    (-) Type I DM, Type II DM, thyroid disease and obesity   Psychiatric/Substance Use:    (-) psychiatric history   Infectious Disease:    (-) Recent Fever   Malignancy:       Other:            Physical Exam    Airway        Mallampati: II   TM distance: > 3 FB   Neck ROM: full   Mouth  opening: > 3 cm    Respiratory Devices and Support         Dental       (+) Minor Abnormalities - some fillings, tiny chips      Cardiovascular   cardiovascular exam normal       Rhythm and rate: regular and normal (-) no murmur    Pulmonary   pulmonary exam normal        breath sounds clear to auscultation           OUTSIDE LABS:  CBC:   Lab Results   Component Value Date    WBC 5.8 08/28/2024    WBC 7.3 08/27/2024    HGB 10.2 (L) 08/29/2024    HGB 10.7 (L) 08/28/2024    HCT 33.9 (L) 08/28/2024    HCT 35.7 08/27/2024     08/28/2024     08/27/2024     BMP:   Lab Results   Component Value Date     08/29/2024     08/28/2024    POTASSIUM 4.2 08/29/2024    POTASSIUM 4.4 08/28/2024    CHLORIDE 108 (H) 08/29/2024    CHLORIDE 111 (H) 08/28/2024    CO2 25 08/29/2024    CO2 21 (L) 08/28/2024    BUN 7.3 08/29/2024    BUN 11.0 08/28/2024    CR 0.76 08/29/2024    CR 0.72 08/28/2024    GLC 89 08/29/2024    GLC 86 08/28/2024     COAGS:   Lab Results   Component Value Date    INR 1.24 (H) 03/17/2022     POC:   Lab Results   Component Value Date     (H) 12/15/2013    HCG Negative 09/25/2024    HCGS Negative 08/27/2024     HEPATIC:   Lab Results   Component Value Date    ALBUMIN 3.2 (L) 08/29/2024    PROTTOTAL 5.2 (L) 08/29/2024    ALT 84 (H) 08/29/2024    AST 33 08/29/2024    ALKPHOS 119 08/29/2024    BILITOTAL 0.3 08/29/2024     OTHER:   Lab Results   Component Value Date    PH 7.29 (L) 08/02/2019    LACT 1.7 03/13/2022    A1C 5.1 12/07/2013    BLACK 8.3 (L) 08/29/2024    PHOS 3.4 05/18/2022    MAG 1.7 08/29/2024    LIPASE 32 08/27/2024    AMYLASE 85 12/12/2013    TSH 0.06 (L) 07/31/2019    CRP 14.0 (H) 03/15/2022    SED 11 03/15/2022       Anesthesia Plan    ASA Status:  3    NPO Status:  NPO Appropriate    Anesthesia Type: MAC.      Maintenance: Balanced.        Consents    Anesthesia Plan(s) and associated risks, benefits, and realistic alternatives discussed. Questions answered and  patient/representative(s) expressed understanding.     - Discussed:     - Discussed with:  Patient            Postoperative Care    Pain management: Multi-modal analgesia.   PONV prophylaxis: Ondansetron (or other 5HT-3), Dexamethasone or Solumedrol     Comments:               Temitope Washington MD    I have reviewed the pertinent notes and labs in the chart from the past 30 days and (re)examined the patient.  Any updates or changes from those notes are reflected in this note.       # Hypocalcemia: Lowest Ca = 7.2 mg/dL in last 30 days, will monitor and replace as appropriate  # Hypomagnesemia: Lowest Mg = 1.6 mg/dL in last 30 days, will replace as needed   # Hypoalbuminemia: Lowest albumin = 3.2 g/dL in the past 30 days , will monitor as appropriate

## 2024-09-25 NOTE — ANESTHESIA CARE TRANSFER NOTE
Patient: Alok Nino    Procedure: Procedure(s):  Endoscopic retrograde CHOLANGIOPANCREATOGRAPHY, BILIARY STENT PLACEMENT,AND STONE EXTRACTION  and Endoscopic ultrasound upper       Diagnosis: Abnormal liver enzymes [R74.8]  Diagnosis Additional Information: No value filed.    Anesthesia Type:   MAC     Note:    Oropharynx: oropharynx clear of all foreign objects and spontaneously breathing  Level of Consciousness: awake  Oxygen Supplementation: room air    Independent Airway: airway patency satisfactory and stable  Dentition: dentition unchanged  Vital Signs Stable: post-procedure vital signs reviewed and stable  Report to RN Given: handoff report given  Patient transferred to: PACU  Comments:     At end of procedure, spontaneous respirations, patient alert to voice, able to follow commands. Patient breathing room air to PACU. Oxygen tubing connected to wall O2 in PACU, SpO2, NiBP, and EKG monitors and alarms on and functioning, report on patient's clinical status given to PACU RN, RN questions answered.            Handoff Report: Identifed the Patient, Identified the Reponsible Provider, Reviewed the pertinent medical history, Discussed the surgical course, Reviewed Intra-OP anesthesia mangement and issues during anesthesia, Set expectations for post-procedure period and Allowed opportunity for questions and acknowledgement of understanding      Vitals:  Vitals Value Taken Time   BP 95/63    Temp     Pulse 67 09/25/24 1351   Resp 18 09/25/24 1351   SpO2 100 % 09/25/24 1351   Vitals shown include unfiled device data.    Electronically Signed By: TACOS Maddox CRNA  September 25, 2024  1:53 PM

## 2024-09-26 ENCOUNTER — APPOINTMENT (OUTPATIENT)
Dept: CT IMAGING | Facility: HOSPITAL | Age: 44
End: 2024-09-26
Attending: EMERGENCY MEDICINE
Payer: COMMERCIAL

## 2024-09-26 ENCOUNTER — HOSPITAL ENCOUNTER (EMERGENCY)
Facility: HOSPITAL | Age: 44
Discharge: HOME | End: 2024-09-26
Admitting: EMERGENCY MEDICINE
Payer: COMMERCIAL

## 2024-09-26 VITALS
DIASTOLIC BLOOD PRESSURE: 69 MMHG | SYSTOLIC BLOOD PRESSURE: 116 MMHG | HEART RATE: 71 BPM | OXYGEN SATURATION: 98 % | RESPIRATION RATE: 18 BRPM | TEMPERATURE: 98.1 F

## 2024-09-26 DIAGNOSIS — R10.11 RUQ ABDOMINAL PAIN: ICD-10-CM

## 2024-09-26 LAB
ALBUMIN SERPL BCG-MCNC: 3.9 G/DL (ref 3.5–5.2)
ALP SERPL-CCNC: 132 U/L (ref 40–150)
ALT SERPL W P-5'-P-CCNC: 119 U/L (ref 0–50)
ANION GAP SERPL CALCULATED.3IONS-SCNC: 9 MMOL/L (ref 7–15)
AST SERPL W P-5'-P-CCNC: 37 U/L (ref 0–45)
BASOPHILS # BLD AUTO: 0 10E3/UL (ref 0–0.2)
BASOPHILS NFR BLD AUTO: 0 %
BILIRUB SERPL-MCNC: 0.6 MG/DL
BUN SERPL-MCNC: 14.7 MG/DL (ref 6–20)
CALCIUM SERPL-MCNC: 8.9 MG/DL (ref 8.8–10.4)
CHLORIDE SERPL-SCNC: 102 MMOL/L (ref 98–107)
CREAT SERPL-MCNC: 0.8 MG/DL (ref 0.51–0.95)
EGFRCR SERPLBLD CKD-EPI 2021: >90 ML/MIN/1.73M2
EOSINOPHIL # BLD AUTO: 0.1 10E3/UL (ref 0–0.7)
EOSINOPHIL NFR BLD AUTO: 1 %
ERYTHROCYTE [DISTWIDTH] IN BLOOD BY AUTOMATED COUNT: 12 % (ref 10–15)
GLUCOSE SERPL-MCNC: 95 MG/DL (ref 70–99)
HCO3 SERPL-SCNC: 27 MMOL/L (ref 22–29)
HCT VFR BLD AUTO: 38.2 % (ref 35–47)
HGB BLD-MCNC: 12.4 G/DL (ref 11.7–15.7)
IMM GRANULOCYTES # BLD: 0 10E3/UL
IMM GRANULOCYTES NFR BLD: 0 %
LIPASE SERPL-CCNC: 30 U/L (ref 13–60)
LYMPHOCYTES # BLD AUTO: 1.8 10E3/UL (ref 0.8–5.3)
LYMPHOCYTES NFR BLD AUTO: 27 %
MCH RBC QN AUTO: 31.1 PG (ref 26.5–33)
MCHC RBC AUTO-ENTMCNC: 32.5 G/DL (ref 31.5–36.5)
MCV RBC AUTO: 96 FL (ref 78–100)
MONOCYTES # BLD AUTO: 0.4 10E3/UL (ref 0–1.3)
MONOCYTES NFR BLD AUTO: 6 %
NEUTROPHILS # BLD AUTO: 4.5 10E3/UL (ref 1.6–8.3)
NEUTROPHILS NFR BLD AUTO: 66 %
NRBC # BLD AUTO: 0 10E3/UL
NRBC BLD AUTO-RTO: 0 /100
PLATELET # BLD AUTO: 284 10E3/UL (ref 150–450)
POTASSIUM SERPL-SCNC: 4.1 MMOL/L (ref 3.4–5.3)
PROT SERPL-MCNC: 6.5 G/DL (ref 6.4–8.3)
RBC # BLD AUTO: 3.99 10E6/UL (ref 3.8–5.2)
SODIUM SERPL-SCNC: 138 MMOL/L (ref 135–145)
WBC # BLD AUTO: 6.9 10E3/UL (ref 4–11)

## 2024-09-26 PROCEDURE — 74177 CT ABD & PELVIS W/CONTRAST: CPT

## 2024-09-26 PROCEDURE — 85025 COMPLETE CBC W/AUTO DIFF WBC: CPT | Performed by: EMERGENCY MEDICINE

## 2024-09-26 PROCEDURE — 250N000011 HC RX IP 250 OP 636: Performed by: EMERGENCY MEDICINE

## 2024-09-26 PROCEDURE — 96374 THER/PROPH/DIAG INJ IV PUSH: CPT | Mod: 59

## 2024-09-26 PROCEDURE — 80053 COMPREHEN METABOLIC PANEL: CPT | Performed by: EMERGENCY MEDICINE

## 2024-09-26 PROCEDURE — 96375 TX/PRO/DX INJ NEW DRUG ADDON: CPT

## 2024-09-26 PROCEDURE — 83690 ASSAY OF LIPASE: CPT | Performed by: EMERGENCY MEDICINE

## 2024-09-26 PROCEDURE — 36415 COLL VENOUS BLD VENIPUNCTURE: CPT | Performed by: EMERGENCY MEDICINE

## 2024-09-26 PROCEDURE — 99285 EMERGENCY DEPT VISIT HI MDM: CPT | Mod: 25

## 2024-09-26 RX ORDER — HYDROCODONE BITARTRATE AND ACETAMINOPHEN 5; 325 MG/1; MG/1
1 TABLET ORAL EVERY 6 HOURS PRN
Qty: 5 TABLET | Refills: 0 | Status: SHIPPED | OUTPATIENT
Start: 2024-09-26 | End: 2024-09-29

## 2024-09-26 RX ORDER — HYDROMORPHONE HYDROCHLORIDE 1 MG/ML
0.5 INJECTION, SOLUTION INTRAMUSCULAR; INTRAVENOUS; SUBCUTANEOUS ONCE
Status: COMPLETED | OUTPATIENT
Start: 2024-09-26 | End: 2024-09-26

## 2024-09-26 RX ORDER — KETOROLAC TROMETHAMINE 15 MG/ML
15 INJECTION, SOLUTION INTRAMUSCULAR; INTRAVENOUS ONCE
Status: COMPLETED | OUTPATIENT
Start: 2024-09-26 | End: 2024-09-26

## 2024-09-26 RX ORDER — IOPAMIDOL 755 MG/ML
75 INJECTION, SOLUTION INTRAVASCULAR ONCE
Status: COMPLETED | OUTPATIENT
Start: 2024-09-26 | End: 2024-09-26

## 2024-09-26 RX ADMIN — HYDROMORPHONE HYDROCHLORIDE 0.5 MG: 1 INJECTION, SOLUTION INTRAMUSCULAR; INTRAVENOUS; SUBCUTANEOUS at 22:16

## 2024-09-26 RX ADMIN — KETOROLAC TROMETHAMINE 15 MG: 15 INJECTION, SOLUTION INTRAMUSCULAR; INTRAVENOUS at 20:27

## 2024-09-26 RX ADMIN — IOPAMIDOL 75 ML: 755 INJECTION, SOLUTION INTRAVENOUS at 21:49

## 2024-09-26 ASSESSMENT — ENCOUNTER SYMPTOMS
VOMITING: 0
FEVER: 0
CHILLS: 0
DIARRHEA: 1
BLOOD IN STOOL: 0
NAUSEA: 1
SHORTNESS OF BREATH: 0
ABDOMINAL PAIN: 1
BACK PAIN: 1
NECK PAIN: 1

## 2024-09-26 ASSESSMENT — COLUMBIA-SUICIDE SEVERITY RATING SCALE - C-SSRS
6. HAVE YOU EVER DONE ANYTHING, STARTED TO DO ANYTHING, OR PREPARED TO DO ANYTHING TO END YOUR LIFE?: NO
1. IN THE PAST MONTH, HAVE YOU WISHED YOU WERE DEAD OR WISHED YOU COULD GO TO SLEEP AND NOT WAKE UP?: NO
2. HAVE YOU ACTUALLY HAD ANY THOUGHTS OF KILLING YOURSELF IN THE PAST MONTH?: NO

## 2024-09-26 ASSESSMENT — ACTIVITIES OF DAILY LIVING (ADL)
ADLS_ACUITY_SCORE: 37
ADLS_ACUITY_SCORE: 37

## 2024-09-27 DIAGNOSIS — R10.11 RUQ ABDOMINAL PAIN: Primary | ICD-10-CM

## 2024-09-27 NOTE — ED PROVIDER NOTES
EMERGENCY DEPARTMENT ENCOUNTER      NAME: Alok Nino  AGE: 44 year old female  YOB: 1980  MRN: [id number]  EVALUATION DATE & TIME: No admission date for patient encounter.    PCP: Nakita Odonnell    ED PROVIDER: Leslee Carl PA-C      Chief Complaint   Patient presents with    Post-op Problem         FINAL IMPRESSION:  1. RUQ abdominal pain          ED COURSE & MEDICAL DECISION MAKING:    Pertinent Labs & Imaging studies reviewed. (See chart for details)    44 year old female presents to the Emergency Department for evaluation of abdominal pain.    Physical exam is remarkable for a generally well-appearing female who is in no acute distress.  Heart and lung sounds are clear diffusely throughout.  She has right upper quadrant abdominal tenderness to palpation without rebound or guarding.  Vital signs are stable and she is afebrile.    CBC is unremarkable with no leukocytosis or anemia.  CMP is unremarkable with no significant electrolyte derangements, normal liver and kidney function. Lipase within normal limits. CT of the abdomen is remarkable for expected pneumobilia but otherwise unremarkable with no evidence of perforation or obstructive process. Common bile duct without dilatation and stent appears in good position.     The patient was given IV toradol and dilaudid for treatment of her symptoms. I discussed her presentation with [facility], they have no further recommendations at this point. I do not think any further emergent labs or imaging are indicated at this time. The patient is overall well appearing here and hemodynamically stable. Her workup is reassuring without evidence of complications from recent ERCP. She denies any chest pain or sob concerning for atypical presentation of ACS, PERC negative. Advised her to follow up with [facility] as previously planned, recommend return here for any new or worsening symptoms. She was given Norco for treatment of severe pain at home,  reviewed  with no concerning narcotic prescription habits. Patient is agreeable with this treatment plan and verbalized understanding.     Medical Decision Making  Obtained supplemental history:Supplemental history obtained?: No  Reviewed external records: External records reviewed?: Documented in chart and Outpatient Record:  9/25/2024 [facility] surgery and discharge with Jesus Rankin MD of Gastroenterology   Care impacted by chronic illness:Other: Crohn's disease  Care significantly affected by social determinants of health:Access to Medical Care  Did you consider but not order tests?: Work up considered but not performed and documented in chart, if applicable  Did you interpret images independently?: Independent interpretation of ECG and images noted in documentation, when applicable.  Consultation discussion with other provider:Did you involve another provider (consultant, MH, pharmacy, etc.)?: I discussed the care with another health care provider, see documentation for details.  Discharge. I prescribed additional prescription strength medication(s) as charted. I considered admission, but ultimately discharged patient after reassuring labs and imaging.  Not Applicable      ED Course   8:18 PM  Performed my initial history and physical exam. Discussed workup in the emergency department, management of symptoms, and likely disposition.   8:21 PM Spoke with [facility]  8:35 PM Updated patient in waiting room.  10:41 PM I discussed the plan for discharge with the patient or family and they are agreeable.. We discussed supportive cares at home and reasons for return to the ER including new or worsening symptoms - all questions and concerns addressed. Patient to be discharged by RN.    At the conclusion of the encounter I discussed the results of all of the tests and the disposition. The questions were answered. The patient or family acknowledged understanding and was agreeable with the care plan.  "    Voice recognition software was used in the creation of this note. Any grammatical or nonsensical errors are due to inherent errors with the software and are not the intention of the writer.     MEDICATIONS GIVEN IN THE EMERGENCY:  Medications   ketorolac (TORADOL) injection 15 mg (15 mg Intravenous $Given 9/26/24 2027)   iopamidol (ISOVUE-370) solution 75 mL (75 mLs Intravenous $Given 9/26/24 2149)   HYDROmorphone (PF) (DILAUDID) injection 0.5 mg (0.5 mg Intravenous $Given 9/26/24 2216)       NEW PRESCRIPTIONS STARTED AT TODAY'S ER VISIT  New Prescriptions    HYDROCODONE-ACETAMINOPHEN (NORCO) 5-325 MG TABLET    Take 1 tablet by mouth every 6 hours as needed for pain.            =================================================================    HPI    Patient information was obtained from: Patient and mother.    Use of : N/A        Alok Nino is a 44 year old female with a past medical history significant for an ERCP done yesterday, cholecystectomy, appendectomy, Crohn's disease who presents with post operative abdominal pain.    Patient felt \"fine\" after her ERCP yesterday. Patient woke up this morning at 0500 endorsing an upper right sided \"gnawing\" abdominal pain radiating into her back, neck, and chest which \"feels like gallstones\" with nausea. This abdominal pain is of similar quality and type when compared to her pain prior to ERCP but is more severe. Patient denies anything which makes pain worse or better. Patient tried to contact GI today with no response.     Patient notes some baseline diarrhea today. She denies bloody or black stool. Patient denies food intake since before her ERCP.    Patient denies vomiting, shortness of breath, fever, chills, or any other symptoms at this time.      Per chart review, 9/25/2024 [facility] surgery and discharge with Jesus Rankin MD of Gastroenterology with done endoscopic retrograde cholangiopancreatography and " endoscopic ultrasound upper.    REVIEW OF SYSTEMS   Review of Systems   Constitutional:  Negative for chills and fever.   Respiratory:  Negative for shortness of breath.    Cardiovascular:  Positive for chest pain.   Gastrointestinal:  Positive for abdominal pain, diarrhea and nausea. Negative for blood in stool and vomiting.   Musculoskeletal:  Positive for back pain and neck pain.       All other systems reviewed and are negative unless noted in HPI.      PAST MEDICAL HISTORY:  Past Medical History:   Diagnosis Date    C. difficile enteritis     Chronic pain     Crohn disease (H)     Melanoma (H)     PONV (postoperative nausea and vomiting)     SBO (small bowel obstruction) (H) 03/14/2022       PAST SURGICAL HISTORY:  Past Surgical History:   Procedure Laterality Date    ABDOMEN SURGERY      3 for crohns dx    APPENDECTOMY      APPENDECTOMY      CHOLECYSTECTOMY      CHOLECYSTECTOMY      COLONOSCOPY      ENDOSCOPIC RETROGRADE CHOLANGIOPANCREATOGRAM N/A 9/25/2024    Procedure: Endoscopic retrograde CHOLANGIOPANCREATOGRAPHY, BILIARY STENT PLACEMENT,AND STONE EXTRACTION;  Surgeon: Jesus Rankin MD;  Location:  OR    ENDOSCOPIC ULTRASOUND UPPER GASTROINTESTINAL TRACT (GI) N/A 11/13/2020    Procedure: ENDOSCOPIC ULTRASOUND, ESOPHAGOSCOPY / UPPER GASTROINTESTINAL TRACT with BIOPSY;  Surgeon: Cydney Garcia MD;  Location:  OR    ENDOSCOPIC ULTRASOUND UPPER GASTROINTESTINAL TRACT (GI) N/A 9/25/2024    Procedure: and Endoscopic ultrasound upper;  Surgeon: Jesus Rankin MD;  Location:  OR    GYN SURGERY      H STATISTIC PICC LINE INSERTION >5YR, FAILED Right 11/10/2021    LUE unsuccessful attempt from another hospital, IR deferral    IR PICC PLACEMENT > 5 YRS OF AGE  11/11/2021    IR PICC PLACEMENT > 5 YRS OF AGE  3/16/2022    OTHER SURGICAL HISTORY      strictoplasty x3    OTHER SURGICAL HISTORY      adhesion removal x1    OTHER SURGICAL HISTORY      small bowel resections    PICC  05/10/2019          RESECTION ILEOCECAL  12/06/2013    Procedure: RESECTION ILEOCECAL;  Laparotomy, Extensive Lysis of Adhesions, Stricture Plasty X2  Anesthesia general with block;  Surgeon: Pete Cartagena MD;  Location: [facility] OR    RESECTION ILEOCOLIC N/A 11/17/2021    Procedure: Exploratory laparotomy, illeal resection, extensive lysis of adhesions (2 hr);  Surgeon: Pete Cartagena MD;  Location: [facility] OR       CURRENT MEDICATIONS:    HYDROcodone-acetaminophen (NORCO) 5-325 MG tablet  Cholecalciferol (D 33158) 250 MCG (59642 UT) CAPS  cyanocobalamin (VITAMIN B-12) 1000 MCG tablet  cyclobenzaprine (FLEXERIL) 10 MG tablet  escitalopram (LEXAPRO) 10 MG tablet  magnesium oxide (MAG-OX) 400 MG tablet  ustekinumab (STELARA) 90 MG/ML        ALLERGIES:  Allergies   Allergen Reactions    Sucralfate Hives and Rash    Morphine Hcl Other (See Comments)     SPASMS OF SPHINCTER OF ODDI  (BILIARY TRACT)    Sucralfate Hives    Codeine Nausea and Vomiting       FAMILY HISTORY:  Family History   Problem Relation Age of Onset    No Known Problems Mother     Clotting Disorder Father     No Known Problems Brother     No Known Problems Son     Anesthesia Reaction No family hx of     Colon Cancer No family hx of     Crohn's Disease No family hx of     Ulcerative Colitis No family hx of     Colon Polyps No family hx of        SOCIAL HISTORY:   Social History     Socioeconomic History    Marital status:    Tobacco Use    Smoking status: Never    Smokeless tobacco: Never   Substance and Sexual Activity    Alcohol use: Yes     Comment: occasional/ 1 drink per 1-3 month    Drug use: No     Social Determinants of Health     Financial Resource Strain: Low Risk  (8/28/2024)    Financial Resource Strain     Within the past 12 months, have you or your family members you live with been unable to get utilities (heat, electricity) when it was really needed?: No   Food Insecurity: No Food Insecurity (9/11/2024)    Received from [facility]    Hunger Vital Sign      Worried About Running Out of Food in the Last Year: Never true     Ran Out of Food in the Last Year: Never true   Transportation Needs: No Transportation Needs (9/11/2024)    Received from [facility]    PRAPARE - Transportation     Lack of Transportation (Medical): No     Lack of Transportation (Non-Medical): No   Physical Activity: Inactive (9/11/2024)    Received from [facility]    Exercise Vital Sign     Days of Exercise per Week: 0 days     Minutes of Exercise per Session: 0 min   Stress: Stress Concern Present (8/22/2022)    Received from [facility], [facility]    [employer] [place] [employer] - Occupational Stress Questionnaire     Feeling of Stress : Rather much   Social Connections: Socially Integrated (8/22/2022)    Received from [facility], [facility]    Social Connection and Isolation Panel [NHANES]     Frequency of Communication with Friends and Family: More than three times a week     Frequency of Social Gatherings with Friends and Family: Once a week     Attends Buddhist Services: More than 4 times per year     Active Member of Clubs or Organizations: Yes     Attends Club or Organization Meetings: More than 4 times per year     Marital Status:    Interpersonal Safety: Low Risk  (9/25/2024)    Interpersonal Safety     Do you feel physically and emotionally safe where you currently live?: Yes     Within the past 12 months, have you been hit, slapped, kicked or otherwise physically hurt by someone?: No     Within the past 12 months, have you been humiliated or emotionally abused in other ways by your partner or ex-partner?: No   Housing Stability: Low Risk  (9/11/2024)    Received from [facility]    Housing Stability     What is your living situation today?: I have a steady place to live       VITALS:  Patient Vitals for the past 24 hrs:   BP Temp Temp src Pulse Resp SpO2   09/26/24 1859 116/69 98.1  F (36.7  C) Oral 71 18 98 %       PHYSICAL EXAM    VITAL SIGNS: /69    Pulse 71   Temp 98.1  F (36.7  C) (Oral)   Resp 18   LMP 09/02/2024 (Approximate)   SpO2 98%   General Appearance: Alert, cooperative, normal speech and facial symmetry, appears stated age, the patient does not appear in distress  Head:  Normocephalic, without obvious abnormality, atraumatic  Cardio:  Regular rate and rhythm, S1 and S2 normal, no murmur, rub    or gallop, 2+ pulses symmetric in all extremities  Pulm:  Clear to auscultation bilaterally, respirations unlabored with no accessory muscle use  Abdomen:  Active bowel sounds in all quadrants; right upper quadrant tenderness to palpation without rebound or guarding  Extremities: Moves all extremities  Neuro: Patient is awake, alert, and responsive to voice. No gross motor weaknesses or sensory loss; moves all extremities.    LAB:  All pertinent labs reviewed and interpreted.  Labs Ordered and Resulted from Time of ED Arrival to Time of ED Departure   COMPREHENSIVE METABOLIC PANEL - Abnormal       Result Value    Sodium 138      Potassium 4.1      Carbon Dioxide (CO2) 27      Anion Gap 9      Urea Nitrogen 14.7      Creatinine 0.80      GFR Estimate >90      Calcium 8.9      Chloride 102      Glucose 95      Alkaline Phosphatase 132      AST 37       (*)     Protein Total 6.5      Albumin 3.9      Bilirubin Total 0.6     LIPASE - Normal    Lipase 30     CBC WITH PLATELETS AND DIFFERENTIAL    WBC Count 6.9      RBC Count 3.99      Hemoglobin 12.4      Hematocrit 38.2      MCV 96      MCH 31.1      MCHC 32.5      RDW 12.0      Platelet Count 284      % Neutrophils 66      % Lymphocytes 27      % Monocytes 6      % Eosinophils 1      % Basophils 0      % Immature Granulocytes 0      NRBCs per 100 WBC 0      Absolute Neutrophils 4.5      Absolute Lymphocytes 1.8      Absolute Monocytes 0.4      Absolute Eosinophils 0.1      Absolute Basophils 0.0      Absolute Immature Granulocytes 0.0      Absolute NRBCs 0.0         RADIOLOGY:  Reviewed all pertinent  imaging. Please see official radiology report.  CT Abdomen Pelvis w Contrast   Final Result   IMPRESSION:    1.  Interval common bile duct stenting. Pneumobilia with no bile duct dilatation.   2.  Diffuse hepatic steatosis redemonstrated.   3.  Stable postop changes with right hemicolectomy and right lower quadrant ileal colonic anastomosis and multiple small bowel anastomoses. No acute inflammatory change nor obstruction.   4.  2 mm nonobstructing calculus.            I, Herb Martinez, am serving as a scribe to document services personally performed by Leslee Carl PA-C based on my observation and the provider's statements to me. I, Leslee Carl PA-C attest that Herb Martinez is acting in a scribe capacity, has observed my performance of the services and has documented them in accordance with my direction.     Leslee Carl PA-C  Emergency Medicine  [facility] EMERGENCY DEPARTMENT  [facility] [facility] [address]  [phone]  Dept: [phone]       Leslee Carl PA-C  09/26/24 7131

## 2024-09-27 NOTE — ED TRIAGE NOTES
The pt arrives with c/o abd pain after having an ERCP yesterday. She had her gallbladder removed when she was young and also has a hx of chrones. She felt good after the procedure but woke up around 5 am with increased pain. She has some nausea but no vomiting.       Triage Assessment (Adult)       Row Name 09/26/24 4372          Triage Assessment    Airway WDL WDL        Cognitive/Neuro/Behavioral WDL    Cognitive/Neuro/Behavioral WDL WDL

## 2024-09-27 NOTE — DISCHARGE INSTRUCTIONS
You were seen here today for evaluation of abdominal pain.  Your lab work today is reassuring without evidence of pancreatitis, infection, or new liver dysfunction.  Your CT scan shows expected postsurgical changes but no abnormalities concerning for a complication.    Take Tylenol for pain, do not exceed 4000 mg/day.  Take Norco for severe pain- This is a narcotic pain medication that might be habit forming so only take when necessary. Do not drink alcohol,drive, or operate machinery while taking this medication.  This medication does contain tylenol so please monitor dosing closely.     Follow up with [facility] as planned. Return here for any new or worsening symptoms like severe pain, persistent vomiting, black or bloody stools that are new, fever, difficulty breathing, or any other symptoms that concern you.

## 2024-09-30 ENCOUNTER — LAB (OUTPATIENT)
Dept: LAB | Facility: HOSPITAL | Age: 44
End: 2024-09-30
Payer: COMMERCIAL

## 2024-09-30 DIAGNOSIS — R10.11 RUQ ABDOMINAL PAIN: ICD-10-CM

## 2024-09-30 LAB
ALBUMIN SERPL BCG-MCNC: 3.9 G/DL (ref 3.5–5.2)
ALP SERPL-CCNC: 126 U/L (ref 40–150)
ALT SERPL W P-5'-P-CCNC: 113 U/L (ref 0–50)
AST SERPL W P-5'-P-CCNC: 89 U/L (ref 0–45)
BASOPHILS # BLD AUTO: 0 10E3/UL (ref 0–0.2)
BASOPHILS NFR BLD AUTO: 0 %
BILIRUB DIRECT SERPL-MCNC: <0.2 MG/DL (ref 0–0.3)
BILIRUB SERPL-MCNC: 0.3 MG/DL
EOSINOPHIL # BLD AUTO: 0.2 10E3/UL (ref 0–0.7)
EOSINOPHIL NFR BLD AUTO: 3 %
ERYTHROCYTE [DISTWIDTH] IN BLOOD BY AUTOMATED COUNT: 11.9 % (ref 10–15)
HCT VFR BLD AUTO: 37.6 % (ref 35–47)
HGB BLD-MCNC: 12 G/DL (ref 11.7–15.7)
IMM GRANULOCYTES # BLD: 0 10E3/UL
IMM GRANULOCYTES NFR BLD: 0 %
LIPASE SERPL-CCNC: 28 U/L (ref 13–60)
LYMPHOCYTES # BLD AUTO: 1.5 10E3/UL (ref 0.8–5.3)
LYMPHOCYTES NFR BLD AUTO: 25 %
MCH RBC QN AUTO: 30.8 PG (ref 26.5–33)
MCHC RBC AUTO-ENTMCNC: 31.9 G/DL (ref 31.5–36.5)
MCV RBC AUTO: 96 FL (ref 78–100)
MONOCYTES # BLD AUTO: 0.4 10E3/UL (ref 0–1.3)
MONOCYTES NFR BLD AUTO: 7 %
NEUTROPHILS # BLD AUTO: 3.7 10E3/UL (ref 1.6–8.3)
NEUTROPHILS NFR BLD AUTO: 64 %
NRBC # BLD AUTO: 0.1 10E3/UL
NRBC BLD AUTO-RTO: 1 /100
PLATELET # BLD AUTO: 276 10E3/UL (ref 150–450)
PROT SERPL-MCNC: 6.4 G/DL (ref 6.4–8.3)
RBC # BLD AUTO: 3.9 10E6/UL (ref 3.8–5.2)
WBC # BLD AUTO: 5.8 10E3/UL (ref 4–11)

## 2024-09-30 PROCEDURE — 85004 AUTOMATED DIFF WBC COUNT: CPT

## 2024-09-30 PROCEDURE — 82040 ASSAY OF SERUM ALBUMIN: CPT

## 2024-09-30 PROCEDURE — 83690 ASSAY OF LIPASE: CPT

## 2024-09-30 PROCEDURE — 36415 COLL VENOUS BLD VENIPUNCTURE: CPT

## 2024-10-04 ENCOUNTER — ANESTHESIA (OUTPATIENT)
Dept: SURGERY | Facility: HOSPITAL | Age: 44
End: 2024-10-04
Payer: COMMERCIAL

## 2024-10-04 ENCOUNTER — APPOINTMENT (OUTPATIENT)
Dept: RADIOLOGY | Facility: HOSPITAL | Age: 44
End: 2024-10-04
Attending: INTERNAL MEDICINE
Payer: COMMERCIAL

## 2024-10-04 ENCOUNTER — ANESTHESIA EVENT (OUTPATIENT)
Dept: SURGERY | Facility: HOSPITAL | Age: 44
End: 2024-10-04
Payer: COMMERCIAL

## 2024-10-04 ENCOUNTER — HOSPITAL ENCOUNTER (OUTPATIENT)
Facility: HOSPITAL | Age: 44
Discharge: HOME | End: 2024-10-04
Attending: INTERNAL MEDICINE | Admitting: INTERNAL MEDICINE
Payer: COMMERCIAL

## 2024-10-04 VITALS
HEART RATE: 49 BPM | DIASTOLIC BLOOD PRESSURE: 55 MMHG | WEIGHT: 112 LBS | RESPIRATION RATE: 16 BRPM | SYSTOLIC BLOOD PRESSURE: 99 MMHG | TEMPERATURE: 97.5 F | BODY MASS INDEX: 20.49 KG/M2 | OXYGEN SATURATION: 99 %

## 2024-10-04 LAB
ERCP: NORMAL
HCG UR QL: NEGATIVE

## 2024-10-04 PROCEDURE — 250N000011 HC RX IP 250 OP 636: Performed by: NURSE ANESTHETIST, CERTIFIED REGISTERED

## 2024-10-04 PROCEDURE — 999N000180 XR SURGERY CARM FLUORO LESS THAN 5 MIN: Mod: TC

## 2024-10-04 PROCEDURE — 272N000001 HC OR GENERAL SUPPLY STERILE: Performed by: INTERNAL MEDICINE

## 2024-10-04 PROCEDURE — 250N000009 HC RX 250: Performed by: INTERNAL MEDICINE

## 2024-10-04 PROCEDURE — C1726 CATH, BAL DIL, NON-VASCULAR: HCPCS | Performed by: INTERNAL MEDICINE

## 2024-10-04 PROCEDURE — 43264 ERCP REMOVE DUCT CALCULI: CPT | Performed by: ANESTHESIOLOGY

## 2024-10-04 PROCEDURE — C1769 GUIDE WIRE: HCPCS | Performed by: INTERNAL MEDICINE

## 2024-10-04 PROCEDURE — 81025 URINE PREGNANCY TEST: CPT | Performed by: STUDENT IN AN ORGANIZED HEALTH CARE EDUCATION/TRAINING PROGRAM

## 2024-10-04 PROCEDURE — 370N000017 HC ANESTHESIA TECHNICAL FEE, PER MIN: Performed by: INTERNAL MEDICINE

## 2024-10-04 PROCEDURE — 360N000082 HC SURGERY LEVEL 2 W/ FLUORO, PER MIN: Performed by: INTERNAL MEDICINE

## 2024-10-04 PROCEDURE — 258N000003 HC RX IP 258 OP 636: Performed by: STUDENT IN AN ORGANIZED HEALTH CARE EDUCATION/TRAINING PROGRAM

## 2024-10-04 PROCEDURE — 255N000002 HC RX 255 OP 636: Performed by: INTERNAL MEDICINE

## 2024-10-04 PROCEDURE — 710N000012 HC RECOVERY PHASE 2, PER MINUTE: Performed by: INTERNAL MEDICINE

## 2024-10-04 PROCEDURE — 43264 ERCP REMOVE DUCT CALCULI: CPT | Performed by: NURSE ANESTHETIST, CERTIFIED REGISTERED

## 2024-10-04 PROCEDURE — 999N000141 HC STATISTIC PRE-PROCEDURE NURSING ASSESSMENT: Performed by: INTERNAL MEDICINE

## 2024-10-04 PROCEDURE — 999N000248 HC STATISTIC IV INSERT WITH US BY RN

## 2024-10-04 RX ORDER — NALOXONE HYDROCHLORIDE 0.4 MG/ML
0.1 INJECTION, SOLUTION INTRAMUSCULAR; INTRAVENOUS; SUBCUTANEOUS
Status: DISCONTINUED | OUTPATIENT
Start: 2024-10-04 | End: 2024-10-04 | Stop reason: HOSPADM

## 2024-10-04 RX ORDER — NALOXONE HYDROCHLORIDE 0.4 MG/ML
0.4 INJECTION, SOLUTION INTRAMUSCULAR; INTRAVENOUS; SUBCUTANEOUS
Status: DISCONTINUED | OUTPATIENT
Start: 2024-10-04 | End: 2024-10-04 | Stop reason: HOSPADM

## 2024-10-04 RX ORDER — DEXAMETHASONE SODIUM PHOSPHATE 10 MG/ML
4 INJECTION, SOLUTION INTRAMUSCULAR; INTRAVENOUS
Status: DISCONTINUED | OUTPATIENT
Start: 2024-10-04 | End: 2024-10-04 | Stop reason: HOSPADM

## 2024-10-04 RX ORDER — ONDANSETRON 4 MG/1
4 TABLET, ORALLY DISINTEGRATING ORAL EVERY 6 HOURS PRN
Status: DISCONTINUED | OUTPATIENT
Start: 2024-10-04 | End: 2024-10-04 | Stop reason: HOSPADM

## 2024-10-04 RX ORDER — PROPOFOL 10 MG/ML
INJECTION, EMULSION INTRAVENOUS CONTINUOUS PRN
Status: DISCONTINUED | OUTPATIENT
Start: 2024-10-04 | End: 2024-10-04

## 2024-10-04 RX ORDER — ONDANSETRON 2 MG/ML
4 INJECTION INTRAMUSCULAR; INTRAVENOUS EVERY 6 HOURS PRN
Status: DISCONTINUED | OUTPATIENT
Start: 2024-10-04 | End: 2024-10-04 | Stop reason: HOSPADM

## 2024-10-04 RX ORDER — NALOXONE HYDROCHLORIDE 0.4 MG/ML
0.2 INJECTION, SOLUTION INTRAMUSCULAR; INTRAVENOUS; SUBCUTANEOUS
Status: DISCONTINUED | OUTPATIENT
Start: 2024-10-04 | End: 2024-10-04 | Stop reason: HOSPADM

## 2024-10-04 RX ORDER — SODIUM CHLORIDE, SODIUM LACTATE, POTASSIUM CHLORIDE, CALCIUM CHLORIDE 600; 310; 30; 20 MG/100ML; MG/100ML; MG/100ML; MG/100ML
INJECTION, SOLUTION INTRAVENOUS CONTINUOUS
Status: DISCONTINUED | OUTPATIENT
Start: 2024-10-04 | End: 2024-10-04 | Stop reason: HOSPADM

## 2024-10-04 RX ORDER — ONDANSETRON 2 MG/ML
4 INJECTION INTRAMUSCULAR; INTRAVENOUS EVERY 30 MIN PRN
Status: DISCONTINUED | OUTPATIENT
Start: 2024-10-04 | End: 2024-10-04 | Stop reason: HOSPADM

## 2024-10-04 RX ORDER — PROCHLORPERAZINE MALEATE 10 MG
10 TABLET ORAL EVERY 6 HOURS PRN
Status: DISCONTINUED | OUTPATIENT
Start: 2024-10-04 | End: 2024-10-04 | Stop reason: HOSPADM

## 2024-10-04 RX ORDER — LIDOCAINE 40 MG/G
CREAM TOPICAL
Status: DISCONTINUED | OUTPATIENT
Start: 2024-10-04 | End: 2024-10-04 | Stop reason: HOSPADM

## 2024-10-04 RX ORDER — ONDANSETRON 4 MG/1
4 TABLET, ORALLY DISINTEGRATING ORAL EVERY 30 MIN PRN
Status: DISCONTINUED | OUTPATIENT
Start: 2024-10-04 | End: 2024-10-04 | Stop reason: HOSPADM

## 2024-10-04 RX ORDER — PROPOFOL 10 MG/ML
INJECTION, EMULSION INTRAVENOUS PRN
Status: DISCONTINUED | OUTPATIENT
Start: 2024-10-04 | End: 2024-10-04

## 2024-10-04 RX ORDER — FLUMAZENIL 0.1 MG/ML
0.2 INJECTION, SOLUTION INTRAVENOUS
Status: DISCONTINUED | OUTPATIENT
Start: 2024-10-04 | End: 2024-10-04 | Stop reason: HOSPADM

## 2024-10-04 RX ADMIN — PROPOFOL 50 MG: 10 INJECTION, EMULSION INTRAVENOUS at 14:01

## 2024-10-04 RX ADMIN — PROPOFOL 50 MG: 10 INJECTION, EMULSION INTRAVENOUS at 14:00

## 2024-10-04 RX ADMIN — SODIUM CHLORIDE, POTASSIUM CHLORIDE, SODIUM LACTATE AND CALCIUM CHLORIDE: 600; 310; 30; 20 INJECTION, SOLUTION INTRAVENOUS at 13:52

## 2024-10-04 RX ADMIN — PROPOFOL 150 MCG/KG/MIN: 10 INJECTION, EMULSION INTRAVENOUS at 14:00

## 2024-10-04 ASSESSMENT — ACTIVITIES OF DAILY LIVING (ADL)
ADLS_ACUITY_SCORE: 37
ADLS_ACUITY_SCORE: 35
ADLS_ACUITY_SCORE: 37

## 2024-10-04 NOTE — ANESTHESIA CARE TRANSFER NOTE
Patient: Alok Nino    Procedure: Procedure(s):  ENDOSCOPIC RETROGRADE CHOLANGIOPANCREATOGRAPHY, STENT AND STONE REMOVAL       Diagnosis: Choledocholithiasis [K80.50]  Diagnosis Additional Information: No value filed.    Anesthesia Type:   MAC     Note:    Oropharynx: oropharynx clear of all foreign objects and spontaneously breathing  Level of Consciousness: awake  Oxygen Supplementation: room air    Independent Airway: airway patency satisfactory and stable  Dentition: dentition unchanged  Vital Signs Stable: post-procedure vital signs reviewed and stable  Report to RN Given: handoff report given  Patient transferred to: Phase II    Handoff Report: Identifed the Patient, Identified the Reponsible Provider, Reviewed the pertinent medical history, Discussed the surgical course, Reviewed Intra-OP anesthesia mangement and issues during anesthesia, Set expectations for post-procedure period and Allowed opportunity for questions and acknowledgement of understanding  Vitals:  Vitals Value Taken Time   BP 99/56 10/04/24 1424   Temp 35.9  C (96.62  F) 10/04/24 1424   Pulse 55 10/04/24 1424   Resp     SpO2 99 % 10/04/24 1424   Vitals shown include unfiled device data.    Electronically Signed By: TACOS Whitfield CRNA  October 4, 2024  2:25 PM

## 2024-10-04 NOTE — H&P
GENERAL PRE-PROCEDURE:   Procedure:  ERCP - sludge in CBD, Stent removal  Date/Time:  10/4/2024 12:09 PM    Verbal consent obtained?: Yes    Written consent obtained?: Yes    Risks and benefits: Risks, benefits and alternatives were discussed    Consent given by:  Patient  Patient states understanding of procedure being performed: Yes    Patient's understanding of procedure matches consent: Yes    Procedure consent matches procedure scheduled: Yes    Expected level of sedation:  Deep  Appropriately NPO:  Yes  ASA Class:  3  Mallampati  :  Grade 2- soft palate, base of uvula, tonsillar pillars, and portion of posterior pharyngeal wall visible  Lungs:  Lungs clear with good breath sounds bilaterally  Heart:  Normal heart sounds and rate and systolic murmur  History & Physical reviewed:  History and physical reviewed and no updates needed  Statement of review:  I have reviewed the lab findings, diagnostic data, medications, and the plan for sedation

## 2024-10-04 NOTE — ANESTHESIA POSTPROCEDURE EVALUATION
Patient: Alok Nino    Procedure: Procedure(s):  ENDOSCOPIC RETROGRADE CHOLANGIOPANCREATOGRAPHY, STENT AND STONE REMOVAL       Anesthesia Type:  MAC    Note:  Disposition: Outpatient   Postop Pain Control: Uneventful            Sign Out: Well controlled pain   PONV: No   Neuro/Psych: Uneventful            Sign Out: Acceptable/Baseline neuro status   Airway/Respiratory: Uneventful            Sign Out: Acceptable/Baseline resp. status   CV/Hemodynamics: Uneventful            Sign Out: Acceptable CV status; No obvious hypovolemia; No obvious fluid overload   Other NRE: NONE   DID A NON-ROUTINE EVENT OCCUR? No           Last vitals:  Vitals Value Taken Time   BP 99/55 10/04/24 1500   Temp 36.4  C (97.52  F) 10/04/24 1500   Pulse 49 10/04/24 1500   Resp 16 10/04/24 1445   SpO2 99 % 10/04/24 1500   Vitals shown include unfiled device data.    Electronically Signed By: Lars Cox MD  October 4, 2024  3:01 PM

## 2024-10-04 NOTE — ANESTHESIA PREPROCEDURE EVALUATION
Anesthesia Pre-Procedure Evaluation    Patient: Alok Nino   MRN: [id number] : 1980        Procedure : Procedure(s):  ENDOSCOPIC RETROGRADE CHOLANGIOPANCREATOGRAPHY          Past Medical History:   Diagnosis Date    Anemia     C. difficile enteritis     Chronic pain     Copper deficiency     Crohn disease (H)     Elevated LFTs     Endometriosis     GERRY (generalized anxiety disorder)     Melanoma (H)     PCOS (polycystic ovarian syndrome)     PONV (postoperative nausea and vomiting)     SBO (small bowel obstruction) (H) 2022      Past Surgical History:   Procedure Laterality Date    ABDOMEN SURGERY      3 for crohns dx    APPENDECTOMY      APPENDECTOMY      CHOLECYSTECTOMY      CHOLECYSTECTOMY      COLONOSCOPY      ENDOSCOPIC RETROGRADE CHOLANGIOPANCREATOGRAM N/A 2024    Procedure: Endoscopic retrograde CHOLANGIOPANCREATOGRAPHY, BILIARY STENT PLACEMENT,AND STONE EXTRACTION;  Surgeon: Jesus Rankin MD;  Location:  OR    ENDOSCOPIC ULTRASOUND UPPER GASTROINTESTINAL TRACT (GI) N/A 2020    Procedure: ENDOSCOPIC ULTRASOUND, ESOPHAGOSCOPY / UPPER GASTROINTESTINAL TRACT with BIOPSY;  Surgeon: Cydney Garcia MD;  Location:  OR    ENDOSCOPIC ULTRASOUND UPPER GASTROINTESTINAL TRACT (GI) N/A 2024    Procedure: and Endoscopic ultrasound upper;  Surgeon: Jesus Rankin MD;  Location:  OR    GYN SURGERY      H STATISTIC PICC LINE INSERTION >5YR, FAILED Right 11/10/2021    LUE unsuccessful attempt from another hospital, IR deferral    IR PICC PLACEMENT > 5 YRS OF AGE  2021    IR PICC PLACEMENT > 5 YRS OF AGE  3/16/2022    OTHER SURGICAL HISTORY      strictoplasty x3    OTHER SURGICAL HISTORY      adhesion removal x1    OTHER SURGICAL HISTORY      small bowel resections    PICC  05/10/2019         RESECTION ILEOCECAL  2013    Procedure: RESECTION ILEOCECAL;  Laparotomy, Extensive Lysis of Adhesions, Stricture Plasty X2  Anesthesia general with block;  Surgeon:  Pete Cartagena MD;  Location: [facility] OR    RESECTION ILEOCOLIC N/A 11/17/2021    Procedure: Exploratory laparotomy, illeal resection, extensive lysis of adhesions (2 hr);  Surgeon: Pete Cartagena MD;  Location: [facility] OR      Allergies   Allergen Reactions    Sucralfate Hives and Rash    Morphine Hcl Other (See Comments)     SPASMS OF SPHINCTER OF ODDI  (BILIARY TRACT)    Pneumovax [Pneumococcal Polysaccharide Vaccine]     Sucralfate Hives    Codeine Nausea and Vomiting      Social History     Tobacco Use    Smoking status: Never    Smokeless tobacco: Never   Substance Use Topics    Alcohol use: Yes     Comment: occasional/ 1 drink per 1-3 month      Wt Readings from Last 1 Encounters:   10/04/24 50.8 kg (112 lb)        Anesthesia Evaluation   Pt has had prior anesthetic.     History of anesthetic complications  - PONV.      ROS/MED HX  ENT/Pulmonary:  - neg pulmonary ROS     Neurologic:  - neg neurologic ROS     Cardiovascular:    (-) murmur   METS/Exercise Tolerance:     Hematologic:     (+)      anemia,          Musculoskeletal:  - neg musculoskeletal ROS     GI/Hepatic:     (+)       Inflammatory bowel disease,             Renal/Genitourinary: Comment: Endometriosis      Endo:  - neg endo ROS     Psychiatric/Substance Use:     (+) psychiatric history anxiety       Infectious Disease:  - neg infectious disease ROS     Malignancy:  - neg malignancy ROS     Other:            Physical Exam    Airway  airway exam normal      Mallampati: II   TM distance: > 3 FB   Neck ROM: full   Mouth opening: > 3 cm    Respiratory Devices and Support         Dental       (+) Completely normal teeth      Cardiovascular   cardiovascular exam normal       Rhythm and rate: regular and normal (-) no murmur    Pulmonary   pulmonary exam normal        breath sounds clear to auscultation           OUTSIDE LABS:  CBC:   Lab Results   Component Value Date    WBC 5.8 09/30/2024    WBC 6.9 09/26/2024    HGB 12.0 09/30/2024    HGB 12.4 09/26/2024     HCT 37.6 09/30/2024    HCT 38.2 09/26/2024     09/30/2024     09/26/2024     BMP:   Lab Results   Component Value Date     09/26/2024     08/29/2024    POTASSIUM 4.1 09/26/2024    POTASSIUM 4.2 08/29/2024    CHLORIDE 102 09/26/2024    CHLORIDE 108 (H) 08/29/2024    CO2 27 09/26/2024    CO2 25 08/29/2024    BUN 14.7 09/26/2024    BUN 7.3 08/29/2024    CR 0.80 09/26/2024    CR 0.76 08/29/2024    GLC 95 09/26/2024    GLC 89 08/29/2024     COAGS:   Lab Results   Component Value Date    INR 1.24 (H) 03/17/2022     POC:   Lab Results   Component Value Date     (H) 12/15/2013    HCG Negative 09/25/2024    HCGS Negative 08/27/2024     HEPATIC:   Lab Results   Component Value Date    ALBUMIN 3.9 09/30/2024    PROTTOTAL 6.4 09/30/2024     (H) 09/30/2024    AST 89 (H) 09/30/2024    ALKPHOS 126 09/30/2024    BILITOTAL 0.3 09/30/2024     OTHER:   Lab Results   Component Value Date    PH 7.29 (L) 08/02/2019    LACT 1.7 03/13/2022    A1C 5.1 12/07/2013    BLACK 8.9 09/26/2024    PHOS 3.4 05/18/2022    MAG 1.7 08/29/2024    LIPASE 28 09/30/2024    AMYLASE 85 12/12/2013    TSH 0.06 (L) 07/31/2019    CRP 14.0 (H) 03/15/2022    SED 11 03/15/2022       Anesthesia Plan    ASA Status:  3    NPO Status:  NPO Appropriate    Anesthesia Type: MAC.     - Reason for MAC: straight local not clinically adequate              Consents    Anesthesia Plan(s) and associated risks, benefits, and realistic alternatives discussed. Questions answered and patient/representative(s) expressed understanding.     - Discussed: Risks, Benefits and Alternatives for BOTH SEDATION and the PROCEDURE were discussed     - Discussed with:  Patient            Postoperative Care    Pain management: IV analgesics, Oral pain medications, Multi-modal analgesia.   PONV prophylaxis: Ondansetron (or other 5HT-3), Background Propofol Infusion     Comments:    Other Comments: Risks of MAC anesthesia including but not limited to recall,  awareness, and potential conversion to general anesthesia discussed.           Dany lOiva MD    I have reviewed the pertinent notes and labs in the chart from the past 30 days and (re)examined the patient.  Any updates or changes from those notes are reflected in this note.

## 2025-05-11 ENCOUNTER — HOSPITAL ENCOUNTER (EMERGENCY)
Facility: HOSPITAL | Age: 45
Discharge: HOME | End: 2025-05-11
Attending: EMERGENCY MEDICINE | Admitting: EMERGENCY MEDICINE
Payer: COMMERCIAL

## 2025-05-11 ENCOUNTER — HOSPITAL ENCOUNTER (INPATIENT)
Facility: CLINIC | Age: 45
LOS: 2 days | Discharge: HOME | DRG: 392 | End: 2025-05-13
Attending: STUDENT IN AN ORGANIZED HEALTH CARE EDUCATION/TRAINING PROGRAM | Admitting: STUDENT IN AN ORGANIZED HEALTH CARE EDUCATION/TRAINING PROGRAM
Payer: COMMERCIAL

## 2025-05-11 VITALS
SYSTOLIC BLOOD PRESSURE: 105 MMHG | OXYGEN SATURATION: 99 % | RESPIRATION RATE: 16 BRPM | HEIGHT: 62 IN | WEIGHT: 114 LBS | BODY MASS INDEX: 20.98 KG/M2 | HEART RATE: 67 BPM | DIASTOLIC BLOOD PRESSURE: 68 MMHG | TEMPERATURE: 98 F

## 2025-05-11 DIAGNOSIS — R13.19 ESOPHAGEAL DYSPHAGIA: ICD-10-CM

## 2025-05-11 LAB
ANION GAP SERPL CALCULATED.3IONS-SCNC: 14 MMOL/L (ref 7–15)
BASOPHILS # BLD AUTO: 0 10E3/UL (ref 0–0.2)
BASOPHILS NFR BLD AUTO: 0 %
BUN SERPL-MCNC: 13.9 MG/DL (ref 6–20)
CALCIUM SERPL-MCNC: 9.2 MG/DL (ref 8.8–10.4)
CHLORIDE SERPL-SCNC: 105 MMOL/L (ref 98–107)
CREAT SERPL-MCNC: 0.58 MG/DL (ref 0.51–0.95)
EGFRCR SERPLBLD CKD-EPI 2021: >90 ML/MIN/1.73M2
EOSINOPHIL # BLD AUTO: 0.3 10E3/UL (ref 0–0.7)
EOSINOPHIL NFR BLD AUTO: 3 %
ERYTHROCYTE [DISTWIDTH] IN BLOOD BY AUTOMATED COUNT: 12.6 % (ref 10–15)
GLUCOSE SERPL-MCNC: 90 MG/DL (ref 70–99)
HCO3 SERPL-SCNC: 20 MMOL/L (ref 22–29)
HCT VFR BLD AUTO: 38.3 % (ref 35–47)
HGB BLD-MCNC: 13 G/DL (ref 11.7–15.7)
IMM GRANULOCYTES # BLD: 0 10E3/UL
IMM GRANULOCYTES NFR BLD: 0 %
LYMPHOCYTES # BLD AUTO: 1.7 10E3/UL (ref 0.8–5.3)
LYMPHOCYTES NFR BLD AUTO: 15 %
MCH RBC QN AUTO: 30.7 PG (ref 26.5–33)
MCHC RBC AUTO-ENTMCNC: 33.9 G/DL (ref 31.5–36.5)
MCV RBC AUTO: 90 FL (ref 78–100)
MONOCYTES # BLD AUTO: 0.8 10E3/UL (ref 0–1.3)
MONOCYTES NFR BLD AUTO: 8 %
NEUTROPHILS # BLD AUTO: 8 10E3/UL (ref 1.6–8.3)
NEUTROPHILS NFR BLD AUTO: 74 %
NRBC # BLD AUTO: 0 10E3/UL
NRBC BLD AUTO-RTO: 0 /100
PLATELET # BLD AUTO: 305 10E3/UL (ref 150–450)
POTASSIUM SERPL-SCNC: 3.5 MMOL/L (ref 3.4–5.3)
RBC # BLD AUTO: 4.24 10E6/UL (ref 3.8–5.2)
SODIUM SERPL-SCNC: 139 MMOL/L (ref 135–145)
WBC # BLD AUTO: 10.8 10E3/UL (ref 4–11)

## 2025-05-11 PROCEDURE — 99284 EMERGENCY DEPT VISIT MOD MDM: CPT | Mod: 25

## 2025-05-11 PROCEDURE — 99222 1ST HOSP IP/OBS MODERATE 55: CPT | Performed by: INTERNAL MEDICINE

## 2025-05-11 PROCEDURE — 80048 BASIC METABOLIC PNL TOTAL CA: CPT | Performed by: STUDENT IN AN ORGANIZED HEALTH CARE EDUCATION/TRAINING PROGRAM

## 2025-05-11 PROCEDURE — 120N000004 HC R&B MS OVERFLOW

## 2025-05-11 PROCEDURE — 96374 THER/PROPH/DIAG INJ IV PUSH: CPT

## 2025-05-11 PROCEDURE — 250N000011 HC RX IP 250 OP 636: Mod: JZ | Performed by: EMERGENCY MEDICINE

## 2025-05-11 PROCEDURE — 96360 HYDRATION IV INFUSION INIT: CPT

## 2025-05-11 PROCEDURE — 36415 COLL VENOUS BLD VENIPUNCTURE: CPT | Performed by: STUDENT IN AN ORGANIZED HEALTH CARE EDUCATION/TRAINING PROGRAM

## 2025-05-11 PROCEDURE — 85025 COMPLETE CBC W/AUTO DIFF WBC: CPT | Performed by: STUDENT IN AN ORGANIZED HEALTH CARE EDUCATION/TRAINING PROGRAM

## 2025-05-11 PROCEDURE — 99285 EMERGENCY DEPT VISIT HI MDM: CPT | Mod: 25

## 2025-05-11 PROCEDURE — 250N000013 HC RX MED GY IP 250 OP 250 PS 637: Performed by: EMERGENCY MEDICINE

## 2025-05-11 PROCEDURE — 258N000003 HC RX IP 258 OP 636: Performed by: STUDENT IN AN ORGANIZED HEALTH CARE EDUCATION/TRAINING PROGRAM

## 2025-05-11 PROCEDURE — 96361 HYDRATE IV INFUSION ADD-ON: CPT

## 2025-05-11 RX ORDER — PROGESTERONE 200 MG/1
CAPSULE ORAL
COMMUNITY
Start: 2025-03-27

## 2025-05-11 RX ORDER — PANTOPRAZOLE SODIUM 40 MG/1
40 TABLET, DELAYED RELEASE ORAL DAILY
Qty: 30 TABLET | Refills: 0 | Status: SHIPPED | OUTPATIENT
Start: 2025-05-11 | End: 2025-06-10

## 2025-05-11 RX ORDER — ONDANSETRON 4 MG/1
4 TABLET, ORALLY DISINTEGRATING ORAL EVERY 12 HOURS PRN
COMMUNITY
Start: 2025-03-31

## 2025-05-11 RX ORDER — FAMOTIDINE 40 MG/1
40 TABLET, FILM COATED ORAL DAILY
COMMUNITY

## 2025-05-11 RX ORDER — CYANOCOBALAMIN 1000 UG/ML
1000 INJECTION, SOLUTION INTRAMUSCULAR; SUBCUTANEOUS WEEKLY
COMMUNITY

## 2025-05-11 RX ADMIN — GLUCAGON 1 MG: 1 INJECTION, POWDER, LYOPHILIZED, FOR SOLUTION INTRAMUSCULAR; INTRAVENOUS at 15:06

## 2025-05-11 RX ADMIN — ANTACID/ANTIFLATULENT 4 G: 380; 550; 10; 10 GRANULE, EFFERVESCENT ORAL at 15:07

## 2025-05-11 RX ADMIN — SODIUM CHLORIDE 1000 ML: 9 INJECTION, SOLUTION INTRAVENOUS at 20:59

## 2025-05-11 ASSESSMENT — COLUMBIA-SUICIDE SEVERITY RATING SCALE - C-SSRS
2. HAVE YOU ACTUALLY HAD ANY THOUGHTS OF KILLING YOURSELF IN THE PAST MONTH?: NO
1. IN THE PAST MONTH, HAVE YOU WISHED YOU WERE DEAD OR WISHED YOU COULD GO TO SLEEP AND NOT WAKE UP?: NO
6. HAVE YOU EVER DONE ANYTHING, STARTED TO DO ANYTHING, OR PREPARED TO DO ANYTHING TO END YOUR LIFE?: NO
6. HAVE YOU EVER DONE ANYTHING, STARTED TO DO ANYTHING, OR PREPARED TO DO ANYTHING TO END YOUR LIFE?: NO
1. IN THE PAST MONTH, HAVE YOU WISHED YOU WERE DEAD OR WISHED YOU COULD GO TO SLEEP AND NOT WAKE UP?: NO
2. HAVE YOU ACTUALLY HAD ANY THOUGHTS OF KILLING YOURSELF IN THE PAST MONTH?: NO

## 2025-05-11 ASSESSMENT — ACTIVITIES OF DAILY LIVING (ADL)
ADLS_ACUITY_SCORE: 58

## 2025-05-11 NOTE — CONSULTS
CONSULTING PHYSICIAN   Ankur Montemayor MD     REASON FOR CONSULTATION   Dysphagia     IMPRESSION   This is a 45-year-old woman with a history of Crohn's disease status post multiple bowel resections and stricturoplasties on Stelara, history of choledocholithiasis status post ERCP with stent placement and subsequent removal, status post cholecystectomy who presents with inability to tolerate oral intake.  She is handling secretions.  She likely has achalasia and I suspect she has a backup of food and liquid in a dilated esophagus.  This is different from an esophageal food impaction, and is more difficult to resolve endoscopically.    The 1 interesting piece is that she was recently treated with doxycycline and this times with worsening swallowing issues.  If the doxycycline was not adequately rinsed down with swallowing, she may have secondary inflammation or ulceration in the distal esophagus.  This does not change the fact that she has poor contractions on upper GI study as well, but certainly may have worsened swallowing issues in the short-term.     RECOMMENDATIONS   1.  I discussed the case with the ER provider and one of my colleagues.  This is a difficult situation as endoscopic evaluation with suspected achalasia typically requires general anesthesia due to significant backup of fluid and debris in a dilated esophagus.  I called the operating room to see if there would be any availability today and at this point they have both anesthesia teams tied up for many hours with emergency procedures.  In her case, certainly if the backup does not resolve on its own, then we would need to consider endoscopic evaluation.  But it is not the same urgency as a food impaction like we see with underlying esophageal stricture.  Many times backups from achalasia can resolve on their own as the liquids and food slowly work their way downward through the LES.  2.  The ER provider will try  some glucagon and EZ gas to see if that helps.  If it does and she passes a swallow challenge with liquids, she could be discharged from the ER with a full liquid diet and plan to proceed with manometry as scheduled tomorrow.  3.  If she does not have improvement with the glucagon and EZ gas, I would recommend admitting her for observation, IV fluid hydration, and bowel rest.  Hopefully with time, the backup of food and liquid can gradually leave the esophagus and then she would not require endoscopic evaluation the next day.  She could then proceed with manometry as scheduled tomorrow.  Otherwise if she does not improve, we can plan for endoscopic evaluation with general anesthesia tomorrow.  4.  Do recommend starting pantoprazole therapy 40 mg once daily for 1 to 2 months, in case of any secondary inflammation from recent doxycycline.  5.  I did give my phone number to the ER doctor so he can text me with an update after the medications have been given.       HISTORY OF PRESENT ILLNESS   Alok Nino is a pleasant 45 year old female with a history of Crohn's disease status post multiple bowel resections and stricturoplasties on Stelara, history of choledocholithiasis status post ERCP with stent placement and subsequent removal, status post cholecystectomy who presents with inability to tolerate oral intake.  She has had progressive dysphagia symptoms over the past several months.  She has a sense of slowness of food moving through the mid to distal chest.  There can be a sense of a back up for a while, and then it resolves on its own.  She was treated for SIBO recently with doxycycline, and finished therapy about 2 weeks ago.  Since then she has had worsening swallowing issues, with more of a tendency to backups.  She had an upper GI study performed on 4/30.  This was suggestive of achalasia, including a dilated esophagus with poor peristalsis and failure of relaxation of the lower esophageal sphincter.  She is  actually scheduled for manometry tomorrow afternoon at [facility].  She ate dinner last night and felt the typical back up occur.  Never quite resolved through the night and she felt a bit of pressure this morning.  She tried eating a few bites of eggs and potatoes this morning and ended up vomiting those back up.  She also did not tolerate her coffee.  There is a persistent sense of backup in the esophagus.  She has been handling secretions.    She denies issues with heartburn.  She reports her Crohn's disease is currently stable.  This is followed closely at [facility].    EGD 3/24 for iron deficiency anemia showed a patulous pylorus, atrophic appearing stomach with mild chronic inflammation on biopsies, normal duodenal biopsies.  Esophageal biopsies for possible Candida esophagitis were also normal.  There was no stricture noted at that point.  She also had subsequent EUS/ERCP last September for choledocholithiasis.     PAST HISTORY   Past Medical History:   Diagnosis Date    Anemia     C. difficile enteritis     Chronic pain     Copper deficiency     Crohn disease (H)     Elevated LFTs     Endometriosis     GERRY (generalized anxiety disorder)     Melanoma (H)     PCOS (polycystic ovarian syndrome)     PONV (postoperative nausea and vomiting)     SBO (small bowel obstruction) (H) 03/14/2022      Past Surgical History:   Procedure Laterality Date    ABDOMEN SURGERY      5 for crohns dx    APPENDECTOMY      APPENDECTOMY      CHOLECYSTECTOMY      CHOLECYSTECTOMY      COLONOSCOPY      ENDOSCOPIC RETROGRADE CHOLANGIOPANCREATOGRAM N/A 9/25/2024    Procedure: Endoscopic retrograde CHOLANGIOPANCREATOGRAPHY, BILIARY STENT PLACEMENT,AND STONE EXTRACTION;  Surgeon: Jesus Rankin MD;  Location:  OR    ENDOSCOPIC RETROGRADE CHOLANGIOPANCREATOGRAM N/A 10/4/2024    Procedure: ENDOSCOPIC RETROGRADE CHOLANGIOPANCREATOGRAPHY, STENT AND STONE REMOVAL;  Surgeon: Jesus Rankin MD;  Location: [facility] OR    ENDOSCOPIC ULTRASOUND  UPPER GASTROINTESTINAL TRACT (GI) N/A 11/13/2020    Procedure: ENDOSCOPIC ULTRASOUND, ESOPHAGOSCOPY / UPPER GASTROINTESTINAL TRACT with BIOPSY;  Surgeon: Cydney Garcia MD;  Location:  OR    ENDOSCOPIC ULTRASOUND UPPER GASTROINTESTINAL TRACT (GI) N/A 9/25/2024    Procedure: and Endoscopic ultrasound upper;  Surgeon: Jesus Rankin MD;  Location:  OR    GYN SURGERY      H STATISTIC PICC LINE INSERTION >5YR, FAILED Right 11/10/2021    LUE unsuccessful attempt from another hospital, IR deferral    IR PICC PLACEMENT > 5 YRS OF AGE  11/11/2021    IR PICC PLACEMENT > 5 YRS OF AGE  3/16/2022    OTHER SURGICAL HISTORY      strictoplasty x3    OTHER SURGICAL HISTORY      adhesion removal x1    OTHER SURGICAL HISTORY      small bowel resections    PICC  05/10/2019         RESECTION ILEOCECAL  12/06/2013    Procedure: RESECTION ILEOCECAL;  Laparotomy, Extensive Lysis of Adhesions, Stricture Plasty X2  Anesthesia general with block;  Surgeon: Pete Cartagena MD;  Location: [facility] OR    RESECTION ILEOCOLIC N/A 11/17/2021    Procedure: Exploratory laparotomy, illeal resection, extensive lysis of adhesions (2 hr);  Surgeon: Pete Cartagena MD;  Location: [facility] OR        Family History Social History   Family History   Problem Relation Age of Onset    No Known Problems Mother     Clotting Disorder Father     No Known Problems Brother     No Known Problems Son     Anesthesia Reaction No family hx of     Colon Cancer No family hx of     Crohn's Disease No family hx of     Ulcerative Colitis No family hx of     Colon Polyps No family hx of     Social History     Socioeconomic History    Marital status:    Tobacco Use    Smoking status: Never    Smokeless tobacco: Never   Substance and Sexual Activity    Alcohol use: Yes     Comment: occasional/ 1 drink per 1-3 month    Drug use: No     Social Drivers of Health     Financial Resource Strain: Low Risk  (8/28/2024)    Financial Resource Strain     Within the past 12  months, have you or your family members you live with been unable to get utilities (heat, electricity) when it was really needed?: No   Food Insecurity: No Food Insecurity (9/11/2024)    Received from [facility]    Hunger Vital Sign     Worried About Running Out of Food in the Last Year: Never true     Ran Out of Food in the Last Year: Never true   Transportation Needs: No Transportation Needs (9/11/2024)    Received from [facility]    PRAPARE - Transportation     Lack of Transportation (Medical): No     Lack of Transportation (Non-Medical): No   Physical Activity: Inactive (9/11/2024)    Received from [facility]    Exercise Vital Sign     Days of Exercise per Week: 0 days     Minutes of Exercise per Session: 0 min   Stress: Stress Concern Present (8/22/2022)    Received from [facility]    [employer] - Occupational Stress Questionnaire     Feeling of Stress : Rather much   Social Connections: Socially Integrated (8/22/2022)    Received from [facility]    Social Connection and Isolation Panel [NHANES]     Frequency of Communication with Friends and Family: More than three times a week     Frequency of Social Gatherings with Friends and Family: Once a week     Attends Zoroastrian Services: More than 4 times per year     Active Member of Clubs or Organizations: Yes     Attends Club or Organization Meetings: More than 4 times per year     Marital Status:    Interpersonal Safety: Low Risk  (10/4/2024)    Interpersonal Safety     Do you feel physically and emotionally safe where you currently live?: Yes     Within the past 12 months, have you been hit, slapped, kicked or otherwise physically hurt by someone?: No     Within the past 12 months, have you been humiliated or emotionally abused in other ways by your partner or ex-partner?: No   Housing Stability: Low Risk  (9/11/2024)    Received from [facility]    Housing Stability     What is your living situation today?: I have a  "steady place to live         MEDICATIONS & ALLERGIES   (Not in a hospital admission)       ALLERGIES   Allergies   Allergen Reactions    Sucralfate Hives and Rash    Morphine Hcl Other (See Comments)     SPASMS OF SPHINCTER OF ODDI  (BILIARY TRACT)    Pneumovax [Pneumococcal Polysaccharide Vaccine]     Sucralfate Hives    Codeine Nausea and Vomiting         REVIEW OF SYSTEMS    See HPI for pertinent positives and negatives. A comprehensive review of systems was performed and was otherwise noncontributory.     OBJECTIVE   Vitals Blood pressure 114/71, pulse 71, temperature 98  F (36.7  C), temperature source Oral, resp. rate 16, height 1.575 m (5' 2\"), weight 51.7 kg (114 lb), SpO2 99%, not currently breastfeeding.           Physical  Exam  GENERAL: alert and oriented, no acute distress.    HEENT: atraumatic, anicteric, moist mucous membranes, neck soft/supple    PULMONARY: normal resp effort, breath sounds clear to auscultation bilaterally   CARDIOVASCULAR: normal rate and rhythm, no murmurs   ABDOMEN: soft, no tenderness, no distention, bowel sounds normal. No hepatosplenomegaly.    MUSCULOSKELETAL: joints grossly normal   NEUROLOGICAL: appropriate mental status, grossly intact   PSYCHIATRIC: normal mood, affect and insight   SKIN: warm and dry, no rashes   EXT: no edema        LABORATORY    ELECTROLYTE PANEL   No results for input(s): \"NA\", \"POTASSIUM\", \"CHLORIDE\", \"CO2\", \"GLC\", \"CR\", \"BUN\" in the last 168 hours.    Invalid input(s): \"CL\"   HEMATOLOGY PANEL   No results for input(s): \"HGB\", \"MCV\", \"WBC\", \"PLT\", \"INR\" in the last 168 hours.   LIVER AND PANCREAS PANEL   No results for input(s): \"AST\", \"ALT\", \"ALKPHOS\", \"BILITOTAL\", \"LIPASE\" in the last 168 hours.  IMAGING STUDIES    EXAM: FL VIDEO SWALLOW WITH SPEECH THERAPY   LOCATION: [facility]   DATE: 4/30/2025     INDICATION: Difficulty swallowing.   COMPARISON: None.     TECHNIQUE: Routine swallow study with speech pathology using multiple barium " thicknesses.     RADIATION DOSE: Total Air Kerma 47.7 mGy.     FINDINGS:   Swallow study with Speech Pathology using multiple barium thicknesses.     The patient's symptoms are retrosternal. Partway through the study, the fluoroscopy machine was moved over to the area where the patient is symptomatic, this showed that the patient has a dilated esophagus, with  apparent poor motility, and failure of relaxation of the lower esophageal sphincter. Barium would pool up to the level of the alexus and then pass by hydrostatic pressure     IMPRESSION:   1. Please see the Speech Pathologist's note for diet recommendations.   2.  The patient's symptoms are retrosternal, and partway through the examination the fluoroscopic machine was moved lower to examine the mid thorax from the lateral projection. The patient has a dilated esophagus with poor peristalsis. There was a failure of relaxation of the lower esophageal sphincter and barium would pool in the esophagus up to the level the alexus before it would pass by hydrostatic pressure. This is an incomplete radiologic evaluation of the mid and lower esophagus but the appearance would be consistent with achalasia.     I have reviewed the current diagnostic and laboratory tests.             Total time spent providing patient care: 35 minutes with at least 50% spent in reviewing documentation/test results, decision making, and coordination of care.           Kateryna Womack MD  Thank you for the opportunity to participate in the care of this patient.   Please feel free to call me with any questions or concerns.  Phone number [phone].

## 2025-05-11 NOTE — PHARMACY-ADMISSION MEDICATION HISTORY
Pharmacist Admission Medication History    Admission medication history is complete. The information provided in this note is only as accurate as the sources available at the time of the update.    Information Source(s): Patient and CareEverywhere/SureScripts via in-person    Pertinent Information:   Patient mentioned that they have not been taking supplements recently due to difficulty swallowing. Confirmed that they do take their prescription medications however.  Cyanocobalamin: Patient stated that they take the B12 injections weekly instead of the tablets.  Progesterone: Patient stated that she will start taking in 4 days, but not currently taking.    Changes made to PTA medication list:  Added: Progesterone, zofran, famotidine  Deleted: Vitamin D, Magnesium oxide  Changed: Vitamin B-12    Allergies reviewed with patient and updates made in EHR: yes    Medication History Completed By: Yann Cloud [facility] 5/11/2025 2:57 PM    PTA Med List   Medication Sig Last Dose/Taking    cyanocobalamin (CYANOCOBALAMIN) 1000 mcg/mL injection Inject 1,000 mcg into the muscle once a week. 5/7/2025    escitalopram (LEXAPRO) 10 MG tablet Take 10 mg by mouth at bedtime. 5/10/2025 Bedtime    famotidine (PEPCID) 40 MG tablet Take 40 mg by mouth daily. 5/10/2025 Bedtime    ondansetron (ZOFRAN ODT) 4 MG ODT tab Take 4 mg by mouth every 12 hours as needed for nausea or vomiting. Taking As Needed    progesterone (PROMETRIUM) 200 MG capsule TAKE 1-2 CAPSULES BY MOUTH AT BEDTIME FOR 10 DAYS EACH MONTH OF CYCLE STARTING DAY 17 OR PEAK +3 AS DIRECTED Taking    ustekinumab (STELARA) 90 MG/ML Inject 90 mg Subcutaneous every 28 days  Past Week

## 2025-05-11 NOTE — ED TRIAGE NOTES
Pt was at [facility] ED for eval of Dysphagia and discharged after EZ gas and glucagon with resolution. She was able to swallow and tolerate water upon discharge. Pt had a few bites of ice cream and immediately starting vomiting and unable to swallow water again. Epigastric and sternal pain. Denies SOB. VSS.      Triage Assessment (Adult)       Row Name 05/11/25 [id number]          Triage Assessment    Airway WDL WDL  c/o dysphagia        Respiratory WDL    Respiratory WDL WDL        Skin Circulation/Temperature WDL    Skin Circulation/Temperature WDL WDL        Cardiac WDL    Cardiac WDL WDL        Peripheral/Neurovascular WDL    Peripheral Neurovascular WDL WDL        Cognitive/Neuro/Behavioral WDL    Cognitive/Neuro/Behavioral WDL WDL

## 2025-05-11 NOTE — ED PROVIDER NOTES
EMERGENCY DEPARTMENT ENCOUNTER      NAME: Alok Nino  AGE: 45 year old female  YOB: 1980  MRN: [id number]  EVALUATION DATE & TIME: No admission date for patient encounter.    PCP: Nakita Odonnell    ED PROVIDER: Ankur Montemayor M.D.      Chief Complaint   Patient presents with    Unable to Swallow         FINAL IMPRESSION:  1. Esophageal dysphagia          ED COURSE & MEDICAL DECISION MAKIN:35 PM I met with the patient, obtained history, performed an initial exam, and discussed options and plan for diagnostics and treatment here in the ED.  1:56 PM Spoke with BRITNEY Holland  2:15 PM Dr. Womack is at the bedside, discussing treatment plan with patient  3:15 PM Repeat exam is benign.  Patient feeling improved and tolerating small sips of fluid.  3:30 PM Repeat exam benign.  Patient feeling improved and tolerating p.o. fluids.  Will discharge with Protonix, return precautions and recommendations for close follow-up with [facility].          Pertinent Labs & Imaging studies reviewed. (See chart for details)  45 year old female presents to the Emergency Department for evaluation of dysphagia. Patient appears non toxic with stable vitals signs, patient is afebrile with no tachycardia or hypoxia. Overall exam is benign.  Lungs are clear and abdomen is benign, patient tolerating secretions.  Per review of the medical record, did review x-ray upper GI in video swallow evaluation done through [facility] on 2025, reading through the impression it appears as though findings were consistent with achalasia.  Certainly considered screening labs and repeat imaging studies, certainly considered treating like like a typical food impaction with glucagon and EZ gas.  However will initiate workup with discussion with [facility] on-call.  Recommendations from [facility] plan here today is to attempt appropriate symptomatic relief with glucagon and EZ gas.  If patient  tolerates this and is able to tolerate p.o. liquids GI will recommend discharged and continue outpatient follow-up with outpatient course of Protonix.    Reassessment: Following our interventions, patient felt much improved, symptoms gradually improved, she was certainly able to tolerate p.o. liquids here.  Patient at this time does feel comfortable with discharge.  Therefore, will discharge patient with recommendations for full liquid diet, follow-up with [facility] in the next 1 or 2 days, she already has outpatient testing scheduled for tomorrow and will recommend she follows up with this testing, and will discharge with a prescription of Protonix.  Updated Dr. Womack with treatment plan.  With patient, all questions were answered and reasons to return discussed.  Patient felt comfortable this plan discharged stable condition.    Medical Decision Making  I reviewed the EMR: Prior Imaging: upper gi swallow evaluation   Discharge. I prescribed additional prescription strength medication(s) as charted. See documentation for any additional details.    MIPS (CTPE, Dental pain, Hamilton, Sinusitis, Asthma/COPD, Head Trauma): Not Applicable    SEPSIS: None          At the conclusion of the encounter I discussed the results of all of the tests and the disposition. The questions were answered and return precautions provided. The patient or family acknowledged understanding and was agreeable with the care plan.         MEDICATIONS GIVEN IN THE EMERGENCY:  Medications   glucagon injection 1 mg (1 mg Intravenous $Given 5/11/25 1504)   sod bicarbonate-citric acid-simethicone (EZ GAS) 2.21-1.53-0.04 g packet 4 g (4 g Oral $Given 5/11/25 1508)       NEW PRESCRIPTIONS STARTED AT TODAY'S ER VISIT  New Prescriptions    PANTOPRAZOLE (PROTONIX) 40 MG EC TABLET    Take 1 tablet (40 mg) by mouth daily for 30 doses.            =================================================================    HPI    Patient information was obtained  "from: patient and mother    Use of Intrepreter: N/A         Alok Nino is a 45 year old female who presents difficulty swallowing.  Patient states that she has had difficulty swallowing for several months and is currently undergoing outpatient evaluation by Dr. Tolbert through [facility].  However, swallowing became suddenly worse last night after eating some eggs.  Patient had recent imaging done at outside facility and told likely has achalasia.  Called her primary gastroenterology clinic and was referred to the emergency department for possible food impaction.  Otherwise patient denies any new chest pain, shortness of breath, difficulty breathing, fever, falls or trauma.  She does endorse associated nausea and vomiting when trying to eat or drink today.        VITALS:  Patient Vitals for the past 24 hrs:   BP Temp Temp src Pulse Resp SpO2 Height Weight   05/11/25 1352 114/71 -- -- 71 -- 99 % -- --   05/11/25 1321 125/79 98  F (36.7  C) Oral 77 16 99 % 1.575 m (5' 2\") 51.7 kg (114 lb)        PHYSICAL EXAM    Constitutional:  Awake, alert, in no apparent distress  HENT:  Normocephalic, Atraumatic. Bilateral external ears normal. Oropharynx moist, patient is tolerating secretions. Nose normal. Neck- Normal range of motion with no guarding, No midline cervical tenderness, Supple, No stridor.   Eyes:  PERRL, EOMI with no signs of entrapment, Conjunctiva normal, No discharge.   Respiratory:  Normal breath sounds, No respiratory distress, No wheezing.    Cardiovascular:  Normal heart rate, Normal rhythm, No appreciable rubs or gallops.   GI:  Soft, No tenderness, No distension, No palpable masses  Musculoskeletal:  Intact distal pulses, No edema. Good range of motion in all major joints. No tenderness to palpation or major deformities noted.  Integument:  Warm, Dry, No erythema, No rash.   Neurologic:  Alert & oriented, Normal motor function, Normal sensory function, No focal deficits noted.   Psychiatric:  Affect " normal, Judgment normal, Mood normal.     LAB:  All pertinent labs reviewed and interpreted.       RADIOLOGY:  No orders to display          EKG:      I have independently reviewed and interpreted the EKG(s) documented above.    PROCEDURES:        [facility] Documentation:   CMS Diagnoses: None      I, ANKUR MONTEMAYOR MD, am serving as a scribe to document services personally performed by Ankur Montemayor MD, based on my observation and the provider's statements to me. I, Ankur Montemayor MD attest that ANKUR MONTEMAYOR MD is acting in a scribe capacity, has observed my performance of the services and has documented them in accordance with my direction.    Ankur Montemayor M.D.  Emergency Medicine  [facility] EMERGENCY DEPARTMENT  [facility] [facility] [id number] [phone]  Dept: [phone]       Ankur Montemayor MD  05/11/25 8435

## 2025-05-11 NOTE — ED TRIAGE NOTES
Patient presents here for evaluation of difficulty swallowing. She has been experiencing increasing problems and underwent a swallow study last week ([place]) and determined to have achalasia. She reports that last night, she found that she is unable to retain any fluids. She called her GI provider and was advised to come here for evaluation of a potential food impaction in her lower esophagus.       Triage Assessment (Adult)       Row Name 05/11/25 1322          Triage Assessment    Airway WDL WDL        Respiratory WDL    Respiratory WDL WDL        Skin Circulation/Temperature WDL    Skin Circulation/Temperature WDL WDL        Cardiac WDL    Cardiac WDL WDL        Peripheral/Neurovascular WDL    Peripheral Neurovascular WDL WDL

## 2025-05-11 NOTE — ED PROVIDER NOTES
EMERGENCY DEPARTMENT ENCOUNTER      NAME: Alok Nino  AGE: 45 year old female  YOB: 1980  MRN: [id number]  EVALUATION DATE & TIME: No admission date for patient encounter.    PCP: Nakita Odonnell    ED PROVIDER: Isidro Medina M.D.      Chief Complaint   Patient presents with    Dysphagia         FINAL IMPRESSION:  1. Esophageal dysphagia          ED COURSE & MEDICAL DECISION MAKING:    Pertinent Labs & Imaging studies reviewed. (See chart for details)  45 year old female presents to the Emergency Department for evaluation of dysphagia.  Patient sees Dr. Tolbert for ulcerative colitis at [facility].  She was at [facility] earlier today for what was felt to be a possible food impaction.  She has had evaluations of her esophagus previously but never had a EGD.  They think she likely has achalasia and her symptoms improved at [facility] and so they felt that she could be discharged with the hope that she would remain asymptomatic enough to try to obtain an outpatient EGD which would require the general anesthesia.  Fortunately the patient went home and had another food impaction.  By the time I saw her things had improved but she still had significant discomfort with any sort of p.o. intake even water.  She was not retching.  Blood work was sent here to ensure that she was not dehydrated or had any metabolic abnormalities.  Blood work was reassuring.  Consider chest x-ray but without tearing pain I think the likelihood of intrathoracic emergency is low.  Called and spoke with GI who felt that if the patient continued to have significant symptoms that admission to the hospital for EGD under general anesthesia would be appropriate.  Spoke with the hospitalist and we will admit the patient    At the conclusion of the encounter I discussed the results of all of the tests and the disposition. The questions were answered. The patient or family acknowledged understanding and was agreeable with the care plan.      ED Course as of 05/12/25 0002   Sun May 11, 2025   2127  The patient drank some water approximately 10 to 15 minutes ago and has not vomited.  She still has the globus sensation and is concerned it is not going down.  No retching or vomiting though.  Will call GI and decide on next steps.   2221 Spoke to the GI doctor who reviewed the records and because this is likely achalasia the patient will need an EGD done under general anesthesia.  That cannot be done as an outpatient in any sort of timely manner so GI physician felt that unless the patient is extremely comfortable and willing to wait for weeks that it would be appropriate to admit her for EGD tomorrow under general anesthesia.  I spoke to the patient and while she is not retching she is extremely uncomfortable and does not think that she is able to tolerate p.o.  With the sensation is significant as it is I think we have no choice but to admit her.           Medical Decision Making  I reviewed the EMR: Outpatient Record: GI note  Care impacted by chrohns  Admit.    MIPS (CTPE, Dental pain, Hamilton, Sinusitis, Asthma/COPD, Head Trauma): Not Applicable    SEPSIS: None                  This patient involved a high degree of complexity in medical decision making, as significant risks were present and assessed. Recent encounters & results in medical record reviewed by me.    Obtained History From Patient, mother    Care Impacted by Chronic Illness Chron's disease        All workup (i.e. any EKG/labs/imaging as per charting below) reviewed and independently interpreted by me. See respective sections for details.       minutes of critical care time     MEDICATIONS GIVEN IN THE EMERGENCY:  Medications   lidocaine 1 % 0.1-1 mL (has no administration in time range)   lidocaine (LMX4) cream (has no administration in time range)   sodium chloride (PF) 0.9% PF flush 3 mL (has no administration in time range)   sodium chloride (PF) 0.9% PF flush 3 mL (has no  administration in time range)   senna-docusate (SENOKOT-S/PERICOLACE) 8.6-50 MG per tablet 1 tablet (has no administration in time range)     Or   senna-docusate (SENOKOT-S/PERICOLACE) 8.6-50 MG per tablet 2 tablet (has no administration in time range)   lactated ringers infusion (has no administration in time range)   acetaminophen (TYLENOL) tablet 650 mg (has no administration in time range)     Or   acetaminophen (TYLENOL) Suppository 650 mg (has no administration in time range)   HYDROmorphone (PF) (DILAUDID) injection 0.2 mg (has no administration in time range)   HYDROmorphone (PF) (DILAUDID) injection 0.4 mg (has no administration in time range)   ondansetron (ZOFRAN ODT) ODT tab 4 mg (has no administration in time range)     Or   ondansetron (ZOFRAN) injection 4 mg (has no administration in time range)   sodium chloride 0.9% BOLUS 1,000 mL (1,000 mLs Intravenous $New Bag 5/11/25 2059)       NEW PRESCRIPTIONS STARTED AT TODAY'S ER VISIT  New Prescriptions    No medications on file          =================================================================    HPI    Patient information was obtained from: Patient, mother    Use of :         Alok Nino is a 45 year old female with a pertinent history of Chron's disease who presents for evaluation of dysphagia.    Per chart review, the patient was seen in the [facility] ED earlier today (5/11/2025) for esophageal dysphagia. She had a x-ray upper GI in video swallow evaluation done through [facility] on 4/30/2025, findings were consistent with achalasia. Consulted [facility] who recommended appropriate symptomatic relief with glucagon and EZ gas. Patient felt improved and was able to tolerate liquids after. Discharged patient with recommendations for full liquid diet and follow-up with [facility] in the next 1 or 2 days. Discharged with Protonix.     Per the patient, she was feeling well after she was discharged from the [facility]. She ate 3 bites of soft serve ice cream on the way to  her Protonix prescription. She was able to swallow fine but after the second bite of the soft serve, she states that the dysphagia and esophageal discomfort/pain returned. Patient then had 1 episode of emesis. She has not tried to swallow anything since then.     She reports dysphagia for the past few months, worse this past month. She has a manometry esophageal study scheduled for tomorrow. No other concerns at this time.        REVIEW OF SYSTEMS   Review of Systems   HENT:          Positive for dysphagia and esophageal discomfort/pain.    All other systems reviewed and are negative.       PAST MEDICAL HISTORY:  Past Medical History:   Diagnosis Date    Anemia     C. difficile enteritis     Chronic pain     Copper deficiency     Crohn disease (H)     Elevated LFTs     Endometriosis     GERRY (generalized anxiety disorder)     Melanoma (H)     PCOS (polycystic ovarian syndrome)     PONV (postoperative nausea and vomiting)     SBO (small bowel obstruction) (H) 03/14/2022       PAST SURGICAL HISTORY:  Past Surgical History:   Procedure Laterality Date    ABDOMEN SURGERY      3 for crohns dx    APPENDECTOMY      APPENDECTOMY      CHOLECYSTECTOMY      CHOLECYSTECTOMY      COLONOSCOPY      ENDOSCOPIC RETROGRADE CHOLANGIOPANCREATOGRAM N/A 9/25/2024    Procedure: Endoscopic retrograde CHOLANGIOPANCREATOGRAPHY, BILIARY STENT PLACEMENT,AND STONE EXTRACTION;  Surgeon: Jesus Rankin MD;  Location:  OR    ENDOSCOPIC RETROGRADE CHOLANGIOPANCREATOGRAM N/A 10/4/2024    Procedure: ENDOSCOPIC RETROGRADE CHOLANGIOPANCREATOGRAPHY, STENT AND STONE REMOVAL;  Surgeon: Jesus Rankin MD;  Location: [facility] [place] [place] OR    ENDOSCOPIC ULTRASOUND UPPER GASTROINTESTINAL TRACT (GI) N/A 11/13/2020    Procedure: ENDOSCOPIC ULTRASOUND, ESOPHAGOSCOPY / UPPER GASTROINTESTINAL TRACT with BIOPSY;  Surgeon: Cydney Garcia MD;  Location:  OR    ENDOSCOPIC  ULTRASOUND UPPER GASTROINTESTINAL TRACT (GI) N/A 9/25/2024    Procedure: and Endoscopic ultrasound upper;  Surgeon: Jesus Rankin MD;  Location: [facility] OR    GYN SURGERY      H STATISTIC PICC LINE INSERTION >5YR, FAILED Right 11/10/2021    LUE unsuccessful attempt from another hospital, IR deferral    IR PICC PLACEMENT > 5 YRS OF AGE  11/11/2021    IR PICC PLACEMENT > 5 YRS OF AGE  3/16/2022    OTHER SURGICAL HISTORY      strictoplasty x3    OTHER SURGICAL HISTORY      adhesion removal x1    OTHER SURGICAL HISTORY      small bowel resections    PICC  05/10/2019         RESECTION ILEOCECAL  12/06/2013    Procedure: RESECTION ILEOCECAL;  Laparotomy, Extensive Lysis of Adhesions, Stricture Plasty X2  Anesthesia general with block;  Surgeon: Pete Cartagena MD;  Location: [facility] OR    RESECTION ILEOCOLIC N/A 11/17/2021    Procedure: Exploratory laparotomy, illeal resection, extensive lysis of adhesions (2 hr);  Surgeon: Pete Cartagena MD;  Location: [facility] OR           CURRENT MEDICATIONS:    cyanocobalamin (CYANOCOBALAMIN) 1000 mcg/mL injection  escitalopram (LEXAPRO) 10 MG tablet  famotidine (PEPCID) 40 MG tablet  ondansetron (ZOFRAN ODT) 4 MG ODT tab  pantoprazole (PROTONIX) 40 MG EC tablet  progesterone (PROMETRIUM) 200 MG capsule  ustekinumab (STELARA) 90 MG/ML        ALLERGIES:  Allergies   Allergen Reactions    Sucralfate Hives and Rash    Morphine Hcl Other (See Comments)     SPASMS OF SPHINCTER OF ODDI  (BILIARY TRACT)    Pneumovax [Pneumococcal Polysaccharide Vaccine]     Sucralfate Hives    Codeine Nausea and Vomiting       FAMILY HISTORY:  Family History   Problem Relation Age of Onset    No Known Problems Mother     Clotting Disorder Father     No Known Problems Brother     No Known Problems Son     Anesthesia Reaction No family hx of     Colon Cancer No family hx of     Crohn's Disease No family hx of     Ulcerative Colitis No family hx of     Colon Polyps No family hx of        SOCIAL HISTORY:   Social History      Socioeconomic History    Marital status:    Tobacco Use    Smoking status: Never    Smokeless tobacco: Never   Substance and Sexual Activity    Alcohol use: Yes     Comment: occasional/ 1 drink per 1-3 month    Drug use: No     Social Drivers of Health     Financial Resource Strain: Low Risk  (8/28/2024)    Financial Resource Strain     Within the past 12 months, have you or your family members you live with been unable to get utilities (heat, electricity) when it was really needed?: No   Food Insecurity: No Food Insecurity (9/11/2024)    Received from [facility]    Hunger Vital Sign     Worried About Running Out of Food in the Last Year: Never true     Ran Out of Food in the Last Year: Never true   Transportation Needs: No Transportation Needs (9/11/2024)    Received from [facility]    PRAPARE - Transportation     Lack of Transportation (Medical): No     Lack of Transportation (Non-Medical): No   Physical Activity: Inactive (9/11/2024)    Received from [facility]    Exercise Vital Sign     Days of Exercise per Week: 0 days     Minutes of Exercise per Session: 0 min   Stress: Stress Concern Present (8/22/2022)    Received from [facility]    [employer] - Occupational Stress Questionnaire     Feeling of Stress : Rather much   Social Connections: Socially Integrated (8/22/2022)    Received from [facility]    Social Connection and Isolation Panel [NHANES]     Frequency of Communication with Friends and Family: More than three times a week     Frequency of Social Gatherings with Friends and Family: Once a week     Attends Taoism Services: More than 4 times per year     Active Member of Clubs or Organizations: Yes     Attends Club or Organization Meetings: More than 4 times per year     Marital Status:    Interpersonal Safety: Low Risk  (10/4/2024)    Interpersonal Safety     Do you feel physically and emotionally safe where you currently live?: Yes     Within the  "past 12 months, have you been hit, slapped, kicked or otherwise physically hurt by someone?: No     Within the past 12 months, have you been humiliated or emotionally abused in other ways by your partner or ex-partner?: No   Housing Stability: Low Risk  (9/11/2024)    Received from [facility]    Housing Stability     What is your living situation today?: I have a steady place to live       VITALS:  /63   Pulse 66   Temp 97.8  F (36.6  C) (Oral)   Resp 18   Ht 1.575 m (5' 2\")   Wt 52 kg (114 lb 9.6 oz)   LMP 05/01/2025   SpO2 99%   BMI 20.96 kg/m        PHYSICAL EXAM    Constitutional: Well developed, Well nourished, NAD, GCS 15  HENT: Normocephalic, Atraumatic, Bilateral external ears normal, Oropharynx normal, mucous membranes moist, Nose normal. Neck-  Normal range of motion, No tenderness, Supple, No stridor.  Eyes: PERRL, EOMI, Conjunctiva normal, No discharge.   Respiratory: Normal breath sounds, No respiratory distress, No wheezing, Speaks full sentences easily. No cough.  Cardiovascular: Normal heart rate, Regular rhythm, No murmurs, No rubs, No gallops. Chest wall nontender.  GI:Soft, No tenderness, No masses, No flank tenderness. No rebound or guarding.   Musculoskeletal: 2+ DP pulses. No edema.No cyanosis, No clubbing. Good range of motion in all major joints. No tenderness to palpation or major deformities noted.   Integument: Warm, Dry, No erythema, No rash. No petechiae.   Neurologic: Alert & oriented x 3,  CN 3-12 intact Normal motor function, Normal sensory function, No focal deficits noted. Normal gait. Normal finger to nose bilaterally  Psychiatric: Affect normal, Judgment normal, Mood normal. Cooperative.          LAB:  All pertinent labs reviewed and interpreted.  Labs Ordered and Resulted from Time of ED Arrival to Time of ED Departure   BASIC METABOLIC PANEL - Abnormal       Result Value    Sodium 139      Potassium 3.5      Chloride 105      Carbon Dioxide (CO2) 20 (*)     Anion " Gap 14      Urea Nitrogen 13.9      Creatinine 0.58      GFR Estimate >90      Calcium 9.2      Glucose 90     CBC WITH PLATELETS AND DIFFERENTIAL    WBC Count 10.8      RBC Count 4.24      Hemoglobin 13.0      Hematocrit 38.3      MCV 90      MCH 30.7      MCHC 33.9      RDW 12.6      Platelet Count 305      % Neutrophils 74      % Lymphocytes 15      % Monocytes 8      % Eosinophils 3      % Basophils 0      % Immature Granulocytes 0      NRBCs per 100 WBC 0      Absolute Neutrophils 8.0      Absolute Lymphocytes 1.7      Absolute Monocytes 0.8      Absolute Eosinophils 0.3      Absolute Basophils 0.0      Absolute Immature Granulocytes 0.0      Absolute NRBCs 0.0     BASIC METABOLIC PANEL       RADIOLOGY:  Reviewed all pertinent imaging. Please see official radiology report.  No orders to display       EKG:    N/A     PROCEDURES:   N/A     [facility] Documentation:   CMS Diagnoses: None               I, Rita Patton, am serving as a scribe to document services personally performed by Dr. Isidro Medina based on my observation and the provider's statements to me. IIsidro MD attest that Rita Patton is acting in a scribe capacity, has observed my performance of the services and has documented them in accordance with my direction.    Isidro Medina M.D.  Emergency Medicine  [facility] EMERGENCY ROOM  [address] [id number] [phone]  Dept: [phone]       Isidro Medina MD  05/12/25 0005

## 2025-05-11 NOTE — DISCHARGE INSTRUCTIONS
Continue on full liquid diet, take Protonix as prescribed.  Follow-up with [facility] in the next 20 days for continued outpatient management evaluation.  Please return for any worsening or more concerning symptoms.

## 2025-05-12 ENCOUNTER — ANESTHESIA (OUTPATIENT)
Dept: SURGERY | Facility: CLINIC | Age: 45
End: 2025-05-12
Payer: COMMERCIAL

## 2025-05-12 ENCOUNTER — ANESTHESIA EVENT (OUTPATIENT)
Dept: SURGERY | Facility: CLINIC | Age: 45
End: 2025-05-12
Payer: COMMERCIAL

## 2025-05-12 LAB
ANION GAP SERPL CALCULATED.3IONS-SCNC: 11 MMOL/L (ref 7–15)
BUN SERPL-MCNC: 12.9 MG/DL (ref 6–20)
CALCIUM SERPL-MCNC: 8.3 MG/DL (ref 8.8–10.4)
CHLORIDE SERPL-SCNC: 108 MMOL/L (ref 98–107)
CREAT SERPL-MCNC: 0.67 MG/DL (ref 0.51–0.95)
EGFRCR SERPLBLD CKD-EPI 2021: >90 ML/MIN/1.73M2
GLUCOSE SERPL-MCNC: 79 MG/DL (ref 70–99)
HCO3 SERPL-SCNC: 21 MMOL/L (ref 22–29)
HOLD SPECIMEN: NORMAL
POTASSIUM SERPL-SCNC: 3.6 MMOL/L (ref 3.4–5.3)
SODIUM SERPL-SCNC: 140 MMOL/L (ref 135–145)
TROPONIN T SERPL HS-MCNC: <6 NG/L
UPPER GI ENDOSCOPY: NORMAL

## 2025-05-12 PROCEDURE — 250N000011 HC RX IP 250 OP 636: Performed by: STUDENT IN AN ORGANIZED HEALTH CARE EDUCATION/TRAINING PROGRAM

## 2025-05-12 PROCEDURE — 360N000075 HC SURGERY LEVEL 2, PER MIN: Performed by: INTERNAL MEDICINE

## 2025-05-12 PROCEDURE — 88305 TISSUE EXAM BY PATHOLOGIST: CPT | Mod: 26 | Performed by: PATHOLOGY

## 2025-05-12 PROCEDURE — 250N000013 HC RX MED GY IP 250 OP 250 PS 637: Performed by: INTERNAL MEDICINE

## 2025-05-12 PROCEDURE — 99233 SBSQ HOSP IP/OBS HIGH 50: CPT | Performed by: STUDENT IN AN ORGANIZED HEALTH CARE EDUCATION/TRAINING PROGRAM

## 2025-05-12 PROCEDURE — 120N000004 HC R&B MS OVERFLOW

## 2025-05-12 PROCEDURE — 0DC38ZZ EXTIRPATION OF MATTER FROM LOWER ESOPHAGUS, VIA NATURAL OR ARTIFICIAL OPENING ENDOSCOPIC: ICD-10-PCS | Performed by: INTERNAL MEDICINE

## 2025-05-12 PROCEDURE — 370N000017 HC ANESTHESIA TECHNICAL FEE, PER MIN: Performed by: INTERNAL MEDICINE

## 2025-05-12 PROCEDURE — 99418 PROLNG IP/OBS E/M EA 15 MIN: CPT | Performed by: STUDENT IN AN ORGANIZED HEALTH CARE EDUCATION/TRAINING PROGRAM

## 2025-05-12 PROCEDURE — 710N000010 HC RECOVERY PHASE 1, LEVEL 2, PER MIN: Performed by: INTERNAL MEDICINE

## 2025-05-12 PROCEDURE — 999N000141 HC STATISTIC PRE-PROCEDURE NURSING ASSESSMENT: Performed by: INTERNAL MEDICINE

## 2025-05-12 PROCEDURE — 710N000012 HC RECOVERY PHASE 2, PER MINUTE: Performed by: INTERNAL MEDICINE

## 2025-05-12 PROCEDURE — 250N000011 HC RX IP 250 OP 636: Performed by: INTERNAL MEDICINE

## 2025-05-12 PROCEDURE — 258N000003 HC RX IP 258 OP 636: Performed by: STUDENT IN AN ORGANIZED HEALTH CARE EDUCATION/TRAINING PROGRAM

## 2025-05-12 PROCEDURE — 250N000009 HC RX 250: Performed by: STUDENT IN AN ORGANIZED HEALTH CARE EDUCATION/TRAINING PROGRAM

## 2025-05-12 PROCEDURE — 250N000025 HC SEVOFLURANE, PER MIN: Performed by: INTERNAL MEDICINE

## 2025-05-12 PROCEDURE — 250N000011 HC RX IP 250 OP 636

## 2025-05-12 PROCEDURE — 272N000001 HC OR GENERAL SUPPLY STERILE: Performed by: INTERNAL MEDICINE

## 2025-05-12 PROCEDURE — 80048 BASIC METABOLIC PNL TOTAL CA: CPT | Performed by: INTERNAL MEDICINE

## 2025-05-12 PROCEDURE — 36415 COLL VENOUS BLD VENIPUNCTURE: CPT | Performed by: INTERNAL MEDICINE

## 2025-05-12 PROCEDURE — 250N000011 HC RX IP 250 OP 636: Mod: JZ | Performed by: ANESTHESIOLOGY

## 2025-05-12 PROCEDURE — 0DB48ZX EXCISION OF ESOPHAGOGASTRIC JUNCTION, VIA NATURAL OR ARTIFICIAL OPENING ENDOSCOPIC, DIAGNOSTIC: ICD-10-PCS | Performed by: INTERNAL MEDICINE

## 2025-05-12 PROCEDURE — 88305 TISSUE EXAM BY PATHOLOGIST: CPT | Mod: TC | Performed by: INTERNAL MEDICINE

## 2025-05-12 PROCEDURE — 258N000003 HC RX IP 258 OP 636: Performed by: ANESTHESIOLOGY

## 2025-05-12 PROCEDURE — 84484 ASSAY OF TROPONIN QUANT: CPT | Performed by: STUDENT IN AN ORGANIZED HEALTH CARE EDUCATION/TRAINING PROGRAM

## 2025-05-12 PROCEDURE — 258N000003 HC RX IP 258 OP 636: Performed by: INTERNAL MEDICINE

## 2025-05-12 RX ORDER — FENTANYL CITRATE 50 UG/ML
25-100 INJECTION, SOLUTION INTRAMUSCULAR; INTRAVENOUS EVERY 5 MIN PRN
Refills: 0 | Status: DISCONTINUED | OUTPATIENT
Start: 2025-05-12 | End: 2025-05-12 | Stop reason: HOSPADM

## 2025-05-12 RX ORDER — SODIUM CHLORIDE, SODIUM LACTATE, POTASSIUM CHLORIDE, CALCIUM CHLORIDE 600; 310; 30; 20 MG/100ML; MG/100ML; MG/100ML; MG/100ML
INJECTION, SOLUTION INTRAVENOUS CONTINUOUS
Status: DISCONTINUED | OUTPATIENT
Start: 2025-05-12 | End: 2025-05-12

## 2025-05-12 RX ORDER — ONDANSETRON 4 MG/1
4 TABLET, ORALLY DISINTEGRATING ORAL EVERY 30 MIN PRN
Status: DISCONTINUED | OUTPATIENT
Start: 2025-05-12 | End: 2025-05-12 | Stop reason: HOSPADM

## 2025-05-12 RX ORDER — FLUMAZENIL 0.1 MG/ML
0.2 INJECTION, SOLUTION INTRAVENOUS
Status: DISCONTINUED | OUTPATIENT
Start: 2025-05-12 | End: 2025-05-12 | Stop reason: HOSPADM

## 2025-05-12 RX ORDER — HYDROMORPHONE HYDROCHLORIDE 1 MG/ML
0.2 INJECTION, SOLUTION INTRAMUSCULAR; INTRAVENOUS; SUBCUTANEOUS
Refills: 0 | Status: DISCONTINUED | OUTPATIENT
Start: 2025-05-12 | End: 2025-05-13 | Stop reason: HOSPADM

## 2025-05-12 RX ORDER — ONDANSETRON 2 MG/ML
4 INJECTION INTRAMUSCULAR; INTRAVENOUS EVERY 6 HOURS PRN
Status: DISCONTINUED | OUTPATIENT
Start: 2025-05-12 | End: 2025-05-13 | Stop reason: HOSPADM

## 2025-05-12 RX ORDER — LIDOCAINE 40 MG/G
CREAM TOPICAL
Status: DISCONTINUED | OUTPATIENT
Start: 2025-05-12 | End: 2025-05-12 | Stop reason: HOSPADM

## 2025-05-12 RX ORDER — ACETAMINOPHEN 325 MG/1
650 TABLET ORAL EVERY 4 HOURS PRN
Status: DISCONTINUED | OUTPATIENT
Start: 2025-05-12 | End: 2025-05-13 | Stop reason: HOSPADM

## 2025-05-12 RX ORDER — HYDROMORPHONE HYDROCHLORIDE 1 MG/ML
0.5 INJECTION, SOLUTION INTRAMUSCULAR; INTRAVENOUS; SUBCUTANEOUS ONCE
Status: COMPLETED | OUTPATIENT
Start: 2025-05-12 | End: 2025-05-12

## 2025-05-12 RX ORDER — ONDANSETRON 2 MG/ML
4 INJECTION INTRAMUSCULAR; INTRAVENOUS EVERY 30 MIN PRN
Status: DISCONTINUED | OUTPATIENT
Start: 2025-05-12 | End: 2025-05-12 | Stop reason: HOSPADM

## 2025-05-12 RX ORDER — SODIUM CHLORIDE, SODIUM LACTATE, POTASSIUM CHLORIDE, CALCIUM CHLORIDE 600; 310; 30; 20 MG/100ML; MG/100ML; MG/100ML; MG/100ML
INJECTION, SOLUTION INTRAVENOUS CONTINUOUS
Status: DISCONTINUED | OUTPATIENT
Start: 2025-05-12 | End: 2025-05-12 | Stop reason: HOSPADM

## 2025-05-12 RX ORDER — DIPHENHYDRAMINE HYDROCHLORIDE 50 MG/ML
25-50 INJECTION, SOLUTION INTRAMUSCULAR; INTRAVENOUS
Status: DISCONTINUED | OUTPATIENT
Start: 2025-05-12 | End: 2025-05-12 | Stop reason: HOSPADM

## 2025-05-12 RX ORDER — AMOXICILLIN 250 MG
1 CAPSULE ORAL 2 TIMES DAILY PRN
Status: DISCONTINUED | OUTPATIENT
Start: 2025-05-11 | End: 2025-05-13 | Stop reason: HOSPADM

## 2025-05-12 RX ORDER — NALOXONE HYDROCHLORIDE 0.4 MG/ML
0.2 INJECTION, SOLUTION INTRAMUSCULAR; INTRAVENOUS; SUBCUTANEOUS
Status: DISCONTINUED | OUTPATIENT
Start: 2025-05-12 | End: 2025-05-12 | Stop reason: HOSPADM

## 2025-05-12 RX ORDER — AMOXICILLIN 250 MG
2 CAPSULE ORAL 2 TIMES DAILY PRN
Status: DISCONTINUED | OUTPATIENT
Start: 2025-05-11 | End: 2025-05-13 | Stop reason: HOSPADM

## 2025-05-12 RX ORDER — DEXAMETHASONE SODIUM PHOSPHATE 10 MG/ML
4 INJECTION, SOLUTION INTRAMUSCULAR; INTRAVENOUS
Status: DISCONTINUED | OUTPATIENT
Start: 2025-05-12 | End: 2025-05-12 | Stop reason: HOSPADM

## 2025-05-12 RX ORDER — HYDROMORPHONE HCL IN WATER/PF 6 MG/30 ML
0.4 PATIENT CONTROLLED ANALGESIA SYRINGE INTRAVENOUS EVERY 5 MIN PRN
Refills: 0 | Status: DISCONTINUED | OUTPATIENT
Start: 2025-05-12 | End: 2025-05-12 | Stop reason: HOSPADM

## 2025-05-12 RX ORDER — NALOXONE HYDROCHLORIDE 0.4 MG/ML
0.4 INJECTION, SOLUTION INTRAMUSCULAR; INTRAVENOUS; SUBCUTANEOUS
Status: DISCONTINUED | OUTPATIENT
Start: 2025-05-12 | End: 2025-05-12 | Stop reason: HOSPADM

## 2025-05-12 RX ORDER — NALOXONE HYDROCHLORIDE 0.4 MG/ML
0.2 INJECTION, SOLUTION INTRAMUSCULAR; INTRAVENOUS; SUBCUTANEOUS
Status: DISCONTINUED | OUTPATIENT
Start: 2025-05-12 | End: 2025-05-13 | Stop reason: HOSPADM

## 2025-05-12 RX ORDER — TRAMADOL HYDROCHLORIDE 50 MG/1
50 TABLET ORAL EVERY 6 HOURS PRN
Status: DISCONTINUED | OUTPATIENT
Start: 2025-05-12 | End: 2025-05-13 | Stop reason: HOSPADM

## 2025-05-12 RX ORDER — NALOXONE HYDROCHLORIDE 0.4 MG/ML
0.1 INJECTION, SOLUTION INTRAMUSCULAR; INTRAVENOUS; SUBCUTANEOUS
Status: DISCONTINUED | OUTPATIENT
Start: 2025-05-12 | End: 2025-05-12 | Stop reason: HOSPADM

## 2025-05-12 RX ORDER — HYDROMORPHONE HYDROCHLORIDE 1 MG/ML
0.4 INJECTION, SOLUTION INTRAMUSCULAR; INTRAVENOUS; SUBCUTANEOUS
Refills: 0 | Status: DISCONTINUED | OUTPATIENT
Start: 2025-05-12 | End: 2025-05-13 | Stop reason: HOSPADM

## 2025-05-12 RX ORDER — DIPHENHYDRAMINE HYDROCHLORIDE 50 MG/ML
25 INJECTION, SOLUTION INTRAMUSCULAR; INTRAVENOUS EVERY 6 HOURS PRN
Status: DISCONTINUED | OUTPATIENT
Start: 2025-05-12 | End: 2025-05-12 | Stop reason: HOSPADM

## 2025-05-12 RX ORDER — FENTANYL CITRATE 50 UG/ML
50 INJECTION, SOLUTION INTRAMUSCULAR; INTRAVENOUS EVERY 5 MIN PRN
Refills: 0 | Status: DISCONTINUED | OUTPATIENT
Start: 2025-05-12 | End: 2025-05-12 | Stop reason: HOSPADM

## 2025-05-12 RX ORDER — ONDANSETRON 2 MG/ML
INJECTION INTRAMUSCULAR; INTRAVENOUS PRN
Status: DISCONTINUED | OUTPATIENT
Start: 2025-05-12 | End: 2025-05-12

## 2025-05-12 RX ORDER — LIDOCAINE HYDROCHLORIDE 10 MG/ML
INJECTION, SOLUTION INFILTRATION; PERINEURAL PRN
Status: DISCONTINUED | OUTPATIENT
Start: 2025-05-12 | End: 2025-05-12

## 2025-05-12 RX ORDER — NALOXONE HYDROCHLORIDE 0.4 MG/ML
0.4 INJECTION, SOLUTION INTRAMUSCULAR; INTRAVENOUS; SUBCUTANEOUS
Status: DISCONTINUED | OUTPATIENT
Start: 2025-05-12 | End: 2025-05-13 | Stop reason: HOSPADM

## 2025-05-12 RX ORDER — SIMETHICONE 40MG/0.6ML
133 SUSPENSION, DROPS(FINAL DOSAGE FORM)(ML) ORAL
Status: DISCONTINUED | OUTPATIENT
Start: 2025-05-12 | End: 2025-05-12 | Stop reason: HOSPADM

## 2025-05-12 RX ORDER — ATROPINE SULFATE 0.1 MG/ML
1 INJECTION INTRAVENOUS
Status: DISCONTINUED | OUTPATIENT
Start: 2025-05-12 | End: 2025-05-12 | Stop reason: HOSPADM

## 2025-05-12 RX ORDER — ONDANSETRON 4 MG/1
4 TABLET, ORALLY DISINTEGRATING ORAL EVERY 6 HOURS PRN
Status: DISCONTINUED | OUTPATIENT
Start: 2025-05-12 | End: 2025-05-13 | Stop reason: HOSPADM

## 2025-05-12 RX ORDER — DEXAMETHASONE SODIUM PHOSPHATE 10 MG/ML
4 INJECTION, SOLUTION INTRAMUSCULAR; INTRAVENOUS
Status: CANCELLED | OUTPATIENT
Start: 2025-05-12

## 2025-05-12 RX ORDER — HYDROMORPHONE HCL IN WATER/PF 6 MG/30 ML
0.2 PATIENT CONTROLLED ANALGESIA SYRINGE INTRAVENOUS EVERY 5 MIN PRN
Refills: 0 | Status: DISCONTINUED | OUTPATIENT
Start: 2025-05-12 | End: 2025-05-12 | Stop reason: HOSPADM

## 2025-05-12 RX ORDER — DIPHENHYDRAMINE HCL 25 MG
25 CAPSULE ORAL EVERY 6 HOURS PRN
Status: DISCONTINUED | OUTPATIENT
Start: 2025-05-12 | End: 2025-05-12 | Stop reason: HOSPADM

## 2025-05-12 RX ORDER — ACETAMINOPHEN 650 MG/1
650 SUPPOSITORY RECTAL EVERY 4 HOURS PRN
Status: DISCONTINUED | OUTPATIENT
Start: 2025-05-12 | End: 2025-05-13 | Stop reason: HOSPADM

## 2025-05-12 RX ORDER — SODIUM CHLORIDE, SODIUM LACTATE, POTASSIUM CHLORIDE, CALCIUM CHLORIDE 600; 310; 30; 20 MG/100ML; MG/100ML; MG/100ML; MG/100ML
INJECTION, SOLUTION INTRAVENOUS CONTINUOUS
Status: ACTIVE | OUTPATIENT
Start: 2025-05-12 | End: 2025-05-13

## 2025-05-12 RX ORDER — ONDANSETRON 4 MG/1
4 TABLET, ORALLY DISINTEGRATING ORAL EVERY 30 MIN PRN
Status: CANCELLED | OUTPATIENT
Start: 2025-05-12

## 2025-05-12 RX ORDER — ONDANSETRON 2 MG/ML
4 INJECTION INTRAMUSCULAR; INTRAVENOUS EVERY 30 MIN PRN
Status: CANCELLED | OUTPATIENT
Start: 2025-05-12

## 2025-05-12 RX ORDER — NALOXONE HYDROCHLORIDE 0.4 MG/ML
0.1 INJECTION, SOLUTION INTRAMUSCULAR; INTRAVENOUS; SUBCUTANEOUS
Status: CANCELLED | OUTPATIENT
Start: 2025-05-12

## 2025-05-12 RX ORDER — EPINEPHRINE 1 MG/ML
0.1 INJECTION, SOLUTION INTRAMUSCULAR; SUBCUTANEOUS
Status: DISCONTINUED | OUTPATIENT
Start: 2025-05-12 | End: 2025-05-12 | Stop reason: HOSPADM

## 2025-05-12 RX ORDER — DEXAMETHASONE SODIUM PHOSPHATE 10 MG/ML
INJECTION, SOLUTION INTRAMUSCULAR; INTRAVENOUS PRN
Status: DISCONTINUED | OUTPATIENT
Start: 2025-05-12 | End: 2025-05-12

## 2025-05-12 RX ORDER — FLUMAZENIL 0.1 MG/ML
0.2 INJECTION, SOLUTION INTRAVENOUS
Status: ACTIVE | OUTPATIENT
Start: 2025-05-12 | End: 2025-05-13

## 2025-05-12 RX ORDER — FENTANYL CITRATE 50 UG/ML
25 INJECTION, SOLUTION INTRAMUSCULAR; INTRAVENOUS EVERY 5 MIN PRN
Refills: 0 | Status: DISCONTINUED | OUTPATIENT
Start: 2025-05-12 | End: 2025-05-12 | Stop reason: HOSPADM

## 2025-05-12 RX ORDER — GLYCOPYRROLATE 0.2 MG/ML
INJECTION, SOLUTION INTRAMUSCULAR; INTRAVENOUS PRN
Status: DISCONTINUED | OUTPATIENT
Start: 2025-05-12 | End: 2025-05-12

## 2025-05-12 RX ORDER — LIDOCAINE 40 MG/G
CREAM TOPICAL
Status: DISCONTINUED | OUTPATIENT
Start: 2025-05-11 | End: 2025-05-13 | Stop reason: HOSPADM

## 2025-05-12 RX ORDER — ESCITALOPRAM OXALATE 10 MG/1
10 TABLET ORAL AT BEDTIME
Status: DISCONTINUED | OUTPATIENT
Start: 2025-05-12 | End: 2025-05-13 | Stop reason: HOSPADM

## 2025-05-12 RX ORDER — PROPOFOL 10 MG/ML
INJECTION, EMULSION INTRAVENOUS PRN
Status: DISCONTINUED | OUTPATIENT
Start: 2025-05-12 | End: 2025-05-12

## 2025-05-12 RX ADMIN — SODIUM CHLORIDE, SODIUM LACTATE, POTASSIUM CHLORIDE, AND CALCIUM CHLORIDE: .6; .31; .03; .02 INJECTION, SOLUTION INTRAVENOUS at 17:29

## 2025-05-12 RX ADMIN — PROPOFOL 180 MG: 10 INJECTION, EMULSION INTRAVENOUS at 11:28

## 2025-05-12 RX ADMIN — HYDROMORPHONE HYDROCHLORIDE 0.2 MG: 1 INJECTION, SOLUTION INTRAMUSCULAR; INTRAVENOUS; SUBCUTANEOUS at 21:35

## 2025-05-12 RX ADMIN — HYDROMORPHONE HYDROCHLORIDE 0.4 MG: 1 INJECTION, SOLUTION INTRAMUSCULAR; INTRAVENOUS; SUBCUTANEOUS at 08:58

## 2025-05-12 RX ADMIN — DEXAMETHASONE SODIUM PHOSPHATE 10 MG: 10 INJECTION, SOLUTION INTRAMUSCULAR; INTRAVENOUS at 11:39

## 2025-05-12 RX ADMIN — LIDOCAINE HYDROCHLORIDE 20 ML: 10 INJECTION, SOLUTION INFILTRATION; PERINEURAL at 11:28

## 2025-05-12 RX ADMIN — ACETAMINOPHEN 650 MG: 325 TABLET, FILM COATED ORAL at 19:41

## 2025-05-12 RX ADMIN — HYDROMORPHONE HYDROCHLORIDE 0.5 MG: 1 INJECTION, SOLUTION INTRAMUSCULAR; INTRAVENOUS; SUBCUTANEOUS at 10:57

## 2025-05-12 RX ADMIN — ESCITALOPRAM OXALATE 10 MG: 10 TABLET ORAL at 21:18

## 2025-05-12 RX ADMIN — SODIUM CHLORIDE, SODIUM LACTATE, POTASSIUM CHLORIDE, AND CALCIUM CHLORIDE: .6; .31; .03; .02 INJECTION, SOLUTION INTRAVENOUS at 11:20

## 2025-05-12 RX ADMIN — MIDAZOLAM 2 MG: 1 INJECTION INTRAMUSCULAR; INTRAVENOUS at 11:21

## 2025-05-12 RX ADMIN — SODIUM CHLORIDE 500 ML: 0.9 INJECTION, SOLUTION INTRAVENOUS at 14:33

## 2025-05-12 RX ADMIN — PHENYLEPHRINE HYDROCHLORIDE 100 MCG: 10 INJECTION INTRAVENOUS at 11:49

## 2025-05-12 RX ADMIN — TRAMADOL HYDROCHLORIDE 50 MG: 50 TABLET, FILM COATED ORAL at 23:20

## 2025-05-12 RX ADMIN — ONDANSETRON 4 MG: 2 INJECTION INTRAMUSCULAR; INTRAVENOUS at 11:41

## 2025-05-12 RX ADMIN — SODIUM CHLORIDE, POTASSIUM CHLORIDE, SODIUM LACTATE AND CALCIUM CHLORIDE: 600; 310; 30; 20 INJECTION, SOLUTION INTRAVENOUS at 00:48

## 2025-05-12 RX ADMIN — PHENYLEPHRINE HYDROCHLORIDE 100 MCG: 10 INJECTION INTRAVENOUS at 11:40

## 2025-05-12 RX ADMIN — PANTOPRAZOLE SODIUM 40 MG: 40 INJECTION, POWDER, FOR SOLUTION INTRAVENOUS at 00:48

## 2025-05-12 RX ADMIN — GLYCOPYRROLATE 0.2 MG: 0.2 INJECTION INTRAMUSCULAR; INTRAVENOUS at 11:49

## 2025-05-12 RX ADMIN — HYDROMORPHONE HYDROCHLORIDE 0.2 MG: 1 INJECTION, SOLUTION INTRAMUSCULAR; INTRAVENOUS; SUBCUTANEOUS at 00:48

## 2025-05-12 RX ADMIN — Medication 100 MG: at 11:28

## 2025-05-12 RX ADMIN — DOXYLAMINE SUCCINATE 25 MG: 25 TABLET ORAL at 23:20

## 2025-05-12 RX ADMIN — HYDROMORPHONE HYDROCHLORIDE 0.4 MG: 1 INJECTION, SOLUTION INTRAMUSCULAR; INTRAVENOUS; SUBCUTANEOUS at 02:05

## 2025-05-12 ASSESSMENT — ACTIVITIES OF DAILY LIVING (ADL)
ADLS_ACUITY_SCORE: 62
ADLS_ACUITY_SCORE: 62
ADLS_ACUITY_SCORE: 58
ADLS_ACUITY_SCORE: 58
ADLS_ACUITY_SCORE: 41
ADLS_ACUITY_SCORE: 41
ADLS_ACUITY_SCORE: 58
ADLS_ACUITY_SCORE: 41
ADLS_ACUITY_SCORE: 58
ADLS_ACUITY_SCORE: 62
ADLS_ACUITY_SCORE: 41
ADLS_ACUITY_SCORE: 58
ADLS_ACUITY_SCORE: 58
ADLS_ACUITY_SCORE: 62
ADLS_ACUITY_SCORE: 58
ADLS_ACUITY_SCORE: 58

## 2025-05-12 NOTE — H&P (VIEW-ONLY)
GI CONSULT NOTE      Name: Alok Nino  Medical Record #: [id number]  YOB: 1980  Date of Admission: 5/11/2025  Date/Time: 5/12/2025/8:27 AM     CHIEF COMPLAINT:  dysphgia      HISTORY OF PRESENT ILLNESS: We were asked to see Alok Nino by Emerson Wang MD for dysphagia. Alok Nino is a 45 year old year old female with history of Crohn's disease status post multiple resections on Stelara, choledocholithiasis status post ERCP with stent placement, status post cholecystectomy who presents to the ER with dysphagia and inability to tolerate oral intake.  She was seen in the ER yesterday with the same symptoms and ultimately discharged after EZ gas and glucagon with resolution of symptoms.  Patient return to the ED after recurrent symptoms.    She has had progressive dysphagia over several months for which she has been seen in GI clinic.  Underwent an upper GI 4/30/2025 suggestive of achalasia with a dilated esophagus and poor peristalsis with failure of relaxation of the lower esophageal sphincter and barium pooling in the lower esophagus.  She was scheduled for esophageal manometry testing at [facility] today for further evaluation.    States that though she has had dysphagia, her symptoms changed this past weekend. Saturday night she took a sip of water and felt like it didn't go down well. Sunday she vomiting after eating eggs. Yesterday after leaving the ER vomited after trying to eat just ice cream. She was also treated for SIBO recently with doxycycline, finishing her therapy about 2 weeks ago.  Swallowing issues have worsened somewhat since this time.    She is extremely uncomfortable during visit, reporting an intense spasm in her chest radiating up her neck and jaw. Typically drinking cold water helps to sooth this, but she is unable to get relief as she is NPO. IV dilaudid has not been helpful.     EGD 3/24/2024 showed a patulous pylorus, atrophic appearing stomach with mild chronic  inflammation on biopsies, normal duodenal biopsies.  Esophageal biopsies for possible Candida esophagitis were normal.    REVIEW OF SYSTEMS (ROS): Complete review of systems negative other than listed in HPI.    PAST MEDICAL HISTORY:  Past Medical History:   Diagnosis Date    Anemia     C. difficile enteritis     Chronic pain     Copper deficiency     Crohn disease (H)     Elevated LFTs     Endometriosis     GERRY (generalized anxiety disorder)     Melanoma (H)     PCOS (polycystic ovarian syndrome)     PONV (postoperative nausea and vomiting)     SBO (small bowel obstruction) (H) 03/14/2022        FAMILY HISTORY:  Family History   Problem Relation Age of Onset    No Known Problems Mother     Clotting Disorder Father     No Known Problems Brother     No Known Problems Son     Anesthesia Reaction No family hx of     Colon Cancer No family hx of     Crohn's Disease No family hx of     Ulcerative Colitis No family hx of     Colon Polyps No family hx of        SOCIAL HISTORY:  Social History     Socioeconomic History    Marital status:      Spouse name: Not on file    Number of children: Not on file    Years of education: Not on file    Highest education level: Not on file   Occupational History    Not on file   Tobacco Use    Smoking status: Never    Smokeless tobacco: Never   Substance and Sexual Activity    Alcohol use: Yes     Comment: occasional/ 1 drink per 1-3 month    Drug use: No    Sexual activity: Not on file   Other Topics Concern    Parent/sibling w/ CABG, MI or angioplasty before 65F 55M? Not Asked   Social History Narrative    Not on file     Social Drivers of Health     Financial Resource Strain: Low Risk  (8/28/2024)    Financial Resource Strain     Within the past 12 months, have you or your family members you live with been unable to get utilities (heat, electricity) when it was really needed?: No   Food Insecurity: No Food Insecurity (9/11/2024)    Received from [facility]    Hunger Vital Sign      Worried About Running Out of Food in the Last Year: Never true     Ran Out of Food in the Last Year: Never true   Transportation Needs: No Transportation Needs (9/11/2024)    Received from [facility]    PRAPARE - Transportation     Lack of Transportation (Medical): No     Lack of Transportation (Non-Medical): No   Physical Activity: Inactive (9/11/2024)    Received from [facility]    Exercise Vital Sign     Days of Exercise per Week: 0 days     Minutes of Exercise per Session: 0 min   Stress: Stress Concern Present (8/22/2022)    Received from [facility]    [employer] - Occupational Stress Questionnaire     Feeling of Stress : Rather much   Social Connections: Socially Integrated (8/22/2022)    Received from [facility]    Social Connection and Isolation Panel [NHANES]     Frequency of Communication with Friends and Family: More than three times a week     Frequency of Social Gatherings with Friends and Family: Once a week     Attends Scientologist Services: More than 4 times per year     Active Member of Clubs or Organizations: Yes     Attends Club or Organization Meetings: More than 4 times per year     Marital Status:    Interpersonal Safety: Low Risk  (10/4/2024)    Interpersonal Safety     Do you feel physically and emotionally safe where you currently live?: Yes     Within the past 12 months, have you been hit, slapped, kicked or otherwise physically hurt by someone?: No     Within the past 12 months, have you been humiliated or emotionally abused in other ways by your partner or ex-partner?: No   Housing Stability: Low Risk  (9/11/2024)    Received from [facility]    Housing Stability     What is your living situation today?: I have a steady place to live       MEDICATIONS PRIOR TO ADMISSION: (Not in a hospital admission)         ALLERGIES: Sucralfate, Morphine hcl, Pneumovax [pneumococcal polysaccharide vaccine], Sucralfate, and Codeine    PHYSICAL EXAM:    /77   " Pulse 76   Temp 98.4  F (36.9  C) (Oral)   Resp 20   Ht 1.575 m (5' 2\")   Wt 52 kg (114 lb 9.6 oz)   LMP 05/01/2025   SpO2 99%   BMI 20.96 kg/m      GENERAL: Uncomfortable, tearful  NECK: Supple without adenopathy  EYES: No scleral icterus  LUNGS: Clear to auscultation bilaterally  HEART: Regular rate and rhythm, S1 and S2 present, no lower extremity edema  ABDOMEN: Non-distended. Positive bowel sounds. Soft, non-tender, no guarding/rebound/mass, no obvious organomegaly  MUSKULOSKELETAL:  Warm and well perfused, moves all extremities well  SKIN: No jaundice  NEUROLOGIC: Alert and oriented, restless/uncomfortable    LAB DATA:  CMP Results:   Recent Labs   Lab Test 05/12/25  0549 05/11/25 2051 09/30/24  1257 09/26/24  1929 08/29/24  0525    139  --  138 141   POTASSIUM 3.6 3.5  --  4.1 4.2   CHLORIDE 108* 105  --  102 108*   CO2 21* 20*  --  27 25   ANIONGAP 11 14  --  9 8   GLC 79 90  --  95 89   BUN 12.9 13.9  --  14.7 7.3   CR 0.67 0.58  --  0.80 0.76   BILITOTAL  --   --  0.3 0.6 0.3   ALKPHOS  --   --  126 132 119   ALT  --   --  113* 119* 84*   AST  --   --  89* 37 33      CBC  Recent Labs   Lab 05/11/25 2051   WBC 10.8   RBC 4.24   HGB 13.0   HCT 38.3   MCV 90   MCH 30.7   MCHC 33.9   RDW 12.6        INRNo lab results found in last 7 days.   Lipase   Date Value Ref Range Status   09/30/2024 28 13 - 60 U/L Final   09/26/2024 30 13 - 60 U/L Final   08/27/2024 32 13 - 60 U/L Final   03/09/2022 44 0 - 52 U/L Final   06/26/2019 21 0 - 52 U/L Final       IMAGING/ENDOSCOPIC EVALUATION:    Upper GI and VSS 4/30/2025  IMPRESSION:   1. Please see the Speech Pathologist's note for diet recommendations.   2.  The patient's symptoms are retrosternal, and partway through the examination the fluoroscopic machine was moved lower to examine the mid thorax from the lateral projection. The patient has a dilated esophagus with poor peristalsis. There was a failure of relaxation of the lower esophageal " sphincter and barium would pool in the esophagus up to the level the alexus before it would pass by hydrostatic pressure. This is an incomplete radiologic evaluation of the mid and lower esophagus but the appearance would be consistent with achalasia.     ASSESSMENT:  1.  Dysphagia, imaging suggestive of achalasia.  2.  Substernal pain.   3.  Inability to tolerate oral intake.    This patient is a 45 year old female with history of Crohn's disease status post multiple bowel resections on Stelara status post cholecystectomy who presents to the ED with progressive dysphagia and acute inability to tolerate oral intake/vomiting.  Recent upper GI suggestive of achalasia.  Symptoms are likely due to backup of food and dilated esophagus, though at this point has not had oral intake since Saturday evening.  Given refractory symptoms, warrants further endoscopic evaluation.  With likely achalasia, this exam will require general anesthesia. Will plan on EGD today in the OR.  Symptoms seem to be compounded by esophageal spasms, which have been unresponsive to opioids. Consider benzodiazepine in this acute setting.  Ultimately will require esophageal manometry testing to help establish achalasia diagnosis.  This was scheduled today at [facility], but will be rescheduled.  In addition, symptoms seemed to worsen after Doxycycline treatment.  Will treat with PPI in the event that there is inflammation/ulceration in the esophagus.     PLAN:  1.  NPO. Did say she can have one mouth swab or ice chip to see if this helps calm things.   2.  EGD today in OR under general.   3.  Daily PPI.   4.  Analgesics per primary.   5.  Outpatient esophageal manometry testing.   6.  Further recommendations to follow procedure.     Discussed with Dr. Phalen, GI staff.     TIME SPENT: 60 min including chart review, patient interview and care coordination.                                                Jessica York DNP  Thank you for the opportunity  to participate in the care of this patient.   Please feel free to call me with any questions or concerns.  Phone number [phone].

## 2025-05-12 NOTE — ANESTHESIA PROCEDURE NOTES
Airway       Patient location during procedure: OR       Procedure Start/Stop Times: 5/12/2025 11:34 AM  Staff -        CRNA: Gavin Everett APRN CRNA       Performed By: CRNA  Consent for Airway        Urgency: elective  Indications and Patient Condition       Indications for airway management: shukri-procedural       Induction type:intravenous       Mask difficulty assessment: 2 - vent by mask + OA or adjuvant +/- NMBA    Final Airway Details       Final airway type: endotracheal airway       Successful airway: ETT - single  Endotracheal Airway Details        ETT size (mm): 7.0       Cuffed: yes       Successful intubation technique: video laryngoscopy       VL Blade Size: Glidescope 3       Grade View of Cords: 1       Adjucts: stylet       Position: Right       Measured from: lips       Secured at (cm): 22       Bite block used: None    Post intubation assessment        Placement verified by: capnometry, equal breath sounds and chest rise        Number of attempts at approach: 2       Number of other approaches attempted: 0       Secured with: tape       Ease of procedure: easy       Dentition: Intact and Unchanged    Medication(s) Administered   Medication Administration Time: 5/12/2025 11:34 AM    Additional Comments       Attempt with frances 2 and not able to get view of glottic opening.

## 2025-05-12 NOTE — CONSULTS
GI CONSULT NOTE      Name: Alok Nino  Medical Record #: [id number]  YOB: 1980  Date of Admission: 5/11/2025  Date/Time: 5/12/2025/8:27 AM     CHIEF COMPLAINT:  dysphgia      HISTORY OF PRESENT ILLNESS: We were asked to see Alok Nino by Emerson Wang MD for dysphagia. Alok Nino is a 45 year old year old female with history of Crohn's disease status post multiple resections on Stelara, choledocholithiasis status post ERCP with stent placement, status post cholecystectomy who presents to the ER with dysphagia and inability to tolerate oral intake.  She was seen in the ER yesterday with the same symptoms and ultimately discharged after EZ gas and glucagon with resolution of symptoms.  Patient return to the ED after recurrent symptoms.    She has had progressive dysphagia over several months for which she has been seen in GI clinic.  Underwent an upper GI 4/30/2025 suggestive of achalasia with a dilated esophagus and poor peristalsis with failure of relaxation of the lower esophageal sphincter and barium pooling in the lower esophagus.  She was scheduled for esophageal manometry testing at [facility] today for further evaluation.    States that though she has had dysphagia, her symptoms changed this past weekend. Saturday night she took a sip of water and felt like it didn't go down well. Sunday she vomiting after eating eggs. Yesterday after leaving the ER vomited after trying to eat just ice cream. She was also treated for SIBO recently with doxycycline, finishing her therapy about 2 weeks ago.  Swallowing issues have worsened somewhat since this time.    She is extremely uncomfortable during visit, reporting an intense spasm in her chest radiating up her neck and jaw. Typically drinking cold water helps to sooth this, but she is unable to get relief as she is NPO. IV dilaudid has not been helpful.     EGD 3/24/2024 showed a patulous pylorus, atrophic appearing stomach with mild chronic  inflammation on biopsies, normal duodenal biopsies.  Esophageal biopsies for possible Candida esophagitis were normal.    REVIEW OF SYSTEMS (ROS): Complete review of systems negative other than listed in HPI.    PAST MEDICAL HISTORY:  Past Medical History:   Diagnosis Date    Anemia     C. difficile enteritis     Chronic pain     Copper deficiency     Crohn disease (H)     Elevated LFTs     Endometriosis     GERRY (generalized anxiety disorder)     Melanoma (H)     PCOS (polycystic ovarian syndrome)     PONV (postoperative nausea and vomiting)     SBO (small bowel obstruction) (H) 03/14/2022        FAMILY HISTORY:  Family History   Problem Relation Age of Onset    No Known Problems Mother     Clotting Disorder Father     No Known Problems Brother     No Known Problems Son     Anesthesia Reaction No family hx of     Colon Cancer No family hx of     Crohn's Disease No family hx of     Ulcerative Colitis No family hx of     Colon Polyps No family hx of        SOCIAL HISTORY:  Social History     Socioeconomic History    Marital status:      Spouse name: Not on file    Number of children: Not on file    Years of education: Not on file    Highest education level: Not on file   Occupational History    Not on file   Tobacco Use    Smoking status: Never    Smokeless tobacco: Never   Substance and Sexual Activity    Alcohol use: Yes     Comment: occasional/ 1 drink per 1-3 month    Drug use: No    Sexual activity: Not on file   Other Topics Concern    Parent/sibling w/ CABG, MI or angioplasty before 65F 55M? Not Asked   Social History Narrative    Not on file     Social Drivers of Health     Financial Resource Strain: Low Risk  (8/28/2024)    Financial Resource Strain     Within the past 12 months, have you or your family members you live with been unable to get utilities (heat, electricity) when it was really needed?: No   Food Insecurity: No Food Insecurity (9/11/2024)    Received from [facility]    Hunger Vital Sign      Worried About Running Out of Food in the Last Year: Never true     Ran Out of Food in the Last Year: Never true   Transportation Needs: No Transportation Needs (9/11/2024)    [facility]    PRAPARE - Transportation     Lack of Transportation (Medical): No     Lack of Transportation (Non-Medical): No   Physical Activity: Inactive (9/11/2024)    [facility]    Exercise Vital Sign     Days of Exercise per Week: 0 days     Minutes of Exercise per Session: 0 min   Stress: Stress Concern Present (8/22/2022)    [facility]    [employer] [place] [employer] - Occupational Stress Questionnaire     Feeling of Stress : Rather much   Social Connections: Socially Integrated (8/22/2022)    [facility]    Social Connection and Isolation Panel [NHANES]     Frequency of Communication with Friends and Family: More than three times a week     Frequency of Social Gatherings with Friends and Family: Once a week     Attends Lutheran Services: More than 4 times per year     Active Member of Clubs or Organizations: Yes     Attends Club or Organization Meetings: More than 4 times per year     Marital Status:    Interpersonal Safety: Low Risk  (10/4/2024)    Interpersonal Safety     Do you feel physically and emotionally safe where you currently live?: Yes     Within the past 12 months, have you been hit, slapped, kicked or otherwise physically hurt by someone?: No     Within the past 12 months, have you been humiliated or emotionally abused in other ways by your partner or ex-partner?: No   Housing Stability: Low Risk  (9/11/2024)    [facility]    Housing Stability     What is your living situation today?: I have a steady place to live       MEDICATIONS PRIOR TO ADMISSION: (Not in a hospital admission)         ALLERGIES: Sucralfate, Morphine hcl, Pneumovax [pneumococcal polysaccharide vaccine], Sucralfate, and Codeine    PHYSICAL EXAM:    /77   " Pulse 76   Temp 98.4  F (36.9  C) (Oral)   Resp 20   Ht 1.575 m (5' 2\")   Wt 52 kg (114 lb 9.6 oz)   LMP 05/01/2025   SpO2 99%   BMI 20.96 kg/m      GENERAL: Uncomfortable, tearful  NECK: Supple without adenopathy  EYES: No scleral icterus  LUNGS: Clear to auscultation bilaterally  HEART: Regular rate and rhythm, S1 and S2 present, no lower extremity edema  ABDOMEN: Non-distended. Positive bowel sounds. Soft, non-tender, no guarding/rebound/mass, no obvious organomegaly  MUSKULOSKELETAL:  Warm and well perfused, moves all extremities well  SKIN: No jaundice  NEUROLOGIC: Alert and oriented, restless/uncomfortable    LAB DATA:  CMP Results:   Recent Labs   Lab Test 05/12/25  0549 05/11/25 2051 09/30/24  1257 09/26/24  1929 08/29/24  0525    139  --  138 141   POTASSIUM 3.6 3.5  --  4.1 4.2   CHLORIDE 108* 105  --  102 108*   CO2 21* 20*  --  27 25   ANIONGAP 11 14  --  9 8   GLC 79 90  --  95 89   BUN 12.9 13.9  --  14.7 7.3   CR 0.67 0.58  --  0.80 0.76   BILITOTAL  --   --  0.3 0.6 0.3   ALKPHOS  --   --  126 132 119   ALT  --   --  113* 119* 84*   AST  --   --  89* 37 33      CBC  Recent Labs   Lab 05/11/25 2051   WBC 10.8   RBC 4.24   HGB 13.0   HCT 38.3   MCV 90   MCH 30.7   MCHC 33.9   RDW 12.6        INRNo lab results found in last 7 days.   Lipase   Date Value Ref Range Status   09/30/2024 28 13 - 60 U/L Final   09/26/2024 30 13 - 60 U/L Final   08/27/2024 32 13 - 60 U/L Final   03/09/2022 44 0 - 52 U/L Final   06/26/2019 21 0 - 52 U/L Final       IMAGING/ENDOSCOPIC EVALUATION:    Upper GI and VSS 4/30/2025  IMPRESSION:   1. Please see the Speech Pathologist's note for diet recommendations.   2.  The patient's symptoms are retrosternal, and partway through the examination the fluoroscopic machine was moved lower to examine the mid thorax from the lateral projection. The patient has a dilated esophagus with poor peristalsis. There was a failure of relaxation of the lower esophageal " sphincter and barium would pool in the esophagus up to the level the alexus before it would pass by hydrostatic pressure. This is an incomplete radiologic evaluation of the mid and lower esophagus but the appearance would be consistent with achalasia.     ASSESSMENT:  1.  Dysphagia, imaging suggestive of achalasia.  2.  Substernal pain.   3.  Inability to tolerate oral intake.    This patient is a 45 year old female with history of Crohn's disease status post multiple bowel resections on Stelara status post cholecystectomy who presents to the ED with progressive dysphagia and acute inability to tolerate oral intake/vomiting.  Recent upper GI suggestive of achalasia.  Symptoms are likely due to backup of food and dilated esophagus, though at this point has not had oral intake since Saturday evening.  Given refractory symptoms, warrants further endoscopic evaluation.  With likely achalasia, this exam will require general anesthesia. Will plan on EGD today in the OR.  Symptoms seem to be compounded by esophageal spasms, which have been unresponsive to opioids. Consider benzodiazepine in this acute setting.  Ultimately will require esophageal manometry testing to help establish achalasia diagnosis.  This was scheduled today at [facility], but will be rescheduled.  In addition, symptoms seemed to worsen after Doxycycline treatment.  Will treat with PPI in the event that there is inflammation/ulceration in the esophagus.     PLAN:  1.  NPO. Did say she can have one mouth swab or ice chip to see if this helps calm things.   2.  EGD today in OR under general.   3.  Daily PPI.   4.  Analgesics per primary.   5.  Outpatient esophageal manometry testing.   6.  Further recommendations to follow procedure.     Discussed with Dr. Phalen, GI staff.     TIME SPENT: 60 min including chart review, patient interview and care coordination.                                                Jessica York DNP  Thank you for the opportunity  to participate in the care of this patient.   Please feel free to call me with any questions or concerns.  Phone number [phone].

## 2025-05-12 NOTE — ED NOTES
"Bedside swallow completed. Patient is able to take sips of water without difficulty, no coughing, no distress, no nausea or vomiting.  Patient does report that the water feels \"stuck in her throat\" and is taking longer to go down. Dr. Medina notified.   "

## 2025-05-12 NOTE — ANESTHESIA POSTPROCEDURE EVALUATION
Patient: Alok Nino    Procedure: Procedure(s):  ESOPHAGOGASTRODUODENOSCOPY with biopsies       Anesthesia Type:  General    Note:  Disposition: Inpatient   Postop Pain Control: Uneventful            Sign Out: Well controlled pain   PONV: No   Neuro/Psych: Uneventful            Sign Out: Acceptable/Baseline neuro status   Airway/Respiratory: Uneventful            Sign Out: Acceptable/Baseline resp. status   CV/Hemodynamics: Uneventful            Sign Out: Acceptable CV status; No obvious hypovolemia; No obvious fluid overload   Other NRE: NONE   DID A NON-ROUTINE EVENT OCCUR? No       Last vitals:  Vitals Value Taken Time   BP 92/55 05/12/25 12:10   Temp 36.4  C (97.5  F) 05/12/25 11:59   Pulse 91 05/12/25 12:17   Resp 16 05/12/25 12:17   SpO2 97 % 05/12/25 12:17   Vitals shown include unfiled device data.    Electronically Signed By: Pete Lemus MD  May 12, 2025  1:12 PM

## 2025-05-12 NOTE — PROGRESS NOTES
[facility]    Medicine Progress Note - Hospitalist Service    Date of Admission:  5/11/2025    Assessment & Plan      Alok Nino is a 45 year old female admitted on 5/11/2025. She has medical history significant for imaging finding of achalasia, Crohn's disease, recurrent bowel obstructions, multiple abdominal surgeries, multiple vitamin deficiencies including vitamin D and B12 any, and copper deficiency myeloneuropathy.      She presented to ER on 5/10 previously and ultimately discharged after receiving EZ gas and glucagon, then returned to the ED with complaints of difficulty swallowing and vomiting. And admitted with GI Consult.    Presentation of progressive dysphagia over months but then acutely changed after Mother's Day branch, with sensation of food stuck in her throat and multiple episodes of emesis, unable to eat ice cream and vomiting after eggs and water.    In the ED vital signs were stable.  CBC was unremarkable.  BMP showed bicarb of 20.      Now status post EGD with findings demonstrating probable dysmotility consistent with suspected achalasia, the scope was able to clear the retained food, the GI team recommends a soft diet and keeping her torso elevated 30 degrees at all times and avoid eating or drinking within 3 hours of laying down; they will follow-up with her about the biopsy, Manometry planned on 5/14.  She was hopeful for discharge from the PACU, but she started to have low blood pressures in the 90s.    Received a bolus and did not respond properly with SBP now further dropping to the 80s.  As such, she will be brought back to the floors and not discharging.     Will start maintenance IV fluids and cardiac monitoring for any tachycardic responses. Monitor hgb as well.   ------  Esophageal dysphagia secondary to achalasia  Hypotension after EGD  A- as above.  Could be anesthesia related, but the drop in trend is concerning and warrants monitoring.  Will also  recheck labs make sure there is nothing else going on.  She is feeling very groggy and tired.  P:  - marilou my discharge order earlier as she is now hemodynamically unstable  - bring back to floors  - gave bolus  - start MIVFs gently until 7:30am on 5/13  - recheck morning bmp and cbc  - if any further unstable changes, would get in touch w/ GI  -IV PPI  - soft diet   - GI consult greatly appreciated  - if MAP <65 persistently, low threshold to start pressors if not responding to boluses   - check troponin out of an abundance of caution; it was negative    Crohn's disease:   On Stelara every 28 days.  Last dose was the past week.  GI following    Metabolic acidosis  A/p- Likely due to dehydration.  Bicarb of 20.  Anion gap is 14.  IV fluids renewed now  Monitor renal function, remains wnl     Mood disorder  Can resume Lexapro    B12 deficiency  On weekly B12 injections, last dose was on 5/7/2025         Diet: Clear Liquid Diet  Diet    DVT Prophylaxis: Pneumatic Compression Devices, Anti-embolisim stockings (TEDs), and Ambulate every shift  Hamilton Catheter: Not present  Lines: None     Cardiac Monitoring: ACTIVE order. Indication: Tachyarrhythmias, acute (48 hours)  Code Status: Full Code      Clinically Significant Risk Factors Present on Admission          # Hyperchloremia: Highest Cl = 108 mmol/L in last 2 days, will monitor as appropriate                                   Social Drivers of Health    Physical Activity: Inactive (9/11/2024)    Received from [facility]    Exercise Vital Sign     Days of Exercise per Week: 0 days     Minutes of Exercise per Session: 0 min   Stress: Stress Concern Present (8/22/2022)    Received from [facility]    [employer] [employer] - Occupational Stress Questionnaire     Feeling of Stress : Rather much          Disposition Plan     Medically Ready for Discharge: Anticipated Tomorrow             Emerson Wang MD  Hospitalist Service  [facility]  Hospital  Securely message with [employer] (more info)  Text page via [employer] Paging/Directory   ______________________________________________________________________    Interval History   Naeo  This AM had chest pain, discussed troponin  No other symptoms  Looking forward to gi  Gi plan for egd  Pt had no further Qs  Discussed w/ ER RN and sw  ---  In PM discussed w/ PACU RN  Saw pt again postprocedurally, was feeling a bit tired/groggy  Discussed dispo and she was hopeful to leave  Her spb was 96  Rechecked and 91  Discussed GI recs and answered all Qs  Gave bolus and discussed w/ PACU RN again  -----  Later paged sbp continues to drop now 80s  Discussed no discharge and monitoring instead and MIVFs  And recheck labs  Discussed on vocera w/ UR     Physical Exam   Vital Signs: Temp: 97.4  F (36.3  C) Temp src: Temporal BP: 91/51 Pulse: 87   Resp: 18 SpO2: 98 % O2 Device: None (Room air) Oxygen Delivery: 4 LPM  Weight: 114 lbs 9.6 oz    General: alert, oriented, and in no acute distress  Pulmonary: clear to auscultation bilaterally, normal respiratory effort, on room air, no rales or wheezes or evidence of accessory muscle use  CVS: regular rate and rhythm, no murmurs, rubs, or gallops; no blatant jugular venous distention; no extremity edema and extremities are warm to the touch  GI: soft, nontender, BS+, no rebound or guarding, no conspicuous organomegaly   Neuro: nonfocal, moves all extremities of own volition  Psych: appropriate          Medical Decision Making       65 MINUTES SPENT BY ME on the date of service doing chart review, history, exam, documentation & further activities per the note.      Data   ------------------------- PAST 24 HR DATA REVIEWED -----------------------------------------------    I have personally reviewed the following data over the past 24 hrs:    10.8  \   13.0   / 305     140 108 (H) 12.9 /  79   3.6 21 (L) 0.67 \     Trop: <6 BNP: N/A       Imaging results reviewed over the past 24  hrs:   No results found for this or any previous visit (from the past 24 hours).

## 2025-05-12 NOTE — ANESTHESIA PREPROCEDURE EVALUATION
Anesthesia Pre-Procedure Evaluation    Patient: Alok Nino   MRN: [id number] : 1980          Procedure : Procedure(s):  ESOPHAGOGASTRODUODENOSCOPY         Past Medical History:   Diagnosis Date    Anemia     C. difficile enteritis     Chronic pain     Copper deficiency     Crohn disease (H)     Elevated LFTs     Endometriosis     GERRY (generalized anxiety disorder)     Melanoma (H)     PCOS (polycystic ovarian syndrome)     PONV (postoperative nausea and vomiting)     SBO (small bowel obstruction) (H) 2022      Past Surgical History:   Procedure Laterality Date    ABDOMEN SURGERY      3 for crohns dx    APPENDECTOMY      APPENDECTOMY      CHOLECYSTECTOMY      CHOLECYSTECTOMY      COLONOSCOPY      ENDOSCOPIC RETROGRADE CHOLANGIOPANCREATOGRAM N/A 2024    Procedure: Endoscopic retrograde CHOLANGIOPANCREATOGRAPHY, BILIARY STENT PLACEMENT,AND STONE EXTRACTION;  Surgeon: Jesus Rankin MD;  Location:  OR    ENDOSCOPIC RETROGRADE CHOLANGIOPANCREATOGRAM N/A 10/4/2024    Procedure: ENDOSCOPIC RETROGRADE CHOLANGIOPANCREATOGRAPHY, STENT AND STONE REMOVAL;  Surgeon: Jesus Rankin MD;  Location: [facility] OR    ENDOSCOPIC ULTRASOUND UPPER GASTROINTESTINAL TRACT (GI) N/A 2020    Procedure: ENDOSCOPIC ULTRASOUND, ESOPHAGOSCOPY / UPPER GASTROINTESTINAL TRACT with BIOPSY;  Surgeon: Cydney Garcia MD;  Location:  OR    ENDOSCOPIC ULTRASOUND UPPER GASTROINTESTINAL TRACT (GI) N/A 2024    Procedure: and Endoscopic ultrasound upper;  Surgeon: Jesus Rankin MD;  Location:  OR    GYN SURGERY      H STATISTIC PICC LINE INSERTION >5YR, FAILED Right 11/10/2021    LUE unsuccessful attempt from another hospital, IR deferral    IR PICC PLACEMENT > 5 YRS OF AGE  2021    IR PICC PLACEMENT > 5 YRS OF AGE  3/16/2022    OTHER SURGICAL HISTORY      strictoplasty x3    OTHER SURGICAL HISTORY      adhesion removal x1    OTHER SURGICAL HISTORY      small bowel resections    PICC   05/10/2019         RESECTION ILEOCECAL  12/06/2013    Procedure: RESECTION ILEOCECAL;  Laparotomy, Extensive Lysis of Adhesions, Stricture Plasty X2  Anesthesia general with block;  Surgeon: Pete Cartagena MD;  Location: [facility] OR    RESECTION ILEOCOLIC N/A 11/17/2021    Procedure: Exploratory laparotomy, illeal resection, extensive lysis of adhesions (2 hr);  Surgeon: Pete Cartagena MD;  Location: [facility] OR      Allergies   Allergen Reactions    Sucralfate Hives and Rash    Morphine Hcl Other (See Comments)     SPASMS OF SPHINCTER OF ODDI  (BILIARY TRACT)    Pneumovax [Pneumococcal Polysaccharide Vaccine]     Sucralfate Hives    Codeine Nausea and Vomiting      Social History     Tobacco Use    Smoking status: Never    Smokeless tobacco: Never   Substance Use Topics    Alcohol use: Yes     Comment: occasional/ 1 drink per 1-3 month      Wt Readings from Last 1 Encounters:   05/11/25 52 kg (114 lb 9.6 oz)        Anesthesia Evaluation   Pt has had prior anesthetic.     History of anesthetic complications  - PONV.      ROS/MED HX  ENT/Pulmonary:  - neg pulmonary ROS     Neurologic:  - neg neurologic ROS     Cardiovascular:  - neg cardiovascular ROS     METS/Exercise Tolerance:     Hematologic:  - neg hematologic  ROS     Musculoskeletal:  - neg musculoskeletal ROS     GI/Hepatic:     (+)   esophageal disease,    Inflammatory bowel disease,             Renal/Genitourinary:  - neg Renal ROS     Endo:  - neg endo ROS     Psychiatric/Substance Use:  - neg psychiatric ROS     Infectious Disease:  - neg infectious disease ROS     Malignancy:  - neg malignancy ROS     Other:  - neg other ROS            Physical Exam  Airway  Mallampati: I  TM distance: >3 FB  Neck ROM: full  Mouth opening: >= 4 cm    Cardiovascular - normal exam  Rhythm: regular  Rate: normal rate     Dental   (+) Minor Abnormalities - some fillings, tiny chips      Pulmonary - normal examBreath sounds clear to auscultation        Neurological - normal exam  She  appears awake, alert and oriented x3.    Other Findings       OUTSIDE LABS:  CBC:   Lab Results   Component Value Date    WBC 10.8 05/11/2025    WBC 5.8 09/30/2024    HGB 13.0 05/11/2025    HGB 12.0 09/30/2024    HCT 38.3 05/11/2025    HCT 37.6 09/30/2024     05/11/2025     09/30/2024     BMP:   Lab Results   Component Value Date     05/12/2025     05/11/2025    POTASSIUM 3.6 05/12/2025    POTASSIUM 3.5 05/11/2025    CHLORIDE 108 (H) 05/12/2025    CHLORIDE 105 05/11/2025    CO2 21 (L) 05/12/2025    CO2 20 (L) 05/11/2025    BUN 12.9 05/12/2025    BUN 13.9 05/11/2025    CR 0.67 05/12/2025    CR 0.58 05/11/2025    GLC 79 05/12/2025    GLC 90 05/11/2025     COAGS:   Lab Results   Component Value Date    INR 1.24 (H) 03/17/2022     POC:   Lab Results   Component Value Date     (H) 12/15/2013    HCG Negative 10/04/2024    HCGS Negative 08/27/2024     HEPATIC:   Lab Results   Component Value Date    ALBUMIN 3.9 09/30/2024    PROTTOTAL 6.4 09/30/2024     (H) 09/30/2024    AST 89 (H) 09/30/2024    ALKPHOS 126 09/30/2024    BILITOTAL 0.3 09/30/2024     OTHER:   Lab Results   Component Value Date    PH 7.29 (L) 08/02/2019    LACT 1.7 03/13/2022    A1C 5.1 12/07/2013    BLACK 8.3 (L) 05/12/2025    PHOS 3.4 05/18/2022    MAG 1.7 08/29/2024    LIPASE 28 09/30/2024    AMYLASE 85 12/12/2013    TSH 0.06 (L) 07/31/2019    CRP 14.0 (H) 03/15/2022    SED 11 03/15/2022       Anesthesia Plan    ASA Status:  2      NPO Status: ELEVATED Aspiration Risk/Unknown   Anesthesia Type: General.   Induction: rapid sequence, intravenous.        Consents    Anesthesia Plan(s) and associated risks, benefits, and realistic alternatives discussed. Questions answered and patient/representative(s) expressed understanding.     - Discussed:     - Discussed with:  Patient       Blood Consent:      - Discussed with: patient.     Postoperative Care            Comments:                   Pete Lemus MD    I have  reviewed the pertinent notes and labs in the chart from the past 30 days and (re)examined the patient.  Any updates or changes from those notes are reflected in this note.    Clinically Significant Risk Factors Present on Admission          # Hyperchloremia: Highest Cl = 108 mmol/L in last 2 days, will monitor as appropriate

## 2025-05-12 NOTE — H&P
[facility]    History and Physical - Hospitalist Service       Date of Admission:  5/11/2025    Assessment & Plan      Alok Nino is a 45 year old female admitted on 5/11/2025. She has medical history significant for imaging finding of achalasia, Crohn's disease, recurrent bowel obstructions, multiple abdominal surgeries, multiple vitamin deficiencies including vitamin D and B12 any, and copper deficiency myeloneuropathy.  She presented to the ED with complaints of difficulty swallowing and vomiting.  Patient reports that she went out for [employer] with her family for Mother's Day.  She reports that she felt that food was getting stuck in her esophagus.  Reports that she subsequently had multiple episodes of vomiting.       In the ED, vital signs were stable.  CBC was unremarkable.  BMP showed bicarb of 20.  GI recommended admission for endoscopy versus manometry.    # Esophageal dysphagia secondary to achalasia  Patient presented with complaints of difficulty swallowing and nausea with vomiting.  She reports that she has had progressively worsening difficulty swallowing for about 2 months.  She underwent videofluoroscopy on 4/30/2025 with findings consistent with achalasia.  IV PPI  N.p.o.  IV fluids  GI consulted, follow-up on recommendations    # Metabolic acidosis: Likely due to dehydration.  Bicarb of 20.  Anion gap is 14.  IV fluids  Monitor renal function    # Mood disorder  Hold Lexapro    # B12 deficiency  On weekly B12 injections, last dose was on 5/7/2025    # # Crohn's disease: On Stelara every 28 days.  Last dose was the past week.          Diet: NPO for Medical/Clinical Reasons Except for: No Exceptions    DVT Prophylaxis: Low Risk/Ambulatory with no VTE prophylaxis indicated  Hamilton Catheter: Not present  Lines: None     Cardiac Monitoring: None  Code Status: Full Code      Clinically Significant Risk Factors Present on Admission                                         Disposition Plan     Medically Ready for Discharge: Anticipated Tomorrow           Fuad Anaya MD  Hospitalist Service  [facility]  Securely message with [employer] (more info)  Text page via [email or web address] Paging/Directory     ______________________________________________________________________    Chief Complaint   Nausea and vomitting.     History is obtained from the patient    History of Present Illness   Alok Nino is a 45 year old female who has medical history significant for imaging finding of achalasia, Crohn's disease, recurrent bowel obstructions, multiple abdominal surgeries, multiple vitamin deficiencies including vitamin D and B12 any, and copper deficiency myeloneuropathy.  She presented to the ED with complaints of difficulty swallowing and vomiting.  Patient reports that she went out for [employer] with her family for Mother's Day.  She reports that she felt that food was getting stuck in her esophagus.  Reports that she subsequently had multiple episodes of vomiting.  Reports that she could not keep anything down.  Reports chest discomfort.  Denies any bloody or bilious emesis.  She reports chronic diarrhea which is at baseline.  She denies any fevers, chills, dysuria, hematuria, melena, hematochezia, or any other new symptoms.      Past Medical History    Past Medical History:   Diagnosis Date    Anemia     C. difficile enteritis     Chronic pain     Copper deficiency     Crohn disease (H)     Elevated LFTs     Endometriosis     GERRY (generalized anxiety disorder)     Melanoma (H)     PCOS (polycystic ovarian syndrome)     PONV (postoperative nausea and vomiting)     SBO (small bowel obstruction) (H) 03/14/2022       Past Surgical History   Past Surgical History:   Procedure Laterality Date    ABDOMEN SURGERY      3 for crohns dx    APPENDECTOMY      APPENDECTOMY      CHOLECYSTECTOMY      CHOLECYSTECTOMY      COLONOSCOPY      ENDOSCOPIC RETROGRADE CHOLANGIOPANCREATOGRAM N/A  9/25/2024    Procedure: Endoscopic retrograde CHOLANGIOPANCREATOGRAPHY, BILIARY STENT PLACEMENT,AND STONE EXTRACTION;  Surgeon: Jesus Rankin MD;  Location:  OR    ENDOSCOPIC RETROGRADE CHOLANGIOPANCREATOGRAM N/A 10/4/2024    Procedure: ENDOSCOPIC RETROGRADE CHOLANGIOPANCREATOGRAPHY, STENT AND STONE REMOVAL;  Surgeon: Jesus Rankin MD;  Location: [facility] OR    ENDOSCOPIC ULTRASOUND UPPER GASTROINTESTINAL TRACT (GI) N/A 11/13/2020    Procedure: ENDOSCOPIC ULTRASOUND, ESOPHAGOSCOPY / UPPER GASTROINTESTINAL TRACT with BIOPSY;  Surgeon: Cydney Garcia MD;  Location:  OR    ENDOSCOPIC ULTRASOUND UPPER GASTROINTESTINAL TRACT (GI) N/A 9/25/2024    Procedure: and Endoscopic ultrasound upper;  Surgeon: Jesus Rankin MD;  Location:  OR    GYN SURGERY      H STATISTIC PICC LINE INSERTION >5YR, FAILED Right 11/10/2021    LUE unsuccessful attempt from another hospital, IR deferral    IR PICC PLACEMENT > 5 YRS OF AGE  11/11/2021    IR PICC PLACEMENT > 5 YRS OF AGE  3/16/2022    OTHER SURGICAL HISTORY      strictoplasty x3    OTHER SURGICAL HISTORY      adhesion removal x1    OTHER SURGICAL HISTORY      small bowel resections    PICC  05/10/2019         RESECTION ILEOCECAL  12/06/2013    Procedure: RESECTION ILEOCECAL;  Laparotomy, Extensive Lysis of Adhesions, Stricture Plasty X2  Anesthesia general with block;  Surgeon: Pete Cartagena MD;  Location: [facility] OR    RESECTION ILEOCOLIC N/A 11/17/2021    Procedure: Exploratory laparotomy, illeal resection, extensive lysis of adhesions (2 hr);  Surgeon: Pete Cartagena MD;  Location: [facility] OR       Prior to Admission Medications   Prior to Admission Medications   Prescriptions Last Dose Informant Patient Reported? Taking?   cyanocobalamin (CYANOCOBALAMIN) 1000 mcg/mL injection 5/7/2025  Yes Yes   Sig: Inject 1,000 mcg into the muscle once a week.   escitalopram (LEXAPRO) 10 MG tablet 5/10/2025 Bedtime  Yes Yes   Sig: Take 10 mg by mouth at bedtime.    famotidine (PEPCID) 40 MG tablet 5/10/2025 Bedtime  Yes Yes   Sig: Take 40 mg by mouth daily.   ondansetron (ZOFRAN ODT) 4 MG ODT tab   Yes Yes   Sig: Take 4 mg by mouth every 12 hours as needed for nausea or vomiting.   pantoprazole (PROTONIX) 40 MG EC tablet   No No   Sig: Take 1 tablet (40 mg) by mouth daily for 30 doses.   progesterone (PROMETRIUM) 200 MG capsule   Yes Yes   Sig: TAKE 1-2 CAPSULES BY MOUTH AT BEDTIME FOR 10 DAYS EACH MONTH OF CYCLE STARTING DAY 17 OR PEAK +3 AS DIRECTED   ustekinumab (STELARA) 90 MG/ML Past Week  Yes Yes   Sig: Inject 90 mg Subcutaneous every 28 days       Facility-Administered Medications: None        Review of Systems    The 10 point Review of Systems is negative other than noted in the HPI or here.     Physical Exam   Vital Signs: Temp: 97.8  F (36.6  C) Temp src: Oral BP: 105/63 Pulse: 66   Resp: 18 SpO2: 99 % O2 Device: None (Room air)    Weight: 114 lbs 9.6 oz    General: AAOx3, NAD, pleasant.  HEENT: NCAT, pupils are equal and round, anicteric, oral mucosa is moist.  Neck: Supple,   Cardiac: RRR.  No murmur. No rub or gallop.  Respiratory: CTAB, no crackles, wheezes, rales, or rhonchi  Abdomen: Mild diffuse abdominal tenderness palpation.  Soft, ND, + bowel sounds  Extremity: No LE edema, DP pulses palpable.   Neuro: AAO x3,   Psych: Calm and cooperative.       Medical Decision Making       >50 MINUTES SPENT BY ME on the date of service doing chart review, history, exam, documentation & further activities per the note.      Data     I have personally reviewed the following data over the past 24 hrs:    10.8  \   13.0   / 305     139 105 13.9 /  90   3.5 20 (L) 0.58 \       Imaging results reviewed over the past 24 hrs:   No results found for this or any previous visit (from the past 24 hours).

## 2025-05-12 NOTE — PROGRESS NOTES
PRE-PROCEDURE NOTE      REASON FOR PROCEDURE: Dysphagia, abnormal imaging    History and Physical: Reviewed, no changes    Pre-sedation Assessment:     VS: AVSS  General: Alert, NAD  Airway: normal  Heart: RRR  Lungs: CTA    Previous Reaction to Sedation: None    Sedation Plan Based on Assessment: General    Mallampati: II    ASA Classification: 2      Impression: Patient deemed adequate candidate for General Anesthesia    Plan: esophagogastroduodenoscopy              Paulo Ramey MD  Thank you for the opportunity to participate in the care of this patient.   Please feel free to call me with any questions or concerns.  Phone number [phone].            5/12/2025 11:21 AM

## 2025-05-12 NOTE — PLAN OF CARE
"  Problem: Swallowing Impairment  Goal: Optimal Eating/Swallowing without Aspiration  Outcome: Progressing     Problem: Pain Acute  Goal: Optimal Pain Control and Function  Outcome: Progressing  Intervention: Develop Pain Management Plan  Recent Flowsheet Documentation  Taken 5/12/2025 0048 by Le Roth RN  Pain Management Interventions: medication (see MAR)  Intervention: Prevent or Manage Pain  Recent Flowsheet Documentation  Taken 5/12/2025 0205 by Le Roth, RN  Medication Review/Management: medications reviewed  Taken 5/12/2025 0048 by Le Roth RN  Medication Review/Management: medications reviewed     Goal Outcome Evaluation:  /56 (BP Location: Right arm, Patient Position: Left side, Cuff Size: Adult Regular)   Pulse 55   Temp 97.8  F (36.6  C) (Oral)   Resp 16   Ht 1.575 m (5' 2\")   Wt 52 kg (114 lb 9.6 oz)   LMP 05/01/2025   SpO2 99%   BMI 20.96 kg/m    Pt is a/ox4, calm and cooperative, able to make needs known, independent. Pt denied SOB, vomiting, and dizziness. Pt reported chest pain that radiates to back, an known issue, pt described pain as more discomfort, a sensation of food stuck in the midsternum area, managed with MAR. Pt maintain NPO.  PIV infusing LR@100ml/hr.                            "

## 2025-05-12 NOTE — PHARMACY-ADMISSION MEDICATION HISTORY
Pharmacist Admission Medication History    Admission medication history is complete. The information provided in this note is only as accurate as the sources available at the time of the update.    Information Source(s): Hospital records and [facility]/[employer] via N/A    Pertinent Information: Patient was in [facility] ED earlier today where med rec was completed by pharmacist. Pt was prescribed pantoprazole which hasn't been filled yet according to [employer] data. Please see pharmacist note from 5/11/25 for more details regarding PTA medication list.      Changes made to PTA medication list:  Added: pantoprazole  Deleted: None  Changed: None    Medication History Completed By: Lynda Carl[facility], [facility]  5/12/2025 12:11 AM    PTA Med List   Medication Sig Last Dose/Taking    cyanocobalamin (CYANOCOBALAMIN) 1000 mcg/mL injection Inject 1,000 mcg into the muscle once a week. 5/7/2025    escitalopram (LEXAPRO) 10 MG tablet Take 10 mg by mouth at bedtime. 5/10/2025 Bedtime    famotidine (PEPCID) 40 MG tablet Take 40 mg by mouth daily. 5/10/2025 Bedtime    ondansetron (ZOFRAN ODT) 4 MG ODT tab Take 4 mg by mouth every 12 hours as needed for nausea or vomiting. Taking As Needed    progesterone (PROMETRIUM) 200 MG capsule TAKE 1-2 CAPSULES BY MOUTH AT BEDTIME FOR 10 DAYS EACH MONTH OF CYCLE STARTING DAY 17 OR PEAK +3 AS DIRECTED Taking    ustekinumab (STELARA) 90 MG/ML Inject 90 mg Subcutaneous every 28 days  Past Week

## 2025-05-12 NOTE — PROVIDER NOTIFICATION
Patient hypotensive despite 500ml bolus. Hospitalist requesting to admit patient.     Urban Kaminski RN

## 2025-05-12 NOTE — ANESTHESIA CARE TRANSFER NOTE
Patient: Alok Nino    Procedure: Procedure(s):  ESOPHAGOGASTRODUODENOSCOPY with biopsies       Diagnosis: Esophageal dysphagia [R13.19]  Diagnosis Additional Information: No value filed.    Anesthesia Type:   General     Note:    Oropharynx: oropharynx clear of all foreign objects  Level of Consciousness: drowsy  Oxygen Supplementation: face mask  Level of Supplemental Oxygen (L/min / FiO2): 8  Independent Airway: airway patency satisfactory and stable  Dentition: dentition unchanged  Vital Signs Stable: post-procedure vital signs reviewed and stable  Report to RN Given: handoff report given  Patient transferred to: PACU    Handoff Report: Identifed the Patient, Identified the Reponsible Provider, Reviewed the pertinent medical history, Discussed the surgical course, Reviewed Intra-OP anesthesia mangement and issues during anesthesia, Set expectations for post-procedure period and Allowed opportunity for questions and acknowledgement of understanding      Vitals:  Vitals Value Taken Time   /57 05/12/25 11:59   Temp 36.7    Pulse 80 05/12/25 12:00   Resp 16 05/12/25 12:00   SpO2 100 % 05/12/25 12:00   Vitals shown include unfiled device data.    Electronically Signed By: TACOS Huggins CRNA  May 12, 2025  12:01 PM

## 2025-05-12 NOTE — INTERVAL H&P NOTE
I have reviewed the surgical (or preoperative) H&P that is linked to this encounter, and examined the patient. There are no significant changes    Clinical Conditions Present on Arrival:  Clinically Significant Risk Factors Present on Admission            # Hyperchloremia: Highest Cl = 108 mmol/L in last 2 days, will monitor as appropriate

## 2025-05-13 VITALS
DIASTOLIC BLOOD PRESSURE: 71 MMHG | SYSTOLIC BLOOD PRESSURE: 113 MMHG | WEIGHT: 115.5 LBS | RESPIRATION RATE: 14 BRPM | HEART RATE: 73 BPM | TEMPERATURE: 98.3 F | OXYGEN SATURATION: 98 % | BODY MASS INDEX: 21.25 KG/M2 | HEIGHT: 62 IN

## 2025-05-13 LAB
ANION GAP SERPL CALCULATED.3IONS-SCNC: 11 MMOL/L (ref 7–15)
BUN SERPL-MCNC: 9.4 MG/DL (ref 6–20)
CALCIUM SERPL-MCNC: 8.7 MG/DL (ref 8.8–10.4)
CHLORIDE SERPL-SCNC: 108 MMOL/L (ref 98–107)
CREAT SERPL-MCNC: 0.62 MG/DL (ref 0.51–0.95)
EGFRCR SERPLBLD CKD-EPI 2021: >90 ML/MIN/1.73M2
ERYTHROCYTE [DISTWIDTH] IN BLOOD BY AUTOMATED COUNT: 12.4 % (ref 10–15)
ERYTHROCYTE [DISTWIDTH] IN BLOOD BY AUTOMATED COUNT: 12.6 % (ref 10–15)
GLUCOSE SERPL-MCNC: 104 MG/DL (ref 70–99)
HCO3 SERPL-SCNC: 21 MMOL/L (ref 22–29)
HCT VFR BLD AUTO: 32.3 % (ref 35–47)
HCT VFR BLD AUTO: 33.1 % (ref 35–47)
HGB BLD-MCNC: 11.3 G/DL (ref 11.7–15.7)
HGB BLD-MCNC: 11.4 G/DL (ref 11.7–15.7)
MCH RBC QN AUTO: 30.8 PG (ref 26.5–33)
MCH RBC QN AUTO: 31.4 PG (ref 26.5–33)
MCHC RBC AUTO-ENTMCNC: 34.4 G/DL (ref 31.5–36.5)
MCHC RBC AUTO-ENTMCNC: 35 G/DL (ref 31.5–36.5)
MCV RBC AUTO: 90 FL (ref 78–100)
MCV RBC AUTO: 90 FL (ref 78–100)
PLATELET # BLD AUTO: 258 10E3/UL (ref 150–450)
PLATELET # BLD AUTO: 263 10E3/UL (ref 150–450)
POTASSIUM SERPL-SCNC: 3.6 MMOL/L (ref 3.4–5.3)
RBC # BLD AUTO: 3.6 10E6/UL (ref 3.8–5.2)
RBC # BLD AUTO: 3.7 10E6/UL (ref 3.8–5.2)
SODIUM SERPL-SCNC: 140 MMOL/L (ref 135–145)
WBC # BLD AUTO: 13.6 10E3/UL (ref 4–11)
WBC # BLD AUTO: 16.4 10E3/UL (ref 4–11)

## 2025-05-13 PROCEDURE — 80048 BASIC METABOLIC PNL TOTAL CA: CPT | Performed by: STUDENT IN AN ORGANIZED HEALTH CARE EDUCATION/TRAINING PROGRAM

## 2025-05-13 PROCEDURE — 85027 COMPLETE CBC AUTOMATED: CPT | Performed by: STUDENT IN AN ORGANIZED HEALTH CARE EDUCATION/TRAINING PROGRAM

## 2025-05-13 PROCEDURE — 36415 COLL VENOUS BLD VENIPUNCTURE: CPT | Performed by: STUDENT IN AN ORGANIZED HEALTH CARE EDUCATION/TRAINING PROGRAM

## 2025-05-13 PROCEDURE — 99239 HOSP IP/OBS DSCHRG MGMT >30: CPT | Performed by: STUDENT IN AN ORGANIZED HEALTH CARE EDUCATION/TRAINING PROGRAM

## 2025-05-13 PROCEDURE — 258N000003 HC RX IP 258 OP 636: Performed by: STUDENT IN AN ORGANIZED HEALTH CARE EDUCATION/TRAINING PROGRAM

## 2025-05-13 PROCEDURE — 250N000011 HC RX IP 250 OP 636: Performed by: INTERNAL MEDICINE

## 2025-05-13 RX ADMIN — PANTOPRAZOLE SODIUM 40 MG: 40 INJECTION, POWDER, FOR SOLUTION INTRAVENOUS at 08:39

## 2025-05-13 RX ADMIN — SODIUM CHLORIDE 750 ML: 0.9 INJECTION, SOLUTION INTRAVENOUS at 08:38

## 2025-05-13 ASSESSMENT — ACTIVITIES OF DAILY LIVING (ADL)
ADLS_ACUITY_SCORE: 41

## 2025-05-13 NOTE — PLAN OF CARE
Pt. A/Ox4 and able to make needs known. VSS on RA. Denies SOB, dyspnea, or dizziness. Tele NSR. Headache and epigastric pain has been managed with PRN oral tramadol. Continuous fluids infusing to right forearm. Mechanical soft diet. Ad svetlana. Call light within reach. Plan of care ongoing.   Problem: Adult Inpatient Plan of Care  Goal: Optimal Comfort and Wellbeing  Outcome: Progressing  Intervention: Monitor Pain and Promote Comfort  Recent Flowsheet Documentation  Taken 5/12/2025 2147 by Diamond Wray RN  Pain Management Interventions:   rest   pillow support provided   quiet environment facilitated  Taken 5/12/2025 2135 by Diamond Wray RN  Pain Management Interventions: medication (see MAR)  Taken 5/12/2025 2028 by Diamond Wray RN  Pain Management Interventions:   diversional activity provided   quiet environment facilitated   food   distraction   emotional support  Taken 5/12/2025 1941 by Diamond Wray RN  Pain Management Interventions: medication (see MAR)  Taken 5/12/2025 1918 by Diamond Wray RN  Pain Management Interventions: MD notified (comment)     Problem: Swallowing Impairment  Goal: Optimal Eating/Swallowing without Aspiration  Intervention: Optimize Eating and Swallowing  Recent Flowsheet Documentation  Taken 5/12/2025 1919 by Diamond Wray RN  Aspiration Precautions: awake/alert before oral intake  Swallowing Interventions: Dysphagia: foods moistened  Swallowing Method:   chin tuck   throat clear/extra swallow     Problem: Pain Acute  Goal: Optimal Pain Control and Function  Outcome: Progressing  Intervention: Optimize Psychosocial Wellbeing  Recent Flowsheet Documentation  Taken 5/12/2025 1919 by Diamond Wray RN  Diversional Activities: smartphone  Intervention: Develop Pain Management Plan  Recent Flowsheet Documentation  Taken 5/12/2025 2147 by Diamond Wray RN  Pain Management Interventions:   rest   pillow support provided   quiet  environment facilitated  Taken 5/12/2025 2135 by Diamond Wray, RN  Pain Management Interventions: medication (see MAR)  Taken 5/12/2025 2028 by Diamond Wray, RN  Pain Management Interventions:   diversional activity provided   quiet environment facilitated   food   distraction   emotional support  Taken 5/12/2025 1941 by Diamond Wray, RN  Pain Management Interventions: medication (see MAR)  Taken 5/12/2025 1918 by Diamond Wray RN  Pain Management Interventions: MD notified (comment)  Intervention: Prevent or Manage Pain  Recent Flowsheet Documentation  Taken 5/12/2025 1919 by Diamond Wray, RN  Medication Review/Management: medications reviewed

## 2025-05-13 NOTE — PROGRESS NOTES
Provider Felton notified of pt's c/o tingling underneath chin. Provider aware. No new orders obtained at this time.

## 2025-05-13 NOTE — PROGRESS NOTES
"[facility] Progress Note       SUBJECTIVE:  Patient tolerating soft diet, feels things moving through slowly but does not feel they are building up right now. No BM today, brown stool yesterday. She would like to go home.        OBJECTIVE:  BP 99/57 (BP Location: Left arm, Patient Position: Semi-Queen's, Cuff Size: Adult Regular)   Pulse 73   Temp 98.1  F (36.7  C) (Oral)   Resp 16   Ht 1.575 m (5' 2\")   Wt 52.4 kg (115 lb 8 oz)   LMP 2025   SpO2 99%   BMI 21.13 kg/m    Temp (24hrs), Av.8  F (36.6  C), Min:97.4  F (36.3  C), Max:98.4  F (36.9  C)    Patient Vitals for the past 72 hrs:   Weight   25 1905 52.4 kg (115 lb 8 oz)   25 1730 52 kg (114 lb 9.6 oz)       Intake/Output Summary (Last 24 hours) at 2025 1046  Last data filed at 2025 1155  Gross per 24 hour   Intake 200 ml   Output --   Net 200 ml        PHYSICAL EXAM  GEN: NAD, female appears stated age sitting up in bed  HRT: no LE edema  RESP: unlabored  ABD: nondistended  SKIN: No rash or jaundice      Additional Data:  I have reviewed the patient's new clinical lab results:     Recent Labs   Lab Test 25  0809 25  1257 22  0609 22  0554 21  0752 21  0744 21  0723 21  0709   WBC 16.4* 10.8 5.8   < >  --    < >  --   --   --    HGB 11.3* 13.0 12.0   < >  --    < >  --   --   --    MCV 90 90 96   < >  --    < >  --   --   --     305 276   < >  --    < >  --    < >  --    INR  --   --   --   --  1.24*  --  1.12  --  1.02    < > = values in this interval not displayed.     Recent Labs   Lab Test 25  0809 25  0549 25  2051   POTASSIUM 3.6 3.6 3.5   CHLORIDE 108* 108* 105   CO2 21* 21* 20*   BUN 9.4 12.9 13.9   ANIONGAP 11 11 14     Recent Labs   Lab Test 24  1257 24  1929 24  0525 24  0753 24  0000 24  2229 24  2223 20  1203 19  1645 19  0910 19  0858   ALBUMIN " 3.9 3.9 3.2*   < >  --    < >  --    < >  --    < >  --    BILITOTAL 0.3 0.6 0.3   < >  --    < >  --    < >  --    < >  --    * 119* 84*   < >  --    < >  --    < >  --    < >  --    AST 89* 37 33   < >  --    < >  --    < >  --    < >  --    PROTEIN  --   --   --   --  10*  --   --   --  Trace*  --  Negative   LIPASE 28 30  --   --   --   --  32   < >  --    < >  --     < > = values in this interval not displayed.         Imaging results:  VSS @  4/30/25:  IMPRESSION:   1. Please see the Speech Pathologist's note for diet recommendations.   2.  The patient's symptoms are retrosternal, and partway through the examination the fluoroscopic machine was moved lower to examine the mid thorax from the lateral projection. The patient has a dilated esophagus with poor peristalsis. There was a failure of relaxation of the lower esophageal sphincter and barium would pool in the esophagus up to the level the alexus before it would pass by hydrostatic pressure. This is an incomplete radiologic evaluation of the mid and lower esophagus but the appearance would be consistent with achalasia.     Procedure results:  EGD 5/12/25 ([facility]):  Findings:       The lumen of the esophagus was dilated with retained food in lower third        of esophagus. Z-line at GE junction (39 cm from the incisors). No        stricture seen.        Transient resistance noted at GE junction. Food was advanced into        stomach using scope and irrigation.        The gastroesophageal junction was otherwise normal. Biopsies were taken        with a cold forceps for histology.        The entire examined stomach was normal.        The examined duodenum was normal.                                                                                    Impression:            - Probable esophageal dysmotility (suspect achalasia)                          with retained food (cleared during procedure today).   Recommendation:        - Proceed with esophageal  manometry on Wednesday at                          [facility] as scheduled                          - Favor soft diet in interim                          - Keep torso elevated to at least 30 degrees at all                          times and avoid eating or drinking within three hours                          of laying down                          - We will follow up with the biopsy results and let                          you know once available.      IMPRESSION:  Dysphagia  This is a 46 y/o female with PMH Crohn's disease s/p resections on Stelara, choledocholithiasis s/p cholecystectomy and ERCP with stent placement admitted 5/11 for dysphagia and vomiting in the setting of possible achalasia. EGD 5/12 with retained food in the esophagus (cleared during procedure) and dilated esophagus consistent with achalasia. She will need esophageal manometry as outpatient and has an appointment available 5/14 at [facility]. She was encouraged to reduce to liquid diet today in anticipation of testing tomorrow and will follow up pending results. She states outpatient Rx has been sent for PPI. She was noted to have elevated WBC today with slight drop in hgb without overt bleeding. She feels well, no abdominal pain, no fever. Recommend outpatient labs in 1 week.     PLAN:  - Recommend clear/full liquid diet until esophageal manometry on 5/14  - PPI as previously prescribed  - CBC at PCP or [facility] in 1 week  - Okay to discharge today per GI    (Dr. Ramey)  Yamileth Boone PA-C  [facility]  5/13/2025 10:46 AM  [phone] (office)    22 minutes of total time was spent providing patient care, including patient evaluation, reviewing documentation/test results, , and documentation.  ________________________________________________________________________

## 2025-05-13 NOTE — DISCHARGE SUMMARY
[facility]  Hospitalist Discharge Summary      Date of Admission:  5/11/2025  Date of Discharge:  5/13/2025  Discharging Provider: Emerson Wang MD  Discharge Service: Hospitalist Service     Discharge Diagnoses   Esophageal dysphagia secondary to achalasia  Hypotension after EGD, resolved  Leukocytosis, downtrending   Crohn's disease:   Metabolic acidosis  Mood disorder  B12 deficiency     Clinically Significant Risk Factors          Follow-ups Needed After Discharge   Follow-up Appointments       Hospital Follow-up with Existing Primary Care Provider (PCP)          Schedule Primary Care visit within: 14 Days                Unresulted Labs Ordered in the Past 30 Days of this Admission       Date and Time Order Name Status Description    5/12/2025 11:41 AM Surgical Pathology Exam In process         These results will be followed up by GI team     Discharge Disposition   Discharged to home  Condition at discharge: Stable    Hospital Course   Alok Nino is a 45 year old female admitted on 5/11/2025. She has medical history significant for imaging finding of achalasia, Crohn's disease, recurrent bowel obstructions, multiple abdominal surgeries, multiple vitamin deficiencies including vitamin D and B12 any, and copper deficiency myeloneuropathy.      She presented to ER on 5/10 previously and ultimately discharged after receiving EZ gas and glucagon, then returned to the ED with complaints of difficulty swallowing and vomiting. And admitted with GI Consult.    Presentation of progressive dysphagia over months but then acutely changed after Mother's Day branch, with sensation of food stuck in her throat and multiple episodes of emesis, unable to eat ice cream and vomiting after eggs and water.    In the ED vital signs were stable.  CBC was unremarkable.  BMP showed bicarb of 20.      Status post EGD with findings demonstrating probable dysmotility consistent with suspected achalasia, the  scope was able to clear the retained food, the GI team recommends a soft diet and keeping her torso elevated 30 degrees at all times and avoid eating or drinking within 3 hours of laying down; they will follow-up with her about the biopsy, Manometry planned on 5/14.  She was hopeful for discharge from the PACU, but she started to have low blood pressures in the 90s.    She received a bolus and did not respond properly with SBP now further dropping to the 80s. As such she was brought back to the floors and started on maintenance IV fluids and cardiac monitoring and monitoring overnight. She had soft pressures and developed a headache which improved overnight.     On 5/13/2025, received another bolus and vitals normalized. She did have a downtrending hgb that was rechecked and stable and no sign/sx of bleed. She did have a leukocytosis but had no localizing symptoms or fevers overnight or on day of discharge. Her wbc rechecked was downtrending so it may be stress demargination however still recommend rechecking at close follow up.     She will see GI tomorrow on 5/14 and will keep a clear/full liquid diet until then. Should have cbc rechecked. She is clinically back to her baseline with no recurrent dysphagia and is hemodynamically and vitally stable on discharge.       Consultations This Hospital Stay   GASTROENTEROLOGY IP CONSULT    Code Status   Full Code    Time Spent on this Encounter   I, Emerson Wang MD, personally saw the patient today and spent greater than 30 minutes discharging this patient.       Emerson Wang MD  [facility]  [facility] [facility] [address]  Phone: [phone]  Fax: [phone]  ______________________________________________________________________    Physical Exam   Vital Signs: Temp: 98.3  F (36.8  C) Temp src: Oral BP: 113/71 Pulse: 73   Resp: 14 SpO2: 98 % O2 Device: None (Room air)    Weight: 115 lbs 8 oz    General: alert,  oriented, and in no acute distress  Pulmonary: clear to auscultation bilaterally, normal respiratory effort, on room air, no rales or wheezes or evidence of accessory muscle use  CVS: regular rate and rhythm, no murmurs, rubs, or gallops; no blatant jugular venous distention; no extremity edema and extremities are warm to the touch  GI: soft, nontender, BS+, no rebound or guarding, no conspicuous organomegaly   Neuro: nonfocal, moves all extremities of own volition  Psych: appropriate           Primary Care Physician   Nakita Odonnell    Discharge Orders      Reason for your hospital stay    Dysphagia, EGD showed probable dysmotility consistent with suspected achalasia, the scope was able to clear the retained food, the GI team recommends a soft diet and keep your torso elevated 30 degrees at all times and avoid eating or drinking within 3 hours of laying down; they will follow-up with you about the biopsy, continue to see them and follow-up with [facility].     Activity    Your activity upon discharge: activity as tolerated     Diet    Follow this diet upon discharge: Current Diet:Orders Placed This Encounter      Clear Liquid Diet; after EGD: Soft diet     Hospital Follow-up with Existing Primary Care Provider (PCP)            Significant Results and Procedures   Results for orders placed or performed during the hospital encounter of 10/04/24   XR Surgery CORINNE Fluoro L/T 5 Min    Narrative    This exam was marked as non-reportable because it will not be read by a   radiologist or a [facility] non-radiologist provider.             Discharge Medications   Current Discharge Medication List        CONTINUE these medications which have NOT CHANGED    Details   cyanocobalamin (CYANOCOBALAMIN) 1000 mcg/mL injection Inject 1,000 mcg into the muscle once a week.      escitalopram (LEXAPRO) 10 MG tablet Take 10 mg by mouth at bedtime.      famotidine (PEPCID) 40 MG tablet Take 40 mg by mouth daily.      ondansetron (ZOFRAN ODT) 4  MG ODT tab Take 4 mg by mouth every 12 hours as needed for nausea or vomiting.      progesterone (PROMETRIUM) 200 MG capsule TAKE 1-2 CAPSULES BY MOUTH AT BEDTIME FOR 10 DAYS EACH MONTH OF CYCLE STARTING DAY 17 OR PEAK +3 AS DIRECTED      ustekinumab (STELARA) 90 MG/ML Inject 90 mg Subcutaneous every 28 days       pantoprazole (PROTONIX) 40 MG EC tablet Take 1 tablet (40 mg) by mouth daily for 30 doses.  Qty: 30 tablet, Refills: 0           Allergies   Allergies   Allergen Reactions    Sucralfate Hives and Rash    Morphine Hcl Other (See Comments)     SPASMS OF SPHINCTER OF ODDI  (BILIARY TRACT)    Pneumovax [Pneumococcal Polysaccharide Vaccine]     Sucralfate Hives    Codeine Nausea and Vomiting

## 2025-05-14 ENCOUNTER — PATIENT OUTREACH (OUTPATIENT)
Dept: CARE COORDINATION | Facility: CLINIC | Age: 45
End: 2025-05-14
Payer: COMMERCIAL

## 2025-05-14 NOTE — PROGRESS NOTES
Transitions of Care Outreach  Chief Complaint   Patient presents with    Clinic Care Coordination - Post Hospital       Most Recent Admission Date: 5/11/2025   Most Recent Admission Diagnosis: Esophageal dysphagia - R13.19     Most Recent Discharge Date: 5/13/2025   Most Recent Discharge Diagnosis: Esophageal dysphagia - R13.19     Transitions of Care Assessment    Discharge Assessment  How are you doing now that you are home?: Alright.  How are your symptoms? (Red Flag symptoms escalate to triage hotline per guidelines): Improved  Do you know how to contact your clinic care team if you have future questions or changes to your health status? : Yes  Does the patient have their discharge instructions? : Yes  Does the patient have questions regarding their discharge instructions? : No  Were you started on any new medications or were there changes to any of your previous medications? : No  Does the patient have all of their medications?: Yes  Do you have questions regarding any of your medications? : No  Do you have all of your needed medical supplies or equipment (DME)?  (i.e. oxygen tank, CPAP, cane, etc.): Yes    Post-op ([facility] Only)  If the patient had a surgery or procedure, do they have any questions for a nurse?: No    Follow up Plan     Discharge Follow-Up  Discharge follow up appointment scheduled in alignment with recommended follow up timeframe or Transitions of Risk Category? (Low = within 30 days; Moderate= within 14 days; High= within 7 days): Yes  Discharge Follow Up Appointment Date: 05/14/25  Discharge Follow Up Appointment Scheduled with?: Specialty Care Provider    No future appointments.    Outpatient Plan as outlined on AVS reviewed with patient.    For any urgent concerns, please contact our 24 hour nurse triage line: [phone] ([phone])       Heidy Madrid, SIMRAN    [phone]  [facility]

## 2025-05-19 LAB
PATH REPORT.COMMENTS IMP SPEC: NORMAL
PATH REPORT.COMMENTS IMP SPEC: NORMAL
PATH REPORT.FINAL DX SPEC: NORMAL
PATH REPORT.GROSS SPEC: NORMAL
PATH REPORT.MICROSCOPIC SPEC OTHER STN: NORMAL
PATH REPORT.RELEVANT HX SPEC: NORMAL
PHOTO IMAGE: NORMAL

## 2025-05-28 NOTE — TELEPHONE ENCOUNTER
Diagnosis, Referred by & from: Crohns   Appt date: 7/6/2022   NOTES STATUS DETAILS   OFFICE NOTE from referring provider [facility]:  5/17/22, 3/29/22 - CR OV with Dr. Cartagena   OFFICE NOTE from other specialist [facility]:  12/6/21 - CR OV with Nahomy Garcia NP   DISCHARGE SUMMARY from hospital Care Everywhere / Internal [facility]:  3/14/22 - Admission with Dr. Marialuisa Caruso:  2/9/22 - Admission with Dr. Stephen  2/3/22 - Admission with Dr. Marc Henry [facility]:  6/29/19 - Admission with Dr. June  5/7/19 - Admission with Dr. Thurman   DISCHARGE REPORT from the ER Care Everywhere / Internal [facility]:  3/13/22 - ED PV with Dr. John Rock:  11/6/20 - ED OV with Dr. Torres   OPERATIVE REPORT Care Everywhere / [facility]:  11/17/21 - OP Note for Exploratory laparotomy, illeal resection, extensive lysis of adhesions with Dr. Cartagena  12/6/13 - OP Note for Laparotomy, Extensive Lysis of Adhesions, Stricture Plasty X2 with Dr. Cartagena    [facility]:  1/13/17 - OP Note for EXPLORATORY LAPAROTOMY with Dr. Messer   MEDICATION LIST Internal    LABS     BIOPSIES/PATHOLOGY RELATED TO DIAGNOSIS [facility]:  3/7/22 - Colon Biopsy (Case: [id number])   DIAGNOSTIC PROCEDURES     COLONOSCOPY Received [facility]:  3/7/22 - Colonoscopy  7/27/21 - Colonoscopy   UPPER ENDOSCOPY (EGD) [facility]:  11/13/20 - EGD   IMAGING (DISC & REPORT)      CT Received / [facility]:  3/13/22 - CT Abd/Pelvis  11/9/21 - CT Abd/Pelvis    [facility]:  2/9/22 - CT Abd/Pelvis  2/3/22 - CT Abd/Pelvis   MRI Received / [facility]:  5/25/22 - MRI Enterography  3/3/22 - MRI Enterography  9/30/21 - MRI Enterography  11/6/19 - MRI Abdomen MRCP  6/26/19 - MRI Abdomen MRCP  5/13/19 - MRI Abdomen  5/7/19 - MRI Abdomen  * Additional MRIs in Epic/PACs    [facility]:  2/4/22 - MRI Enterography   XRAY [facility]:  3/9/22 - XR Abdomen   ULTRASOUND  (ENDOANAL/ENDORECTAL) [facility]:  6/26/19 - US  Abdomen      Attending to bill Attending to bill Attending to bill

## 2025-07-13 ENCOUNTER — HEALTH MAINTENANCE LETTER (OUTPATIENT)
Age: 45
End: 2025-07-13

## 2025-08-04 ENCOUNTER — HOSPITAL ENCOUNTER (EMERGENCY)
Facility: HOSPITAL | Age: 45
Discharge: HOME | End: 2025-08-05
Payer: COMMERCIAL

## 2025-08-04 ENCOUNTER — APPOINTMENT (OUTPATIENT)
Dept: RADIOLOGY | Facility: HOSPITAL | Age: 45
End: 2025-08-04
Attending: STUDENT IN AN ORGANIZED HEALTH CARE EDUCATION/TRAINING PROGRAM
Payer: COMMERCIAL

## 2025-08-04 ENCOUNTER — APPOINTMENT (OUTPATIENT)
Dept: CT IMAGING | Facility: HOSPITAL | Age: 45
End: 2025-08-04
Payer: COMMERCIAL

## 2025-08-04 DIAGNOSIS — R11.2 NAUSEA AND VOMITING, UNSPECIFIED VOMITING TYPE: Primary | ICD-10-CM

## 2025-08-04 DIAGNOSIS — K59.00 CONSTIPATION, UNSPECIFIED CONSTIPATION TYPE: ICD-10-CM

## 2025-08-04 LAB
ALBUMIN SERPL BCG-MCNC: 3.9 G/DL (ref 3.5–5.2)
ALP SERPL-CCNC: 122 U/L (ref 40–150)
ALT SERPL W P-5'-P-CCNC: 40 U/L (ref 0–50)
ANION GAP SERPL CALCULATED.3IONS-SCNC: 9 MMOL/L (ref 7–15)
AST SERPL W P-5'-P-CCNC: 27 U/L (ref 0–45)
BASOPHILS # BLD AUTO: 0 10E3/UL (ref 0–0.2)
BASOPHILS NFR BLD AUTO: 0 %
BILIRUB DIRECT SERPL-MCNC: 0.12 MG/DL (ref 0–0.3)
BILIRUB SERPL-MCNC: 0.3 MG/DL
BUN SERPL-MCNC: 18.3 MG/DL (ref 6–20)
CALCIUM SERPL-MCNC: 9.9 MG/DL (ref 8.8–10.4)
CHLORIDE SERPL-SCNC: 102 MMOL/L (ref 98–107)
CREAT SERPL-MCNC: 0.65 MG/DL (ref 0.51–0.95)
EGFRCR SERPLBLD CKD-EPI 2021: >90 ML/MIN/1.73M2
EOSINOPHIL # BLD AUTO: 0.2 10E3/UL (ref 0–0.7)
EOSINOPHIL NFR BLD AUTO: 3 %
ERYTHROCYTE [DISTWIDTH] IN BLOOD BY AUTOMATED COUNT: 12.1 % (ref 10–15)
GLUCOSE SERPL-MCNC: 105 MG/DL (ref 70–99)
HCG SERPL QL: NEGATIVE
HCO3 SERPL-SCNC: 31 MMOL/L (ref 22–29)
HCT VFR BLD AUTO: 36.2 % (ref 35–47)
HGB BLD-MCNC: 11.8 G/DL (ref 11.7–15.7)
IMM GRANULOCYTES # BLD: 0 10E3/UL
IMM GRANULOCYTES NFR BLD: 0 %
LIPASE SERPL-CCNC: 74 U/L (ref 13–60)
LYMPHOCYTES # BLD AUTO: 1.3 10E3/UL (ref 0.8–5.3)
LYMPHOCYTES NFR BLD AUTO: 18 %
MCH RBC QN AUTO: 29.8 PG (ref 26.5–33)
MCHC RBC AUTO-ENTMCNC: 32.6 G/DL (ref 31.5–36.5)
MCV RBC AUTO: 91 FL (ref 78–100)
MONOCYTES # BLD AUTO: 0.6 10E3/UL (ref 0–1.3)
MONOCYTES NFR BLD AUTO: 8 %
NEUTROPHILS # BLD AUTO: 4.7 10E3/UL (ref 1.6–8.3)
NEUTROPHILS NFR BLD AUTO: 70 %
NRBC # BLD AUTO: 0 10E3/UL
NRBC BLD AUTO-RTO: 0 /100
PLATELET # BLD AUTO: 321 10E3/UL (ref 150–450)
POTASSIUM SERPL-SCNC: 3.9 MMOL/L (ref 3.4–5.3)
PROT SERPL-MCNC: 7.2 G/DL (ref 6.4–8.3)
RBC # BLD AUTO: 3.96 10E6/UL (ref 3.8–5.2)
SODIUM SERPL-SCNC: 142 MMOL/L (ref 135–145)
WBC # BLD AUTO: 6.8 10E3/UL (ref 4–11)

## 2025-08-04 PROCEDURE — 250N000009 HC RX 250

## 2025-08-04 PROCEDURE — 96361 HYDRATE IV INFUSION ADD-ON: CPT

## 2025-08-04 PROCEDURE — 85004 AUTOMATED DIFF WBC COUNT: CPT

## 2025-08-04 PROCEDURE — 84703 CHORIONIC GONADOTROPIN ASSAY: CPT

## 2025-08-04 PROCEDURE — 83690 ASSAY OF LIPASE: CPT

## 2025-08-04 PROCEDURE — 96374 THER/PROPH/DIAG INJ IV PUSH: CPT | Mod: 59

## 2025-08-04 PROCEDURE — 74019 RADEX ABDOMEN 2 VIEWS: CPT

## 2025-08-04 PROCEDURE — 99285 EMERGENCY DEPT VISIT HI MDM: CPT | Mod: 25

## 2025-08-04 PROCEDURE — 82248 BILIRUBIN DIRECT: CPT

## 2025-08-04 PROCEDURE — 250N000011 HC RX IP 250 OP 636

## 2025-08-04 PROCEDURE — 258N000003 HC RX IP 258 OP 636

## 2025-08-04 PROCEDURE — 82435 ASSAY OF BLOOD CHLORIDE: CPT

## 2025-08-04 PROCEDURE — 96375 TX/PRO/DX INJ NEW DRUG ADDON: CPT | Mod: 59

## 2025-08-04 PROCEDURE — 74177 CT ABD & PELVIS W/CONTRAST: CPT

## 2025-08-04 PROCEDURE — 36415 COLL VENOUS BLD VENIPUNCTURE: CPT

## 2025-08-04 RX ORDER — LIDOCAINE HYDROCHLORIDE 20 MG/ML
10 SOLUTION OROPHARYNGEAL ONCE
Status: COMPLETED | OUTPATIENT
Start: 2025-08-04 | End: 2025-08-04

## 2025-08-04 RX ORDER — ONDANSETRON 4 MG/1
8 TABLET, ORALLY DISINTEGRATING ORAL ONCE
Status: COMPLETED | OUTPATIENT
Start: 2025-08-04 | End: 2025-08-04

## 2025-08-04 RX ORDER — DIPHENHYDRAMINE HYDROCHLORIDE 50 MG/ML
25 INJECTION, SOLUTION INTRAMUSCULAR; INTRAVENOUS ONCE
Status: COMPLETED | OUTPATIENT
Start: 2025-08-04 | End: 2025-08-04

## 2025-08-04 RX ORDER — METOCLOPRAMIDE HYDROCHLORIDE 5 MG/ML
10 INJECTION INTRAMUSCULAR; INTRAVENOUS ONCE
Status: COMPLETED | OUTPATIENT
Start: 2025-08-04 | End: 2025-08-04

## 2025-08-04 RX ADMIN — METOCLOPRAMIDE 10 MG: 5 INJECTION, SOLUTION INTRAMUSCULAR; INTRAVENOUS at 22:39

## 2025-08-04 RX ADMIN — SODIUM CHLORIDE 1000 ML: 0.9 INJECTION, SOLUTION INTRAVENOUS at 22:40

## 2025-08-04 RX ADMIN — LIDOCAINE HYDROCHLORIDE 10 ML: 20 SOLUTION ORAL at 21:33

## 2025-08-04 RX ADMIN — DIPHENHYDRAMINE HYDROCHLORIDE 25 MG: 50 INJECTION, SOLUTION INTRAMUSCULAR; INTRAVENOUS at 22:39

## 2025-08-04 RX ADMIN — ONDANSETRON 8 MG: 4 TABLET, ORALLY DISINTEGRATING ORAL at 21:33

## 2025-08-04 ASSESSMENT — ACTIVITIES OF DAILY LIVING (ADL): ADLS_ACUITY_SCORE: 58

## 2025-08-05 VITALS
WEIGHT: 110 LBS | HEART RATE: 80 BPM | OXYGEN SATURATION: 98 % | TEMPERATURE: 97.8 F | DIASTOLIC BLOOD PRESSURE: 58 MMHG | SYSTOLIC BLOOD PRESSURE: 103 MMHG | HEIGHT: 62 IN | RESPIRATION RATE: 16 BRPM | BODY MASS INDEX: 20.24 KG/M2

## 2025-08-05 PROCEDURE — 250N000011 HC RX IP 250 OP 636

## 2025-08-05 PROCEDURE — 96361 HYDRATE IV INFUSION ADD-ON: CPT

## 2025-08-05 RX ORDER — IOPAMIDOL 755 MG/ML
54 INJECTION, SOLUTION INTRAVASCULAR ONCE
Status: COMPLETED | OUTPATIENT
Start: 2025-08-05 | End: 2025-08-05

## 2025-08-05 RX ORDER — METOCLOPRAMIDE 10 MG/1
10 TABLET ORAL
Qty: 20 TABLET | Refills: 0 | Status: SHIPPED | OUTPATIENT
Start: 2025-08-05

## 2025-08-05 RX ADMIN — IOPAMIDOL 54 ML: 755 INJECTION, SOLUTION INTRAVENOUS at 00:07

## 2025-08-05 ASSESSMENT — ACTIVITIES OF DAILY LIVING (ADL)
ADLS_ACUITY_SCORE: 58
ADLS_ACUITY_SCORE: 58

## (undated) DEVICE — SU VICRYL 3-0 SH 8X18" UND J864D

## (undated) DEVICE — SU VICRYL 2-0 SH 8-27" J785G

## (undated) DEVICE — ESU LIGASURE IMPACT OPEN SEALER/DVDR CVD LG JAW LF4418

## (undated) DEVICE — Device

## (undated) DEVICE — FORCEP BIOPSY 2.3MM DISP COATED 000388

## (undated) DEVICE — ESU GROUND PAD ADULT REM W/15' CORD E7507DB

## (undated) DEVICE — ESU GROUND PAD ADULT W/CORD E7507

## (undated) DEVICE — SU VICRYL 3-0 SH 27" UND J416H

## (undated) DEVICE — TUBING SUCTION MEDI-VAC 1/4"X20' N620A

## (undated) DEVICE — GLOVE PROTEXIS MICRO 7.0  2D73PM70

## (undated) DEVICE — SU SILK 2-0 TIE 12X30" A305H

## (undated) DEVICE — SYR EAR BULB 3OZ 0035830

## (undated) DEVICE — NDL CANNULA INTERLINK BLUNT 18GA

## (undated) DEVICE — DRAPE IOBAN INCISE 23X17" 6650EZ

## (undated) DEVICE — SU SILK 4-0 RB-1 CR 8X18" C054D

## (undated) DEVICE — INTR ENDOSCOPIC STENT FUSION OASIS 09.0FRX200CM

## (undated) DEVICE — SUCTION MANIFOLD NEPTUNE 2 SYS 1 PORT 702-025-000

## (undated) DEVICE — TUBING SUCTION 6"X3/16" N56A

## (undated) DEVICE — GLOVE PROTEXIS BLUE W/NEU-THERA 7.5  2D73EB75

## (undated) DEVICE — SOL NACL 0.9% IRRIG 1000ML BOTTLE 07138-09

## (undated) DEVICE — GOWN IMPERVIOUS BREATHABLE SMART XLG 89045

## (undated) DEVICE — SU VICRYL 2-0 TIE 12X18" J905T

## (undated) DEVICE — RAD RX ISOVUE 300 (50ML) 61% IOPAMIDOL CHARGE PER ML

## (undated) DEVICE — SOL NACL 0.9% IRRIG 1000ML BOTTLE 2F7124

## (undated) DEVICE — KIT NEW IMAGE COLOSTOMY/ILEOSTOMY 2 3/4" LF 19354

## (undated) DEVICE — CATH FOLEY 18FR 5ML LATEX 0165SI18

## (undated) DEVICE — SNARE OVAL SMALL DISP 100600

## (undated) DEVICE — DRSG GAUZE 4X4" 3033

## (undated) DEVICE — SOL WATER IRRIG 1000ML BOTTLE 2F7114

## (undated) DEVICE — SUCTION MANIFOLD NEPTUNE 2 SYS 4 PORT 0702-020-000

## (undated) DEVICE — SU SILK 0 TIE 6X30" A306H

## (undated) DEVICE — SU PDS II 0 CT-1 27" Z340H

## (undated) DEVICE — SU VICRYL 0 CT-1 CR 8X18" J740D

## (undated) DEVICE — ENDO CATH BALLOON EXTRACTOR PRO RX 09-12MM 4700

## (undated) DEVICE — SU VICRYL 3-0 SH 27" J316H

## (undated) DEVICE — SU MONOCRYL 4-0 PS-2 27" UND Y426H

## (undated) DEVICE — MANIFOLD NEPTUNE 4 PORT 700-20

## (undated) DEVICE — LINEN TOWEL PACK X6 WHITE 5487

## (undated) DEVICE — DRAPE LEGGINGS CLEAR 8430

## (undated) DEVICE — SU SILK 3-0 TIE 12X30" A304H

## (undated) DEVICE — DRAPE U SPLIT 74X120" 29440

## (undated) DEVICE — STPL RELOAD 80 X 3.8MM GIA8038L

## (undated) DEVICE — BALLOON EXTRACTION 15X1950MM 3.2MM TL B-V243Q-A

## (undated) DEVICE — WIPES FOLEY CARE SURESTEP PROVON DFC100

## (undated) DEVICE — ENDO FORCEP ENDOJAW BIOPSY 2.8MMX230CM FB-220U

## (undated) DEVICE — SYR BULB IRRIG DOVER 60 ML LATEX FREE 67000

## (undated) DEVICE — ADH SKIN CLOSURE PREMIERPRO EXOFIN 1.0ML 3470

## (undated) DEVICE — ENDO TUBING W/CAP AUXILARY WATER INLET 100609 EGA-500

## (undated) DEVICE — WIRE GUIDE 0.35X270CM STRAIGHT TIP VISIGLIDE G-260-3527S

## (undated) DEVICE — LINEN TOWEL PACK X30 5481

## (undated) DEVICE — PREP CHLORAPREP 26ML TINTED ORANGE  260815

## (undated) DEVICE — PACK AB HYST II

## (undated) DEVICE — SU VICRYL 2-0 SH 27" UND J417H

## (undated) DEVICE — SYR 30ML LL W/O NDL 302832

## (undated) DEVICE — SU SILK 4-0 TF CR 8X18" C084D

## (undated) DEVICE — DECANTER BAG 2002S

## (undated) RX ORDER — FENTANYL CITRATE 50 UG/ML
INJECTION, SOLUTION INTRAMUSCULAR; INTRAVENOUS
Status: DISPENSED
Start: 2021-11-17

## (undated) RX ORDER — ACETAMINOPHEN 325 MG/1
TABLET ORAL
Status: DISPENSED
Start: 2021-11-17

## (undated) RX ORDER — PROPOFOL 10 MG/ML
INJECTION, EMULSION INTRAVENOUS
Status: DISPENSED
Start: 2020-11-13

## (undated) RX ORDER — LIDOCAINE HYDROCHLORIDE 10 MG/ML
INJECTION, SOLUTION EPIDURAL; INFILTRATION; INTRACAUDAL; PERINEURAL
Status: DISPENSED
Start: 2022-03-16

## (undated) RX ORDER — HYDROMORPHONE HCL IN WATER/PF 6 MG/30 ML
PATIENT CONTROLLED ANALGESIA SYRINGE INTRAVENOUS
Status: DISPENSED
Start: 2021-11-17

## (undated) RX ORDER — OXYCODONE HYDROCHLORIDE 10 MG/1
TABLET ORAL
Status: DISPENSED
Start: 2021-11-17

## (undated) RX ORDER — PROPOFOL 10 MG/ML
INJECTION, EMULSION INTRAVENOUS
Status: DISPENSED
Start: 2021-11-17

## (undated) RX ORDER — FENTANYL CITRATE-0.9 % NACL/PF 10 MCG/ML
PLASTIC BAG, INJECTION (ML) INTRAVENOUS
Status: DISPENSED
Start: 2021-11-17

## (undated) RX ORDER — HEPARIN SODIUM,PORCINE 10 UNIT/ML
VIAL (ML) INTRAVENOUS
Status: DISPENSED
Start: 2021-11-11

## (undated) RX ORDER — FENTANYL CITRATE 50 UG/ML
INJECTION, SOLUTION INTRAMUSCULAR; INTRAVENOUS
Status: DISPENSED
Start: 2024-09-25

## (undated) RX ORDER — APREPITANT 40 MG/1
CAPSULE ORAL
Status: DISPENSED
Start: 2024-09-25

## (undated) RX ORDER — CEFAZOLIN SODIUM 2 G/100ML
INJECTION, SOLUTION INTRAVENOUS
Status: DISPENSED
Start: 2021-11-17

## (undated) RX ORDER — HEPARIN SODIUM 1000 [USP'U]/ML
INJECTION, SOLUTION INTRAVENOUS; SUBCUTANEOUS
Status: DISPENSED
Start: 2021-11-11

## (undated) RX ORDER — SODIUM CHLORIDE, SODIUM LACTATE, POTASSIUM CHLORIDE, CALCIUM CHLORIDE 600; 310; 30; 20 MG/100ML; MG/100ML; MG/100ML; MG/100ML
INJECTION, SOLUTION INTRAVENOUS
Status: DISPENSED
Start: 2021-11-17

## (undated) RX ORDER — DEXAMETHASONE SODIUM PHOSPHATE 4 MG/ML
INJECTION, SOLUTION INTRA-ARTICULAR; INTRALESIONAL; INTRAMUSCULAR; INTRAVENOUS; SOFT TISSUE
Status: DISPENSED
Start: 2020-11-13

## (undated) RX ORDER — LIDOCAINE HYDROCHLORIDE 10 MG/ML
INJECTION, SOLUTION EPIDURAL; INFILTRATION; INTRACAUDAL; PERINEURAL
Status: DISPENSED
Start: 2021-11-11

## (undated) RX ORDER — ONDANSETRON 2 MG/ML
INJECTION INTRAMUSCULAR; INTRAVENOUS
Status: DISPENSED
Start: 2021-11-17

## (undated) RX ORDER — CEFAZOLIN SODIUM 2 G/100ML
INJECTION, SOLUTION INTRAVENOUS
Status: DISPENSED
Start: 2020-11-13

## (undated) RX ORDER — LIDOCAINE HYDROCHLORIDE 20 MG/ML
INJECTION, SOLUTION EPIDURAL; INFILTRATION; INTRACAUDAL; PERINEURAL
Status: DISPENSED
Start: 2020-11-13

## (undated) RX ORDER — FENTANYL CITRATE 50 UG/ML
INJECTION, SOLUTION INTRAMUSCULAR; INTRAVENOUS
Status: DISPENSED
Start: 2022-03-16

## (undated) RX ORDER — ONDANSETRON 2 MG/ML
INJECTION INTRAMUSCULAR; INTRAVENOUS
Status: DISPENSED
Start: 2020-11-13

## (undated) RX ORDER — DEXAMETHASONE SODIUM PHOSPHATE 4 MG/ML
INJECTION, SOLUTION INTRA-ARTICULAR; INTRALESIONAL; INTRAMUSCULAR; INTRAVENOUS; SOFT TISSUE
Status: DISPENSED
Start: 2021-11-17

## (undated) RX ORDER — EPHEDRINE SULFATE 50 MG/ML
INJECTION, SOLUTION INTRAMUSCULAR; INTRAVENOUS; SUBCUTANEOUS
Status: DISPENSED
Start: 2021-11-17

## (undated) RX ORDER — LIDOCAINE HYDROCHLORIDE 20 MG/ML
INJECTION, SOLUTION EPIDURAL; INFILTRATION; INTRACAUDAL; PERINEURAL
Status: DISPENSED
Start: 2021-11-17